# Patient Record
Sex: MALE | Race: WHITE | Employment: OTHER | URBAN - METROPOLITAN AREA
[De-identification: names, ages, dates, MRNs, and addresses within clinical notes are randomized per-mention and may not be internally consistent; named-entity substitution may affect disease eponyms.]

---

## 2017-09-29 PROBLEM — K86.2 CYSTIC MASS OF PANCREAS: Status: ACTIVE | Noted: 2017-09-29

## 2017-12-29 PROBLEM — R59.0 CERVICAL ADENOPATHY: Status: ACTIVE | Noted: 2017-12-29

## 2017-12-29 PROBLEM — M47.816 SPONDYLOSIS OF LUMBAR REGION WITHOUT MYELOPATHY OR RADICULOPATHY: Status: ACTIVE | Noted: 2017-12-29

## 2017-12-29 PROBLEM — K21.9 GASTROESOPHAGEAL REFLUX DISEASE WITHOUT ESOPHAGITIS: Status: ACTIVE | Noted: 2017-12-29

## 2018-02-26 PROBLEM — J45.909 ASTHMA: Status: ACTIVE | Noted: 2018-02-26

## 2018-04-05 ENCOUNTER — OFFICE VISIT (OUTPATIENT)
Dept: FAMILY MEDICINE CLINIC | Age: 53
End: 2018-04-05
Payer: COMMERCIAL

## 2018-04-05 VITALS
RESPIRATION RATE: 18 BRPM | BODY MASS INDEX: 27.99 KG/M2 | DIASTOLIC BLOOD PRESSURE: 78 MMHG | HEART RATE: 70 BPM | SYSTOLIC BLOOD PRESSURE: 126 MMHG | HEIGHT: 69 IN | WEIGHT: 189 LBS | OXYGEN SATURATION: 98 %

## 2018-04-05 DIAGNOSIS — K21.9 GASTROESOPHAGEAL REFLUX DISEASE WITHOUT ESOPHAGITIS: ICD-10-CM

## 2018-04-05 DIAGNOSIS — J43.9 PULMONARY EMPHYSEMA, UNSPECIFIED EMPHYSEMA TYPE (HCC): Primary | ICD-10-CM

## 2018-04-05 DIAGNOSIS — Z12.11 SCREENING FOR MALIGNANT NEOPLASM OF COLON: ICD-10-CM

## 2018-04-05 PROCEDURE — G8427 DOCREV CUR MEDS BY ELIG CLIN: HCPCS | Performed by: FAMILY MEDICINE

## 2018-04-05 PROCEDURE — 99214 OFFICE O/P EST MOD 30 MIN: CPT | Performed by: FAMILY MEDICINE

## 2018-04-05 PROCEDURE — 3017F COLORECTAL CA SCREEN DOC REV: CPT | Performed by: FAMILY MEDICINE

## 2018-04-05 PROCEDURE — 4004F PT TOBACCO SCREEN RCVD TLK: CPT | Performed by: FAMILY MEDICINE

## 2018-04-05 PROCEDURE — G8926 SPIRO NO PERF OR DOC: HCPCS | Performed by: FAMILY MEDICINE

## 2018-04-05 PROCEDURE — G8419 CALC BMI OUT NRM PARAM NOF/U: HCPCS | Performed by: FAMILY MEDICINE

## 2018-04-05 PROCEDURE — 3023F SPIROM DOC REV: CPT | Performed by: FAMILY MEDICINE

## 2018-04-05 RX ORDER — MONTELUKAST SODIUM 10 MG/1
10 TABLET ORAL NIGHTLY
Refills: 0 | Status: ON HOLD | COMMUNITY
Start: 2018-04-03 | End: 2018-12-07 | Stop reason: HOSPADM

## 2018-04-05 ASSESSMENT — ENCOUNTER SYMPTOMS
VOMITING: 0
COLOR CHANGE: 0
RHINORRHEA: 0
DIARRHEA: 0
DIFFICULTY BREATHING: 0
SHORTNESS OF BREATH: 0
APNEA: 0
HEARTBURN: 1
CONSTIPATION: 0
ABDOMINAL PAIN: 0
EYE REDNESS: 0
SORE THROAT: 0
CHOKING: 0
CHEST TIGHTNESS: 0
SINUS PRESSURE: 0
EYE PAIN: 0
WHEEZING: 0
SPUTUM PRODUCTION: 0
BACK PAIN: 0
BLOOD IN STOOL: 0
COUGH: 1
NAUSEA: 0
EYE ITCHING: 0

## 2018-04-05 ASSESSMENT — COPD QUESTIONNAIRES: COPD: 1

## 2018-04-09 ENCOUNTER — TELEPHONE (OUTPATIENT)
Dept: SURGERY | Age: 53
End: 2018-04-09

## 2018-04-12 ENCOUNTER — TELEPHONE (OUTPATIENT)
Dept: SURGERY | Age: 53
End: 2018-04-12

## 2018-05-08 ENCOUNTER — TELEPHONE (OUTPATIENT)
Dept: SURGERY | Age: 53
End: 2018-05-08

## 2018-05-10 RX ORDER — OMEPRAZOLE 20 MG/1
20 CAPSULE, DELAYED RELEASE ORAL DAILY
Qty: 90 CAPSULE | Refills: 1 | Status: SHIPPED | OUTPATIENT
Start: 2018-05-10 | End: 2018-05-30 | Stop reason: SDUPTHER

## 2018-05-14 DIAGNOSIS — J43.9 PULMONARY EMPHYSEMA, UNSPECIFIED EMPHYSEMA TYPE (HCC): Primary | ICD-10-CM

## 2018-05-14 DIAGNOSIS — J45.909 UNCOMPLICATED ASTHMA, UNSPECIFIED ASTHMA SEVERITY, UNSPECIFIED WHETHER PERSISTENT: ICD-10-CM

## 2018-05-30 DIAGNOSIS — J45.909 UNCOMPLICATED ASTHMA, UNSPECIFIED ASTHMA SEVERITY, UNSPECIFIED WHETHER PERSISTENT: ICD-10-CM

## 2018-05-30 RX ORDER — OMEPRAZOLE 20 MG/1
20 CAPSULE, DELAYED RELEASE ORAL DAILY
Qty: 90 CAPSULE | Refills: 1 | Status: SHIPPED | OUTPATIENT
Start: 2018-05-30 | End: 2018-12-26 | Stop reason: SDUPTHER

## 2018-06-19 ENCOUNTER — HOSPITAL ENCOUNTER (OUTPATIENT)
Dept: CT IMAGING | Age: 53
Discharge: HOME | End: 2018-06-21
Payer: COMMERCIAL

## 2018-06-19 DIAGNOSIS — J45.909 SEVERE ASTHMA WITHOUT COMPLICATION, UNSPECIFIED WHETHER PERSISTENT: ICD-10-CM

## 2018-06-19 PROCEDURE — 71250 CT THORAX DX C-: CPT

## 2018-07-31 ENCOUNTER — OFFICE VISIT (OUTPATIENT)
Dept: PULMONOLOGY | Age: 53
End: 2018-07-31
Payer: COMMERCIAL

## 2018-07-31 VITALS
HEIGHT: 71 IN | HEART RATE: 76 BPM | RESPIRATION RATE: 16 BRPM | BODY MASS INDEX: 25.2 KG/M2 | SYSTOLIC BLOOD PRESSURE: 137 MMHG | TEMPERATURE: 98.8 F | WEIGHT: 180 LBS | OXYGEN SATURATION: 97 % | DIASTOLIC BLOOD PRESSURE: 75 MMHG

## 2018-07-31 DIAGNOSIS — J45.909 SEVERE ASTHMA WITHOUT COMPLICATION, UNSPECIFIED WHETHER PERSISTENT: ICD-10-CM

## 2018-07-31 PROCEDURE — G8419 CALC BMI OUT NRM PARAM NOF/U: HCPCS | Performed by: INTERNAL MEDICINE

## 2018-07-31 PROCEDURE — 4004F PT TOBACCO SCREEN RCVD TLK: CPT | Performed by: INTERNAL MEDICINE

## 2018-07-31 PROCEDURE — 99213 OFFICE O/P EST LOW 20 MIN: CPT | Performed by: INTERNAL MEDICINE

## 2018-07-31 PROCEDURE — 3017F COLORECTAL CA SCREEN DOC REV: CPT | Performed by: INTERNAL MEDICINE

## 2018-07-31 PROCEDURE — G8427 DOCREV CUR MEDS BY ELIG CLIN: HCPCS | Performed by: INTERNAL MEDICINE

## 2018-10-09 ENCOUNTER — TELEPHONE (OUTPATIENT)
Dept: FAMILY MEDICINE CLINIC | Age: 53
End: 2018-10-09

## 2018-11-02 ENCOUNTER — HOSPITAL ENCOUNTER (EMERGENCY)
Age: 53
Discharge: HOME | End: 2018-11-02
Attending: EMERGENCY MEDICINE
Payer: COMMERCIAL

## 2018-11-02 ENCOUNTER — APPOINTMENT (OUTPATIENT)
Dept: CT IMAGING | Age: 53
End: 2018-11-02
Payer: COMMERCIAL

## 2018-11-02 VITALS
RESPIRATION RATE: 18 BRPM | OXYGEN SATURATION: 95 % | BODY MASS INDEX: 25.2 KG/M2 | HEART RATE: 98 BPM | SYSTOLIC BLOOD PRESSURE: 136 MMHG | HEIGHT: 71 IN | WEIGHT: 180 LBS | TEMPERATURE: 98.4 F | DIASTOLIC BLOOD PRESSURE: 80 MMHG

## 2018-11-02 DIAGNOSIS — R10.30 LOWER ABDOMINAL PAIN: Primary | ICD-10-CM

## 2018-11-02 DIAGNOSIS — R19.7 DIARRHEA, UNSPECIFIED TYPE: ICD-10-CM

## 2018-11-02 DIAGNOSIS — F10.920 ACUTE ALCOHOLIC INTOXICATION WITHOUT COMPLICATION (HCC): ICD-10-CM

## 2018-11-02 LAB
ACETAMINOPHEN LEVEL: <5 MCG/ML (ref 10–30)
ALBUMIN SERPL-MCNC: 3.5 G/DL (ref 3.5–5.2)
ALP BLD-CCNC: 100 U/L (ref 40–129)
ALT SERPL-CCNC: 123 U/L (ref 0–40)
AMPHETAMINE SCREEN, URINE: NOT DETECTED
AMYLASE: 53 U/L (ref 20–100)
ANION GAP SERPL CALCULATED.3IONS-SCNC: 14 MMOL/L (ref 7–16)
AST SERPL-CCNC: 162 U/L (ref 0–39)
BARBITURATE SCREEN URINE: NOT DETECTED
BASOPHILS ABSOLUTE: 0.02 E9/L (ref 0–0.2)
BASOPHILS RELATIVE PERCENT: 0.5 % (ref 0–2)
BENZODIAZEPINE SCREEN, URINE: NOT DETECTED
BILIRUB SERPL-MCNC: 0.5 MG/DL (ref 0–1.2)
BILIRUBIN DIRECT: 0.2 MG/DL (ref 0–0.3)
BILIRUBIN URINE: NEGATIVE
BILIRUBIN, INDIRECT: 0.3 MG/DL (ref 0–1)
BLOOD, URINE: NEGATIVE
BUN BLDV-MCNC: 3 MG/DL (ref 6–20)
CALCIUM SERPL-MCNC: 8.6 MG/DL (ref 8.6–10.2)
CANNABINOID SCREEN URINE: NOT DETECTED
CHLORIDE BLD-SCNC: 92 MMOL/L (ref 98–107)
CLARITY: CLEAR
CO2: 24 MMOL/L (ref 22–29)
COCAINE METABOLITE SCREEN URINE: NOT DETECTED
COLOR: YELLOW
CREAT SERPL-MCNC: 0.5 MG/DL (ref 0.7–1.2)
EOSINOPHILS ABSOLUTE: 0.07 E9/L (ref 0.05–0.5)
EOSINOPHILS RELATIVE PERCENT: 1.8 % (ref 0–6)
ETHANOL: 128 MG/DL (ref 0–0.08)
ETHANOL: 174 MG/DL (ref 0–0.08)
ETHANOL: 307 MG/DL (ref 0–0.08)
GFR AFRICAN AMERICAN: >60
GFR NON-AFRICAN AMERICAN: >60 ML/MIN/1.73
GLUCOSE BLD-MCNC: 94 MG/DL (ref 74–109)
GLUCOSE URINE: NEGATIVE MG/DL
HCT VFR BLD CALC: 39.9 % (ref 37–54)
HEMOGLOBIN: 14.4 G/DL (ref 12.5–16.5)
IMMATURE GRANULOCYTES #: 0.01 E9/L
IMMATURE GRANULOCYTES %: 0.3 % (ref 0–5)
KETONES, URINE: NEGATIVE MG/DL
LACTIC ACID: 2.7 MMOL/L (ref 0.5–2.2)
LACTIC ACID: 3 MMOL/L (ref 0.5–2.2)
LEUKOCYTE ESTERASE, URINE: NEGATIVE
LIPASE: 56 U/L (ref 13–60)
LYMPHOCYTES ABSOLUTE: 1.84 E9/L (ref 1.5–4)
LYMPHOCYTES RELATIVE PERCENT: 47.8 % (ref 20–42)
MCH RBC QN AUTO: 36.1 PG (ref 26–35)
MCHC RBC AUTO-ENTMCNC: 36.1 % (ref 32–34.5)
MCV RBC AUTO: 100 FL (ref 80–99.9)
METHADONE SCREEN, URINE: NOT DETECTED
MONOCYTES ABSOLUTE: 0.34 E9/L (ref 0.1–0.95)
MONOCYTES RELATIVE PERCENT: 8.8 % (ref 2–12)
NEUTROPHILS ABSOLUTE: 1.57 E9/L (ref 1.8–7.3)
NEUTROPHILS RELATIVE PERCENT: 40.8 % (ref 43–80)
NITRITE, URINE: NEGATIVE
OPIATE SCREEN URINE: NOT DETECTED
PDW BLD-RTO: 11.9 FL (ref 11.5–15)
PH UA: 6 (ref 5–9)
PHENCYCLIDINE SCREEN URINE: NOT DETECTED
PLATELET # BLD: 66 E9/L (ref 130–450)
PLATELET CONFIRMATION: NORMAL
PMV BLD AUTO: 11.6 FL (ref 7–12)
POTASSIUM SERPL-SCNC: 3.7 MMOL/L (ref 3.5–5)
PROPOXYPHENE SCREEN: NOT DETECTED
PROTEIN UA: NEGATIVE MG/DL
RBC # BLD: 3.99 E12/L (ref 3.8–5.8)
SALICYLATE, SERUM: <0.3 MG/DL (ref 0–30)
SODIUM BLD-SCNC: 130 MMOL/L (ref 132–146)
SPECIFIC GRAVITY UA: <=1.005 (ref 1–1.03)
TOTAL PROTEIN: 6.4 G/DL (ref 6.4–8.3)
TRICYCLIC ANTIDEPRESSANTS SCREEN SERUM: NEGATIVE NG/ML
UROBILINOGEN, URINE: 0.2 E.U./DL
WBC # BLD: 3.9 E9/L (ref 4.5–11.5)

## 2018-11-02 PROCEDURE — 74176 CT ABD & PELVIS W/O CONTRAST: CPT

## 2018-11-02 PROCEDURE — 6360000002 HC RX W HCPCS: Performed by: PHYSICIAN ASSISTANT

## 2018-11-02 PROCEDURE — 80048 BASIC METABOLIC PNL TOTAL CA: CPT

## 2018-11-02 PROCEDURE — 96366 THER/PROPH/DIAG IV INF ADDON: CPT

## 2018-11-02 PROCEDURE — 82150 ASSAY OF AMYLASE: CPT

## 2018-11-02 PROCEDURE — 83605 ASSAY OF LACTIC ACID: CPT

## 2018-11-02 PROCEDURE — 2500000003 HC RX 250 WO HCPCS: Performed by: PHYSICIAN ASSISTANT

## 2018-11-02 PROCEDURE — 2580000003 HC RX 258: Performed by: PHYSICIAN ASSISTANT

## 2018-11-02 PROCEDURE — 36415 COLL VENOUS BLD VENIPUNCTURE: CPT

## 2018-11-02 PROCEDURE — 96375 TX/PRO/DX INJ NEW DRUG ADDON: CPT

## 2018-11-02 PROCEDURE — 99284 EMERGENCY DEPT VISIT MOD MDM: CPT

## 2018-11-02 PROCEDURE — 81003 URINALYSIS AUTO W/O SCOPE: CPT

## 2018-11-02 PROCEDURE — G0480 DRUG TEST DEF 1-7 CLASSES: HCPCS

## 2018-11-02 PROCEDURE — 83690 ASSAY OF LIPASE: CPT

## 2018-11-02 PROCEDURE — 96365 THER/PROPH/DIAG IV INF INIT: CPT

## 2018-11-02 PROCEDURE — 80307 DRUG TEST PRSMV CHEM ANLYZR: CPT

## 2018-11-02 PROCEDURE — 85025 COMPLETE CBC W/AUTO DIFF WBC: CPT

## 2018-11-02 PROCEDURE — 80076 HEPATIC FUNCTION PANEL: CPT

## 2018-11-02 RX ORDER — DICYCLOMINE HYDROCHLORIDE 10 MG/1
10 CAPSULE ORAL
Qty: 20 CAPSULE | Refills: 0 | Status: SHIPPED | OUTPATIENT
Start: 2018-11-02 | End: 2018-11-30 | Stop reason: ALTCHOICE

## 2018-11-02 RX ORDER — ONDANSETRON 2 MG/ML
4 INJECTION INTRAMUSCULAR; INTRAVENOUS ONCE
Status: COMPLETED | OUTPATIENT
Start: 2018-11-02 | End: 2018-11-02

## 2018-11-02 RX ORDER — ONDANSETRON 4 MG/1
4 TABLET, FILM COATED ORAL EVERY 8 HOURS PRN
Qty: 12 TABLET | Refills: 0 | Status: SHIPPED | OUTPATIENT
Start: 2018-11-02 | End: 2018-11-07

## 2018-11-02 RX ORDER — SODIUM CHLORIDE 0.9 % (FLUSH) 0.9 %
SYRINGE (ML) INJECTION
Status: DISCONTINUED
Start: 2018-11-02 | End: 2018-11-02 | Stop reason: HOSPADM

## 2018-11-02 RX ORDER — 0.9 % SODIUM CHLORIDE 0.9 %
1000 INTRAVENOUS SOLUTION INTRAVENOUS ONCE
Status: COMPLETED | OUTPATIENT
Start: 2018-11-02 | End: 2018-11-02

## 2018-11-02 RX ADMIN — ONDANSETRON 4 MG: 2 INJECTION INTRAMUSCULAR; INTRAVENOUS at 02:57

## 2018-11-02 RX ADMIN — SODIUM CHLORIDE 1000 ML: 9 INJECTION, SOLUTION INTRAVENOUS at 02:57

## 2018-11-02 RX ADMIN — FOLIC ACID: 5 INJECTION, SOLUTION INTRAMUSCULAR; INTRAVENOUS; SUBCUTANEOUS at 04:33

## 2018-11-02 ASSESSMENT — PAIN DESCRIPTION - PAIN TYPE: TYPE: ACUTE PAIN

## 2018-11-02 ASSESSMENT — PAIN DESCRIPTION - LOCATION: LOCATION: ABDOMEN

## 2018-11-02 ASSESSMENT — PAIN SCALES - GENERAL: PAINLEVEL_OUTOF10: 10

## 2018-11-02 NOTE — ED PROVIDER NOTES
[facility]       Department of Emergency Medicine   ED  Provider Note  Admit Date/Time: 11/2/2018  2:03 AM  ED Bed: 22/22  Chief Complaint     Abdominal Pain    History of Present Illness   Source of history provided by:  patient. History/Exam Limitations: none. Gaviota Dennis is a 48 y.o. old male who has a past medical history of:   Past Medical History:   Diagnosis Date    Arthritis     Asthma 2/26/2018    Cancer [facility]) Diagnosed 2013    Pancreatic cancer    Chronic back pain     Plates inseted from C0-T4    COPD [facility] obstructive [facility] disease) [facility] [address]     Emphysema lung ([facility] [address]     Hepatitis C 2011    presents to the emergency department by private vehicle, for complaints of gradual onset, still present aching, colicky, cramping pain in the lower abdomen without radiation which began a few day(s) prior to arrival.  There has been no similar episodes in the past.   Since onset the symptoms have been persistent. The pain is associated with pt is drunk. The pain is aggravated by movement and relieved by nothing. There has been NO back pain, chills, constipation, diarrhea or vomiting. Pt is drunk and not reliable historian. .  ROS   Pertinent positives and negatives are stated within HPI, all other systems reviewed and are negative. Past Surgical History:   Procedure Laterality Date    BACK SURGERY      LEG SURGERY Left     Pins inserted   Social History:  reports that he has been smoking. He has a 30.00 pack-year smoking history. He has never used smokeless tobacco. He reports that he drinks alcohol. He reports that he does not use drugs. Family History: family history includes Breast Cancer in his mother; Heart Disease in his father. Allergies: Shellfish-derived products;  Strawberry extract; and Penicillins    Physical Exam           ED Triage Vitals [11/02/18 0209]   BP Temp Temp Source Pulse Resp SpO2 Height Weight   119/61 98.4 °F (36.9 °C) Oral 95 14 96 % 5' 11\" (1.803 m) ng/mL    Opiate Scrn, Ur NOT DETECTED Negative < 300ng/mL    PCP Screen, Urine NOT DETECTED Negative < 25 ng/mL    Methadone Screen, Urine NOT DETECTED Negative <300 ng/mL    Propoxyphene Scrn, Ur NOT DETECTED Negative <300 ng/mL   Platelet Confirmation   Result Value Ref Range    Platelet Confirmation CONFIRMED      Imaging: All Radiology results interpreted by Radiologist unless otherwise noted. CT ABDOMEN PELVIS WO CONTRAST   Final Result   1. Nondilated, gas and fluid-filled colon, compatible with diarrhea. 2. Normal appendix. This report has been electronically signed by Maximilian Felder MD.          ED Course / Medical Decision Making     Medications   sodium chloride 0.9 % 5,609 mL with folic acid 1 mg, adult multi-vitamin with vitamin k 10 mL, thiamine 100 mg (not administered)   sodium chloride flush 0.9 % injection (not administered)   ondansetron (ZOFRAN) injection 4 mg (4 mg Intravenous Given 11/2/18 0257)   0.9 % sodium chloride bolus (0 mLs Intravenous Stopped 11/2/18 0337)        Re-Evaluations:  11/2/18      Time: Pt is resting quietly    Patients symptoms show no change. Consultations:             None    Procedures:   none    MDM:  PT presents with abdominal pain, reportedly for a few months, pt is intoxicated and not a good historian. CT showing diarrheal illness and etoh 307. Pt states he does not have ride home. Held in ER until able to be discharged. PT asked to be driven to this hospital bypassing closer hospital on the way. Pt receiving IV fluids and nausea medication inED. Pt to obtain ride home or be dc when sober. CAre endorsed to Dr. Jeanna Cotto at 0400. Counseling:   I have spoken with the patient and discussed todays results, in addition to providing specific details for the plan of care and counseling regarding the diagnosis and prognosis and are agreeable with the plan. Assessment      1. Lower abdominal pain    2. Diarrhea, unspecified type    3.  Acute alcoholic intoxication without complication [facility])      This patient's ED course included: a personal history and physicial examination and re-evaluation prior to disposition  This patient has remained hemodynamically stable during their ED course. Plan   Discharge to home. Patient condition is stable. New Medications     New Prescriptions    No medications on file     Electronically signed by Scot Schultz PA-C   DD: 11/2/18  **This report was transcribed using voice recognition software. Every effort was made to ensure accuracy; however, inadvertent computerized transcription errors may be present.   END OF PROVIDER NOTE       Scot Schultz PA-C  11/02/18 [facility] RICKEY Jeong  11/02/18 6693

## 2018-11-21 DIAGNOSIS — D13.6 PANCREATIC CYSTADENOMA: Primary | ICD-10-CM

## 2018-11-26 ENCOUNTER — TELEPHONE (OUTPATIENT)
Dept: SURGERY | Age: 53
End: 2018-11-26

## 2018-11-26 NOTE — TELEPHONE ENCOUNTER
I called the patient's sister and shared with her that the patient is scheduled at [facility] on 12/14/18 at 11:00 am for his MRI. I also shared with her that the patient must NPO 4 hours prior to the test.   [facility] patient's sister stated she will try to tell the patient but he has not been getting his psych. Shots, which is making it hard for her to talk to him. I shared with her to let me know if the patient is unable or can not get the MRI.      Electronically signed by Luis Miguel Roldan on 11/26/18 at 3:12 PM

## 2018-11-29 DIAGNOSIS — K86.2 CYSTIC MASS OF PANCREAS: Primary | ICD-10-CM

## 2018-11-30 ENCOUNTER — HOSPITAL ENCOUNTER (INPATIENT)
Age: 53
LOS: 7 days | Discharge: HOME | DRG: 885 | End: 2018-12-07
Attending: EMERGENCY MEDICINE | Admitting: PSYCHIATRY & NEUROLOGY
Payer: COMMERCIAL

## 2018-11-30 DIAGNOSIS — R45.851 SUICIDAL IDEATION: Primary | ICD-10-CM

## 2018-11-30 PROBLEM — F32.A DEPRESSION: Status: ACTIVE | Noted: 2018-11-30

## 2018-11-30 PROBLEM — F32.2 SEVERE MAJOR DEPRESSION WITHOUT PSYCHOTIC FEATURES (HCC): Status: ACTIVE | Noted: 2018-11-30

## 2018-11-30 LAB
ACETAMINOPHEN LEVEL: <5 MCG/ML (ref 10–30)
ALBUMIN SERPL-MCNC: 3.9 G/DL (ref 3.5–5.2)
ALP BLD-CCNC: 67 U/L (ref 40–129)
ALT SERPL-CCNC: 34 U/L (ref 0–40)
AMPHETAMINE SCREEN, URINE: NOT DETECTED
ANION GAP SERPL CALCULATED.3IONS-SCNC: 12 MMOL/L (ref 7–16)
AST SERPL-CCNC: 32 U/L (ref 0–39)
BACTERIA: NORMAL /HPF
BARBITURATE SCREEN URINE: NOT DETECTED
BASOPHILS ABSOLUTE: 0.05 E9/L (ref 0–0.2)
BASOPHILS RELATIVE PERCENT: 0.9 % (ref 0–2)
BENZODIAZEPINE SCREEN, URINE: NOT DETECTED
BILIRUB SERPL-MCNC: 0.2 MG/DL (ref 0–1.2)
BILIRUBIN URINE: NEGATIVE
BLOOD, URINE: NEGATIVE
BUN BLDV-MCNC: 3 MG/DL (ref 6–20)
CALCIUM SERPL-MCNC: 8.5 MG/DL (ref 8.6–10.2)
CANNABINOID SCREEN URINE: NOT DETECTED
CHLORIDE BLD-SCNC: 96 MMOL/L (ref 98–107)
CLARITY: CLEAR
CO2: 26 MMOL/L (ref 22–29)
COCAINE METABOLITE SCREEN URINE: NOT DETECTED
COLOR: YELLOW
CREAT SERPL-MCNC: 0.6 MG/DL (ref 0.7–1.2)
EKG ATRIAL RATE: 76 BPM
EKG P AXIS: 60 DEGREES
EKG P-R INTERVAL: 138 MS
EKG Q-T INTERVAL: 386 MS
EKG QRS DURATION: 80 MS
EKG QTC CALCULATION (BAZETT): 434 MS
EKG R AXIS: 41 DEGREES
EKG T AXIS: 67 DEGREES
EKG VENTRICULAR RATE: 76 BPM
EOSINOPHILS ABSOLUTE: 0.04 E9/L (ref 0.05–0.5)
EOSINOPHILS RELATIVE PERCENT: 0.7 % (ref 0–6)
ETHANOL: 248 MG/DL (ref 0–0.08)
GFR AFRICAN AMERICAN: >60
GFR NON-AFRICAN AMERICAN: >60 ML/MIN/1.73
GLUCOSE BLD-MCNC: 95 MG/DL (ref 74–99)
GLUCOSE URINE: NEGATIVE MG/DL
HCT VFR BLD CALC: 42.9 % (ref 37–54)
HEMOGLOBIN: 14.5 G/DL (ref 12.5–16.5)
IMMATURE GRANULOCYTES #: 0.02 E9/L
IMMATURE GRANULOCYTES %: 0.4 % (ref 0–5)
KETONES, URINE: NEGATIVE MG/DL
LEUKOCYTE ESTERASE, URINE: ABNORMAL
LYMPHOCYTES ABSOLUTE: 1.94 E9/L (ref 1.5–4)
LYMPHOCYTES RELATIVE PERCENT: 35.8 % (ref 20–42)
MCH RBC QN AUTO: 35.3 PG (ref 26–35)
MCHC RBC AUTO-ENTMCNC: 33.8 % (ref 32–34.5)
MCV RBC AUTO: 104.4 FL (ref 80–99.9)
METHADONE SCREEN, URINE: NOT DETECTED
MONOCYTES ABSOLUTE: 0.57 E9/L (ref 0.1–0.95)
MONOCYTES RELATIVE PERCENT: 10.5 % (ref 2–12)
NEUTROPHILS ABSOLUTE: 2.8 E9/L (ref 1.8–7.3)
NEUTROPHILS RELATIVE PERCENT: 51.7 % (ref 43–80)
NITRITE, URINE: NEGATIVE
OPIATE SCREEN URINE: NOT DETECTED
PDW BLD-RTO: 12.4 FL (ref 11.5–15)
PH UA: 5 (ref 5–9)
PHENCYCLIDINE SCREEN URINE: NOT DETECTED
PLATELET # BLD: 157 E9/L (ref 130–450)
PMV BLD AUTO: 10.4 FL (ref 7–12)
POTASSIUM SERPL-SCNC: 4.1 MMOL/L (ref 3.5–5)
PROPOXYPHENE SCREEN: NOT DETECTED
PROTEIN UA: NEGATIVE MG/DL
RBC # BLD: 4.11 E12/L (ref 3.8–5.8)
RBC UA: NORMAL /HPF (ref 0–2)
SALICYLATE, SERUM: <0.3 MG/DL (ref 0–30)
SODIUM BLD-SCNC: 134 MMOL/L (ref 132–146)
SPECIFIC GRAVITY UA: <=1.005 (ref 1–1.03)
TOTAL PROTEIN: 7 G/DL (ref 6.4–8.3)
TRICYCLIC ANTIDEPRESSANTS SCREEN SERUM: NEGATIVE NG/ML
TROPONIN: <0.01 NG/ML (ref 0–0.03)
UROBILINOGEN, URINE: 0.2 E.U./DL
WBC # BLD: 5.4 E9/L (ref 4.5–11.5)
WBC UA: NORMAL /HPF (ref 0–5)

## 2018-11-30 PROCEDURE — 94640 AIRWAY INHALATION TREATMENT: CPT

## 2018-11-30 PROCEDURE — 36415 COLL VENOUS BLD VENIPUNCTURE: CPT

## 2018-11-30 PROCEDURE — 80053 COMPREHEN METABOLIC PANEL: CPT

## 2018-11-30 PROCEDURE — 85025 COMPLETE CBC W/AUTO DIFF WBC: CPT

## 2018-11-30 PROCEDURE — 1240000000 HC EMOTIONAL WELLNESS R&B

## 2018-11-30 PROCEDURE — 80307 DRUG TEST PRSMV CHEM ANLYZR: CPT

## 2018-11-30 PROCEDURE — 99285 EMERGENCY DEPT VISIT HI MDM: CPT

## 2018-11-30 PROCEDURE — G0480 DRUG TEST DEF 1-7 CLASSES: HCPCS

## 2018-11-30 PROCEDURE — 84484 ASSAY OF TROPONIN QUANT: CPT

## 2018-11-30 PROCEDURE — 81001 URINALYSIS AUTO W/SCOPE: CPT

## 2018-11-30 PROCEDURE — 6370000000 HC RX 637 (ALT 250 FOR IP): Performed by: NURSE PRACTITIONER

## 2018-11-30 PROCEDURE — 6360000002 HC RX W HCPCS: Performed by: NURSE PRACTITIONER

## 2018-11-30 RX ORDER — ACETAMINOPHEN 325 MG/1
650 TABLET ORAL EVERY 4 HOURS PRN
Status: DISCONTINUED | OUTPATIENT
Start: 2018-11-30 | End: 2018-12-07 | Stop reason: HOSPADM

## 2018-11-30 RX ORDER — ALBUTEROL SULFATE 90 UG/1
2 AEROSOL, METERED RESPIRATORY (INHALATION) EVERY 6 HOURS PRN
Status: DISCONTINUED | OUTPATIENT
Start: 2018-11-30 | End: 2018-12-07 | Stop reason: HOSPADM

## 2018-11-30 RX ORDER — HALOPERIDOL 5 MG/ML
10 INJECTION INTRAMUSCULAR EVERY 6 HOURS PRN
Status: DISCONTINUED | OUTPATIENT
Start: 2018-11-30 | End: 2018-12-07 | Stop reason: HOSPADM

## 2018-11-30 RX ORDER — LORAZEPAM 2 MG/ML
3 INJECTION INTRAMUSCULAR
Status: DISCONTINUED | OUTPATIENT
Start: 2018-11-30 | End: 2018-12-07 | Stop reason: HOSPADM

## 2018-11-30 RX ORDER — LORAZEPAM 1 MG/1
3 TABLET ORAL
Status: DISCONTINUED | OUTPATIENT
Start: 2018-11-30 | End: 2018-12-07 | Stop reason: HOSPADM

## 2018-11-30 RX ORDER — LORAZEPAM 1 MG/1
1 TABLET ORAL
Status: DISCONTINUED | OUTPATIENT
Start: 2018-11-30 | End: 2018-12-07 | Stop reason: HOSPADM

## 2018-11-30 RX ORDER — ONDANSETRON 2 MG/ML
4 INJECTION INTRAMUSCULAR; INTRAVENOUS EVERY 6 HOURS PRN
Status: DISCONTINUED | OUTPATIENT
Start: 2018-11-30 | End: 2018-12-07 | Stop reason: HOSPADM

## 2018-11-30 RX ORDER — LORAZEPAM 1 MG/1
4 TABLET ORAL
Status: DISCONTINUED | OUTPATIENT
Start: 2018-11-30 | End: 2018-12-07 | Stop reason: HOSPADM

## 2018-11-30 RX ORDER — MONTELUKAST SODIUM 10 MG/1
10 TABLET ORAL NIGHTLY
Status: DISCONTINUED | OUTPATIENT
Start: 2018-11-30 | End: 2018-12-07 | Stop reason: HOSPADM

## 2018-11-30 RX ORDER — TRAZODONE HYDROCHLORIDE 50 MG/1
50 TABLET ORAL NIGHTLY PRN
Status: DISCONTINUED | OUTPATIENT
Start: 2018-11-30 | End: 2018-12-07 | Stop reason: HOSPADM

## 2018-11-30 RX ORDER — LORAZEPAM 1 MG/1
2 TABLET ORAL
Status: DISCONTINUED | OUTPATIENT
Start: 2018-11-30 | End: 2018-12-07 | Stop reason: HOSPADM

## 2018-11-30 RX ORDER — LORAZEPAM 2 MG/ML
4 INJECTION INTRAMUSCULAR
Status: DISCONTINUED | OUTPATIENT
Start: 2018-11-30 | End: 2018-12-07 | Stop reason: HOSPADM

## 2018-11-30 RX ORDER — MULTIVITAMIN WITH FOLIC ACID 400 MCG
1 TABLET ORAL DAILY
Status: DISCONTINUED | OUTPATIENT
Start: 2018-11-30 | End: 2018-12-07 | Stop reason: HOSPADM

## 2018-11-30 RX ORDER — PANTOPRAZOLE SODIUM 40 MG/1
40 TABLET, DELAYED RELEASE ORAL
Status: DISCONTINUED | OUTPATIENT
Start: 2018-12-01 | End: 2018-12-07 | Stop reason: HOSPADM

## 2018-11-30 RX ORDER — THIAMINE MONONITRATE (VIT B1) 100 MG
100 TABLET ORAL DAILY
Status: DISCONTINUED | OUTPATIENT
Start: 2018-11-30 | End: 2018-12-07 | Stop reason: HOSPADM

## 2018-11-30 RX ORDER — LORAZEPAM 2 MG/ML
2 INJECTION INTRAMUSCULAR
Status: DISCONTINUED | OUTPATIENT
Start: 2018-11-30 | End: 2018-12-07 | Stop reason: HOSPADM

## 2018-11-30 RX ORDER — LORAZEPAM 2 MG/ML
1 INJECTION INTRAMUSCULAR
Status: DISCONTINUED | OUTPATIENT
Start: 2018-11-30 | End: 2018-12-07 | Stop reason: HOSPADM

## 2018-11-30 RX ORDER — MAGNESIUM HYDROXIDE/ALUMINUM HYDROXICE/SIMETHICONE 120; 1200; 1200 MG/30ML; MG/30ML; MG/30ML
30 SUSPENSION ORAL PRN
Status: DISCONTINUED | OUTPATIENT
Start: 2018-11-30 | End: 2018-12-07 | Stop reason: HOSPADM

## 2018-11-30 RX ORDER — HYDROXYZINE PAMOATE 50 MG/1
50 CAPSULE ORAL EVERY 6 HOURS PRN
Status: DISCONTINUED | OUTPATIENT
Start: 2018-11-30 | End: 2018-12-07 | Stop reason: HOSPADM

## 2018-11-30 RX ORDER — ALBUTEROL SULFATE 2.5 MG/3ML
2.5 SOLUTION RESPIRATORY (INHALATION) 3 TIMES DAILY
Status: DISCONTINUED | OUTPATIENT
Start: 2018-11-30 | End: 2018-12-07 | Stop reason: HOSPADM

## 2018-11-30 RX ORDER — FOLIC ACID 1 MG/1
1 TABLET ORAL DAILY
Status: DISCONTINUED | OUTPATIENT
Start: 2018-11-30 | End: 2018-12-07 | Stop reason: HOSPADM

## 2018-11-30 RX ORDER — BENZTROPINE MESYLATE 1 MG/ML
2 INJECTION INTRAMUSCULAR; INTRAVENOUS 2 TIMES DAILY PRN
Status: DISCONTINUED | OUTPATIENT
Start: 2018-11-30 | End: 2018-12-07 | Stop reason: HOSPADM

## 2018-11-30 RX ORDER — OLANZAPINE 10 MG/1
10 TABLET ORAL
Status: ACTIVE | OUTPATIENT
Start: 2018-11-30 | End: 2018-11-30

## 2018-11-30 RX ORDER — NICOTINE 21 MG/24HR
1 PATCH, TRANSDERMAL 24 HOURS TRANSDERMAL DAILY
Status: DISCONTINUED | OUTPATIENT
Start: 2018-11-30 | End: 2018-12-07 | Stop reason: HOSPADM

## 2018-11-30 RX ADMIN — MOMETASONE FUROATE AND FORMOTEROL FUMARATE DIHYDRATE 2 PUFF: 200; 5 AEROSOL RESPIRATORY (INHALATION) at 21:24

## 2018-11-30 RX ADMIN — MULTIVITAMIN TABLET 1 TABLET: TABLET at 18:39

## 2018-11-30 RX ADMIN — FOLIC ACID 1 MG: 1 TABLET ORAL at 18:39

## 2018-11-30 RX ADMIN — TRAZODONE HYDROCHLORIDE 50 MG: 50 TABLET ORAL at 20:39

## 2018-11-30 RX ADMIN — MONTELUKAST SODIUM 10 MG: 10 TABLET, FILM COATED ORAL at 20:31

## 2018-11-30 RX ADMIN — ACETAMINOPHEN 650 MG: 325 TABLET, FILM COATED ORAL at 20:31

## 2018-11-30 RX ADMIN — Medication 100 MG: at 18:39

## 2018-11-30 RX ADMIN — ALBUTEROL SULFATE 2.5 MG: 2.5 SOLUTION RESPIRATORY (INHALATION) at 20:00

## 2018-11-30 RX ADMIN — TIOTROPIUM BROMIDE 18 MCG: 18 CAPSULE ORAL; RESPIRATORY (INHALATION) at 20:39

## 2018-11-30 ASSESSMENT — PAIN DESCRIPTION - ORIENTATION: ORIENTATION: LOWER

## 2018-11-30 ASSESSMENT — SLEEP AND FATIGUE QUESTIONNAIRES
DIFFICULTY STAYING ASLEEP: YES
SLEEP PATTERN: DIFFICULTY FALLING ASLEEP;DISTURBED/INTERRUPTED SLEEP
DIFFICULTY ARISING: NO
DIFFICULTY FALLING ASLEEP: YES
DO YOU USE A SLEEP AID: NO
RESTFUL SLEEP: NO
DO YOU HAVE DIFFICULTY SLEEPING: YES
AVERAGE NUMBER OF SLEEP HOURS: 4

## 2018-11-30 ASSESSMENT — PAIN SCALES - GENERAL
PAINLEVEL_OUTOF10: 10
PAINLEVEL_OUTOF10: 10
PAINLEVEL_OUTOF10: 0

## 2018-11-30 ASSESSMENT — LIFESTYLE VARIABLES: HISTORY_ALCOHOL_USE: YES

## 2018-11-30 ASSESSMENT — PAIN DESCRIPTION - PAIN TYPE
TYPE: CHRONIC PAIN
TYPE: CHRONIC PAIN

## 2018-11-30 ASSESSMENT — PAIN DESCRIPTION - DESCRIPTORS
DESCRIPTORS: ACHING;DISCOMFORT
DESCRIPTORS: ACHING;CONSTANT

## 2018-11-30 ASSESSMENT — PAIN DESCRIPTION - ONSET: ONSET: ON-GOING

## 2018-11-30 ASSESSMENT — PAIN DESCRIPTION - FREQUENCY: FREQUENCY: CONTINUOUS

## 2018-11-30 ASSESSMENT — PAIN DESCRIPTION - LOCATION
LOCATION: BACK
LOCATION: BACK

## 2018-11-30 ASSESSMENT — PATIENT HEALTH QUESTIONNAIRE - PHQ9: SUM OF ALL RESPONSES TO PHQ QUESTIONS 1-9: 12

## 2018-11-30 NOTE — ED PROVIDER NOTES
and counseling regarding the diagnosis and prognosis. Questions are answered at this time and they are agreeable with the plan.         --------------------------------- IMPRESSION AND DISPOSITION ---------------------------------    IMPRESSION  1.  Suicidal ideation        DISPOSITION  Disposition: as per consultation   Patient condition is stable             Iveth Cee, ASHLIE - CNP  11/30/18 5386

## 2018-12-01 LAB
CHOLESTEROL, TOTAL: 179 MG/DL (ref 0–199)
HBA1C MFR BLD: 4.6 % (ref 4–5.6)
HDLC SERPL-MCNC: 56 MG/DL
LDL CHOLESTEROL CALCULATED: 108 MG/DL (ref 0–99)
TRIGL SERPL-MCNC: 76 MG/DL (ref 0–149)
VLDLC SERPL CALC-MCNC: 15 MG/DL

## 2018-12-01 PROCEDURE — 1240000000 HC EMOTIONAL WELLNESS R&B

## 2018-12-01 PROCEDURE — 6370000000 HC RX 637 (ALT 250 FOR IP): Performed by: PSYCHIATRY & NEUROLOGY

## 2018-12-01 PROCEDURE — 36415 COLL VENOUS BLD VENIPUNCTURE: CPT

## 2018-12-01 PROCEDURE — 99221 1ST HOSP IP/OBS SF/LOW 40: CPT | Performed by: PSYCHIATRY & NEUROLOGY

## 2018-12-01 PROCEDURE — 6370000000 HC RX 637 (ALT 250 FOR IP): Performed by: NURSE PRACTITIONER

## 2018-12-01 PROCEDURE — 83036 HEMOGLOBIN GLYCOSYLATED A1C: CPT

## 2018-12-01 PROCEDURE — 94640 AIRWAY INHALATION TREATMENT: CPT

## 2018-12-01 PROCEDURE — 80061 LIPID PANEL: CPT

## 2018-12-01 PROCEDURE — 6360000002 HC RX W HCPCS: Performed by: NURSE PRACTITIONER

## 2018-12-01 RX ORDER — TRAZODONE HYDROCHLORIDE 50 MG/1
50 TABLET ORAL NIGHTLY
Status: DISCONTINUED | OUTPATIENT
Start: 2018-12-01 | End: 2018-12-07 | Stop reason: HOSPADM

## 2018-12-01 RX ORDER — VENLAFAXINE HYDROCHLORIDE 37.5 MG/1
37.5 CAPSULE, EXTENDED RELEASE ORAL EVERY MORNING
Status: DISCONTINUED | OUTPATIENT
Start: 2018-12-01 | End: 2018-12-02

## 2018-12-01 RX ADMIN — ALBUTEROL SULFATE 2.5 MG: 2.5 SOLUTION RESPIRATORY (INHALATION) at 12:27

## 2018-12-01 RX ADMIN — FOLIC ACID 1 MG: 1 TABLET ORAL at 08:20

## 2018-12-01 RX ADMIN — MULTIVITAMIN TABLET 1 TABLET: TABLET at 08:21

## 2018-12-01 RX ADMIN — MOMETASONE FUROATE AND FORMOTEROL FUMARATE DIHYDRATE 2 PUFF: 200; 5 AEROSOL RESPIRATORY (INHALATION) at 21:06

## 2018-12-01 RX ADMIN — Medication 100 MG: at 08:20

## 2018-12-01 RX ADMIN — TRAZODONE HYDROCHLORIDE 50 MG: 50 TABLET ORAL at 21:07

## 2018-12-01 RX ADMIN — PANTOPRAZOLE SODIUM 40 MG: 40 TABLET, DELAYED RELEASE ORAL at 06:43

## 2018-12-01 RX ADMIN — VENLAFAXINE HYDROCHLORIDE 37.5 MG: 37.5 CAPSULE, EXTENDED RELEASE ORAL at 12:35

## 2018-12-01 RX ADMIN — MONTELUKAST SODIUM 10 MG: 10 TABLET, FILM COATED ORAL at 21:07

## 2018-12-01 RX ADMIN — ACETAMINOPHEN 650 MG: 325 TABLET, FILM COATED ORAL at 06:51

## 2018-12-01 ASSESSMENT — PAIN SCALES - GENERAL
PAINLEVEL_OUTOF10: 0
PAINLEVEL_OUTOF10: 10

## 2018-12-01 NOTE — H&P
Physical Exam:        Medical Review of Systems:     All other than marked systmes have been reviewed and are all negative. Constitutional Symptoms: []  fever []  Chills  Skin Symptoms: [] rash []  Pruritus   Eye Symptoms: [] Vision unchanged []  recent vision problems[] blurred vision   Respiratory Symptoms:[] shortness of breath [] cough  Cardiovascular Symptoms:  [] chest pain   [] palpitations   Gastrointestinal Symptoms: []  abdominal pain []  nausea []  vomiting []  diarrhea  Genitourinary Symptoms: []  dysuria  []  hematuria   Musculoskeletal Symptoms: []  back pain []  muscle pain []  joint pain  Neurologic Symptoms: []  headache []  dizziness  Hematolymphoid Symptoms: [] Adenopathy [] Bruises   [] Schimosis       VITALS: /84   Pulse 58   Temp 97.5 °F (36.4 °C) (Temporal)   Resp 16   Ht 5' 11\" (1.803 m)   Wt 160 lb (72.6 kg)   SpO2 99%   BMI 22.32 kg/m²     ALLERGIES: Shellfish-derived products;  Strawberry extract; and Penicillins       Physical Examination:    Head:  [x] Atraumatic:  [x] normocephalic  Skin and Mucosa       [] Moist [] Dry [] Pale [x] Normal   Neck: [x] Thyroid [] Palpable    [x] Not palpable []  venus distention [] adenopathy   Chest: [x] Clear [] Rhonchi  [] Wheezing   CV: [x] S1 [x] S2 [x] No murmer   Abdomen:  [x] Soft   [] Tender  [] Viceromegaly   Extremities:  [x] No Edema   [] Edema    Cranial Nerves Examination:    CN II: [x] Pupils are reactive to light [] Pupils are non reactive to light  CN III, IV, VI:[x] No eye deviation  [x] No diplopia or ptosis   CN V: [x] Facial Sensation is intact  [] Facial Sensation is not intact   CN IIIV:  [x] Hearing is normal to rubbing fingers   CN IX, X:  [x] Normal gag reflex and phonation   CN XI: [x] Shoulder shrug and neck rotation is normal  CNXII: [x] Tongue is midline no deviation or atrophy       For further PE refer to ED note

## 2018-12-02 PROCEDURE — 6370000000 HC RX 637 (ALT 250 FOR IP): Performed by: NURSE PRACTITIONER

## 2018-12-02 PROCEDURE — 1240000000 HC EMOTIONAL WELLNESS R&B

## 2018-12-02 PROCEDURE — 6360000002 HC RX W HCPCS: Performed by: NURSE PRACTITIONER

## 2018-12-02 PROCEDURE — 6370000000 HC RX 637 (ALT 250 FOR IP): Performed by: PSYCHIATRY & NEUROLOGY

## 2018-12-02 PROCEDURE — 99232 SBSQ HOSP IP/OBS MODERATE 35: CPT | Performed by: NURSE PRACTITIONER

## 2018-12-02 PROCEDURE — 94640 AIRWAY INHALATION TREATMENT: CPT

## 2018-12-02 RX ORDER — VENLAFAXINE HYDROCHLORIDE 75 MG/1
75 CAPSULE, EXTENDED RELEASE ORAL EVERY MORNING
Status: DISCONTINUED | OUTPATIENT
Start: 2018-12-03 | End: 2018-12-06

## 2018-12-02 RX ADMIN — TRAZODONE HYDROCHLORIDE 50 MG: 50 TABLET ORAL at 21:15

## 2018-12-02 RX ADMIN — ALBUTEROL SULFATE 2.5 MG: 2.5 SOLUTION RESPIRATORY (INHALATION) at 18:15

## 2018-12-02 RX ADMIN — VENLAFAXINE HYDROCHLORIDE 37.5 MG: 37.5 CAPSULE, EXTENDED RELEASE ORAL at 10:25

## 2018-12-02 RX ADMIN — PANTOPRAZOLE SODIUM 40 MG: 40 TABLET, DELAYED RELEASE ORAL at 07:18

## 2018-12-02 RX ADMIN — ALBUTEROL SULFATE 2.5 MG: 2.5 SOLUTION RESPIRATORY (INHALATION) at 13:40

## 2018-12-02 RX ADMIN — ACETAMINOPHEN 650 MG: 325 TABLET, FILM COATED ORAL at 21:15

## 2018-12-02 RX ADMIN — MONTELUKAST SODIUM 10 MG: 10 TABLET, FILM COATED ORAL at 21:15

## 2018-12-02 RX ADMIN — ALBUTEROL SULFATE 2.5 MG: 2.5 SOLUTION RESPIRATORY (INHALATION) at 10:06

## 2018-12-02 RX ADMIN — FOLIC ACID 1 MG: 1 TABLET ORAL at 10:25

## 2018-12-02 RX ADMIN — Medication 100 MG: at 10:25

## 2018-12-02 RX ADMIN — MULTIVITAMIN TABLET 1 TABLET: TABLET at 10:25

## 2018-12-02 ASSESSMENT — PAIN SCALES - GENERAL
PAINLEVEL_OUTOF10: 9
PAINLEVEL_OUTOF10: 10

## 2018-12-02 NOTE — PLAN OF CARE
Problem: Depressive Behavior With or Without Suicide Precautions:  Goal: Able to verbalize and/or display a decrease in depressive symptoms  Able to verbalize and/or display a decrease in depressive symptoms   Outcome: Ongoing  ISOLATING HIMSELF IN HIS ROOM. OUT FOR DINNER AND ATE WELL. CONTINUES WITH COMPLAINTS OF SUICIDAL THOUGHTS. DENIES INTENT OR PLAN. DENIES THOUGHTS OF HARMING OTHERS. REPORTS HALLUCINATIONS NON SPECIFIC TALKING AND LAUGHING. DID ATTEND GOAL GROUP.

## 2018-12-02 NOTE — PLAN OF CARE
Problem: Depressive Behavior With or Without Suicide Precautions:  Goal: Able to verbalize and/or display a decrease in depressive symptoms  Able to verbalize and/or display a decrease in depressive symptoms   Outcome: Ongoing  ISOLATING HIMSELF IN HIS BED. DID COME OUT FOR DINNER. REPOTS THOUGHTS OF SUICIDE, BUT DENIES INTENT OR PLAN. NO SIGN OR SYMPTOMS OF WITHDRAWAL.

## 2018-12-03 PROCEDURE — 6370000000 HC RX 637 (ALT 250 FOR IP): Performed by: PSYCHIATRY & NEUROLOGY

## 2018-12-03 PROCEDURE — 99232 SBSQ HOSP IP/OBS MODERATE 35: CPT | Performed by: NURSE PRACTITIONER

## 2018-12-03 PROCEDURE — 1240000000 HC EMOTIONAL WELLNESS R&B

## 2018-12-03 PROCEDURE — 6370000000 HC RX 637 (ALT 250 FOR IP): Performed by: NURSE PRACTITIONER

## 2018-12-03 RX ORDER — ARIPIPRAZOLE 2 MG/1
2 TABLET ORAL 2 TIMES DAILY
Status: DISCONTINUED | OUTPATIENT
Start: 2018-12-03 | End: 2018-12-04

## 2018-12-03 RX ADMIN — TRAZODONE HYDROCHLORIDE 50 MG: 50 TABLET ORAL at 21:37

## 2018-12-03 RX ADMIN — MOMETASONE FUROATE AND FORMOTEROL FUMARATE DIHYDRATE 2 PUFF: 200; 5 AEROSOL RESPIRATORY (INHALATION) at 21:37

## 2018-12-03 RX ADMIN — PANTOPRAZOLE SODIUM 40 MG: 40 TABLET, DELAYED RELEASE ORAL at 06:39

## 2018-12-03 RX ADMIN — VENLAFAXINE HYDROCHLORIDE 75 MG: 75 CAPSULE, EXTENDED RELEASE ORAL at 09:47

## 2018-12-03 RX ADMIN — TIOTROPIUM BROMIDE 18 MCG: 18 CAPSULE ORAL; RESPIRATORY (INHALATION) at 09:49

## 2018-12-03 RX ADMIN — ARIPIPRAZOLE 2 MG: 2 TABLET ORAL at 10:37

## 2018-12-03 RX ADMIN — MOMETASONE FUROATE AND FORMOTEROL FUMARATE DIHYDRATE 2 PUFF: 200; 5 AEROSOL RESPIRATORY (INHALATION) at 09:49

## 2018-12-03 RX ADMIN — Medication 100 MG: at 09:47

## 2018-12-03 RX ADMIN — MULTIVITAMIN TABLET 1 TABLET: TABLET at 09:47

## 2018-12-03 RX ADMIN — MONTELUKAST SODIUM 10 MG: 10 TABLET, FILM COATED ORAL at 21:36

## 2018-12-03 RX ADMIN — FOLIC ACID 1 MG: 1 TABLET ORAL at 09:47

## 2018-12-03 RX ADMIN — ARIPIPRAZOLE 2 MG: 2 TABLET ORAL at 21:36

## 2018-12-03 RX ADMIN — TRAZODONE HYDROCHLORIDE 50 MG: 50 TABLET ORAL at 21:36

## 2018-12-04 PROCEDURE — 6370000000 HC RX 637 (ALT 250 FOR IP): Performed by: NURSE PRACTITIONER

## 2018-12-04 PROCEDURE — 6370000000 HC RX 637 (ALT 250 FOR IP): Performed by: PSYCHIATRY & NEUROLOGY

## 2018-12-04 PROCEDURE — 99231 SBSQ HOSP IP/OBS SF/LOW 25: CPT | Performed by: NURSE PRACTITIONER

## 2018-12-04 PROCEDURE — 1240000000 HC EMOTIONAL WELLNESS R&B

## 2018-12-04 RX ORDER — ARIPIPRAZOLE 5 MG/1
5 TABLET ORAL 2 TIMES DAILY
Status: DISCONTINUED | OUTPATIENT
Start: 2018-12-04 | End: 2018-12-05

## 2018-12-04 RX ADMIN — PANTOPRAZOLE SODIUM 40 MG: 40 TABLET, DELAYED RELEASE ORAL at 06:30

## 2018-12-04 RX ADMIN — VENLAFAXINE HYDROCHLORIDE 75 MG: 75 CAPSULE, EXTENDED RELEASE ORAL at 10:14

## 2018-12-04 RX ADMIN — FOLIC ACID 1 MG: 1 TABLET ORAL at 10:14

## 2018-12-04 RX ADMIN — TRAZODONE HYDROCHLORIDE 50 MG: 50 TABLET ORAL at 21:12

## 2018-12-04 RX ADMIN — MONTELUKAST SODIUM 10 MG: 10 TABLET, FILM COATED ORAL at 21:12

## 2018-12-04 RX ADMIN — Medication 100 MG: at 10:14

## 2018-12-04 RX ADMIN — TRAZODONE HYDROCHLORIDE 50 MG: 50 TABLET ORAL at 21:20

## 2018-12-04 RX ADMIN — MOMETASONE FUROATE AND FORMOTEROL FUMARATE DIHYDRATE 2 PUFF: 200; 5 AEROSOL RESPIRATORY (INHALATION) at 10:19

## 2018-12-04 RX ADMIN — TIOTROPIUM BROMIDE 18 MCG: 18 CAPSULE ORAL; RESPIRATORY (INHALATION) at 10:19

## 2018-12-04 RX ADMIN — MULTIVITAMIN TABLET 1 TABLET: TABLET at 10:14

## 2018-12-04 RX ADMIN — ARIPIPRAZOLE 5 MG: 5 TABLET ORAL at 21:12

## 2018-12-04 RX ADMIN — MOMETASONE FUROATE AND FORMOTEROL FUMARATE DIHYDRATE 2 PUFF: 200; 5 AEROSOL RESPIRATORY (INHALATION) at 21:12

## 2018-12-04 ASSESSMENT — PAIN SCALES - GENERAL: PAINLEVEL_OUTOF10: 0

## 2018-12-04 NOTE — PLAN OF CARE
Problem: Depressive Behavior With or Without Suicide Precautions:  Goal: Able to verbalize acceptance of life and situations over which he or she has no control  Able to verbalize acceptance of life and situations over which he or she has no control   Outcome: Ongoing    Goal: Able to verbalize and/or display a decrease in depressive symptoms  Able to verbalize and/or display a decrease in depressive symptoms   Outcome: Ongoing  Patient reports SI with no plan. Contracts for safety. Says he is hearing voices but no commands. Denies HI. Affect quiet, flat, but cooperative and polite. Accepting of meds, meals, and some groups.

## 2018-12-05 PROCEDURE — 94640 AIRWAY INHALATION TREATMENT: CPT

## 2018-12-05 PROCEDURE — 6370000000 HC RX 637 (ALT 250 FOR IP): Performed by: PSYCHIATRY & NEUROLOGY

## 2018-12-05 PROCEDURE — 1240000000 HC EMOTIONAL WELLNESS R&B

## 2018-12-05 PROCEDURE — 6360000002 HC RX W HCPCS: Performed by: NURSE PRACTITIONER

## 2018-12-05 PROCEDURE — 99231 SBSQ HOSP IP/OBS SF/LOW 25: CPT | Performed by: NURSE PRACTITIONER

## 2018-12-05 PROCEDURE — 6370000000 HC RX 637 (ALT 250 FOR IP): Performed by: NURSE PRACTITIONER

## 2018-12-05 RX ORDER — ARIPIPRAZOLE 15 MG/1
15 TABLET ORAL EVERY EVENING
Status: DISCONTINUED | OUTPATIENT
Start: 2018-12-06 | End: 2018-12-07 | Stop reason: HOSPADM

## 2018-12-05 RX ADMIN — TRAZODONE HYDROCHLORIDE 50 MG: 50 TABLET ORAL at 20:34

## 2018-12-05 RX ADMIN — ARIPIPRAZOLE 5 MG: 5 TABLET ORAL at 10:16

## 2018-12-05 RX ADMIN — ALBUTEROL SULFATE 2.5 MG: 2.5 SOLUTION RESPIRATORY (INHALATION) at 14:17

## 2018-12-05 RX ADMIN — VENLAFAXINE HYDROCHLORIDE 75 MG: 75 CAPSULE, EXTENDED RELEASE ORAL at 10:16

## 2018-12-05 RX ADMIN — MOMETASONE FUROATE AND FORMOTEROL FUMARATE DIHYDRATE 2 PUFF: 200; 5 AEROSOL RESPIRATORY (INHALATION) at 10:18

## 2018-12-05 RX ADMIN — TIOTROPIUM BROMIDE 18 MCG: 18 CAPSULE ORAL; RESPIRATORY (INHALATION) at 10:18

## 2018-12-05 RX ADMIN — FOLIC ACID 1 MG: 1 TABLET ORAL at 10:16

## 2018-12-05 RX ADMIN — MONTELUKAST SODIUM 10 MG: 10 TABLET, FILM COATED ORAL at 20:30

## 2018-12-05 RX ADMIN — ALBUTEROL SULFATE 2.5 MG: 2.5 SOLUTION RESPIRATORY (INHALATION) at 09:35

## 2018-12-05 RX ADMIN — ALBUTEROL SULFATE 2 PUFF: 90 AEROSOL, METERED RESPIRATORY (INHALATION) at 20:30

## 2018-12-05 RX ADMIN — PANTOPRAZOLE SODIUM 40 MG: 40 TABLET, DELAYED RELEASE ORAL at 06:27

## 2018-12-05 RX ADMIN — MOMETASONE FUROATE AND FORMOTEROL FUMARATE DIHYDRATE 2 PUFF: 200; 5 AEROSOL RESPIRATORY (INHALATION) at 20:30

## 2018-12-05 RX ADMIN — MULTIVITAMIN TABLET 1 TABLET: TABLET at 10:16

## 2018-12-05 RX ADMIN — Medication 100 MG: at 10:16

## 2018-12-05 RX ADMIN — TRAZODONE HYDROCHLORIDE 50 MG: 50 TABLET ORAL at 20:30

## 2018-12-05 ASSESSMENT — PAIN SCALES - GENERAL: PAINLEVEL_OUTOF10: 0

## 2018-12-06 PROCEDURE — 94640 AIRWAY INHALATION TREATMENT: CPT

## 2018-12-06 PROCEDURE — 6370000000 HC RX 637 (ALT 250 FOR IP): Performed by: NURSE PRACTITIONER

## 2018-12-06 PROCEDURE — 1240000000 HC EMOTIONAL WELLNESS R&B

## 2018-12-06 PROCEDURE — 99231 SBSQ HOSP IP/OBS SF/LOW 25: CPT | Performed by: NURSE PRACTITIONER

## 2018-12-06 PROCEDURE — 6370000000 HC RX 637 (ALT 250 FOR IP): Performed by: PSYCHIATRY & NEUROLOGY

## 2018-12-06 PROCEDURE — 6360000002 HC RX W HCPCS: Performed by: NURSE PRACTITIONER

## 2018-12-06 RX ORDER — NALTREXONE HYDROCHLORIDE 50 MG/1
50 TABLET, FILM COATED ORAL DAILY
Status: DISCONTINUED | OUTPATIENT
Start: 2018-12-06 | End: 2018-12-07 | Stop reason: HOSPADM

## 2018-12-06 RX ORDER — VENLAFAXINE HYDROCHLORIDE 150 MG/1
150 CAPSULE, EXTENDED RELEASE ORAL EVERY MORNING
Status: DISCONTINUED | OUTPATIENT
Start: 2018-12-07 | End: 2018-12-07 | Stop reason: HOSPADM

## 2018-12-06 RX ADMIN — Medication 100 MG: at 08:50

## 2018-12-06 RX ADMIN — FOLIC ACID 1 MG: 1 TABLET ORAL at 08:50

## 2018-12-06 RX ADMIN — ALBUTEROL SULFATE 2.5 MG: 2.5 SOLUTION RESPIRATORY (INHALATION) at 12:15

## 2018-12-06 RX ADMIN — ARIPIPRAZOLE 15 MG: 15 TABLET ORAL at 17:10

## 2018-12-06 RX ADMIN — TRAZODONE HYDROCHLORIDE 50 MG: 50 TABLET ORAL at 20:20

## 2018-12-06 RX ADMIN — MULTIVITAMIN TABLET 1 TABLET: TABLET at 08:50

## 2018-12-06 RX ADMIN — PANTOPRAZOLE SODIUM 40 MG: 40 TABLET, DELAYED RELEASE ORAL at 06:17

## 2018-12-06 RX ADMIN — TRAZODONE HYDROCHLORIDE 50 MG: 50 TABLET ORAL at 20:21

## 2018-12-06 RX ADMIN — NALTREXONE HYDROCHLORIDE 50 MG: 50 TABLET, FILM COATED ORAL at 14:35

## 2018-12-06 RX ADMIN — ALBUTEROL SULFATE 2.5 MG: 2.5 SOLUTION RESPIRATORY (INHALATION) at 21:55

## 2018-12-06 RX ADMIN — MONTELUKAST SODIUM 10 MG: 10 TABLET, FILM COATED ORAL at 20:20

## 2018-12-06 RX ADMIN — MOMETASONE FUROATE AND FORMOTEROL FUMARATE DIHYDRATE 2 PUFF: 200; 5 AEROSOL RESPIRATORY (INHALATION) at 08:50

## 2018-12-06 RX ADMIN — MOMETASONE FUROATE AND FORMOTEROL FUMARATE DIHYDRATE 2 PUFF: 200; 5 AEROSOL RESPIRATORY (INHALATION) at 20:22

## 2018-12-06 RX ADMIN — ACETAMINOPHEN 650 MG: 325 TABLET, FILM COATED ORAL at 21:45

## 2018-12-06 RX ADMIN — ALBUTEROL SULFATE 2.5 MG: 2.5 SOLUTION RESPIRATORY (INHALATION) at 08:30

## 2018-12-06 RX ADMIN — TIOTROPIUM BROMIDE 18 MCG: 18 CAPSULE ORAL; RESPIRATORY (INHALATION) at 08:51

## 2018-12-06 RX ADMIN — VENLAFAXINE HYDROCHLORIDE 75 MG: 75 CAPSULE, EXTENDED RELEASE ORAL at 08:50

## 2018-12-06 ASSESSMENT — PAIN DESCRIPTION - LOCATION: LOCATION: BACK;SHOULDER

## 2018-12-06 ASSESSMENT — PAIN SCALES - GENERAL
PAINLEVEL_OUTOF10: 0
PAINLEVEL_OUTOF10: 8

## 2018-12-06 ASSESSMENT — PAIN DESCRIPTION - PAIN TYPE: TYPE: CHRONIC PAIN

## 2018-12-06 ASSESSMENT — PAIN DESCRIPTION - DESCRIPTORS: DESCRIPTORS: ACHING;DISCOMFORT;CONSTANT

## 2018-12-06 ASSESSMENT — PAIN DESCRIPTION - ORIENTATION: ORIENTATION: RIGHT;LEFT

## 2018-12-06 NOTE — PLAN OF CARE
Problem: Depressive Behavior With or Without Suicide Precautions:  Goal: Able to verbalize acceptance of life and situations over which he or she has no control  Able to verbalize acceptance of life and situations over which he or she has no control   Outcome: Not Met This Shift    Goal: Able to verbalize and/or display a decrease in depressive symptoms  Able to verbalize and/or display a decrease in depressive symptoms   Outcome: Not Met This Shift

## 2018-12-07 VITALS
HEART RATE: 73 BPM | OXYGEN SATURATION: 99 % | HEIGHT: 71 IN | RESPIRATION RATE: 18 BRPM | DIASTOLIC BLOOD PRESSURE: 76 MMHG | BODY MASS INDEX: 22.4 KG/M2 | SYSTOLIC BLOOD PRESSURE: 119 MMHG | TEMPERATURE: 97.5 F | WEIGHT: 160 LBS

## 2018-12-07 PROCEDURE — 6370000000 HC RX 637 (ALT 250 FOR IP): Performed by: NURSE PRACTITIONER

## 2018-12-07 PROCEDURE — 6360000002 HC RX W HCPCS: Performed by: NURSE PRACTITIONER

## 2018-12-07 PROCEDURE — 94640 AIRWAY INHALATION TREATMENT: CPT

## 2018-12-07 PROCEDURE — 99238 HOSP IP/OBS DSCHRG MGMT 30/<: CPT | Performed by: NURSE PRACTITIONER

## 2018-12-07 RX ORDER — MONTELUKAST SODIUM 10 MG/1
10 TABLET ORAL NIGHTLY
Qty: 30 TABLET | Refills: 0 | Status: SHIPPED | OUTPATIENT
Start: 2018-12-07 | End: 2019-08-22 | Stop reason: SDUPTHER

## 2018-12-07 RX ORDER — TRAZODONE HYDROCHLORIDE 50 MG/1
50 TABLET ORAL NIGHTLY
Qty: 30 TABLET | Refills: 0 | Status: SHIPPED | OUTPATIENT
Start: 2018-12-07

## 2018-12-07 RX ORDER — VENLAFAXINE HYDROCHLORIDE 150 MG/1
150 CAPSULE, EXTENDED RELEASE ORAL EVERY MORNING
Qty: 30 CAPSULE | Refills: 0 | Status: SHIPPED | OUTPATIENT
Start: 2018-12-07 | End: 2020-02-11

## 2018-12-07 RX ORDER — NICOTINE 21 MG/24HR
1 PATCH, TRANSDERMAL 24 HOURS TRANSDERMAL DAILY
Qty: 30 PATCH | Refills: 0 | Status: SHIPPED | OUTPATIENT
Start: 2018-12-07 | End: 2020-02-11

## 2018-12-07 RX ORDER — NALTREXONE HYDROCHLORIDE 50 MG/1
50 TABLET, FILM COATED ORAL DAILY
Qty: 30 TABLET | Refills: 0 | Status: SHIPPED | OUTPATIENT
Start: 2018-12-07 | End: 2020-02-11

## 2018-12-07 RX ORDER — ARIPIPRAZOLE 15 MG/1
15 TABLET ORAL EVERY EVENING
Qty: 30 TABLET | Refills: 0 | Status: SHIPPED | OUTPATIENT
Start: 2018-12-07 | End: 2020-02-11

## 2018-12-07 RX ORDER — ALBUTEROL SULFATE 90 UG/1
2 AEROSOL, METERED RESPIRATORY (INHALATION) EVERY 6 HOURS PRN
Qty: 1 INHALER | Refills: 0 | Status: SHIPPED | OUTPATIENT
Start: 2018-12-07 | End: 2019-08-22 | Stop reason: SDUPTHER

## 2018-12-07 RX ADMIN — ALBUTEROL SULFATE 2.5 MG: 2.5 SOLUTION RESPIRATORY (INHALATION) at 09:20

## 2018-12-07 RX ADMIN — Medication 100 MG: at 09:21

## 2018-12-07 RX ADMIN — TIOTROPIUM BROMIDE 18 MCG: 18 CAPSULE ORAL; RESPIRATORY (INHALATION) at 09:22

## 2018-12-07 RX ADMIN — ALBUTEROL SULFATE 2.5 MG: 2.5 SOLUTION RESPIRATORY (INHALATION) at 13:25

## 2018-12-07 RX ADMIN — MULTIVITAMIN TABLET 1 TABLET: TABLET at 09:21

## 2018-12-07 RX ADMIN — FOLIC ACID 1 MG: 1 TABLET ORAL at 09:21

## 2018-12-07 RX ADMIN — VENLAFAXINE HYDROCHLORIDE 150 MG: 150 CAPSULE, EXTENDED RELEASE ORAL at 09:21

## 2018-12-07 RX ADMIN — MOMETASONE FUROATE AND FORMOTEROL FUMARATE DIHYDRATE 2 PUFF: 200; 5 AEROSOL RESPIRATORY (INHALATION) at 09:22

## 2018-12-07 RX ADMIN — NALTREXONE HYDROCHLORIDE 50 MG: 50 TABLET, FILM COATED ORAL at 09:21

## 2018-12-07 RX ADMIN — PANTOPRAZOLE SODIUM 40 MG: 40 TABLET, DELAYED RELEASE ORAL at 05:56

## 2018-12-07 ASSESSMENT — PAIN SCALES - GENERAL: PAINLEVEL_OUTOF10: 0

## 2018-12-07 NOTE — DISCHARGE SUMMARY
Physician Discharge Summary      Physical Examination   VS/Measurements:     /76   Pulse 73   Temp 97.5 °F (36.4 °C) (Oral)   Resp 18   Ht 5' 11\" (1.803 m)   Wt 160 lb (72.6 kg)   SpO2 99%   BMI 22.32 kg/m²         Discharge Medications:              Prer[facility]   Home Medication Instructions [id number]    Printed on:12/07/18 3242   Medication Information                      albuterol sulfate HFA (PROAIR HFA) 108 (90 Base) MCG/ACT inhaler  Inhale 2 puffs into the lungs every 6 hours as needed for Wheezing             ARIPiprazole (ABILIFY) 15 MG tablet  Take 1 tablet by mouth every evening             mometasone-formoterol (DULERA) 200-5 MCG/ACT inhaler  Inhale 2 puffs into the lungs 2 times daily             montelukast (SINGULAIR) 10 MG tablet  Take 1 tablet by mouth nightly             naltrexone (DEPADE) 50 MG tablet  Take 1 tablet by mouth daily             nicotine (NICODERM CQ) 21 MG/24HR  Place 1 patch onto the skin daily             omeprazole (PRILOSEC) 20 MG delayed release capsule  Take 1 capsule by mouth daily             tiotropium (SPIRIVA) 18 MCG inhalation capsule  Inhale 1 capsule into the lungs daily             traZODone (DESYREL) 50 MG tablet  Take 1 tablet by mouth nightly             venlafaxine (EFFEXOR XR) 150 MG extended release capsule  Take 1 capsule by mouth every morning                     Psychiatric: Mental Status Examination:    Cognition:      [x] Alert  [x] Awake  [x] Oriented  [x] Person  [x] Place [x] Time    [] drowsy  [] tired  [] lethargic  [] distractable       Attention/Concentration:   [x] Attentive  [] Distracted        Memory Recent and Remote: [x] Intact   [] Impaired [] Partially Impaired     Language: [] Able to recognize and name objects          [] Unable to recognize and name Objects    Fund of Knowledge:  [] Poor []  Fair  [] Good    Speech: [x] Normal  [] Soft  [] Slow  [] Fast [] Pressured            [] Loud [] Dysarthria  []

## 2018-12-07 NOTE — CARE COORDINATION
Contacted [employer] to arrange taxi service to [place].   Requested taxi to call with an ETA  Electronically signed by Ade Pope on 12/7/2018 at 2:09 PM
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
## 2018-12-26 RX ORDER — OMEPRAZOLE 20 MG/1
CAPSULE, DELAYED RELEASE ORAL
Qty: 90 CAPSULE | Refills: 3 | Status: SHIPPED | OUTPATIENT
Start: 2018-12-26 | End: 2019-08-01 | Stop reason: SDUPTHER

## 2018-12-29 ENCOUNTER — HOSPITAL ENCOUNTER (EMERGENCY)
Age: 53
Discharge: HOME | End: 2018-12-29
Attending: EMERGENCY MEDICINE
Payer: COMMERCIAL

## 2018-12-29 VITALS
SYSTOLIC BLOOD PRESSURE: 119 MMHG | HEIGHT: 71 IN | BODY MASS INDEX: 23.8 KG/M2 | OXYGEN SATURATION: 98 % | TEMPERATURE: 97.3 F | RESPIRATION RATE: 17 BRPM | DIASTOLIC BLOOD PRESSURE: 77 MMHG | HEART RATE: 89 BPM | WEIGHT: 170 LBS

## 2018-12-29 DIAGNOSIS — J02.9 SORETHROAT: ICD-10-CM

## 2018-12-29 DIAGNOSIS — R05.9 COUGH: ICD-10-CM

## 2018-12-29 DIAGNOSIS — J06.9 ACUTE UPPER RESPIRATORY INFECTION: Primary | ICD-10-CM

## 2018-12-29 LAB
INFLUENZA A BY PCR: NOT DETECTED
INFLUENZA B BY PCR: NOT DETECTED
STREP GRP A PCR: NEGATIVE

## 2018-12-29 PROCEDURE — 99283 EMERGENCY DEPT VISIT LOW MDM: CPT

## 2018-12-29 PROCEDURE — 87502 INFLUENZA DNA AMP PROBE: CPT

## 2018-12-29 PROCEDURE — 6370000000 HC RX 637 (ALT 250 FOR IP): Performed by: EMERGENCY MEDICINE

## 2018-12-29 PROCEDURE — 87880 STREP A ASSAY W/OPTIC: CPT

## 2018-12-29 RX ORDER — IBUPROFEN 800 MG/1
800 TABLET ORAL ONCE
Status: COMPLETED | OUTPATIENT
Start: 2018-12-29 | End: 2018-12-29

## 2018-12-29 RX ORDER — BROMPHENIRAMINE MALEATE, PSEUDOEPHEDRINE HYDROCHLORIDE, AND DEXTROMETHORPHAN HYDROBROMIDE 2; 30; 10 MG/5ML; MG/5ML; MG/5ML
5 SYRUP ORAL 4 TIMES DAILY PRN
Qty: 120 ML | Refills: 0 | Status: SHIPPED | OUTPATIENT
Start: 2018-12-29 | End: 2019-01-03

## 2018-12-29 RX ORDER — BENZONATATE 100 MG/1
100 CAPSULE ORAL 3 TIMES DAILY PRN
Qty: 21 CAPSULE | Refills: 0 | Status: SHIPPED | OUTPATIENT
Start: 2018-12-29 | End: 2019-01-05

## 2018-12-29 RX ORDER — IBUPROFEN 800 MG/1
800 TABLET ORAL EVERY 8 HOURS PRN
Qty: 21 TABLET | Refills: 0 | Status: SHIPPED | OUTPATIENT
Start: 2018-12-29 | End: 2021-06-24

## 2018-12-29 RX ORDER — BENZONATATE 100 MG/1
100 CAPSULE ORAL ONCE
Status: COMPLETED | OUTPATIENT
Start: 2018-12-29 | End: 2018-12-29

## 2018-12-29 RX ADMIN — BENZONATATE 100 MG: 100 CAPSULE ORAL at 13:14

## 2018-12-29 RX ADMIN — IBUPROFEN 800 MG: 800 TABLET ORAL at 12:46

## 2018-12-29 ASSESSMENT — PAIN SCALES - GENERAL
PAINLEVEL_OUTOF10: 10
PAINLEVEL_OUTOF10: 5

## 2019-01-10 ENCOUNTER — HOSPITAL ENCOUNTER (OUTPATIENT)
Dept: MRI IMAGING | Age: 54
Discharge: HOME | End: 2019-01-12
Payer: COMMERCIAL

## 2019-01-10 ENCOUNTER — OFFICE VISIT (OUTPATIENT)
Dept: HEMATOLOGY | Age: 54
End: 2019-01-10
Payer: COMMERCIAL

## 2019-01-10 VITALS
HEIGHT: 71 IN | WEIGHT: 159 LBS | OXYGEN SATURATION: 100 % | HEART RATE: 98 BPM | BODY MASS INDEX: 22.26 KG/M2 | RESPIRATION RATE: 18 BRPM | DIASTOLIC BLOOD PRESSURE: 63 MMHG | SYSTOLIC BLOOD PRESSURE: 113 MMHG

## 2019-01-10 DIAGNOSIS — K86.2 CYSTIC MASS OF PANCREAS: Primary | ICD-10-CM

## 2019-01-10 DIAGNOSIS — K86.2 CYSTIC MASS OF PANCREAS: ICD-10-CM

## 2019-01-10 PROCEDURE — 3017F COLORECTAL CA SCREEN DOC REV: CPT | Performed by: TRANSPLANT SURGERY

## 2019-01-10 PROCEDURE — 99213 OFFICE O/P EST LOW 20 MIN: CPT | Performed by: TRANSPLANT SURGERY

## 2019-01-10 PROCEDURE — G8427 DOCREV CUR MEDS BY ELIG CLIN: HCPCS | Performed by: TRANSPLANT SURGERY

## 2019-01-10 PROCEDURE — 74183 MRI ABD W/O CNTR FLWD CNTR: CPT

## 2019-01-10 PROCEDURE — 99214 OFFICE O/P EST MOD 30 MIN: CPT | Performed by: TRANSPLANT SURGERY

## 2019-01-10 PROCEDURE — 4004F PT TOBACCO SCREEN RCVD TLK: CPT | Performed by: TRANSPLANT SURGERY

## 2019-01-10 PROCEDURE — 6360000004 HC RX CONTRAST MEDICATION: Performed by: RADIOLOGY

## 2019-01-10 PROCEDURE — G8484 FLU IMMUNIZE NO ADMIN: HCPCS | Performed by: TRANSPLANT SURGERY

## 2019-01-10 PROCEDURE — G8420 CALC BMI NORM PARAMETERS: HCPCS | Performed by: TRANSPLANT SURGERY

## 2019-01-10 PROCEDURE — A9579 GAD-BASE MR CONTRAST NOS,1ML: HCPCS | Performed by: RADIOLOGY

## 2019-01-10 RX ADMIN — GADOTERIDOL 15 ML: 279.3 INJECTION, SOLUTION INTRAVENOUS at 13:22

## 2019-02-04 ENCOUNTER — OFFICE VISIT (OUTPATIENT)
Dept: PULMONOLOGY | Age: 54
End: 2019-02-04
Payer: COMMERCIAL

## 2019-02-04 VITALS
TEMPERATURE: 98.1 F | OXYGEN SATURATION: 98 % | DIASTOLIC BLOOD PRESSURE: 76 MMHG | WEIGHT: 169 LBS | HEART RATE: 99 BPM | SYSTOLIC BLOOD PRESSURE: 134 MMHG | BODY MASS INDEX: 23.66 KG/M2 | HEIGHT: 71 IN | RESPIRATION RATE: 16 BRPM

## 2019-02-04 DIAGNOSIS — J45.909 SEVERE ASTHMA WITHOUT COMPLICATION, UNSPECIFIED WHETHER PERSISTENT: ICD-10-CM

## 2019-02-04 PROCEDURE — 4004F PT TOBACCO SCREEN RCVD TLK: CPT | Performed by: INTERNAL MEDICINE

## 2019-02-04 PROCEDURE — G8427 DOCREV CUR MEDS BY ELIG CLIN: HCPCS | Performed by: INTERNAL MEDICINE

## 2019-02-04 PROCEDURE — 99213 OFFICE O/P EST LOW 20 MIN: CPT | Performed by: INTERNAL MEDICINE

## 2019-02-04 PROCEDURE — 99214 OFFICE O/P EST MOD 30 MIN: CPT | Performed by: INTERNAL MEDICINE

## 2019-02-04 PROCEDURE — G8420 CALC BMI NORM PARAMETERS: HCPCS | Performed by: INTERNAL MEDICINE

## 2019-02-04 PROCEDURE — 99406 BEHAV CHNG SMOKING 3-10 MIN: CPT | Performed by: INTERNAL MEDICINE

## 2019-02-04 PROCEDURE — 3017F COLORECTAL CA SCREEN DOC REV: CPT | Performed by: INTERNAL MEDICINE

## 2019-02-04 PROCEDURE — G8484 FLU IMMUNIZE NO ADMIN: HCPCS | Performed by: INTERNAL MEDICINE

## 2019-02-04 RX ORDER — NICOTINE 21 MG/24HR
1 PATCH, TRANSDERMAL 24 HOURS TRANSDERMAL EVERY 24 HOURS
Qty: 30 PATCH | Refills: 0 | Status: SHIPPED | OUTPATIENT
Start: 2019-02-04 | End: 2020-02-11

## 2019-02-04 RX ORDER — BUDESONIDE AND FORMOTEROL FUMARATE DIHYDRATE 160; 4.5 UG/1; UG/1
AEROSOL RESPIRATORY (INHALATION)
Refills: 3 | COMMUNITY
Start: 2018-12-12 | End: 2019-02-04 | Stop reason: SDUPTHER

## 2019-02-04 RX ORDER — UMECLIDINIUM 62.5 UG/1
AEROSOL, POWDER ORAL
Refills: 3 | COMMUNITY
Start: 2018-12-12 | End: 2019-02-04 | Stop reason: SDUPTHER

## 2019-02-04 RX ORDER — BUDESONIDE AND FORMOTEROL FUMARATE DIHYDRATE 160; 4.5 UG/1; UG/1
2 AEROSOL RESPIRATORY (INHALATION) 2 TIMES DAILY
Qty: 1 INHALER | Refills: 3 | Status: SHIPPED | OUTPATIENT
Start: 2019-02-04 | End: 2019-08-22 | Stop reason: SDUPTHER

## 2019-02-04 RX ORDER — UMECLIDINIUM 62.5 UG/1
1 AEROSOL, POWDER ORAL DAILY
Qty: 1 EACH | Refills: 3 | Status: SHIPPED | OUTPATIENT
Start: 2019-02-04 | End: 2019-08-22 | Stop reason: SDUPTHER

## 2019-02-05 ENCOUNTER — HOSPITAL ENCOUNTER (OUTPATIENT)
Age: 54
Discharge: HOME | End: 2019-02-07
Payer: COMMERCIAL

## 2019-02-05 ENCOUNTER — OFFICE VISIT (OUTPATIENT)
Dept: FAMILY MEDICINE CLINIC | Age: 54
End: 2019-02-05
Payer: COMMERCIAL

## 2019-02-05 VITALS
WEIGHT: 169 LBS | DIASTOLIC BLOOD PRESSURE: 80 MMHG | RESPIRATION RATE: 16 BRPM | OXYGEN SATURATION: 98 % | HEIGHT: 71 IN | HEART RATE: 78 BPM | BODY MASS INDEX: 23.66 KG/M2 | SYSTOLIC BLOOD PRESSURE: 136 MMHG

## 2019-02-05 DIAGNOSIS — Z20.9 EXPOSURE TO COMMUNICABLE DISEASES: ICD-10-CM

## 2019-02-05 DIAGNOSIS — M47.816 SPONDYLOSIS OF LUMBAR REGION WITHOUT MYELOPATHY OR RADICULOPATHY: Primary | ICD-10-CM

## 2019-02-05 DIAGNOSIS — Z12.11 SCREENING FOR MALIGNANT NEOPLASM OF COLON: ICD-10-CM

## 2019-02-05 DIAGNOSIS — Z11.4 SCREENING FOR HIV (HUMAN IMMUNODEFICIENCY VIRUS): ICD-10-CM

## 2019-02-05 DIAGNOSIS — J43.9 PULMONARY EMPHYSEMA, UNSPECIFIED EMPHYSEMA TYPE (HCC): ICD-10-CM

## 2019-02-05 DIAGNOSIS — B99.8 OTHER INFECTIOUS DISEASE: ICD-10-CM

## 2019-02-05 PROCEDURE — 86703 HIV-1/HIV-2 1 RESULT ANTBDY: CPT

## 2019-02-05 PROCEDURE — 3017F COLORECTAL CA SCREEN DOC REV: CPT | Performed by: FAMILY MEDICINE

## 2019-02-05 PROCEDURE — 36415 COLL VENOUS BLD VENIPUNCTURE: CPT | Performed by: FAMILY MEDICINE

## 2019-02-05 PROCEDURE — 3023F SPIROM DOC REV: CPT | Performed by: FAMILY MEDICINE

## 2019-02-05 PROCEDURE — G8420 CALC BMI NORM PARAMETERS: HCPCS | Performed by: FAMILY MEDICINE

## 2019-02-05 PROCEDURE — G8484 FLU IMMUNIZE NO ADMIN: HCPCS | Performed by: FAMILY MEDICINE

## 2019-02-05 PROCEDURE — 86803 HEPATITIS C AB TEST: CPT

## 2019-02-05 PROCEDURE — G8926 SPIRO NO PERF OR DOC: HCPCS | Performed by: FAMILY MEDICINE

## 2019-02-05 PROCEDURE — 99214 OFFICE O/P EST MOD 30 MIN: CPT | Performed by: FAMILY MEDICINE

## 2019-02-05 PROCEDURE — G8427 DOCREV CUR MEDS BY ELIG CLIN: HCPCS | Performed by: FAMILY MEDICINE

## 2019-02-05 PROCEDURE — 4004F PT TOBACCO SCREEN RCVD TLK: CPT | Performed by: FAMILY MEDICINE

## 2019-02-05 ASSESSMENT — ENCOUNTER SYMPTOMS
SORE THROAT: 0
BACK PAIN: 1
SHORTNESS OF BREATH: 1
NAUSEA: 0
COUGH: 0
VOMITING: 0
RHINORRHEA: 0
CHEST TIGHTNESS: 0
SINUS PRESSURE: 0
DIFFICULTY BREATHING: 1
FREQUENT THROAT CLEARING: 0
HEMOPTYSIS: 0
TROUBLE SWALLOWING: 0
APNEA: 0
EYE REDNESS: 0
COLOR CHANGE: 0
BLOOD IN STOOL: 0
DIARRHEA: 0
EYE ITCHING: 0
ABDOMINAL PAIN: 0
CONSTIPATION: 0
WHEEZING: 0
EYE PAIN: 0

## 2019-02-05 ASSESSMENT — PATIENT HEALTH QUESTIONNAIRE - PHQ9
1. LITTLE INTEREST OR PLEASURE IN DOING THINGS: 0
2. FEELING DOWN, DEPRESSED OR HOPELESS: 0
SUM OF ALL RESPONSES TO PHQ9 QUESTIONS 1 & 2: 0
SUM OF ALL RESPONSES TO PHQ QUESTIONS 1-9: 0
SUM OF ALL RESPONSES TO PHQ QUESTIONS 1-9: 0

## 2019-02-05 ASSESSMENT — COPD QUESTIONNAIRES: COPD: 1

## 2019-02-06 LAB
HEPATITIS C ANTIBODY INTERPRETATION: NORMAL
HIV-1 AND HIV-2 ANTIBODIES: NORMAL

## 2019-02-18 ENCOUNTER — NURSE ONLY (OUTPATIENT)
Dept: FAMILY MEDICINE CLINIC | Age: 54
End: 2019-02-18
Payer: COMMERCIAL

## 2019-02-18 DIAGNOSIS — Z23 NEED FOR PNEUMOCOCCAL VACCINATION: Primary | ICD-10-CM

## 2019-02-18 DIAGNOSIS — Z12.11 SCREENING FOR MALIGNANT NEOPLASM OF COLON: ICD-10-CM

## 2019-02-18 LAB
CONTROL: NORMAL
HEMOCCULT STL QL: NEGATIVE

## 2019-02-18 PROCEDURE — G0009 ADMIN PNEUMOCOCCAL VACCINE: HCPCS | Performed by: FAMILY MEDICINE

## 2019-02-18 PROCEDURE — 82274 ASSAY TEST FOR BLOOD FECAL: CPT | Performed by: FAMILY MEDICINE

## 2019-02-18 PROCEDURE — 90732 PPSV23 VACC 2 YRS+ SUBQ/IM: CPT | Performed by: FAMILY MEDICINE

## 2019-04-11 ENCOUNTER — TELEPHONE (OUTPATIENT)
Dept: PHYSICAL MEDICINE AND REHAB | Age: 54
End: 2019-04-11

## 2019-07-02 ENCOUNTER — TELEPHONE (OUTPATIENT)
Dept: PULMONOLOGY | Age: 54
End: 2019-07-02

## 2019-08-01 RX ORDER — OMEPRAZOLE 20 MG/1
CAPSULE, DELAYED RELEASE ORAL
Qty: 90 CAPSULE | Refills: 3 | Status: SHIPPED | OUTPATIENT
Start: 2019-08-01 | End: 2021-06-24

## 2019-08-07 ENCOUNTER — HOSPITAL ENCOUNTER (OUTPATIENT)
Dept: CT IMAGING | Age: 54
Discharge: HOME | End: 2019-08-09
Payer: MEDICARE

## 2019-08-07 DIAGNOSIS — J45.909 SEVERE ASTHMA WITHOUT COMPLICATION, UNSPECIFIED WHETHER PERSISTENT: ICD-10-CM

## 2019-08-07 PROCEDURE — 71250 CT THORAX DX C-: CPT

## 2019-08-22 ENCOUNTER — OFFICE VISIT (OUTPATIENT)
Dept: PULMONOLOGY | Age: 54
End: 2019-08-22
Payer: MEDICARE

## 2019-08-22 VITALS
OXYGEN SATURATION: 95 % | BODY MASS INDEX: 25.62 KG/M2 | SYSTOLIC BLOOD PRESSURE: 105 MMHG | DIASTOLIC BLOOD PRESSURE: 59 MMHG | WEIGHT: 183 LBS | HEART RATE: 90 BPM | HEIGHT: 71 IN | RESPIRATION RATE: 18 BRPM

## 2019-08-22 DIAGNOSIS — J45.909 SEVERE ASTHMA WITHOUT COMPLICATION, UNSPECIFIED WHETHER PERSISTENT: Primary | ICD-10-CM

## 2019-08-22 DIAGNOSIS — J43.9 PULMONARY EMPHYSEMA, UNSPECIFIED EMPHYSEMA TYPE (HCC): ICD-10-CM

## 2019-08-22 LAB
EXPIRATORY TIME: NORMAL SEC
FEF 25-75% %PRED-PRE: NORMAL L/SEC
FEF 25-75% PRED: NORMAL L/SEC
FEF 25-75%-PRE: NORMAL L/SEC
FENO: 6 PPB
FEV1 %PRED-PRE: 71 %
FEV1 PRED: 3.92 L
FEV1/FVC %PRED-PRE: 95 %
FEV1/FVC PRED: 78 %
FEV1/FVC: 75 %
FEV1: 2.8 L
FVC %PRED-PRE: 74 %
FVC PRED: 5.03 L
FVC: 3.75 L
PEF %PRED-PRE: NORMAL L/SEC
PEF PRED: NORMAL L/SEC
PEF-PRE: NORMAL L/SEC

## 2019-08-22 PROCEDURE — G8925 SPIR FEV1/FVC>=60% & NO COPD: HCPCS | Performed by: INTERNAL MEDICINE

## 2019-08-22 PROCEDURE — 99214 OFFICE O/P EST MOD 30 MIN: CPT | Performed by: INTERNAL MEDICINE

## 2019-08-22 PROCEDURE — 3017F COLORECTAL CA SCREEN DOC REV: CPT | Performed by: INTERNAL MEDICINE

## 2019-08-22 PROCEDURE — G8427 DOCREV CUR MEDS BY ELIG CLIN: HCPCS | Performed by: INTERNAL MEDICINE

## 2019-08-22 PROCEDURE — 94010 BREATHING CAPACITY TEST: CPT | Performed by: INTERNAL MEDICINE

## 2019-08-22 PROCEDURE — G8419 CALC BMI OUT NRM PARAM NOF/U: HCPCS | Performed by: INTERNAL MEDICINE

## 2019-08-22 PROCEDURE — 95012 NITRIC OXIDE EXP GAS DETER: CPT | Performed by: INTERNAL MEDICINE

## 2019-08-22 PROCEDURE — 3023F SPIROM DOC REV: CPT | Performed by: INTERNAL MEDICINE

## 2019-08-22 PROCEDURE — 99213 OFFICE O/P EST LOW 20 MIN: CPT | Performed by: INTERNAL MEDICINE

## 2019-08-22 PROCEDURE — 4004F PT TOBACCO SCREEN RCVD TLK: CPT | Performed by: INTERNAL MEDICINE

## 2019-08-22 RX ORDER — OLANZAPINE 15 MG/1
15 TABLET ORAL NIGHTLY
COMMUNITY
Start: 2019-07-31 | End: 2020-02-11

## 2019-08-22 RX ORDER — MONTELUKAST SODIUM 10 MG/1
10 TABLET ORAL NIGHTLY
Qty: 30 TABLET | Refills: 0 | Status: SHIPPED | OUTPATIENT
Start: 2019-08-22 | End: 2019-09-21 | Stop reason: SDUPTHER

## 2019-08-22 RX ORDER — UMECLIDINIUM 62.5 UG/1
1 AEROSOL, POWDER ORAL DAILY
Qty: 1 EACH | Refills: 3 | Status: SHIPPED | OUTPATIENT
Start: 2019-08-22 | End: 2019-12-17 | Stop reason: SDUPTHER

## 2019-08-22 RX ORDER — BUDESONIDE AND FORMOTEROL FUMARATE DIHYDRATE 160; 4.5 UG/1; UG/1
2 AEROSOL RESPIRATORY (INHALATION) 2 TIMES DAILY
Qty: 1 INHALER | Refills: 3 | Status: SHIPPED | OUTPATIENT
Start: 2019-08-22 | End: 2021-10-14

## 2019-08-22 RX ORDER — ALBUTEROL SULFATE 90 UG/1
2 AEROSOL, METERED RESPIRATORY (INHALATION) EVERY 6 HOURS PRN
Qty: 1 INHALER | Refills: 0 | Status: SHIPPED | OUTPATIENT
Start: 2019-08-22 | End: 2020-05-01 | Stop reason: SDUPTHER

## 2019-08-22 ASSESSMENT — PULMONARY FUNCTION TESTS
FEV1_PREDICTED: 3.92
FVC_PREDICTED: 5.03
FENO: 6
FEV1_PERCENT_PREDICTED_PRE: 71
FEV1/FVC_PREDICTED: 78
FVC: 3.75
FEV1: 2.80
FEV1/FVC_PERCENT_PREDICTED_PRE: 95
FVC_PERCENT_PREDICTED_PRE: 74
FEV1/FVC: 75

## 2019-08-22 NOTE — PROGRESS NOTES
intact. External canals are patent and no discharge was appreciated. Septum was midline,   mucosa was without erythema, exudates or cobblestoning. No thrush was noted. Neck: Supple without thyromegaly. No elevated JVP. Trachea was midline. No carotid bruits were auscultated. Respiratory: occasional wheezing ,rhonchi     Cardiovascular: Regular without murmur, clicks, gallops or rubs. There is no left or right ventricular heave. Pulses:  Carotid, radial and femoral pulses were equally bilaterally. Abdomen: Slightly rounded and soft without organomegaly. No rebound, rigidity or guarding was appreciated. Lymphatic: No lymphadenopathy. Musculoskeletal: no joint deformity   Extremities:no edema   Skin:  Warm and dry. Good color, turgor and pigmentation. No lesions or scars. Neurological/Psychiatric: The patient's general behavior, level of consciousness, thought content and emotional status is normal.  Cranial nerves II-XII are intact. DATA:   FEV1  2,22   55 5  FVC: 3.41    66  FEV1/FVC   65 ,LLN is 69     PFT today shows possible mixed obstructive and restrictive lung disease with mild air-trapping     Repeat Spirometry shows non specific pattern and improved FEV1  FEV 1 2.8 which is 71  FVC 3.75 74 %  FEv1/FVC 75   ERV 0.57 33 %        IMPRESSION:      1-Moderately Severe COPD  2-Emphysema  3-Nicotine dependence   4- allergic Rhinitis            PLAN:        -Patient is a 42-year-old male with a 30- pack year history of smoking, [facility] for follow-up of COPD.   He proceed with the following    -Doing well with Incruse inhaler 1 puff daily   -On Symbicort   _ use albuterol 5-6 times a week  _ breathing is ok  -No hemoptysis     Singulair and on Zyrtec  - alpha-1 antitrypsin still pending, hospice staff to make sure it has been sent   IgE level came back 749 ,not covered with insurance ,previous one ,will check with new insurance ,if will consider [place]   I

## 2019-08-28 ENCOUNTER — TELEPHONE (OUTPATIENT)
Dept: PULMONOLOGY | Age: 54
End: 2019-08-28

## 2019-08-28 DIAGNOSIS — J43.9 PULMONARY EMPHYSEMA, UNSPECIFIED EMPHYSEMA TYPE (HCC): Primary | ICD-10-CM

## 2019-08-28 DIAGNOSIS — J45.909 SEVERE ASTHMA WITHOUT COMPLICATION, UNSPECIFIED WHETHER PERSISTENT: ICD-10-CM

## 2019-08-28 DIAGNOSIS — J45.909 SEVERE ASTHMA WITHOUT COMPLICATION, UNSPECIFIED WHETHER PERSISTENT: Primary | ICD-10-CM

## 2019-08-28 NOTE — TELEPHONE ENCOUNTER
VM left for pt advising that lab orders are placed for pt to get [facility] lab completed before follow up visit to office. Instructed to go to any [facility] Lab site and to call office if any questions.

## 2019-08-28 NOTE — TELEPHONE ENCOUNTER
Call from [facility] center re: Test kit they have received. Advised office that testing is no longer free and there will be a charge to the pt for this test. Advised they are not able to bill insurance however the pt can be billed. Requested lab discard sample and not proceed with testing.

## 2019-09-09 ENCOUNTER — HOSPITAL ENCOUNTER (OUTPATIENT)
Age: 54
Discharge: HOME | End: 2019-09-09
Payer: MEDICARE

## 2019-09-09 DIAGNOSIS — J45.909 SEVERE ASTHMA WITHOUT COMPLICATION, UNSPECIFIED WHETHER PERSISTENT: ICD-10-CM

## 2019-09-09 DIAGNOSIS — J43.9 PULMONARY EMPHYSEMA, UNSPECIFIED EMPHYSEMA TYPE (HCC): ICD-10-CM

## 2019-09-09 PROCEDURE — 82103 ALPHA-1-ANTITRYPSIN TOTAL: CPT

## 2019-09-12 LAB — ALPHA-1 ANTITRYPSIN: 154 MG/DL (ref 90–200)

## 2019-09-13 ENCOUNTER — TELEPHONE (OUTPATIENT)
Dept: PULMONOLOGY | Age: 54
End: 2019-09-13

## 2019-09-16 ENCOUNTER — HOSPITAL ENCOUNTER (OUTPATIENT)
Age: 54
Discharge: HOME | End: 2019-09-16
Payer: MEDICARE

## 2019-09-16 DIAGNOSIS — J45.909 SEVERE ASTHMA WITHOUT COMPLICATION, UNSPECIFIED WHETHER PERSISTENT: ICD-10-CM

## 2019-09-16 PROCEDURE — 86003 ALLG SPEC IGE CRUDE XTRC EA: CPT

## 2019-09-16 PROCEDURE — 82785 ASSAY OF IGE: CPT

## 2019-09-20 LAB
Lab: NORMAL
REPORT: NORMAL
THIS TEST SENT TO: NORMAL

## 2019-09-23 RX ORDER — MONTELUKAST SODIUM 10 MG/1
TABLET ORAL
Qty: 30 TABLET | Refills: 0 | Status: SHIPPED | OUTPATIENT
Start: 2019-09-23 | End: 2019-10-17 | Stop reason: SDUPTHER

## 2019-10-17 DIAGNOSIS — J45.909 SEVERE ASTHMA WITHOUT COMPLICATION, UNSPECIFIED WHETHER PERSISTENT: Primary | ICD-10-CM

## 2019-10-17 RX ORDER — MONTELUKAST SODIUM 10 MG/1
TABLET ORAL
Qty: 30 TABLET | Refills: 3 | Status: SHIPPED | OUTPATIENT
Start: 2019-10-17 | End: 2020-04-01 | Stop reason: SDUPTHER

## 2019-12-17 DIAGNOSIS — J43.9 PULMONARY EMPHYSEMA, UNSPECIFIED EMPHYSEMA TYPE (HCC): Primary | ICD-10-CM

## 2019-12-18 ENCOUNTER — TELEPHONE (OUTPATIENT)
Dept: HEMATOLOGY | Age: 54
End: 2019-12-18

## 2019-12-19 DIAGNOSIS — K86.2 CYSTIC MASS OF PANCREAS: Primary | ICD-10-CM

## 2019-12-20 RX ORDER — DIPHENHYDRAMINE HYDROCHLORIDE 50 MG/ML
50 INJECTION INTRAMUSCULAR; INTRAVENOUS ONCE
Status: CANCELLED | OUTPATIENT
Start: 2019-12-20

## 2019-12-20 RX ORDER — HEPARIN SODIUM (PORCINE) LOCK FLUSH IV SOLN 100 UNIT/ML 100 UNIT/ML
500 SOLUTION INTRAVENOUS PRN
Status: CANCELLED | OUTPATIENT
Start: 2019-12-20

## 2019-12-20 RX ORDER — SODIUM CHLORIDE 0.9 % (FLUSH) 0.9 %
10 SYRINGE (ML) INJECTION PRN
Status: CANCELLED | OUTPATIENT
Start: 2019-12-20

## 2019-12-20 RX ORDER — METHYLPREDNISOLONE SODIUM SUCCINATE 125 MG/2ML
125 INJECTION, POWDER, LYOPHILIZED, FOR SOLUTION INTRAMUSCULAR; INTRAVENOUS ONCE
Status: CANCELLED | OUTPATIENT
Start: 2019-12-20

## 2019-12-20 RX ORDER — ACETAMINOPHEN 325 MG/1
650 TABLET ORAL ONCE
Status: CANCELLED | OUTPATIENT
Start: 2019-12-20

## 2019-12-20 RX ORDER — SODIUM CHLORIDE 0.9 % (FLUSH) 0.9 %
5 SYRINGE (ML) INJECTION PRN
Status: CANCELLED | OUTPATIENT
Start: 2019-12-20

## 2019-12-20 RX ORDER — SODIUM CHLORIDE 9 MG/ML
INJECTION, SOLUTION INTRAVENOUS CONTINUOUS
Status: CANCELLED | OUTPATIENT
Start: 2019-12-20

## 2019-12-20 RX ORDER — EPINEPHRINE 1 MG/ML
0.3 INJECTION, SOLUTION, CONCENTRATE INTRAVENOUS PRN
Status: CANCELLED | OUTPATIENT
Start: 2019-12-20

## 2019-12-23 ENCOUNTER — TELEPHONE (OUTPATIENT)
Dept: HEMATOLOGY | Age: 54
End: 2019-12-23

## 2019-12-30 ENCOUNTER — TELEPHONE (OUTPATIENT)
Dept: HEMATOLOGY | Age: 54
End: 2019-12-30

## 2019-12-31 ENCOUNTER — TELEPHONE (OUTPATIENT)
Dept: HEMATOLOGY | Age: 54
End: 2019-12-31

## 2020-01-09 ENCOUNTER — HOSPITAL ENCOUNTER (OUTPATIENT)
Dept: MRI IMAGING | Age: 55
Discharge: HOME | End: 2020-01-11
Payer: MEDICARE

## 2020-01-09 PROCEDURE — A9579 GAD-BASE MR CONTRAST NOS,1ML: HCPCS | Performed by: RADIOLOGY

## 2020-01-09 PROCEDURE — 6360000004 HC RX CONTRAST MEDICATION: Performed by: RADIOLOGY

## 2020-01-09 PROCEDURE — 74183 MRI ABD W/O CNTR FLWD CNTR: CPT

## 2020-01-09 RX ADMIN — GADOTERIDOL 16 ML: 279.3 INJECTION, SOLUTION INTRAVENOUS at 15:13

## 2020-01-16 ENCOUNTER — TELEPHONE (OUTPATIENT)
Dept: HEMATOLOGY | Age: 55
End: 2020-01-16

## 2020-01-16 NOTE — TELEPHONE ENCOUNTER
I called and left a message for the patient to call the office back at [phone] to reschedule his 1/23/2020 appt in the [facility] office.        Electronically signed by Yolanda Tellez on 1/16/20 at 10:11 AM

## 2020-01-20 ENCOUNTER — TELEPHONE (OUTPATIENT)
Dept: HEMATOLOGY | Age: 55
End: 2020-01-20

## 2020-01-20 NOTE — TELEPHONE ENCOUNTER
Returned call to reschedule his appt from 1/23/20 to 1/30/20 at 3:15pm at [facility]. He is aware of office location and was instructed to bring his photo ID, list of meds and insurance card to this appt. He verbalized understanding and confirmed this appt.       Electronically signed by Trung Egan RN on 1/20/2020 at 11:34 AM

## 2020-01-29 ENCOUNTER — TELEPHONE (OUTPATIENT)
Dept: HEMATOLOGY | Age: 55
End: 2020-01-29

## 2020-01-29 NOTE — TELEPHONE ENCOUNTER
I called and left a message for the patient to call the office back at [phone] to confirm or reschedule his 1/30/2020 appt at 3:15pm in the [facility] office.        Electronically signed by Tsering Vazquez on 1/29/20 at 9:41 AM

## 2020-01-30 ENCOUNTER — OFFICE VISIT (OUTPATIENT)
Dept: HEMATOLOGY | Age: 55
End: 2020-01-30
Payer: MEDICARE

## 2020-01-30 VITALS
BODY MASS INDEX: 27.02 KG/M2 | TEMPERATURE: 97.5 F | DIASTOLIC BLOOD PRESSURE: 84 MMHG | WEIGHT: 193 LBS | OXYGEN SATURATION: 97 % | HEART RATE: 110 BPM | HEIGHT: 71 IN | SYSTOLIC BLOOD PRESSURE: 135 MMHG | RESPIRATION RATE: 16 BRPM

## 2020-01-30 PROCEDURE — G8419 CALC BMI OUT NRM PARAM NOF/U: HCPCS | Performed by: TRANSPLANT SURGERY

## 2020-01-30 PROCEDURE — G8427 DOCREV CUR MEDS BY ELIG CLIN: HCPCS | Performed by: TRANSPLANT SURGERY

## 2020-01-30 PROCEDURE — G8484 FLU IMMUNIZE NO ADMIN: HCPCS | Performed by: TRANSPLANT SURGERY

## 2020-01-30 PROCEDURE — 3017F COLORECTAL CA SCREEN DOC REV: CPT | Performed by: TRANSPLANT SURGERY

## 2020-01-30 PROCEDURE — 99213 OFFICE O/P EST LOW 20 MIN: CPT | Performed by: TRANSPLANT SURGERY

## 2020-01-30 PROCEDURE — 4004F PT TOBACCO SCREEN RCVD TLK: CPT | Performed by: TRANSPLANT SURGERY

## 2020-01-30 PROCEDURE — 99212 OFFICE O/P EST SF 10 MIN: CPT | Performed by: TRANSPLANT SURGERY

## 2020-01-30 SDOH — HEALTH STABILITY: MENTAL HEALTH: HOW MANY STANDARD DRINKS CONTAINING ALCOHOL DO YOU HAVE ON A TYPICAL DAY?: 3 OR 4

## 2020-01-30 SDOH — HEALTH STABILITY: MENTAL HEALTH: HOW OFTEN DO YOU HAVE A DRINK CONTAINING ALCOHOL?: 2-4 TIMES A MONTH

## 2020-02-03 ENCOUNTER — TELEPHONE (OUTPATIENT)
Dept: HEMATOLOGY | Age: 55
End: 2020-02-03

## 2020-02-13 ENCOUNTER — TELEPHONE (OUTPATIENT)
Dept: HEMATOLOGY | Age: 55
End: 2020-02-13

## 2020-02-13 NOTE — TELEPHONE ENCOUNTER
Patient called and stated he does not have a ride for his procedure tomorrow so he will call back to reschedule.        Electronically signed by Tahir Oliveros on 2/13/20 at 2:10 PM

## 2020-02-27 ENCOUNTER — OFFICE VISIT (OUTPATIENT)
Dept: PULMONOLOGY | Age: 55
End: 2020-02-27
Payer: MEDICARE

## 2020-02-27 VITALS
RESPIRATION RATE: 16 BRPM | DIASTOLIC BLOOD PRESSURE: 72 MMHG | HEART RATE: 92 BPM | TEMPERATURE: 98.1 F | BODY MASS INDEX: 26.46 KG/M2 | HEIGHT: 71 IN | OXYGEN SATURATION: 97 % | WEIGHT: 189 LBS | SYSTOLIC BLOOD PRESSURE: 123 MMHG

## 2020-02-27 LAB — FENO: 5 PPB

## 2020-02-27 PROCEDURE — G8427 DOCREV CUR MEDS BY ELIG CLIN: HCPCS | Performed by: INTERNAL MEDICINE

## 2020-02-27 PROCEDURE — G8419 CALC BMI OUT NRM PARAM NOF/U: HCPCS | Performed by: INTERNAL MEDICINE

## 2020-02-27 PROCEDURE — 99213 OFFICE O/P EST LOW 20 MIN: CPT | Performed by: INTERNAL MEDICINE

## 2020-02-27 PROCEDURE — 3017F COLORECTAL CA SCREEN DOC REV: CPT | Performed by: INTERNAL MEDICINE

## 2020-02-27 PROCEDURE — 4004F PT TOBACCO SCREEN RCVD TLK: CPT | Performed by: INTERNAL MEDICINE

## 2020-02-27 PROCEDURE — G8484 FLU IMMUNIZE NO ADMIN: HCPCS | Performed by: INTERNAL MEDICINE

## 2020-02-27 RX ORDER — OLANZAPINE 10 MG/1
10 TABLET ORAL NIGHTLY
COMMUNITY
Start: 2020-02-17

## 2020-02-27 ASSESSMENT — PULMONARY FUNCTION TESTS: FENO: 5

## 2020-02-27 NOTE — PROGRESS NOTES
Department of Internal Medicine  Division of Pulmonary, Critical Care & Sleep Medicine  Pulmonary [address] [facility]                                             Pulmonary Clinic Consult     I had the pleasure of seeing  Osmar Egan in the [facility] regarding their SOB ,cough with yellow sputum       Chief Complaint   Patient presents with    Follow-up     6 mos follow up; no complaints   And possible COPD    HISTORY OF PRESENT ILLNESS:    Osmar Egan is a 47y.o. year old male start age 16 usually smoke 1 pack and goes up to 1.5 pach  He has been having SOB . for the last 2 years ,it has been getting worse over the last 6 months as well to the point where he use albuterol in daily basis, he also has cough that's productive of yellow sputum and sometimes become brown, he notices that this shortness of breath get worse in the evening associated with wheezing and chest tightness, he is complaining of nasal stuffiness and postnasal drainage, he denies any fever or chills he denies any chest pain, he worked in [employer] for 6 years.   He stated he was exposed to dust and industrial material    The patient is known to have hepatitis C and pancreatic cyst for which she will follow with general surgery with    Use albuterol 2 puff but on daily basis   He was admitted/visited ER at last 2-3 times   Use Symbicort 160/4.5 2 puff bid   He can goes 2-3 flights of stairs   He can walk 1-2 blocks with no chest pain   No sleeping while driving or talking to people       Today visit      Still with some and SOB    Still smoke ,he states he cut to 5 cigarettes a day   He states that he can do 1 block but he will stop for seconds  He states he will stop so he can catch his breath at times  He was not using his inhalers   No hemoptysis   No weight loss    ALLERGIES:    Allergies   Allergen Reactions    Shellfish-Derived Products Hives    Strawberry Extract Hives    been no weight changes. No fevers, fatigue or weakness. Head: Patient denies any history of trauma, convulsive disorder or syncope. Skin:  Patient denies history of changes in pigmentation, eruptions or pruritus. No easy bruising or bleeding. EENT: no nasal congestion   Cardiovascular ,No chest pain ,No edema ,  Respiration:SOB:+++  ,GRACE :+++  Gastrointestinal:No GI bleed ,no melena  ,no hematemesis    Musculoskeletal: no joint pain ,no swelling  Neurological:no , syncope. Denies twitching, convulsions, loss of consciousness or memory. Endocrine:  . No history of goiter, exophthalmos or dryness of skin. The patient has no history of diabetes. Hematopoietic:  No history of bleeding disorders or easy bruising. Rheumatic:  No connective tissue disease history or polyarthritis/inflammatory joint disease. PHYSICAL EXAMINATION:  Vitals:    02/27/20 1434   BP: 123/72   Pulse: 92   Resp: 16   Temp: 98.1 °F (36.7 °C)   SpO2: 97%     Constitutional: This is a well developed, well nourished 47y.o. year old male who is alert, oriented, cooperative and in no apparent distress. Head was normocephalic and atraumatic. EENT: Mallampati class :  Extraocular muscles intact. External canals are patent and no discharge was appreciated. Septum was midline,   mucosa was without erythema, exudates or cobblestoning. No thrush was noted. Neck: Supple without thyromegaly. No elevated JVP. Trachea was midline. No carotid bruits were auscultated. Respiratory: occasional wheezing ,rhonchi     Cardiovascular: Regular without murmur, clicks, gallops or rubs. There is no left or right ventricular heave. Pulses:  Carotid, radial and femoral pulses were equally bilaterally. Abdomen: Slightly rounded and soft without organomegaly. No rebound, rigidity or guarding was appreciated. Lymphatic: No lymphadenopathy. Musculoskeletal: no joint deformity   Extremities:no edema   Skin:  Warm and dry.   Good

## 2020-02-28 RX ORDER — ACETAMINOPHEN 325 MG/1
650 TABLET ORAL ONCE
Status: CANCELLED | OUTPATIENT
Start: 2020-02-28

## 2020-03-11 ENCOUNTER — TELEPHONE (OUTPATIENT)
Dept: PULMONOLOGY | Age: 55
End: 2020-03-11

## 2020-03-11 RX ORDER — EPINEPHRINE 0.3 MG/.3ML
INJECTION SUBCUTANEOUS
Qty: 0.3 ML | Refills: 0 | Status: SHIPPED
Start: 2020-03-11 | End: 2021-06-24

## 2020-03-17 ENCOUNTER — HOSPITAL ENCOUNTER (OUTPATIENT)
Dept: INFUSION THERAPY | Age: 55
Setting detail: INFUSION SERIES
End: 2020-03-17
Payer: MEDICARE

## 2020-03-25 ENCOUNTER — TELEPHONE (OUTPATIENT)
Dept: HEMATOLOGY | Age: 55
End: 2020-03-25

## 2020-03-25 ENCOUNTER — TELEPHONE (OUTPATIENT)
Dept: SURGERY | Age: 55
End: 2020-03-25

## 2020-03-25 NOTE — TELEPHONE ENCOUNTER
Call placed to patient to discuss rescheduling EUS with Dr. Dorian Holloway due to COVID-19. Patient not available, message left for patient to please return my call to discuss rescheduling.   Electronically signed by Sonido Womack on 3/25/20 at 8:55 AM EDT

## 2020-03-25 NOTE — TELEPHONE ENCOUNTER
Called and rescheduled this patient for his EUS follow up appt. Dr. Adry Bustos is not able to do this procedure until 6/5/20 due to the Covid-19. I gave him an appt on 6/12/20 at 1:00pm at [facility] clinic at [facility].     Electronically signed by Danny Jones RN on 3/25/2020 at 9:48 AM

## 2020-03-25 NOTE — TELEPHONE ENCOUNTER
Return call received from patient and procedure moved from 3.27.2020 to 6. 5.2020 with Dr. Kaya Reis. Patient informed that should things change with COVID-19 we will move his procedure back up. Patient verbalized understanding and had no further questions at this time. Phone call placed to surgery scheduling at [facility] to move procedure, I spoke with Hernandez Quan. Chart forwarded to Dr. Kaya Reis to enter orders. Patient will follow up with Dr. Sanjuanita Mathis for results of EUS.   Electronically signed by Rahul Meredith on 3/25/20 at 9:24 AM EDT

## 2020-03-26 ENCOUNTER — HOSPITAL ENCOUNTER (OUTPATIENT)
Dept: INFUSION THERAPY | Age: 55
Setting detail: INFUSION SERIES
End: 2020-03-26
Payer: MEDICARE

## 2020-03-30 ENCOUNTER — HOSPITAL ENCOUNTER (OUTPATIENT)
Dept: INFUSION THERAPY | Age: 55
Setting detail: INFUSION SERIES
End: 2020-03-30
Payer: MEDICARE

## 2020-04-01 RX ORDER — MONTELUKAST SODIUM 10 MG/1
TABLET ORAL
Qty: 30 TABLET | Refills: 3 | Status: SHIPPED
Start: 2020-04-01 | End: 2021-06-24

## 2020-05-01 RX ORDER — UMECLIDINIUM 62.5 UG/1
1 AEROSOL, POWDER ORAL DAILY
Qty: 1 EACH | Refills: 5 | Status: SHIPPED
Start: 2020-05-01 | End: 2021-10-14 | Stop reason: SDUPTHER

## 2020-05-01 RX ORDER — ALBUTEROL SULFATE 90 UG/1
2 AEROSOL, METERED RESPIRATORY (INHALATION) EVERY 6 HOURS PRN
Qty: 1 INHALER | Refills: 0 | Status: SHIPPED
Start: 2020-05-01 | End: 2021-02-10

## 2020-06-10 ENCOUNTER — TELEPHONE (OUTPATIENT)
Dept: SURGERY | Age: 55
End: 2020-06-10

## 2020-06-10 NOTE — TELEPHONE ENCOUNTER
Phone call placed to patient to discuss rescheduling his missed procedure. Patient not available, and unable to leave message. Patients appointment with Dr. Gerber Forrester has also been cancelled due to procedure not being done. Will try again to reach patient to reschedule.   Electronically signed by Ari Naidu on 6/10/20 at 9:45 AM EDT

## 2020-07-14 ENCOUNTER — TELEPHONE (OUTPATIENT)
Dept: HEMATOLOGY | Age: 55
End: 2020-07-14

## 2020-07-14 NOTE — TELEPHONE ENCOUNTER
I called and left another message attempting to get this patient to get his EUS scheduled with Dr. Angeline Song and a follow up with Dr. Brianna Braun. This patient is not returning any calls to our office or Dr. Lance Boykin office.     Electronically signed by Oscar Olivo RN on 7/14/2020 at 2:36 PM

## 2020-08-19 ENCOUNTER — TELEPHONE (OUTPATIENT)
Dept: HEMATOLOGY | Age: 55
End: 2020-08-19

## 2020-09-01 ENCOUNTER — VIRTUAL VISIT (OUTPATIENT)
Dept: PULMONOLOGY | Age: 55
End: 2020-09-01
Payer: MEDICARE

## 2020-09-01 PROCEDURE — 99442 PR PHYS/QHP TELEPHONE EVALUATION 11-20 MIN: CPT | Performed by: INTERNAL MEDICINE

## 2020-09-01 NOTE — PROGRESS NOTES
Department of Internal Medicine  Division of Pulmonary, Critical Care & Sleep Medicine  Pulmonary [address] [facility]                                             Pulmonary Clinic Consult     I had the pleasure of seeing  Sherice Knutson in the [facility] regarding their SOB ,cough with yellow sputum       Chief Complaint   Patient presents with    Follow-up    Asthma   And possible COPD    HISTORY OF PRESENT ILLNESS:    Sherice Knutson is a 54y.o. year old male start age 16 usually smoke 1 pack and goes up to 1.5 pach  He has been having SOB . for the last 2 years ,it has been getting worse over the last 6 months as well to the point where he use albuterol in daily basis, he also has cough that's productive of yellow sputum and sometimes become brown, he notices that this shortness of breath get worse in the evening associated with wheezing and chest tightness, he is complaining of nasal stuffiness and postnasal drainage, he denies any fever or chills he denies any chest pain, he worked in [employer] for 6 years. He stated he was exposed to dust and industrial material    The patient is known to have hepatitis C and pancreatic cyst for which she will follow with general surgery with    Use albuterol 2 puff but on daily basis   He was admitted/visited ER at last 2-3 times   Use Symbicort 160/4.5 2 puff bid   He can goes 2-3 flights of stairs   He can walk 1-2 blocks with no chest pain   No sleeping while driving or talking to people       Today visit      States that his breathing still the same and get Short winded when exercise ,some cough   Still smoke ,he states he still 10 cigarettes a day .   He states that he can do 1 block but he will stop for seconds  He states he will stop so he can catch his breath at times  He is sining albuterol and incruse   No hemoptysis   No weight loss    ALLERGIES:    Allergies   Allergen Reactions    Shellfish-Derived Products Hives    Strawberry Extract Hives    Penicillins Rash     Trouble breathing       PAST MEDICAL HISTORY:       Diagnosis Date    Arthritis     Asthma 2/26/2018    Chronic back pain     Plates inseted from S7-S7    COPD (chronic obstructive pulmonary disease) (HCC)     Emphysema lung (HCC)     Hepatitis C 2011       MEDICATIONS:   Current Outpatient Medications   Medication Sig Dispense Refill    Umeclidinium Bromide (INCRUSE ELLIPTA) 62.5 MCG/INH AEPB Inhale 1 puff into the lungs daily 1 each 5    albuterol sulfate HFA (PROAIR HFA) 108 (90 Base) MCG/ACT inhaler Inhale 2 puffs into the lungs every 6 hours as needed for Wheezing 1 Inhaler 0    montelukast (SINGULAIR) 10 MG tablet TAKE 1 TABLET BY MOUTH EVERY DAY AT NIGHT 30 tablet 3    EPINEPHrine (EPIPEN 2-DANO) 0.3 MG/0.3ML SOAJ injection Use as directed for allergic reaction to Xolair 0.3 mL 0    OLANZapine (ZYPREXA) 10 MG tablet nightly       SYMBICORT 160-4.5 MCG/ACT AERO Inhale 2 puffs into the lungs 2 times daily 1 Inhaler 3    omeprazole (PRILOSEC) 20 MG delayed release capsule TAKE ONE (1) CAPSULE BY MOUTH DAILY 90 capsule 3    ibuprofen (IBU) 800 MG tablet Take 1 tablet by mouth every 8 hours as needed for Pain (Patient taking differently: Take 800 mg by mouth every 8 hours as needed for Pain STOP PREOP MED) 21 tablet 0    traZODone (DESYREL) 50 MG tablet Take 1 tablet by mouth nightly 30 tablet 0     No current facility-administered medications for this visit.         SOCIAL AND OCCUPATIONAL HEALTH:  Social History     Tobacco Use   Smoking Status Current Every Day Smoker    Packs/day: 0.75    Years: 30.00    Pack years: 22.50    Types: Cigarettes   Smokeless Tobacco Never Used     TB :No   Pets No   Industrial exposure:[employer] 6 years   Birds :No     SURGICAL HISTORY:   Past Surgical History:   Procedure Laterality Date    BACK SURGERY  1994    LEG SURGERY Left     Pins inserted       FAMILY HISTORY:   Lung cancer:No   DVT or PE No     REVIEW OF SYSTEMS:  Constitutional: General health is good . There has been no weight changes. No fevers, fatigue or weakness. Head: Patient denies any history of trauma, convulsive disorder or syncope. Skin:  Patient denies history of changes in pigmentation, eruptions or pruritus. No easy bruising or bleeding. EENT: no nasal congestion   Cardiovascular ,No chest pain ,No edema ,  Respiration:SOB:+++  ,GRACE :+++  Gastrointestinal:No GI bleed ,no melena  ,no hematemesis    Musculoskeletal: no joint pain ,no swelling  Neurological:no , syncope. Denies twitching, convulsions, loss of consciousness or memory. Endocrine:  . No history of goiter, exophthalmos or dryness of skin. The patient has no history of diabetes. Hematopoietic:  No history of bleeding disorders or easy bruising. Rheumatic:  No connective tissue disease history or polyarthritis/inflammatory joint disease. PHYSICAL EXAMINATION:  There were no vitals filed for this visit. This is phone visit     DATA:   FEV1  2,22   55 5  FVC: 3.41    66  FEV1/FVC   65 ,LLN is 69     PFT today shows possible mixed obstructive and restrictive lung disease with mild air-trapping     Repeat Spirometry shows non specific pattern and improved FEV1  FEV 1 2.8 which is 71  FVC 3.75 74 %  FEv1/FVC 75   ERV 0.57 33 %        IMPRESSION:      1-Moderately Severe COPD  2-Emphysema  3-Nicotine dependence   4- allergic Rhinitis            PLAN:        -Patient is a 26-year-old male with a 30- pack year history of smoking, came in for [facility] for follow-up of COPD.    -Doing well with Incruse inhaler 1 puff daily   -On Symbicort   _ Use albuterol 5-6 times a week  -No hemoptysis     Singulair and on Zyrtec  - alpha-1 antitrypsin still pending, hospice staff to make sure it has been sent     IgE level came back 749 ,he did not show up for his injection ,so will arrange for that,asked to check expiration date on his Epi pen and if needed to call   Will verify with them,hopefully     I spent 4-6 minutes counseling patient regarding smoking and the risk of Lung cancer and COPD and respiratory failure ,will try patches     PFT next visit   CT in 6 months     Pulmonary rehab not interested   Compression fracture . primary following as Patient tells me,advise again to contact his PCP dr Menard Sessions       Thank you Dr Khadijah Sarmiento for allowing me to participate in [facility] care.  I will keep following with you ,should you have any concerns ,please contact me at [phone]     Sincerely,      Yemi Pimentel MD  Pulmonary & Critical Care Medicine

## 2020-09-01 NOTE — PROGRESS NOTES
TeleMedicine Video Visit    This visit was performed as a virtual video visit using a telephone. Patient identification was verified at the start of the visit, including the patient's telephone number and physical location. I discussed with the patient the nature of our telehealth visits, that:     1. Due to the nature of an audio- video modality, the only components of a physical exam that could be done are the elements supported by direct observation. 2. I would evaluate the patient and recommend diagnostics and treatments based on my assessment. 3. If it was felt that the patient should be evaluated in clinic or an emergency room setting, then they would be directed there. 4. Our sessions are not being recorded and that personal health information is protected. 5. Our team would provide follow up care in person if/when the patient needs it. Patient does agree to proceed with telemedicine consultation. Patient's location: pts home  Physician  location [address]  other people involved in call N/A      Time spent: Greater than 20    This visit was completed virtually using telephone. 6 mos follow up via phone; pt reports breathing is ok. Smoking still (approx 1/2 pkg). Pt concerned with pandemic/COVID. Using incruse and albuterol daily with good results. Uses Albuterol 1-15 times/weekly. Denies hemoptysis. Pt would like to re-order Xolair and begin injections. Office to arrange with infusion center to begin Xolair injections. Follow up in office in 6 to 8 mos. Appt card to be mailed. Pt advised that infusion center will be calling to arrange appt for injections. Advised to check Epi Pen expiration date and contact office RN if needs for new script.

## 2021-02-09 DIAGNOSIS — J45.30 MILD PERSISTENT ASTHMA, UNSPECIFIED WHETHER COMPLICATED: ICD-10-CM

## 2021-02-09 DIAGNOSIS — J43.9 PULMONARY EMPHYSEMA, UNSPECIFIED EMPHYSEMA TYPE (HCC): ICD-10-CM

## 2021-02-10 RX ORDER — ALBUTEROL SULFATE 90 UG/1
AEROSOL, METERED RESPIRATORY (INHALATION)
Qty: 18 G | Refills: 5 | Status: SHIPPED
Start: 2021-02-10 | End: 2021-06-24

## 2021-04-01 ENCOUNTER — TELEPHONE (OUTPATIENT)
Dept: PULMONOLOGY | Age: 56
End: 2021-04-01

## 2021-04-01 NOTE — TELEPHONE ENCOUNTER
Call returned to pt after VM left requesting appt for today be changed. Advised pt in  that office will send out new appt card date and time for appt. Advised to call office if needed.

## 2021-06-24 ENCOUNTER — APPOINTMENT (OUTPATIENT)
Dept: ULTRASOUND IMAGING | Age: 56
DRG: 392 | End: 2021-06-24
Payer: MEDICARE

## 2021-06-24 ENCOUNTER — HOSPITAL ENCOUNTER (INPATIENT)
Age: 56
LOS: 4 days | Discharge: HOME | DRG: 392 | End: 2021-06-28
Attending: EMERGENCY MEDICINE | Admitting: INTERNAL MEDICINE
Payer: MEDICARE

## 2021-06-24 ENCOUNTER — APPOINTMENT (OUTPATIENT)
Dept: CT IMAGING | Age: 56
DRG: 392 | End: 2021-06-24
Payer: MEDICARE

## 2021-06-24 ENCOUNTER — ANESTHESIA EVENT (OUTPATIENT)
Dept: ENDOSCOPY | Age: 56
DRG: 392 | End: 2021-06-24
Payer: MEDICARE

## 2021-06-24 DIAGNOSIS — R10.9 ABDOMINAL PAIN, UNSPECIFIED ABDOMINAL LOCATION: Primary | ICD-10-CM

## 2021-06-24 DIAGNOSIS — R19.7 DIARRHEA, UNSPECIFIED TYPE: ICD-10-CM

## 2021-06-24 DIAGNOSIS — R11.2 NON-INTRACTABLE VOMITING WITH NAUSEA, UNSPECIFIED VOMITING TYPE: ICD-10-CM

## 2021-06-24 DIAGNOSIS — E87.1 HYPONATREMIA: ICD-10-CM

## 2021-06-24 PROBLEM — Z72.0 TOBACCO ABUSE: Chronic | Status: ACTIVE | Noted: 2021-06-24

## 2021-06-24 PROBLEM — F32.A DEPRESSION: Chronic | Status: ACTIVE | Noted: 2018-11-30

## 2021-06-24 LAB
ALBUMIN SERPL-MCNC: 3.5 G/DL (ref 3.5–5.2)
ALP BLD-CCNC: 75 U/L (ref 40–129)
ALT SERPL-CCNC: 103 U/L (ref 0–40)
ANION GAP SERPL CALCULATED.3IONS-SCNC: 15 MMOL/L (ref 7–16)
ANION GAP SERPL CALCULATED.3IONS-SCNC: 7 MMOL/L (ref 7–16)
AST SERPL-CCNC: 87 U/L (ref 0–39)
BASOPHILS ABSOLUTE: 0.03 E9/L (ref 0–0.2)
BASOPHILS RELATIVE PERCENT: 0.5 % (ref 0–2)
BILIRUB SERPL-MCNC: 0.6 MG/DL (ref 0–1.2)
BILIRUBIN URINE: NEGATIVE
BLOOD, URINE: NEGATIVE
BUN BLDV-MCNC: 3 MG/DL (ref 6–20)
BUN BLDV-MCNC: 5 MG/DL (ref 6–20)
CALCIUM SERPL-MCNC: 8.6 MG/DL (ref 8.6–10.2)
CALCIUM SERPL-MCNC: 9.2 MG/DL (ref 8.6–10.2)
CHLORIDE BLD-SCNC: 84 MMOL/L (ref 98–107)
CHLORIDE BLD-SCNC: 89 MMOL/L (ref 98–107)
CLARITY: CLEAR
CO2: 25 MMOL/L (ref 22–29)
CO2: 29 MMOL/L (ref 22–29)
COLOR: YELLOW
CREAT SERPL-MCNC: 0.6 MG/DL (ref 0.7–1.2)
CREAT SERPL-MCNC: 0.6 MG/DL (ref 0.7–1.2)
EKG ATRIAL RATE: 69 BPM
EKG P AXIS: 63 DEGREES
EKG P-R INTERVAL: 134 MS
EKG Q-T INTERVAL: 378 MS
EKG QRS DURATION: 80 MS
EKG QTC CALCULATION (BAZETT): 405 MS
EKG R AXIS: 46 DEGREES
EKG T AXIS: 57 DEGREES
EKG VENTRICULAR RATE: 69 BPM
EOSINOPHILS ABSOLUTE: 0.07 E9/L (ref 0.05–0.5)
EOSINOPHILS RELATIVE PERCENT: 1.2 % (ref 0–6)
ETHANOL: <10 MG/DL (ref 0–0.08)
GFR AFRICAN AMERICAN: >60
GFR AFRICAN AMERICAN: >60
GFR NON-AFRICAN AMERICAN: >60 ML/MIN/1.73
GFR NON-AFRICAN AMERICAN: >60 ML/MIN/1.73
GLUCOSE BLD-MCNC: 78 MG/DL (ref 74–99)
GLUCOSE BLD-MCNC: 84 MG/DL (ref 74–99)
GLUCOSE URINE: NEGATIVE MG/DL
HCT VFR BLD CALC: 42.2 % (ref 37–54)
HEMOGLOBIN: 15.6 G/DL (ref 12.5–16.5)
IMMATURE GRANULOCYTES #: 0.05 E9/L
IMMATURE GRANULOCYTES %: 0.9 % (ref 0–5)
KETONES, URINE: 40 MG/DL
LACTIC ACID, SEPSIS: 1.5 MMOL/L (ref 0.5–1.9)
LEUKOCYTE ESTERASE, URINE: NEGATIVE
LIPASE: 33 U/L (ref 13–60)
LYMPHOCYTES ABSOLUTE: 1.8 E9/L (ref 1.5–4)
LYMPHOCYTES RELATIVE PERCENT: 30.7 % (ref 20–42)
MCH RBC QN AUTO: 35.4 PG (ref 26–35)
MCHC RBC AUTO-ENTMCNC: 37 % (ref 32–34.5)
MCV RBC AUTO: 95.7 FL (ref 80–99.9)
MONOCYTES ABSOLUTE: 0.62 E9/L (ref 0.1–0.95)
MONOCYTES RELATIVE PERCENT: 10.6 % (ref 2–12)
NEUTROPHILS ABSOLUTE: 3.3 E9/L (ref 1.8–7.3)
NEUTROPHILS RELATIVE PERCENT: 56.1 % (ref 43–80)
NITRITE, URINE: NEGATIVE
PDW BLD-RTO: 10.9 FL (ref 11.5–15)
PH UA: 6 (ref 5–9)
PLATELET # BLD: 103 E9/L (ref 130–450)
PMV BLD AUTO: 11.5 FL (ref 7–12)
POTASSIUM REFLEX MAGNESIUM: 4 MMOL/L (ref 3.5–5)
POTASSIUM SERPL-SCNC: 4.3 MMOL/L (ref 3.5–5)
PROTEIN UA: NEGATIVE MG/DL
RBC # BLD: 4.41 E12/L (ref 3.8–5.8)
SODIUM BLD-SCNC: 124 MMOL/L (ref 132–146)
SODIUM BLD-SCNC: 125 MMOL/L (ref 132–146)
SPECIFIC GRAVITY UA: 1.01 (ref 1–1.03)
TOTAL PROTEIN: 6.9 G/DL (ref 6.4–8.3)
UROBILINOGEN, URINE: 0.2 E.U./DL
WBC # BLD: 5.9 E9/L (ref 4.5–11.5)

## 2021-06-24 PROCEDURE — C9113 INJ PANTOPRAZOLE SODIUM, VIA: HCPCS | Performed by: INTERNAL MEDICINE

## 2021-06-24 PROCEDURE — 83690 ASSAY OF LIPASE: CPT

## 2021-06-24 PROCEDURE — 76705 ECHO EXAM OF ABDOMEN: CPT

## 2021-06-24 PROCEDURE — 6370000000 HC RX 637 (ALT 250 FOR IP): Performed by: INTERNAL MEDICINE

## 2021-06-24 PROCEDURE — 6360000002 HC RX W HCPCS: Performed by: INTERNAL MEDICINE

## 2021-06-24 PROCEDURE — 36415 COLL VENOUS BLD VENIPUNCTURE: CPT

## 2021-06-24 PROCEDURE — 2580000003 HC RX 258: Performed by: INTERNAL MEDICINE

## 2021-06-24 PROCEDURE — 74176 CT ABD & PELVIS W/O CONTRAST: CPT

## 2021-06-24 PROCEDURE — 1200000000 HC SEMI PRIVATE

## 2021-06-24 PROCEDURE — 80053 COMPREHEN METABOLIC PANEL: CPT

## 2021-06-24 PROCEDURE — 94640 AIRWAY INHALATION TREATMENT: CPT

## 2021-06-24 PROCEDURE — 82077 ASSAY SPEC XCP UR&BREATH IA: CPT

## 2021-06-24 PROCEDURE — 93010 ELECTROCARDIOGRAM REPORT: CPT | Performed by: INTERNAL MEDICINE

## 2021-06-24 PROCEDURE — 80048 BASIC METABOLIC PNL TOTAL CA: CPT

## 2021-06-24 PROCEDURE — 93005 ELECTROCARDIOGRAM TRACING: CPT | Performed by: EMERGENCY MEDICINE

## 2021-06-24 PROCEDURE — 6370000000 HC RX 637 (ALT 250 FOR IP): Performed by: STUDENT IN AN ORGANIZED HEALTH CARE EDUCATION/TRAINING PROGRAM

## 2021-06-24 PROCEDURE — 99283 EMERGENCY DEPT VISIT LOW MDM: CPT

## 2021-06-24 PROCEDURE — 85025 COMPLETE CBC W/AUTO DIFF WBC: CPT

## 2021-06-24 PROCEDURE — 2580000003 HC RX 258: Performed by: EMERGENCY MEDICINE

## 2021-06-24 PROCEDURE — 83605 ASSAY OF LACTIC ACID: CPT

## 2021-06-24 PROCEDURE — 81003 URINALYSIS AUTO W/O SCOPE: CPT

## 2021-06-24 RX ORDER — PANTOPRAZOLE SODIUM 40 MG/10ML
40 INJECTION, POWDER, LYOPHILIZED, FOR SOLUTION INTRAVENOUS DAILY
Status: DISCONTINUED | OUTPATIENT
Start: 2021-06-24 | End: 2021-06-28 | Stop reason: HOSPADM

## 2021-06-24 RX ORDER — ALBUTEROL SULFATE 90 UG/1
2 AEROSOL, METERED RESPIRATORY (INHALATION) EVERY 6 HOURS PRN
Status: DISCONTINUED | OUTPATIENT
Start: 2021-06-24 | End: 2021-06-24 | Stop reason: CLARIF

## 2021-06-24 RX ORDER — ONDANSETRON 2 MG/ML
4 INJECTION INTRAMUSCULAR; INTRAVENOUS EVERY 6 HOURS PRN
Status: DISCONTINUED | OUTPATIENT
Start: 2021-06-24 | End: 2021-06-28 | Stop reason: HOSPADM

## 2021-06-24 RX ORDER — MONTELUKAST SODIUM 10 MG/1
10 TABLET ORAL NIGHTLY
Status: DISCONTINUED | OUTPATIENT
Start: 2021-06-24 | End: 2021-06-28 | Stop reason: HOSPADM

## 2021-06-24 RX ORDER — POLYETHYLENE GLYCOL 3350 17 G/17G
17 POWDER, FOR SOLUTION ORAL DAILY PRN
Status: DISCONTINUED | OUTPATIENT
Start: 2021-06-24 | End: 2021-06-28 | Stop reason: HOSPADM

## 2021-06-24 RX ORDER — SODIUM CHLORIDE 9 MG/ML
25 INJECTION, SOLUTION INTRAVENOUS PRN
Status: DISCONTINUED | OUTPATIENT
Start: 2021-06-24 | End: 2021-06-28 | Stop reason: HOSPADM

## 2021-06-24 RX ORDER — OMEPRAZOLE 20 MG/1
20 CAPSULE, DELAYED RELEASE ORAL DAILY
Status: ON HOLD | COMMUNITY
End: 2021-06-27 | Stop reason: HOSPADM

## 2021-06-24 RX ORDER — ALBUTEROL SULFATE 2.5 MG/3ML
2.5 SOLUTION RESPIRATORY (INHALATION) EVERY 6 HOURS PRN
Status: DISCONTINUED | OUTPATIENT
Start: 2021-06-24 | End: 2021-06-28 | Stop reason: HOSPADM

## 2021-06-24 RX ORDER — ACETAMINOPHEN 650 MG/1
650 SUPPOSITORY RECTAL EVERY 6 HOURS PRN
Status: DISCONTINUED | OUTPATIENT
Start: 2021-06-24 | End: 2021-06-28 | Stop reason: HOSPADM

## 2021-06-24 RX ORDER — MONTELUKAST SODIUM 10 MG/1
10 TABLET ORAL NIGHTLY
COMMUNITY

## 2021-06-24 RX ORDER — SODIUM CHLORIDE 9 MG/ML
10 INJECTION INTRAVENOUS DAILY
Status: DISCONTINUED | OUTPATIENT
Start: 2021-06-24 | End: 2021-06-28 | Stop reason: HOSPADM

## 2021-06-24 RX ORDER — ALBUTEROL SULFATE 90 UG/1
2 AEROSOL, METERED RESPIRATORY (INHALATION) EVERY 6 HOURS PRN
COMMUNITY
End: 2021-09-27 | Stop reason: SDUPTHER

## 2021-06-24 RX ORDER — OLANZAPINE 10 MG/1
10 TABLET ORAL NIGHTLY
Status: DISCONTINUED | OUTPATIENT
Start: 2021-06-24 | End: 2021-06-28 | Stop reason: HOSPADM

## 2021-06-24 RX ORDER — BUDESONIDE AND FORMOTEROL FUMARATE DIHYDRATE 160; 4.5 UG/1; UG/1
2 AEROSOL RESPIRATORY (INHALATION) 2 TIMES DAILY
Status: DISCONTINUED | OUTPATIENT
Start: 2021-06-24 | End: 2021-06-24 | Stop reason: CLARIF

## 2021-06-24 RX ORDER — ARFORMOTEROL TARTRATE 15 UG/2ML
15 SOLUTION RESPIRATORY (INHALATION) 2 TIMES DAILY
Status: DISCONTINUED | OUTPATIENT
Start: 2021-06-24 | End: 2021-06-28 | Stop reason: HOSPADM

## 2021-06-24 RX ORDER — ONDANSETRON 4 MG/1
4 TABLET, ORALLY DISINTEGRATING ORAL EVERY 8 HOURS PRN
Status: DISCONTINUED | OUTPATIENT
Start: 2021-06-24 | End: 2021-06-28 | Stop reason: HOSPADM

## 2021-06-24 RX ORDER — TRAZODONE HYDROCHLORIDE 50 MG/1
50 TABLET ORAL NIGHTLY
Status: DISCONTINUED | OUTPATIENT
Start: 2021-06-24 | End: 2021-06-28 | Stop reason: HOSPADM

## 2021-06-24 RX ORDER — ACETAMINOPHEN 325 MG/1
650 TABLET ORAL EVERY 6 HOURS PRN
Status: DISCONTINUED | OUTPATIENT
Start: 2021-06-24 | End: 2021-06-28 | Stop reason: HOSPADM

## 2021-06-24 RX ORDER — BUDESONIDE 0.5 MG/2ML
0.5 INHALANT ORAL 2 TIMES DAILY
Status: DISCONTINUED | OUTPATIENT
Start: 2021-06-24 | End: 2021-06-28 | Stop reason: HOSPADM

## 2021-06-24 RX ORDER — SODIUM CHLORIDE 9 MG/ML
1000 INJECTION, SOLUTION INTRAVENOUS CONTINUOUS
Status: DISCONTINUED | OUTPATIENT
Start: 2021-06-24 | End: 2021-06-24 | Stop reason: SDUPTHER

## 2021-06-24 RX ORDER — SODIUM CHLORIDE 0.9 % (FLUSH) 0.9 %
5-40 SYRINGE (ML) INJECTION PRN
Status: DISCONTINUED | OUTPATIENT
Start: 2021-06-24 | End: 2021-06-28 | Stop reason: HOSPADM

## 2021-06-24 RX ORDER — SODIUM CHLORIDE 0.9 % (FLUSH) 0.9 %
5-40 SYRINGE (ML) INJECTION EVERY 12 HOURS SCHEDULED
Status: DISCONTINUED | OUTPATIENT
Start: 2021-06-24 | End: 2021-06-28 | Stop reason: HOSPADM

## 2021-06-24 RX ORDER — SODIUM CHLORIDE 9 MG/ML
INJECTION, SOLUTION INTRAVENOUS CONTINUOUS
Status: DISCONTINUED | OUTPATIENT
Start: 2021-06-24 | End: 2021-06-26

## 2021-06-24 RX ADMIN — SODIUM CHLORIDE 1000 ML: 9 INJECTION, SOLUTION INTRAVENOUS at 06:11

## 2021-06-24 RX ADMIN — ACETAMINOPHEN 650 MG: 325 TABLET ORAL at 10:56

## 2021-06-24 RX ADMIN — SODIUM CHLORIDE: 9 INJECTION, SOLUTION INTRAVENOUS at 11:01

## 2021-06-24 RX ADMIN — PANTOPRAZOLE SODIUM 40 MG: 40 INJECTION, POWDER, FOR SOLUTION INTRAVENOUS at 10:55

## 2021-06-24 RX ADMIN — OLANZAPINE 10 MG: 10 TABLET, FILM COATED ORAL at 20:45

## 2021-06-24 RX ADMIN — IPRATROPIUM BROMIDE 0.5 MG: 0.5 SOLUTION RESPIRATORY (INHALATION) at 15:18

## 2021-06-24 RX ADMIN — MONTELUKAST SODIUM 10 MG: 10 TABLET ORAL at 20:45

## 2021-06-24 RX ADMIN — ONDANSETRON 4 MG: 2 INJECTION INTRAMUSCULAR; INTRAVENOUS at 10:56

## 2021-06-24 RX ADMIN — ENOXAPARIN SODIUM 40 MG: 40 INJECTION SUBCUTANEOUS at 10:57

## 2021-06-24 RX ADMIN — TRAZODONE HYDROCHLORIDE 50 MG: 50 TABLET ORAL at 20:45

## 2021-06-24 RX ADMIN — MAGNESIUM HYDROXIDE/ALUMINUM HYDROXICE/SIMETHICONE: 120; 1200; 1200 SUSPENSION ORAL at 10:57

## 2021-06-24 RX ADMIN — ACETAMINOPHEN 650 MG: 325 TABLET ORAL at 16:57

## 2021-06-24 RX ADMIN — ONDANSETRON 4 MG: 2 INJECTION INTRAMUSCULAR; INTRAVENOUS at 16:32

## 2021-06-24 RX ADMIN — SODIUM CHLORIDE: 9 INJECTION, SOLUTION INTRAVENOUS at 16:32

## 2021-06-24 ASSESSMENT — PAIN DESCRIPTION - DESCRIPTORS: DESCRIPTORS: DISCOMFORT

## 2021-06-24 ASSESSMENT — PAIN DESCRIPTION - FREQUENCY: FREQUENCY: CONTINUOUS

## 2021-06-24 ASSESSMENT — ENCOUNTER SYMPTOMS
RHINORRHEA: 0
TROUBLE SWALLOWING: 0
COUGH: 0
EYE PAIN: 0
BACK PAIN: 0
WHEEZING: 0
CHEST TIGHTNESS: 0
SHORTNESS OF BREATH: 0
VOMITING: 1
BLOOD IN STOOL: 0
DIARRHEA: 1
ABDOMINAL PAIN: 1
NAUSEA: 1

## 2021-06-24 ASSESSMENT — PAIN SCALES - GENERAL
PAINLEVEL_OUTOF10: 10
PAINLEVEL_OUTOF10: 0
PAINLEVEL_OUTOF10: 10

## 2021-06-24 ASSESSMENT — PAIN DESCRIPTION - LOCATION: LOCATION: ABDOMEN

## 2021-06-24 ASSESSMENT — PAIN DESCRIPTION - ORIENTATION: ORIENTATION: RIGHT

## 2021-06-24 ASSESSMENT — PAIN SCALES - WONG BAKER: WONGBAKER_NUMERICALRESPONSE: 2

## 2021-06-24 ASSESSMENT — LIFESTYLE VARIABLES: SMOKING_STATUS: 1

## 2021-06-24 ASSESSMENT — PAIN DESCRIPTION - PAIN TYPE: TYPE: ACUTE PAIN

## 2021-06-24 NOTE — H&P
[address]                  [address] [facility]  [facility]                              HISTORY AND PHYSICAL    PATIENT NAME: Kevin Steele                    :        1965  MED REC NO:   [id number]                            ROOM:       [id number]  ACCOUNT NO:   [de-identified]                           ADMIT DATE: 2021  PROVIDER:     Gabby Simmons DO    CHIEF COMPLAINT:  Nausea, vomiting, abdominal pain. HISTORY OF PRESENT ILLNESS:  The patient is a 59-year-old  male  who presented to the emergency room complaining of two-week history of  right lower quadrant abdominal pain with associated vomiting and  diarrhea. He was seen in the emergency room. Diagnostic evaluation  revealed a sodium of 124. CT abdomen and pelvis revealed fluid seen  throughout, nondilated small bowel, air fluid levels throughout,  nondilated colon, mild diffuse bladder wall thickening, hepatic  steatosis. The patient was admitted for further evaluation and  treatment. MEDICATIONS PRIOR TO ADMISSION:  Ventolin inhaler, Singulair, Prilosec,  Incruse Ellipta, Zyprexa, Symbicort, trazodone. SOCIAL HISTORY:  The patient smokes a pack of cigarettes a day. Admits  to alcohol, three six packs of beer a week. PAST SURGICAL HISTORY:  Low back surgery. REVIEW OF SYSTEMS:  Remarkable for above-stated chief complaint plus  allergy to PENICILLIN. PAST MEDICAL HISTORY:  COPD, hepatitis C, depression, schizoaffective  disorder per old records. PRIMARY CARE PROVIDER:  Anjelica Welch DO    PHYSICAL EXAMINATION:  GENERAL APPEARANCE:  Reveals a 59-year-old  male who is alert,  cooperative and a fair historian. VITAL SIGNS:  On admission, temperature 98.6, pulse 84, respirations 15,  blood pressure 143/88. HEENT:  Head:  Normocephalic, atraumatic. Eyes:  Pupils are equal and  reactive to light. Extraocular muscles intact. Fundi not well  visualized. Nose:  No obstruction, polyp or discharge noted. Mouth:   Mucosa without lesion. Teeth with dental caries. Pharynx:  Noninjected  without exudate. NECK:  Supple. No JVD. No thyromegaly. No carotid bruits. HEART:  Regular rate and rhythm without murmur. LUNGS:  Clear to auscultation bilaterally. ABDOMEN:  Positive bowel sounds. Soft. There is minimal tenderness to  palpation on the right lower quadrant. No rebound or guarding. No  hepatosplenomegaly. No masses. BACK:  Intact without lesion. EXTREMITIES:  Without edema. LYMPH NODES:  No adenopathy noted. SKIN:  Without rash or lesion. IMPRESSIO:  Abdominal pain, intractable nausea and vomiting,  hyponatremia, COPD, schizoaffective disorder, depression. PLAN:  Admit. IV fluids. Surgery to see. Serial lab. Discharge plan  home when stable.         Fabricio Duran DO    D: 06/24/2021 15:57:28       T: 06/24/2021 16:05:23     [id number]  Job#: [id number]     Doc#: [id number]    CC:

## 2021-06-24 NOTE — ED PROVIDER NOTES
[facility]  eMERGENCY dEPARTMENT eNCOUnter      Pt Name: Kendra Waggoner  MRN: [id number]  [place] 1965  Date of evaluation: 6/24/2021  Provider: Shelbie Ribera MD     CHIEF COMPLAINT       Chief Complaint   Patient presents with    Abdominal Pain     nausea/vomitin/diarrhea. HISTORY OF PRESENT ILLNESS   (Location/Symptom, Timing/Onset,Context/Setting, Quality, Duration, Modifying Factors, Severity) Note limiting factors. Presents here with abdominal pain. He arrived here via squad. He admits to drinking 2 beers this evening. He is slow to answer some questions. He states he has pain on his right side and the right abdomen which has been ongoing for 2 weeks. He also states that he has had diarrhea and vomiting for 2 weeks but it was worse today. He denies any other chest pain. He denies any neck or back pain. He has not vomited up any blood. No blood in his stool or black stool. Denies urinary symptoms          Kendra Waggoner is a 54 y.o. male who presents to the emergency department      Nursing Notes were reviewed. REVIEW OF SYSTEMS    (2+ for4; 10+ for level 5)     Review of Systems   Constitutional: Negative for appetite change, chills, fatigue, fever and unexpected weight change. HENT: Negative for congestion, drooling, nosebleeds, rhinorrhea and trouble swallowing. Eyes: Negative for pain and visual disturbance. Respiratory: Negative for cough, chest tightness, shortness of breath and wheezing. Cardiovascular: Negative for chest pain, palpitations and leg swelling. Gastrointestinal: Positive for abdominal pain, diarrhea, nausea and vomiting. Negative for blood in stool. Endocrine: Negative for polydipsia and polyuria. Genitourinary: Negative for difficulty urinating, dysuria, flank pain, frequency, hematuria and urgency. Musculoskeletal: Negative for arthralgias, back pain, myalgias and neck pain. None    Number of children: 1    Years of education: None    Highest education level: None   Occupational History     Employer: DISABLED   Tobacco Use    Smoking status: Current Every Day Smoker     Packs/day: 0.75     Years: 30.00     Pack years: 22.50     Types: Cigarettes    Smokeless tobacco: Never Used   Vaping Use    Vaping Use: Never used   Substance and Sexual Activity    Alcohol use: Yes     Alcohol/week: 6.0 standard drinks     Types: 6 Cans of beer per week     Comment: 3 times a week     Drug use: No    Sexual activity: Not Currently   Other Topics Concern    None   Social History Narrative    Has 1 daughter     Social Determinants of Health     Financial Resource Strain:     Difficulty of Paying Living Expenses:    Food Insecurity:     Worried About Running Out of Food in the Last Year:     Ran Out of Food in the Last Year:    Transportation Needs:     Lack of Transportation (Medical):  Lack of Transportation (Non-Medical):    Physical Activity:     Days of Exercise per Week:     Minutes of Exercise per Session:    Stress:     Feeling of Stress :    Social Connections:     Frequency of Communication with Friends and Family:     Frequency of Social Gatherings with Friends and Family:     Attends Latter-day Services:     Active Member of Clubs or Organizations:     Attends Club or Organization Meetings:     Marital Status:    Intimate Partner Violence:     Fear of Current or Ex-Partner:     Emotionally Abused:     Physically Abused:     Sexually Abused:        SCREENINGS      @FLOW([id number])@    PHYSICAL EXAM    (5+ for level 4, 8+ for level 5)     ED Triage Vitals [06/24/21 0400]   BP Temp Temp Source Pulse Resp SpO2 Height Weight   (!) 143/88 98.6 °F (37 °C) Oral 84 15 98 % 5' 11\" (1.803 m) 186 lb (84.4 kg)       Physical Exam  Vitals and nursing note reviewed. Constitutional:       General: He is not in acute distress. Appearance: Normal appearance.  He is well-developed and normal weight. He is not ill-appearing, toxic-appearing or diaphoretic. HENT:      Head: Normocephalic and atraumatic. Nose: Nose normal.      Mouth/Throat:      Pharynx: No oropharyngeal exudate. Eyes:      General: No scleral icterus. Right eye: No discharge. Left eye: No discharge. Conjunctiva/sclera: Conjunctivae normal.      Pupils: Pupils are equal, round, and reactive to light. Neck:      Thyroid: No thyromegaly. Vascular: No JVD. Trachea: No tracheal deviation. Cardiovascular:      Rate and Rhythm: Normal rate and regular rhythm. Heart sounds: Normal heart sounds. No murmur heard. No gallop. Pulmonary:      Effort: Pulmonary effort is normal. No respiratory distress. Breath sounds: Normal breath sounds. No stridor. No wheezing or rales. Chest:      Chest wall: No tenderness. Abdominal:      General: Abdomen is flat. There is no distension. Palpations: Abdomen is soft. There is no mass. Tenderness: There is abdominal tenderness. There is no guarding or rebound. Comments: Mild diffuse tenderness with no masses rebound guarding peritoneal signs or any focal findings   Musculoskeletal:         General: No tenderness or deformity. Normal range of motion. Cervical back: Normal range of motion and neck supple. Lymphadenopathy:      Cervical: No cervical adenopathy. Skin:     General: Skin is warm and dry. Coloration: Skin is not pale. Findings: No erythema or rash. Neurological:      General: No focal deficit present. Mental Status: He is alert and oriented to person, place, and time. Cranial Nerves: No cranial nerve deficit. Motor: No abnormal muscle tone. Coordination: Coordination normal.   Psychiatric:         Mood and Affect: Mood normal.         Behavior: Behavior normal.         Thought Content:  Thought content normal.         Judgment: Judgment normal.         DIAGNOSTIC RESULTS     EKG (Per Emergency Physician):   Normal sinus rhythm. Rate of 69. Normal NC. Normal QRS. Normal ST segments. No acute ischemia    [facility] Interpretation per the Radiologist below, if available at the time of this note:  CT ABDOMEN PELVIS WO CONTRAST Additional Contrast? None    Result Date: 6/24/2021  EXAMINATION: CT OF THE ABDOMEN AND PELVIS WITHOUT CONTRAST 6/24/2021 4:50 am TECHNIQUE: CT of the abdomen and pelvis was performed without the administration of intravenous contrast. Multiplanar reformatted images are provided for review. Dose modulation, iterative reconstruction, and/or weight based adjustment of the mA/kV was utilized to reduce the radiation dose to as low as reasonably achievable. COMPARISON: 11/02/2018 HISTORY: ORDERING SYSTEM PROVIDED HISTORY: abdominal pain TECHNOLOGIST PROVIDED HISTORY: Reason for exam:->abdominal pain Additional Contrast?->None Decision Support Exception - unselect if not a suspected or confirmed emergency medical condition->Emergency Medical Condition (MA) What reading provider will be dictating this exam?->CRC FINDINGS: Lower Chest: The visualized portions of the lung bases are clear. Organs: There is hepatic steatosis Within the limitations of a noncontrast exam, the visualized spleen, pancreas, adrenal glands and kidneys demonstrate no significant abnormality. GI/Bowel: There are no findings of intestinal obstruction. The appendix is normal.  There are fluid containing normal caliber bowel loops. There are air-fluid levels throughout normal caliber colon. Findings could represent enterocolitis. Pelvis: There is mild diffuse bladder wall thickening. Correlate for possible cystitis. There is no abnormal pelvic mass or fluid collection seen. Peritoneum/Retroperitoneum: There are aortoiliac atherosclerotic calcification. There is no abdominal aortic aneurysm. There is no free intraperitoneal air.   There is no abnormal fluid collection. Bones/Soft Tissues: No acute abnormality there are lumbar postsurgical changes from L2-L4. 1. Fluid seen throughout nondilated small bowel. There are air-fluid levels throughout nondilated colon. Findings are nonspecific but could represent enterocolitis or ileus. 2. Mild diffuse bladder wall thickening. Correlate for possible cystitis. 3. Unchanged hepatic steatosis       :  Labs Reviewed   CBC WITH AUTO DIFFERENTIAL - Abnormal; Notable for the following components:       Result Value    MCH 35.4 (*)     MCHC 37.0 (*)     RDW 10.9 (*)     Platelets 134 (*)     All other components within normal limits   COMPREHENSIVE METABOLIC PANEL W/ REFLEX TO MG FOR LOW K - Abnormal; Notable for the following components:    Sodium 124 (*)     Chloride 84 (*)     BUN 3 (*)     CREATININE 0.6 (*)      (*)     AST 87 (*)     All other components within normal limits   LIPASE   LACTATE, SEPSIS   ETHANOL   URINALYSIS       All other labs were within normal range or not returned as of this dictation. EMERGENCY DEPARTMENT COURSE and DIFFERENTIALDIAGNOSIS/MDM:   Vitals:    Vitals:    06/24/21 0400   BP: (!) 143/88   Pulse: 84   Resp: 15   Temp: 98.6 °F (37 °C)   TempSrc: Oral   SpO2: 98%   Weight: 186 lb (84.4 kg)   Height: 5' 11\" (1.803 m)       Medications   0.9 % sodium chloride infusion (has no administration in time range)       MDM  Number of Diagnoses or Management Options  Diagnosis management comments: Presents here with nausea vomiting some abdominal pain as well as diarrhea. It is uncertain the exact timeframe. It sounds as though this started about 2 weeks ago but then worsened over the last 24 hours. He is not a very good historian and is very vague. Patient has mild abdominal pain to palpation but really a nonfocal exam.  His labs returned with a white count of normal but his sodium is low at 124 which is new compared to the last one on the computer but that was 2 years ago.   His CT scan does reveal fluid-filled loops of bowel most likely an ileus not an obvious obstruction. Patient was reevaluated and at this time is resting comfortably. I spoke with Dr. Abhinav Adamson who agrees to admit the patient for correction of sodium and monitoring fluid status. Discussed with the patient he is in agreement as well. .    CONSULTS:  IP CONSULT TO INTERNAL MEDICINE    PROCEDURES:  Unless otherwise noted below, none     Procedures    FINAL IMPRESSION      1. Abdominal pain, unspecified abdominal location    2. Diarrhea, unspecified type    3. Non-intractable vomiting with nausea, unspecified vomiting type    4. Hyponatremia        DISPOSITION/PLAN   DISPOSITION Admitted 06/24/2021 05:45:05 AM      PATIENT REFERRED TO:  No follow-up provider specified. DISCHARGE MEDICATIONS:  New Prescriptions    No medications on file          (Please note:  Portions of this note were completed with a voice recognition program.  Efforts were made to edit thedictations but occasionally words and phrases are mis-transcribed.)    Form v2016. J.5-cn    Latrice Chun MD (electronically signed)  Emergency Medicine Provider         Latrice Chun MD  06/24/21 0781

## 2021-06-24 NOTE — CONSULTS
GENERAL SURGERY  CONSULT NOTE  6/24/2021    Physician Consulted: Dr. Patricia French  Reason for Consult: Intractable abdominal pain nausea vomiting and diarrhea    CC: Abdominal pain    HPI  Tala Aguilar is a 54 y.o. male who presents for evaluation of abdominal pain. Patient has a history of schizophrenia, GERD, hepatitis C. States he started noticing right upper quadrant abdominal pain approximately 1 month ago that is constant and dull. Currently the pain is 8 out of 10 but can spike to 10 out of 10.  2 weeks ago he developed nausea vomiting and very watery diarrhea. He has not noticed any hematemesis or bloody bowel movements or dark tarry stool. He has no history of EGD or colonoscopy. He does take Prilosec for frequent acid reflux. He does consume proximately 5 beers daily with an alcohol percentage of 6.9. CT abdomen and pelvis demonstrating hepatic steatosis, no gallbladder distention or stones visible or ductal dilation. No duodenal thickening or fat stranding    Patient is afebrile, not tachycardic, lactic acid WNL, WBC WNL. Lipase within normal limits      Past Medical History:   Diagnosis Date    Arthritis     Asthma 2/26/2018    Chronic back pain     Plates inseted from B1-H3    COPD (chronic obstructive pulmonary disease) ([facility]     Emphysema lung ([facility]     Hepatitis C 2011       Past Surgical History:   Procedure Laterality Date    BACK SURGERY  1994    LEG SURGERY Left     Pins inserted       Medications Prior to Admission:    Prior to Admission medications    Medication Sig Start Date End Date Taking?  Authorizing Provider   albuterol sulfate HFA (VENTOLIN HFA) 108 (90 Base) MCG/ACT inhaler Inhale 2 puffs into the lungs every 6 hours as needed for Wheezing   Yes Historical Provider, MD   montelukast (SINGULAIR) 10 MG tablet Take 10 mg by mouth nightly   Yes Historical Provider, MD   omeprazole (PRILOSEC) 20 MG delayed release capsule Take 20 mg by mouth daily   Yes Historical Provider, MD Umeclidinium Bromide (INCRUSE ELLIPTA) 62.5 MCG/INH AEPB Inhale 1 puff into the lungs daily 5/1/20  Yes Barbara Donato MD   OLANZapine (ZYPREXA) 10 MG tablet Take 10 mg by mouth nightly  2/17/20  Yes Historical Provider, MD   SYMBICORT 160-4.5 MCG/ACT AERO Inhale 2 puffs into the lungs 2 times daily 8/22/19  Yes Barbara Donato MD   traZODone (DESYREL) 50 MG tablet Take 1 tablet by mouth nightly 12/7/18  Yes Rodney Drew, APRN - CNP       Allergies   Allergen Reactions    Shellfish-Derived Products Hives    Strawberry Extract Hives    Penicillins Rash     Trouble breathing       Family History   Problem Relation Age of Onset    Breast Cancer Mother     Heart Disease Father        Social History     Tobacco Use    Smoking status: Current Every Day Smoker     Packs/day: 0.75     Years: 30.00     Pack years: 22.50     Types: Cigarettes    Smokeless tobacco: Never Used   Vaping Use    Vaping Use: Never used   Substance Use Topics    Alcohol use: Yes     Alcohol/week: 6.0 standard drinks     Types: 6 Cans of beer per week     Comment: 3 times a week     Drug use: No         Review of Systems   General ROS: positive for  - fever  negative for - chills  Hematological and Lymphatic ROS: Not on home anticoagulation  Respiratory ROS: Chronic cough, no shortness of breath  Cardiovascular ROS: Occasional left-sided chest pain associated with the abdominal pain, no dyspnea on exertion  Gastrointestinal ROS: SEE HPI  Genito-Urinary ROS: no dysuria, trouble voiding, or hematuria  Musculoskeletal ROS: Reporting pain in right thigh      PHYSICAL EXAM:    Vitals:    06/24/21 0400   BP: (!) 143/88   Pulse: 84   Resp: 15   Temp: 98.6 °F (37 °C)   SpO2: 98%       General Appearance:  awake, alert, oriented, in no acute distress  Skin:  Skin color, texture, turgor normal. No rashes or lesions. Head/face:  NCAT  Eyes:  No gross abnormalities.   Lungs:  Breathing Pattern: regular, no distress  Heart: Regular rate and hypertensive  Abdomen: Soft, nondistended, tender to palpation right upper quadrant with some involuntary guarding, negative Shaw sign. Not peritoneal  Extremities: Extremities warm to touch, pink, with no edema. LABS:    CBC  Recent Labs     06/24/21 0438   WBC 5.9   HGB 15.6   HCT 42.2   *     BMP  Recent Labs     06/24/21 0438   *   K 4.0   CL 84*   CO2 25   BUN 3*   CREATININE 0.6*   CALCIUM 9.2     Liver Function  Recent Labs     06/24/21 0438   LIPASE 33   BILITOT 0.6   AST 87*   *   ALKPHOS 75   PROT 6.9   LABALBU 3.5     No results for input(s): LACTATE in the last 72 hours. No results for input(s): INR, PTT in the last 72 hours. Invalid input(s): PT    RADIOLOGY    CT ABDOMEN PELVIS WO CONTRAST Additional Contrast? None    Result Date: 6/24/2021  EXAMINATION: CT OF THE ABDOMEN AND PELVIS WITHOUT CONTRAST 6/24/2021 4:50 am TECHNIQUE: CT of the abdomen and pelvis was performed without the administration of intravenous contrast. Multiplanar reformatted images are provided for review. Dose modulation, iterative reconstruction, and/or weight based adjustment of the mA/kV was utilized to reduce the radiation dose to as low as reasonably achievable. COMPARISON: 11/02/2018 HISTORY: ORDERING SYSTEM PROVIDED HISTORY: abdominal pain TECHNOLOGIST PROVIDED HISTORY: Reason for exam:->abdominal pain Additional Contrast?->None Decision Support Exception - unselect if not a suspected or confirmed emergency medical condition->Emergency Medical Condition (MA) What reading provider will be dictating this exam?->CRC FINDINGS: Lower Chest: The visualized portions of the lung bases are clear. Organs: There is hepatic steatosis Within the limitations of a noncontrast exam, the visualized spleen, pancreas, adrenal glands and kidneys demonstrate no significant abnormality. GI/Bowel: There are no findings of intestinal obstruction.   The appendix is normal.  There are fluid containing normal caliber bowel loops. There are air-fluid levels throughout normal caliber colon. Findings could represent enterocolitis. Pelvis: There is mild diffuse bladder wall thickening. Correlate for possible cystitis. There is no abnormal pelvic mass or fluid collection seen. Peritoneum/Retroperitoneum: There are aortoiliac atherosclerotic calcification. There is no abdominal aortic aneurysm. There is no free intraperitoneal air. There is no abnormal fluid collection. Bones/Soft Tissues: No acute abnormality there are lumbar postsurgical changes from L2-L4. 1. Fluid seen throughout nondilated small bowel. There are air-fluid levels throughout nondilated colon. Findings are nonspecific but could represent enterocolitis or ileus. 2. Mild diffuse bladder wall thickening. Correlate for possible cystitis. 3. Unchanged hepatic steatosis         ASSESSMENT:  54 y.o. male with abdominal pain, nausea, vomiting, diarrhea.   History of alcohol abuse, likely alcoholic peptic disease and pancreatic insufficiency    PLAN:    -We will obtain right upper quadrant ultrasound to rule out cholecystitis or cholelithiasis  -N.p.o. after midnight  -EGD tomorrow  -GI cocktail  -PPI  -Appreciate medicine input    Plan discussed with Dr. Bassam Breen    Electronically signed by Soheila Toth DO on 6/24/21 at 9:08 AM EDT    The chart was reviewed  Agree with the assessment and plan

## 2021-06-24 NOTE — Clinical Note
Patient Class: Observation [104]   REQUIRED: Diagnosis: Hyponatremia [433481]   Estimated Length of Stay: Estimated stay of less than 2 midnights   Admitting Provider: Jia Morales [[id number]]   Telemetry/Cardiac Monitoring Required?: No

## 2021-06-24 NOTE — ANESTHESIA PRE PROCEDURE
Department of Anesthesiology  Preprocedure Note       Name:  Mal Goltz   Age:  54 y.o.  :  1965                                          MRN:  [id number]         Date:  2021      Surgeon: Kye Veras):  Indu Holloway MD    Procedure: Procedure(s):  EGD ESOPHAGOGASTRODUODENOSCOPY    Medications prior to admission:   Prior to Admission medications    Medication Sig Start Date End Date Taking?  Authorizing Provider   albuterol sulfate HFA (VENTOLIN HFA) 108 (90 Base) MCG/ACT inhaler Inhale 2 puffs into the lungs every 6 hours as needed for Wheezing   Yes Historical Provider, MD   montelukast (SINGULAIR) 10 MG tablet Take 10 mg by mouth nightly   Yes Historical Provider, MD   omeprazole (PRILOSEC) 20 MG delayed release capsule Take 20 mg by mouth daily   Yes Historical Provider, MD   Umeclidinium Bromide (INCRUSE ELLIPTA) 62.5 MCG/INH AEPB Inhale 1 puff into the lungs daily 20  Yes Raphael Oden MD   OLANZapine (ZYPREXA) 10 MG tablet Take 10 mg by mouth nightly  20  Yes Historical Provider, MD   SYMBICORT 160-4.5 MCG/ACT AERO Inhale 2 puffs into the lungs 2 times daily 19  Yes Raphael Oden MD   traZODone (DESYREL) 50 MG tablet Take 1 tablet by mouth nightly 18  Yes Fan Manuel, APRN - CNP       Current medications:    Current Facility-Administered Medications   Medication Dose Route Frequency Provider Last Rate Last Admin    montelukast (SINGULAIR) tablet 10 mg  10 mg Oral Nightly Charlene Prado, DO   10 mg at 21    OLANZapine (ZYPREXA) tablet 10 mg  10 mg Oral Nightly Charlene Prado, DO   10 mg at 21    traZODone (DESYREL) tablet 50 mg  50 mg Oral Nightly Charlene Prado, DO   50 mg at 21    sodium chloride flush 0.9 % injection 5-40 mL  5-40 mL Intravenous 2 times per day Chava Santiago, DO   10 mL at 21 1010    sodium chloride flush 0.9 % injection 5-40 mL  5-40 mL Intravenous PRN Charleen Prado, DO        0.9 % sodium chloride infusion  25 mL Intravenous PRN Yaima Prado, DO        enoxaparin (LOVENOX) injection 40 mg  40 mg Subcutaneous Daily Yaima Prado, DO   40 mg at 06/25/21 1009    ondansetron (ZOFRAN-ODT) disintegrating tablet 4 mg  4 mg Oral Q8H PRN Yaima Prado, DO        Or    ondansetron [facility]) injection 4 mg  4 mg Intravenous Q6H PRN Yaima Prado, DO   4 mg at 06/24/21 1632    polyethylene glycol (GLYCOLAX) packet 17 g  17 g Oral Daily PRN Yaima Prado, DO        acetaminophen (TYLENOL) tablet 650 mg  650 mg Oral Q6H PRN Yaima Prado, DO   650 mg at 06/24/21 1657    Or    acetaminophen (TYLENOL) suppository 650 mg  650 mg Rectal Q6H PRN Yaima Prado, DO        0.9 % sodium chloride infusion   Intravenous Continuous Yaima Prado,  mL/hr at 06/24/21 1632 New Bag at 06/24/21 1632    pantoprazole (PROTONIX) injection 40 mg  40 mg Intravenous Daily Yaima Prado, DO   40 mg at 06/25/21 1008    And    sodium chloride (PF) 0.9 % injection 10 mL  10 mL Intravenous Daily Yaima Prado, DO   10 mL at 06/25/21 1009    albuterol (PROVENTIL) nebulizer solution 2.5 mg  2.5 mg Nebulization Q6H PRN Yaima Prado, DO        budesonide (PULMICORT) nebulizer suspension 500 mcg  0.5 mg Nebulization BID Yaima Prado, DO   500 mcg at 06/25/21 1001    Arformoterol Tartrate (BROVANA) nebulizer solution 15 mcg  15 mcg Nebulization BID Yaima Prado, DO   15 mcg at 06/25/21 1000    ipratropium (ATROVENT) 0.02 % nebulizer solution 0.5 mg  0.5 mg Nebulization 4x daily Yaima Prado, DO   0.5 mg at 06/25/21 1250       Allergies:     Allergies   Allergen Reactions    Shellfish-Derived Products Hives    Strawberry Extract Hives    Penicillins Rash     Trouble breathing       Problem List:    Patient Active Problem List   Diagnosis Code    Schizophrenia, schizo-affective ([facility] [address] F25.9    Dermatitis L30.9    Pulmonary emphysema ([facility] [facility] J43.9    Cystic mass of pancreas K86.2    Spondylosis of lumbar region without myelopathy or radiculopathy M47.816    Gastroesophageal reflux disease without esophagitis K21.9    Cervical adenopathy R59.0    Asthma J45.909    Depression F32.9    Severe major depression without psychotic features (HCC) F32.2    Exposure to communicable diseases Z20.9    Hyponatremia E87.1    Abdominal pain R10.9    Tobacco abuse Z72.0       Past Medical History:        Diagnosis Date    Arthritis     Asthma 2/26/2018    Chronic back pain     Plates inseted from A4-W4    [facility]     [facility]phy[facility] lung [facility] [facility]     Hepatitis C 2011       Past Surgical History:        Procedure Laterality Date    BACK SURGERY  1994    LEG SURGERY Left     Pins inserted       Social History:    Social History     Tobacco Use    Smoking status: Current Every Day Smoker     Packs/day: 0.75     Years: 30.00     Pack years: 22.50     Types: Cigarettes    Smokeless tobacco: Never Used   Substance Use Topics    Alcohol use:  Yes     Alcohol/week: 6.0 standard drinks     Types: 6 Cans of beer per week     Comment: 3 times a week                                 Ready to quit: Not Answered  Counseling given: Not Answered      Vital Signs (Current):   Vitals:    06/24/21 0400 06/24/21 1017 06/24/21 2358 06/25/21 0826   BP: (!) 143/88 127/65 105/61 106/61   Pulse: 84 65 61 55   Resp: 15  16 16   Temp: 98.6 °F (37 °C) 96.8 °F (36 °C) 97.3 °F (36.3 °C) 96.6 °F (35.9 °C)   TempSrc: Oral Temporal Temporal Temporal   SpO2: 98% 99% 97% 97%   Weight: 186 lb (84.4 kg)      Height: 5' 11\" (1.803 m)                                                 BP Readings from Last 3 Encounters:   06/25/21 106/61   02/27/20 123/72   01/30/20 135/84       NPO Status:  > 8hrs                                                                               BMI:   Wt Readings from Last 3 Encounters:   06/24/21 186 lb (84.4 kg)   02/27/20 189 lb (85.7 kg)   01/30/20 193 lb (87.5 kg) Body mass index is 25.94 kg/m². CBC:   Lab Results   Component Value Date    WBC 5.9 06/24/2021    RBC 4.41 06/24/2021    HGB 15.6 06/24/2021    HCT 42.2 06/24/2021    MCV 95.7 06/24/2021    RDW 10.9 06/24/2021     06/24/2021       CMP:   Lab Results   Component Value Date     06/25/2021    K 4.0 06/25/2021    K 4.0 06/24/2021    CL 95 06/25/2021    CO2 28 06/25/2021    BUN 4 06/25/2021    CREATININE 0.7 06/25/2021    GFRAA >60 06/25/2021    LABGLOM >60 06/25/2021    GLUCOSE 77 06/25/2021    GLUCOSE 65 03/26/2011    PROT 6.0 06/25/2021    CALCIUM 8.8 06/25/2021    BILITOT 0.5 06/25/2021    ALKPHOS 66 06/25/2021    AST 40 06/25/2021    ALT 70 06/25/2021       POC Tests: No results for input(s): POCGLU, POCNA, POCK, POCCL, POCBUN, POCHEMO, POCHCT in the last 72 hours. Coags:   Lab Results   Component Value Date    PROTIME 11.3 03/21/2017    PROTIME 10.9 02/27/2011    INR 1.0 03/21/2017    APTT 28.9 03/21/2017       HCG (If Applicable): No results found for: PREGTESTUR, PREGSERUM, HCG, HCGQUANT     ABGs: No results found for: PHART, PO2ART, UWA0WSS, XSE8VGP, BEART, M7HVZGVE     Type & Screen (If Applicable):  No results found for: LABABO, LABRH    Drug/Infectious Status (If Applicable):  No results found for: HIV, HEPCAB    COVID-19 Screening (If Applicable): No results found for: COVID19        Anesthesia Evaluation  Patient summary reviewed and Nursing notes reviewed no history of anesthetic complications:   Airway: Mallampati: III        Dental: normal exam         Pulmonary:   (+) COPD:  decreased breath sounds,  asthma: seasonal asthma, current smoker (1PPD)          Patient smoked on day of surgery.                  Cardiovascular:  Exercise tolerance: good (>4 METS),         ECG reviewed  Rhythm: regular  Rate: normal           Beta Blocker:  Not on Beta Blocker      ROS comment: EKG Narrative & Impression    Normal sinus rhythm  Normal ECG  When compared with ECG of 30-NOV-2018 05:19,  No significant change was found  Confirmed by Fifi Garcia ([phone]) on 6/24/2021 10:55:19 AM         Neuro/Psych:   (+) psychiatric history (Schizophrenia, schizo-affective ([facility])): stable with treatmentdepression/anxiety             GI/Hepatic/Renal:   (+) GERD: poorly controlled, hepatitis: C, liver disease (Hepatic Steatosis):,          ROS comment: N/v x2 weeks. Endo/Other:    (+) : arthritis (Cervical Adenopathy, Previous Lower back surgery): OA., electrolyte abnormalities, . ROS comment: Dermatitis Abdominal:           Vascular:                                        Anesthesia Plan      MAC     ASA 3       Induction: intravenous. MIPS: Prophylactic antiemetics administered. Anesthetic plan and risks discussed with patient. Plan discussed with CRNA and attending.                   Ace Ascencio DO   6/25/2021

## 2021-06-24 NOTE — ED NOTES
Bed: 15  Expected date:   Expected time:   Means of arrival:   Comments:  ems     Cara Liriano RN  06/24/21 9622

## 2021-06-25 ENCOUNTER — ANESTHESIA (OUTPATIENT)
Dept: ENDOSCOPY | Age: 56
DRG: 392 | End: 2021-06-25
Payer: MEDICARE

## 2021-06-25 VITALS — DIASTOLIC BLOOD PRESSURE: 82 MMHG | OXYGEN SATURATION: 95 % | SYSTOLIC BLOOD PRESSURE: 129 MMHG

## 2021-06-25 LAB
ALBUMIN SERPL-MCNC: 3.2 G/DL (ref 3.5–5.2)
ALP BLD-CCNC: 66 U/L (ref 40–129)
ALT SERPL-CCNC: 70 U/L (ref 0–40)
ANION GAP SERPL CALCULATED.3IONS-SCNC: 9 MMOL/L (ref 7–16)
AST SERPL-CCNC: 40 U/L (ref 0–39)
BILIRUB SERPL-MCNC: 0.5 MG/DL (ref 0–1.2)
BUN BLDV-MCNC: 4 MG/DL (ref 6–20)
CALCIUM SERPL-MCNC: 8.8 MG/DL (ref 8.6–10.2)
CHLORIDE BLD-SCNC: 95 MMOL/L (ref 98–107)
CO2: 28 MMOL/L (ref 22–29)
CREAT SERPL-MCNC: 0.7 MG/DL (ref 0.7–1.2)
GFR AFRICAN AMERICAN: >60
GFR NON-AFRICAN AMERICAN: >60 ML/MIN/1.73
GLUCOSE BLD-MCNC: 77 MG/DL (ref 74–99)
POTASSIUM SERPL-SCNC: 4 MMOL/L (ref 3.5–5)
SODIUM BLD-SCNC: 132 MMOL/L (ref 132–146)
TOTAL PROTEIN: 6 G/DL (ref 6.4–8.3)

## 2021-06-25 PROCEDURE — 0DB68ZX EXCISION OF STOMACH, VIA NATURAL OR ARTIFICIAL OPENING ENDOSCOPIC, DIAGNOSTIC: ICD-10-PCS | Performed by: SURGERY

## 2021-06-25 PROCEDURE — 3700000001 HC ADD 15 MINUTES (ANESTHESIA): Performed by: SURGERY

## 2021-06-25 PROCEDURE — 6360000002 HC RX W HCPCS: Performed by: ANESTHESIOLOGIST ASSISTANT

## 2021-06-25 PROCEDURE — 80053 COMPREHEN METABOLIC PANEL: CPT

## 2021-06-25 PROCEDURE — C9113 INJ PANTOPRAZOLE SODIUM, VIA: HCPCS | Performed by: INTERNAL MEDICINE

## 2021-06-25 PROCEDURE — 88342 IMHCHEM/IMCYTCHM 1ST ANTB: CPT

## 2021-06-25 PROCEDURE — 2709999900 HC NON-CHARGEABLE SUPPLY: Performed by: SURGERY

## 2021-06-25 PROCEDURE — 94640 AIRWAY INHALATION TREATMENT: CPT

## 2021-06-25 PROCEDURE — 3609012400 HC EGD TRANSORAL BIOPSY SINGLE/MULTIPLE: Performed by: SURGERY

## 2021-06-25 PROCEDURE — 2580000003 HC RX 258: Performed by: ANESTHESIOLOGIST ASSISTANT

## 2021-06-25 PROCEDURE — 2580000003 HC RX 258: Performed by: INTERNAL MEDICINE

## 2021-06-25 PROCEDURE — 3700000000 HC ANESTHESIA ATTENDED CARE: Performed by: SURGERY

## 2021-06-25 PROCEDURE — 6360000002 HC RX W HCPCS: Performed by: INTERNAL MEDICINE

## 2021-06-25 PROCEDURE — 36415 COLL VENOUS BLD VENIPUNCTURE: CPT

## 2021-06-25 PROCEDURE — 88305 TISSUE EXAM BY PATHOLOGIST: CPT

## 2021-06-25 PROCEDURE — 0DB98ZX EXCISION OF DUODENUM, VIA NATURAL OR ARTIFICIAL OPENING ENDOSCOPIC, DIAGNOSTIC: ICD-10-PCS | Performed by: SURGERY

## 2021-06-25 PROCEDURE — 6370000000 HC RX 637 (ALT 250 FOR IP): Performed by: INTERNAL MEDICINE

## 2021-06-25 PROCEDURE — 1200000000 HC SEMI PRIVATE

## 2021-06-25 PROCEDURE — 7100000000 HC PACU RECOVERY - FIRST 15 MIN: Performed by: SURGERY

## 2021-06-25 PROCEDURE — 7100000001 HC PACU RECOVERY - ADDTL 15 MIN: Performed by: SURGERY

## 2021-06-25 RX ORDER — MIDAZOLAM HYDROCHLORIDE 1 MG/ML
INJECTION INTRAMUSCULAR; INTRAVENOUS PRN
Status: DISCONTINUED | OUTPATIENT
Start: 2021-06-25 | End: 2021-06-25 | Stop reason: SDUPTHER

## 2021-06-25 RX ORDER — SODIUM CHLORIDE 9 MG/ML
INJECTION, SOLUTION INTRAVENOUS CONTINUOUS PRN
Status: DISCONTINUED | OUTPATIENT
Start: 2021-06-25 | End: 2021-06-25 | Stop reason: SDUPTHER

## 2021-06-25 RX ORDER — PROPOFOL 10 MG/ML
INJECTION, EMULSION INTRAVENOUS PRN
Status: DISCONTINUED | OUTPATIENT
Start: 2021-06-25 | End: 2021-06-25 | Stop reason: SDUPTHER

## 2021-06-25 RX ADMIN — PROPOFOL 150 MG: 10 INJECTION, EMULSION INTRAVENOUS at 14:38

## 2021-06-25 RX ADMIN — TRAZODONE HYDROCHLORIDE 50 MG: 50 TABLET ORAL at 21:31

## 2021-06-25 RX ADMIN — BUDESONIDE 500 MCG: 0.5 SUSPENSION RESPIRATORY (INHALATION) at 10:01

## 2021-06-25 RX ADMIN — IPRATROPIUM BROMIDE 0.5 MG: 0.5 SOLUTION RESPIRATORY (INHALATION) at 10:01

## 2021-06-25 RX ADMIN — SODIUM CHLORIDE, PRESERVATIVE FREE 10 ML: 5 INJECTION INTRAVENOUS at 10:09

## 2021-06-25 RX ADMIN — Medication 10 ML: at 10:10

## 2021-06-25 RX ADMIN — ONDANSETRON 4 MG: 2 INJECTION INTRAMUSCULAR; INTRAVENOUS at 21:31

## 2021-06-25 RX ADMIN — IPRATROPIUM BROMIDE 0.5 MG: 0.5 SOLUTION RESPIRATORY (INHALATION) at 12:50

## 2021-06-25 RX ADMIN — PANTOPRAZOLE SODIUM 40 MG: 40 INJECTION, POWDER, FOR SOLUTION INTRAVENOUS at 10:08

## 2021-06-25 RX ADMIN — SODIUM CHLORIDE: 9 INJECTION, SOLUTION INTRAVENOUS at 14:35

## 2021-06-25 RX ADMIN — SODIUM CHLORIDE: 9 INJECTION, SOLUTION INTRAVENOUS at 21:39

## 2021-06-25 RX ADMIN — ARFORMOTEROL TARTRATE 15 MCG: 15 SOLUTION RESPIRATORY (INHALATION) at 10:00

## 2021-06-25 RX ADMIN — ENOXAPARIN SODIUM 40 MG: 40 INJECTION SUBCUTANEOUS at 10:09

## 2021-06-25 RX ADMIN — IPRATROPIUM BROMIDE 0.5 MG: 0.5 SOLUTION RESPIRATORY (INHALATION) at 17:58

## 2021-06-25 RX ADMIN — Medication 10 ML: at 21:31

## 2021-06-25 RX ADMIN — IPRATROPIUM BROMIDE 0.5 MG: 0.5 SOLUTION RESPIRATORY (INHALATION) at 20:57

## 2021-06-25 RX ADMIN — MIDAZOLAM 2 MG: 1 INJECTION INTRAMUSCULAR; INTRAVENOUS at 14:38

## 2021-06-25 RX ADMIN — BUDESONIDE 500 MCG: 0.5 SUSPENSION RESPIRATORY (INHALATION) at 20:57

## 2021-06-25 RX ADMIN — ARFORMOTEROL TARTRATE 15 MCG: 15 SOLUTION RESPIRATORY (INHALATION) at 20:57

## 2021-06-25 RX ADMIN — OLANZAPINE 10 MG: 10 TABLET, FILM COATED ORAL at 21:31

## 2021-06-25 RX ADMIN — MONTELUKAST SODIUM 10 MG: 10 TABLET ORAL at 21:31

## 2021-06-25 ASSESSMENT — PAIN SCALES - GENERAL
PAINLEVEL_OUTOF10: 0

## 2021-06-25 ASSESSMENT — PAIN DESCRIPTION - PAIN TYPE: TYPE: SURGICAL PAIN

## 2021-06-25 ASSESSMENT — PAIN DESCRIPTION - LOCATION: LOCATION: ABDOMEN

## 2021-06-25 NOTE — PROGRESS NOTES
Floor Called, nurse to nurse given. Spoke with [employer]. Patients test results review, VS reported to receiving nurse. Any and all important information regarding patient disclosed.

## 2021-06-25 NOTE — CARE COORDINATION
6/25/2021 social work transition of care planning  Pt is from home alone and uses a cane. Pt pcp is Dr. Enrique Mckeon and pharmacy is [facility] on [address]. Explained sw role in transition of care planning. Pt plan is to return home. Pt stated he has money for a taxi. Pt has no preference if c needed-list declined. [address] to follow.   Electronically signed by Bonnetta Habermann, LSW on 6/25/2021 at 11:06 AM

## 2021-06-25 NOTE — HOME CARE
[facility] received referral. Patient does NOT have a pcp to follow [address] as he has not seen his listed pcp since 2019.  Mychal Gamble) made aware. Spoke with patient and verified demographics. Patient will need a Doctor to follow for [address] at discharge before [address] can see the patient.   Rula Clinton LPN, [facility]

## 2021-06-25 NOTE — PROGRESS NOTES
Hospital Medicine    Subjective:  Pt seen this am no emesis overnite      Current Facility-Administered Medications:     montelukast (SINGULAIR) tablet 10 mg, 10 mg, Oral, Nightly, [place] Al[place] Malmer, DO, 10 mg at 06/24/21 2045    OLANZapine (ZYPREXA) tablet 10 mg, 10 mg, Oral, Nightly, [place] Montgomery Malmer, DO, 10 mg at 06/24/21 2045    traZODone (DESYREL) tablet 50 mg, 50 mg, Oral, Nightly, [place] Malmer, DO, 50 mg at 06/24/21 2045    sodium chloride flush 0.9 % injection 5-40 mL, 5-40 mL, Intravenous, 2 times per day, [address], DO, 10 mL at 06/25/21 1010    sodium chloride flush 0.9 % injection 5-40 mL, 5-40 mL, Intravenous, PRN, [place] Montgomery Malmer, DO    0.9 % sodium chloride infusion, 25 mL, Intravenous, PRN, [place] Malmer, DO    enoxaparin (LOVENOX) injection 40 mg, 40 mg, Subcutaneous, Daily, [place] Almita Malmer, DO, 40 mg at 06/25/21 1009    ondansetron (ZOFRAN-ODT) disintegrating tablet 4 mg, 4 mg, Oral, Q8H PRN **OR** ondansetron (ZOFRAN) injection 4 mg, 4 mg, Intravenous, Q6H PRN, [place] Malmer, DO, 4 mg at 06/24/21 1632    polyethylene glycol (GLYCOLAX) packet 17 g, 17 g, Oral, Daily PRN, [place] Malmer, DO    acetaminophen (TYLENOL) tablet 650 mg, 650 mg, Oral, Q6H PRN, 650 mg at 06/24/21 1657 **OR** acetaminophen (TYLENOL) suppository 650 mg, 650 mg, Rectal, Q6H PRN, [place] Almita Malmer, DO    0.9 % sodium chloride infusion, , Intravenous, Continuous, [place] Malmer, DO, Last Rate: 100 mL/hr at 06/24/21 1632, New Bag at 06/24/21 1632    pantoprazole (PROTONIX) injection 40 mg, 40 mg, Intravenous, Daily, 40 mg at 06/25/21 1008 **AND** sodium chloride (PF) 0.9 % injection 10 mL, 10 mL, Intravenous, Daily, [place] Almita Prado DO, 10 mL at 06/25/21 1009    albuterol (PROVENTIL) nebulizer solution 2.5 mg, 2.5 mg, Nebulization, Q6H PRN, Maryam Prado DO    budesonide (PULMICORT) nebulizer suspension 500 mcg, 0.5 mg, Nebulization, BID, Maryam Prado DO, 500 mcg at 06/25/21 1001   Arformoterol Tartrate (BROVANA) nebulizer solution 15 mcg, 15 mcg, Nebulization, BID, Anni Griffin Malmer, DO, 15 mcg at 06/25/21 1000    ipratropium (ATROVENT) 0.02 % nebulizer solution 0.5 mg, 0.5 mg, Nebulization, 4x daily, Anni Griffin Malmer, DO, 0.5 mg at 06/25/21 1250    Objective:    /61   Pulse 55   Temp 96.6 °F (35.9 °C) (Temporal)   Resp 16   Ht 5' 11\" (1.803 m)   Wt 186 lb (84.4 kg)   SpO2 97%   BMI 25.94 kg/m²     Heart:  reg  Lungs:  ctab  Abd: + bs nontender nondistended  Extrem:  W/o edema    CBC with Differential:    Lab Results   Component Value Date    WBC 5.9 06/24/2021    RBC 4.41 06/24/2021    HGB 15.6 06/24/2021    HCT 42.2 06/24/2021     06/24/2021    MCV 95.7 06/24/2021    MCH 35.4 06/24/2021    MCHC 37.0 06/24/2021    RDW 10.9 06/24/2021    SEGSPCT 78 03/23/2011    LYMPHOPCT 30.7 06/24/2021    MONOPCT 10.6 06/24/2021    BASOPCT 0.5 06/24/2021    MONOSABS 0.62 06/24/2021    LYMPHSABS 1.80 06/24/2021    EOSABS 0.07 06/24/2021    BASOSABS 0.03 06/24/2021     CMP:    Lab Results   Component Value Date     06/25/2021    K 4.0 06/25/2021    K 4.0 06/24/2021    CL 95 06/25/2021    CO2 28 06/25/2021    BUN 4 06/25/2021    CREATININE 0.7 06/25/2021    GFRAA >60 06/25/2021    LABGLOM >60 06/25/2021    GLUCOSE 77 06/25/2021    GLUCOSE 65 03/26/2011    PROT 6.0 06/25/2021    LABALBU 3.2 06/25/2021    LABALBU 3.1 03/26/2011    CALCIUM 8.8 06/25/2021    BILITOT 0.5 06/25/2021    ALKPHOS 66 06/25/2021    AST 40 06/25/2021    ALT 70 06/25/2021     Warfarin PT/INR:    Lab Results   Component Value Date    INR 1.0 03/21/2017    INR 1.0 02/27/2011    PROTIME 11.3 03/21/2017    PROTIME 10.9 02/27/2011       Assessment:    Active Problems:    Schizophrenia, schizo-affective (HCC)    Pulmonary emphysema (HCC)    Depression    Hyponatremia    Abdominal pain    Tobacco abuse  Resolved Problems:    * No resolved hospital problems. *      Plan:   For egd today cont ivf advance diet per surgery cont ppi        Leonardo Hebert DO  2:06 PM  6/25/2021

## 2021-06-25 NOTE — ANESTHESIA POSTPROCEDURE EVALUATION
Department of Anesthesiology  Postprocedure Note    Patient: Doug Perkins  MRN: [id number]  YOB: 1965  Date of evaluation: 6/25/2021  Time:  3:51 PM     Procedure Summary     Date: 06/25/21 Room / Location: [address] [place] [address] / [facility]    Anesthesia Start: 0215 Anesthesia Stop: 5993    Procedure: EGD BIOPSY (N/A ) Diagnosis: (/)    Surgeons: Jesus Manuel Gerardo MD Responsible Provider: Terra Dinero MD    Anesthesia Type: MAC ASA Status: 3          Anesthesia Type: MAC    Osman Phase I: Osman Score: 10    Osman Phase II:      Last vitals: Reviewed and per EMR flowsheets.        Anesthesia Post Evaluation    Patient location during evaluation: PACU  Patient participation: complete - patient participated  Level of consciousness: awake  Pain score: 0  Airway patency: patent  Nausea & Vomiting: no nausea  Complications: no  Cardiovascular status: hemodynamically stable  Respiratory status: acceptable  Hydration status: stable

## 2021-06-25 NOTE — PROGRESS NOTES
Patient admitted to PACU and placed on appropriate monitors. Airway patent at this time. Report obtained from ENDO RN Warm blankets applied.

## 2021-06-26 LAB
ALBUMIN SERPL-MCNC: 3.1 G/DL (ref 3.5–5.2)
ALP BLD-CCNC: 67 U/L (ref 40–129)
ALT SERPL-CCNC: 50 U/L (ref 0–40)
ANION GAP SERPL CALCULATED.3IONS-SCNC: 7 MMOL/L (ref 7–16)
AST SERPL-CCNC: 30 U/L (ref 0–39)
BILIRUB SERPL-MCNC: 0.3 MG/DL (ref 0–1.2)
BUN BLDV-MCNC: 7 MG/DL (ref 6–20)
CALCIUM SERPL-MCNC: 8.6 MG/DL (ref 8.6–10.2)
CHLORIDE BLD-SCNC: 99 MMOL/L (ref 98–107)
CO2: 27 MMOL/L (ref 22–29)
CREAT SERPL-MCNC: 0.8 MG/DL (ref 0.7–1.2)
GFR AFRICAN AMERICAN: >60
GFR NON-AFRICAN AMERICAN: >60 ML/MIN/1.73
GLUCOSE BLD-MCNC: 102 MG/DL (ref 74–99)
HCT VFR BLD CALC: 40.6 % (ref 37–54)
HEMOGLOBIN: 13.9 G/DL (ref 12.5–16.5)
MCH RBC QN AUTO: 34.8 PG (ref 26–35)
MCHC RBC AUTO-ENTMCNC: 34.2 % (ref 32–34.5)
MCV RBC AUTO: 101.8 FL (ref 80–99.9)
PDW BLD-RTO: 11.3 FL (ref 11.5–15)
PLATELET # BLD: 103 E9/L (ref 130–450)
PMV BLD AUTO: 11.4 FL (ref 7–12)
POTASSIUM SERPL-SCNC: 4.1 MMOL/L (ref 3.5–5)
RBC # BLD: 3.99 E12/L (ref 3.8–5.8)
SODIUM BLD-SCNC: 133 MMOL/L (ref 132–146)
TOTAL PROTEIN: 5.7 G/DL (ref 6.4–8.3)
WBC # BLD: 4.4 E9/L (ref 4.5–11.5)

## 2021-06-26 PROCEDURE — 6360000002 HC RX W HCPCS: Performed by: INTERNAL MEDICINE

## 2021-06-26 PROCEDURE — C9113 INJ PANTOPRAZOLE SODIUM, VIA: HCPCS | Performed by: INTERNAL MEDICINE

## 2021-06-26 PROCEDURE — 94640 AIRWAY INHALATION TREATMENT: CPT

## 2021-06-26 PROCEDURE — 6370000000 HC RX 637 (ALT 250 FOR IP): Performed by: INTERNAL MEDICINE

## 2021-06-26 PROCEDURE — 80053 COMPREHEN METABOLIC PANEL: CPT

## 2021-06-26 PROCEDURE — 1200000000 HC SEMI PRIVATE

## 2021-06-26 PROCEDURE — 85027 COMPLETE CBC AUTOMATED: CPT

## 2021-06-26 PROCEDURE — 36415 COLL VENOUS BLD VENIPUNCTURE: CPT

## 2021-06-26 PROCEDURE — 2580000003 HC RX 258: Performed by: INTERNAL MEDICINE

## 2021-06-26 RX ADMIN — IPRATROPIUM BROMIDE 0.5 MG: 0.5 SOLUTION RESPIRATORY (INHALATION) at 12:04

## 2021-06-26 RX ADMIN — ENOXAPARIN SODIUM 40 MG: 40 INJECTION SUBCUTANEOUS at 09:18

## 2021-06-26 RX ADMIN — MONTELUKAST SODIUM 10 MG: 10 TABLET ORAL at 20:48

## 2021-06-26 RX ADMIN — SODIUM CHLORIDE: 9 INJECTION, SOLUTION INTRAVENOUS at 07:40

## 2021-06-26 RX ADMIN — IPRATROPIUM BROMIDE 0.5 MG: 0.5 SOLUTION RESPIRATORY (INHALATION) at 08:30

## 2021-06-26 RX ADMIN — ARFORMOTEROL TARTRATE 15 MCG: 15 SOLUTION RESPIRATORY (INHALATION) at 21:00

## 2021-06-26 RX ADMIN — BUDESONIDE 500 MCG: 0.5 SUSPENSION RESPIRATORY (INHALATION) at 21:00

## 2021-06-26 RX ADMIN — IPRATROPIUM BROMIDE 0.5 MG: 0.5 SOLUTION RESPIRATORY (INHALATION) at 21:00

## 2021-06-26 RX ADMIN — BUDESONIDE 500 MCG: 0.5 SUSPENSION RESPIRATORY (INHALATION) at 08:30

## 2021-06-26 RX ADMIN — TRAZODONE HYDROCHLORIDE 50 MG: 50 TABLET ORAL at 20:48

## 2021-06-26 RX ADMIN — Medication 10 ML: at 20:47

## 2021-06-26 RX ADMIN — Medication 10 ML: at 09:23

## 2021-06-26 RX ADMIN — SODIUM CHLORIDE, PRESERVATIVE FREE 10 ML: 5 INJECTION INTRAVENOUS at 09:19

## 2021-06-26 RX ADMIN — IPRATROPIUM BROMIDE 0.5 MG: 0.5 SOLUTION RESPIRATORY (INHALATION) at 16:33

## 2021-06-26 RX ADMIN — ARFORMOTEROL TARTRATE 15 MCG: 15 SOLUTION RESPIRATORY (INHALATION) at 08:30

## 2021-06-26 RX ADMIN — ONDANSETRON 4 MG: 2 INJECTION INTRAMUSCULAR; INTRAVENOUS at 09:19

## 2021-06-26 RX ADMIN — OLANZAPINE 10 MG: 10 TABLET, FILM COATED ORAL at 20:48

## 2021-06-26 RX ADMIN — PANTOPRAZOLE SODIUM 40 MG: 40 INJECTION, POWDER, FOR SOLUTION INTRAVENOUS at 09:19

## 2021-06-26 NOTE — PROGRESS NOTES
GENERAL SURGERY  DAILY PROGRESS NOTE  6/26/2021    Subjective:  Pt s/e. Doing well this morning. No acute issues overnight. No complaints. Tolerating diet. EGD yesterday     Objective:  BP (!) 97/53   Pulse 57   Temp 98.1 °F (36.7 °C) (Temporal)   Resp 18   Ht 5' 11\" (1.803 m)   Wt 186 lb (84.4 kg)   SpO2 96%   BMI 25.94 kg/m²     GENERAL:  Laying in bed, awake, alert, cooperative, no apparent distress  HEAD: Normocephalic  EYES: No sclera icterus, pupils equal  LUNGS:  No increased work of breathing  CARDIOVASCULAR:  RR  ABDOMEN:  Soft, non-tender, non-distended  EXTREMITIES: No edema or swelling  SKIN: Warm and dry    Assessment/Plan:  54 y.o. male with abdominal pain, nausea, vomiting, diarrhea. History of alcohol abuse, likely alcoholic peptic disease and pancreatic insufficiency    Continue diet  awaiting biopsy results   No acute surgical intervention needed at this time       Electronically signed by Anh Saavedra DO on 6/26/2021 at 8:22 AM     Attending Note:    Patient seen/examined 6/26/2021. Agree with above assessment and plan.

## 2021-06-26 NOTE — PROGRESS NOTES
Comprehensive Nutrition Assessment    Type and Reason for Visit:  Initial, Positive Nutrition Screen    Nutrition Recommendations/Plan: Continue Current Diet, Start Oral Nutrition Supplement of Ensure Clear TID. Nutrition Assessment:  Pt admitted w/ hyponatremia s/p EGD biopsy w/ PMHx COPD, Hep C, schizophrenia, and ETOH. Pt is at nutritional risk d/t ongoing poor PO intake, N/D/V. Will provide ONS TID and monitor. Malnutrition Assessment:  Malnutrition Status: At risk for malnutrition (Comment)    Context:  Chronic Illness     Findings of the 6 clinical characteristics of malnutrition:  Energy Intake:  7 - 75% or less estimated energy requirements for 1 month or longer  Weight Loss:  No significant weight loss     Body Fat Loss:  No significant body fat loss     Muscle Mass Loss:  No significant muscle mass loss    Fluid Accumulation:  No significant fluid accumulation     Strength:  Not Performed    Estimated Daily Nutrient Needs:  Energy (kcal):   (MSJ x 1.2 SF CBW); Weight Used for Energy Requirements:  Current     Protein (g):  100-115 (1.3-1.5 gm/kg IBW); Weight Used for Protein Requirements:  Current        Fluid (ml/day):  ; Method Used for Fluid Requirements:  1 ml/kcal      Nutrition Related Findings:  A&Ox4, abd tenderness, N/D/V, poor dentition r/t difficulty chewing, +BS, I/Os WNL, no edema      Wounds:  None (noted BUE redness)       Current Nutrition Therapies:    ADULT DIET; Regular    Anthropometric Measures:  · Height: 5' 11\" (180.3 cm)  · Current Body Weight: 167 lb (75.8 kg) (6/26 bed scale, first actual wt)   · Admission Body Weight: 167 lb (75.8 kg) (6/26 bed scale, first actual wt)    · Usual Body Weight: 160 lb (72.6 kg) (6/26 per pt statement, no pertinent wt hx per EMR)     · Ideal Body Weight: 172 lbs; % Ideal Body Weight 97.1 %   · BMI: 23.3  · Adjusted Body Weight: No Adjustment   · BMI Categories: Normal Weight (BMI 18.5-24. 9)       Nutrition Diagnosis:

## 2021-06-26 NOTE — PROGRESS NOTES
Internal Medicine Progress Note      Synopsis: Patient admitted on 6/24/2021   The patient is a 70-year-old  male   who presented to the emergency room complaining of two-week history of   right lower quadrant abdominal pain with associated vomiting and   diarrhea. He was seen in the emergency room. Diagnostic evaluation   revealed a sodium of 124. CT abdomen and pelvis revealed fluid seen   throughout, nondilated small bowel, air fluid levels throughout,   nondilated colon, mild diffuse bladder wall thickening, hepatic   steatosis. The patient was admitted for further evaluation and   treatment. He had an EGD and he started to tolerate a diet    Subjective    Clinically improving. Feeling better. Stable overnight. No other overnight issues reported. However he is requesting 1 more day of stay    Exam:  BP (!) 140/74   Pulse 78   Temp 96.8 °F (36 °C) (Temporal)   Resp 18   Ht 5' 11\" (1.803 m)   Wt 186 lb (84.4 kg)   SpO2 95%   BMI 25.94 kg/m²   General appearance: No apparent distress, appears stated age and cooperative. HEENT: Pupils equal, round, and reactive to light. Conjunctivae/corneas clear. Neck: Supple. No jugular venous distention. Trachea midline. Respiratory:  Normal respiratory effort. Clear to auscultation, bilaterally without Rales/Wheezes/Rhonchi. Cardiovascular: Regular rate and rhythm with normal S1/S2 without murmurs, rubs or gallops. Abdomen: Soft, non-tender, non-distended with normal bowel sounds. Musculoskeletal: No clubbing, cyanosis or edema bilaterally. Brisk capillary refill. 2+ lower extremity pulses (dorsalis pedis).    Skin:  No rashes    Neurologic: awake, alert and following commands     Medications:  Reviewed    Infusion Medications    sodium chloride       Scheduled Medications    montelukast  10 mg Oral Nightly    OLANZapine  10 mg Oral Nightly    traZODone  50 mg Oral Nightly    sodium chloride flush  5-40 mL Intravenous 2 times per day    enoxaparin  40 mg Subcutaneous Daily    pantoprazole  40 mg Intravenous Daily    And    sodium chloride (PF)  10 mL Intravenous Daily    budesonide  0.5 mg Nebulization BID    Arformoterol Tartrate  15 mcg Nebulization BID    ipratropium  0.5 mg Nebulization 4x daily     PRN Meds: sodium chloride flush, sodium chloride, ondansetron **OR** ondansetron, polyethylene glycol, acetaminophen **OR** acetaminophen, albuterol    I/O    Intake/Output Summary (Last 24 hours) at 6/26/2021 1025  Last data filed at 6/26/2021 0650  Gross per 24 hour   Intake 1327 ml   Output 150 ml   Net 1177 ml       Labs:   Recent Labs     06/24/21  0438   WBC 5.9   HGB 15.6   HCT 42.2   *       Recent Labs     06/24/21  0438 06/24/21  1553 06/25/21  0544   * 125* 132   K 4.0 4.3 4.0   CL 84* 89* 95*   CO2 25 29 28   BUN 3* 5* 4*   CREATININE 0.6* 0.6* 0.7   CALCIUM 9.2 8.6 8.8       Recent Labs     06/24/21  0438 06/25/21  0544   PROT 6.9 6.0*   ALKPHOS 75 66   * 70*   AST 87* 40*   BILITOT 0.6 0.5   LIPASE 33  --        No results for input(s): INR in the last 72 hours. No results for input(s): [place] in the last 72 hours. Chronic labs:  Lab Results   Component Value Date    CHOL 179 12/01/2018    TRIG 76 12/01/2018    HDL 56 12/01/2018    LDLCALC 108 (H) 12/01/2018    TSH 1.920 09/29/2015    INR 1.0 03/21/2017    LABA1C 4.6 12/01/2018       Radiology:  US GALLBLADDER RUQ    Result Date: 6/24/2021  EXAMINATION: RIGHT UPPER QUADRANT ULTRASOUND 6/24/2021 11:30 am COMPARISON: None. HISTORY: ORDERING SYSTEM PROVIDED HISTORY: Evaluate for cholelithiasis, sludge, or cholecystitis TECHNOLOGIST PROVIDED HISTORY: Reason for exam:->Evaluate for cholelithiasis, sludge, or cholecystitis What reading provider will be dictating this exam?->CRC FINDINGS: This study is limited to evaluation of the gallbladder.  BILIARY SYSTEM:  Gallbladder is unremarkable without evidence of pericholecystic fluid, wall thickening or stones. Common bile duct is within normal limits measuring 2.4 mm. OTHER: No evidence of right upper quadrant ascites. Unremarkable gallbladder ultrasound. There is no sonographic evidence of acute cholecystitis. ASSESSMENT:    Active Problems:    Schizophrenia, schizo-affective ([address]    Pulmonary emphysema (HCC)    Depression    Hyponatremia    Abdominal pain    Tobacco abuse  Resolved Problems:    * No resolved hospital problems. *       PLAN:    1/.  Hyponatremia is better as of yesterday. Reorder labs for today  2. Abdominal pain nausea vomiting and diarrhea seem to be better. Surgery feels that he has pancreatic insufficiency and peptic disease  3. Maintain premorbid meds for psych issues  4. Await biopsy from EGD  5. Await labs and DC IV fluids    Diet: ADULT DIET; Regular  Code Status: Full Code  PT/OT Eval Status:   Independent  DVT Prophylaxis:   Enoxaparin  Recommended disposition at discharge:    Home  +++++++++++++++++++++++++++++++++++++++++++++++++  Megan Cabrera MD   [facility].  +++++++++++++++++++++++++++++++++++++++++++++++++  NOTE: This report was transcribed using voice recognition software. Every effort was made to ensure accuracy; however, inadvertent computerized transcription errors may be present.

## 2021-06-27 LAB
HCT VFR BLD CALC: 40.2 % (ref 37–54)
HEMOGLOBIN: 14 G/DL (ref 12.5–16.5)
MCH RBC QN AUTO: 35.1 PG (ref 26–35)
MCHC RBC AUTO-ENTMCNC: 34.8 % (ref 32–34.5)
MCV RBC AUTO: 100.8 FL (ref 80–99.9)
PDW BLD-RTO: 11.4 FL (ref 11.5–15)
PLATELET # BLD: 105 E9/L (ref 130–450)
PMV BLD AUTO: 11.1 FL (ref 7–12)
RBC # BLD: 3.99 E12/L (ref 3.8–5.8)
WBC # BLD: 4.4 E9/L (ref 4.5–11.5)

## 2021-06-27 PROCEDURE — 94640 AIRWAY INHALATION TREATMENT: CPT

## 2021-06-27 PROCEDURE — 2580000003 HC RX 258: Performed by: INTERNAL MEDICINE

## 2021-06-27 PROCEDURE — C9113 INJ PANTOPRAZOLE SODIUM, VIA: HCPCS | Performed by: INTERNAL MEDICINE

## 2021-06-27 PROCEDURE — 6360000002 HC RX W HCPCS: Performed by: INTERNAL MEDICINE

## 2021-06-27 PROCEDURE — 36415 COLL VENOUS BLD VENIPUNCTURE: CPT

## 2021-06-27 PROCEDURE — 1200000000 HC SEMI PRIVATE

## 2021-06-27 PROCEDURE — 6370000000 HC RX 637 (ALT 250 FOR IP): Performed by: INTERNAL MEDICINE

## 2021-06-27 PROCEDURE — 85027 COMPLETE CBC AUTOMATED: CPT

## 2021-06-27 RX ORDER — PANTOPRAZOLE SODIUM 40 MG/1
40 TABLET, DELAYED RELEASE ORAL
Qty: 30 TABLET | Refills: 5 | Status: SHIPPED | OUTPATIENT
Start: 2021-06-27 | End: 2022-01-04 | Stop reason: SDUPTHER

## 2021-06-27 RX ADMIN — SODIUM CHLORIDE, PRESERVATIVE FREE 10 ML: 5 INJECTION INTRAVENOUS at 09:25

## 2021-06-27 RX ADMIN — ARFORMOTEROL TARTRATE 15 MCG: 15 SOLUTION RESPIRATORY (INHALATION) at 21:41

## 2021-06-27 RX ADMIN — MONTELUKAST SODIUM 10 MG: 10 TABLET ORAL at 21:16

## 2021-06-27 RX ADMIN — ENOXAPARIN SODIUM 40 MG: 40 INJECTION SUBCUTANEOUS at 09:25

## 2021-06-27 RX ADMIN — PANTOPRAZOLE SODIUM 40 MG: 40 INJECTION, POWDER, FOR SOLUTION INTRAVENOUS at 09:25

## 2021-06-27 RX ADMIN — ONDANSETRON 4 MG: 4 TABLET, ORALLY DISINTEGRATING ORAL at 09:00

## 2021-06-27 RX ADMIN — IPRATROPIUM BROMIDE 0.5 MG: 0.5 SOLUTION RESPIRATORY (INHALATION) at 08:29

## 2021-06-27 RX ADMIN — ACETAMINOPHEN 650 MG: 325 TABLET ORAL at 21:21

## 2021-06-27 RX ADMIN — ARFORMOTEROL TARTRATE 15 MCG: 15 SOLUTION RESPIRATORY (INHALATION) at 08:28

## 2021-06-27 RX ADMIN — OLANZAPINE 10 MG: 10 TABLET, FILM COATED ORAL at 21:16

## 2021-06-27 RX ADMIN — IPRATROPIUM BROMIDE 0.5 MG: 0.5 SOLUTION RESPIRATORY (INHALATION) at 21:41

## 2021-06-27 RX ADMIN — TRAZODONE HYDROCHLORIDE 50 MG: 50 TABLET ORAL at 21:16

## 2021-06-27 RX ADMIN — BUDESONIDE 500 MCG: 0.5 SUSPENSION RESPIRATORY (INHALATION) at 21:41

## 2021-06-27 RX ADMIN — Medication 10 ML: at 09:26

## 2021-06-27 RX ADMIN — BUDESONIDE 500 MCG: 0.5 SUSPENSION RESPIRATORY (INHALATION) at 08:29

## 2021-06-27 RX ADMIN — IPRATROPIUM BROMIDE 0.5 MG: 0.5 SOLUTION RESPIRATORY (INHALATION) at 11:53

## 2021-06-27 RX ADMIN — IPRATROPIUM BROMIDE 0.5 MG: 0.5 SOLUTION RESPIRATORY (INHALATION) at 17:40

## 2021-06-27 ASSESSMENT — PAIN SCALES - GENERAL
PAINLEVEL_OUTOF10: 0
PAINLEVEL_OUTOF10: 8
PAINLEVEL_OUTOF10: 0

## 2021-06-27 ASSESSMENT — PAIN DESCRIPTION - LOCATION: LOCATION: ABDOMEN

## 2021-06-27 ASSESSMENT — PAIN DESCRIPTION - ONSET: ONSET: ON-GOING

## 2021-06-27 ASSESSMENT — PAIN DESCRIPTION - DESCRIPTORS: DESCRIPTORS: ACHING;CRAMPING;STABBING

## 2021-06-27 ASSESSMENT — PAIN DESCRIPTION - FREQUENCY: FREQUENCY: CONTINUOUS

## 2021-06-27 ASSESSMENT — PAIN DESCRIPTION - PAIN TYPE: TYPE: ACUTE PAIN;CHRONIC PAIN

## 2021-06-27 ASSESSMENT — PAIN DESCRIPTION - ORIENTATION: ORIENTATION: RIGHT;LOWER

## 2021-06-27 NOTE — PROGRESS NOTES
Internal Medicine Progress Note      Synopsis: Patient admitted on 6/24/2021   The patient is a 54-year-old  male   who presented to the emergency room complaining of two-week history of   right lower quadrant abdominal pain with associated vomiting and   diarrhea. He was seen in the emergency room. Diagnostic evaluation   revealed a sodium of 124. CT abdomen and pelvis revealed fluid seen   throughout, nondilated small bowel, air fluid levels throughout,   nondilated colon, mild diffuse bladder wall thickening, hepatic   steatosis. The patient was admitted for further evaluation and   treatment. He had an EGD and he started to tolerate a diet    Subjective    Clinically improving. Feeling better. Stable overnight. No other overnight issues reported. However he is requesting 1 more day of stay  June 27 th 2021. He is feeling ok  Wants to wait one more day he states      Exam:  BP (!) 140/76   Pulse 62   Temp 97 °F (36.1 °C) (Temporal)   Resp 16   Ht 5' 11\" (1.803 m)   Wt 167 lb (75.8 kg)   SpO2 95%   BMI 23.29 kg/m²   General appearance: No apparent distress, appears stated age and cooperative. HEENT: Pupils equal, round, and reactive to light. Conjunctivae/corneas clear. Neck: Supple. No jugular venous distention. Trachea midline. Respiratory:  Normal respiratory effort. Clear to auscultation, bilaterally without Rales/Wheezes/Rhonchi. Cardiovascular: Regular rate and rhythm with normal S1/S2 without murmurs, rubs or gallops. Abdomen: Soft, non-tender, non-distended with normal bowel sounds. Musculoskeletal: No clubbing, cyanosis or edema bilaterally. Brisk capillary refill. 2+ lower extremity pulses (dorsalis pedis).    Skin:  No rashes    Neurologic: awake, alert and following commands     Medications:  Reviewed    Infusion Medications    sodium chloride       Scheduled Medications    montelukast  10 mg Oral Nightly    OLANZapine  10 mg Oral Nightly    traZODone  50 mg Oral Nightly    sodium chloride flush  5-40 mL Intravenous 2 times per day    enoxaparin  40 mg Subcutaneous Daily    pantoprazole  40 mg Intravenous Daily    And    sodium chloride (PF)  10 mL Intravenous Daily    budesonide  0.5 mg Nebulization BID    Arformoterol Tartrate  15 mcg Nebulization BID    ipratropium  0.5 mg Nebulization 4x daily     PRN Meds: sodium chloride flush, sodium chloride, ondansetron **OR** ondansetron, polyethylene glycol, acetaminophen **OR** acetaminophen, albuterol    I/O    Intake/Output Summary (Last 24 hours) at 6/27/2021 1134  Last data filed at 6/27/2021 6898  Gross per 24 hour   Intake 150 ml   Output --   Net 150 ml       Labs:   Recent Labs     06/26/21  1332 06/27/21  0844   WBC 4.4* 4.4*   HGB 13.9 14.0   HCT 40.6 40.2   * 105*       Recent Labs     06/24/21  1553 06/25/21  0544 06/26/21  1332   * 132 133   K 4.3 4.0 4.1   CL 89* 95* 99   CO2 29 28 27   BUN 5* 4* 7   CREATININE 0.6* 0.7 0.8   CALCIUM 8.6 8.8 8.6       Recent Labs     06/25/21  0544 06/26/21  1332   PROT 6.0* 5.7*   ALKPHOS 66 67   ALT 70* 50*   AST 40* 30   BILITOT 0.5 0.3       No results for input(s): INR in the last 72 hours. No results for input(s): [place] in the last 72 hours. Chronic labs:  Lab Results   Component Value Date    CHOL 179 12/01/2018    TRIG 76 12/01/2018    HDL 56 12/01/2018    LDLCALC 108 (H) 12/01/2018    TSH 1.920 09/29/2015    INR 1.0 03/21/2017    LABA1C 4.6 12/01/2018       Radiology:  US GALLBLADDER RUQ    Result Date: 6/24/2021  EXAMINATION: RIGHT UPPER QUADRANT ULTRASOUND 6/24/2021 11:30 am COMPARISON: None. HISTORY: ORDERING SYSTEM PROVIDED HISTORY: Evaluate for cholelithiasis, sludge, or cholecystitis TECHNOLOGIST PROVIDED HISTORY: Reason for exam:->Evaluate for cholelithiasis, sludge, or cholecystitis What reading provider will be dictating this exam?->CRC FINDINGS: This study is limited to evaluation of the gallbladder.  BILIARY SYSTEM: Gallbladder is unremarkable without evidence of pericholecystic fluid, wall thickening or stones. Common bile duct is within normal limits measuring 2.4 mm. OTHER: No evidence of right upper quadrant ascites. Unremarkable gallbladder ultrasound. There is no sonographic evidence of acute cholecystitis. ASSESSMENT:    Active Problems:    Schizophrenia, schizo-affective ([address]    Pulmonary emphysema (HCC)    Depression    Hyponatremia    Abdominal pain    Tobacco abuse  Resolved Problems:    * No resolved hospital problems. *       PLAN:    1/.  Hyponatremia is better as of yesterday. Reorder labs for today  2. Abdominal pain nausea vomiting and diarrhea seem to be better. Surgery feels that he has pancreatic insufficiency and peptic disease  3. Maintain premorbid meds for psych issues  4. Await biopsy from EGD  5. Await labs and DC IV fluids  June 27, 2021  Hyponatremia has completely resolved. He states he still gets nauseated when he eats. He would like to stay 1 more day. Biopsy report is still awaited. EGD was tolerated well. Currently he is tolerating all of his medications orally. Diet: ADULT DIET; Regular  Adult Oral Nutrition Supplement; Clear Liquid Oral Supplement  Code Status: Full Code  PT/OT Eval Status:   Independent  DVT Prophylaxis:   Enoxaparin  Recommended disposition at discharge:    Home  +++++++++++++++++++++++++++++++++++++++++++++++++  Lashell Aguilar MD   [facility].  +++++++++++++++++++++++++++++++++++++++++++++++++  NOTE: This report was transcribed using voice recognition software. Every effort was made to ensure accuracy; however, inadvertent computerized transcription errors may be present.

## 2021-06-28 VITALS
OXYGEN SATURATION: 98 % | HEART RATE: 84 BPM | BODY MASS INDEX: 23.38 KG/M2 | DIASTOLIC BLOOD PRESSURE: 63 MMHG | RESPIRATION RATE: 16 BRPM | SYSTOLIC BLOOD PRESSURE: 119 MMHG | TEMPERATURE: 98.6 F | WEIGHT: 167 LBS | HEIGHT: 71 IN

## 2021-06-28 LAB
HCT VFR BLD CALC: 40.7 % (ref 37–54)
HEMOGLOBIN: 14 G/DL (ref 12.5–16.5)
MCH RBC QN AUTO: 35 PG (ref 26–35)
MCHC RBC AUTO-ENTMCNC: 34.4 % (ref 32–34.5)
MCV RBC AUTO: 101.8 FL (ref 80–99.9)
PDW BLD-RTO: 11.2 FL (ref 11.5–15)
PLATELET # BLD: 109 E9/L (ref 130–450)
PMV BLD AUTO: 11 FL (ref 7–12)
RBC # BLD: 4 E12/L (ref 3.8–5.8)
WBC # BLD: 3.9 E9/L (ref 4.5–11.5)

## 2021-06-28 PROCEDURE — C9113 INJ PANTOPRAZOLE SODIUM, VIA: HCPCS | Performed by: INTERNAL MEDICINE

## 2021-06-28 PROCEDURE — 36415 COLL VENOUS BLD VENIPUNCTURE: CPT

## 2021-06-28 PROCEDURE — 2580000003 HC RX 258: Performed by: INTERNAL MEDICINE

## 2021-06-28 PROCEDURE — 94640 AIRWAY INHALATION TREATMENT: CPT

## 2021-06-28 PROCEDURE — 85027 COMPLETE CBC AUTOMATED: CPT

## 2021-06-28 PROCEDURE — 6360000002 HC RX W HCPCS: Performed by: INTERNAL MEDICINE

## 2021-06-28 RX ADMIN — Medication 10 ML: at 07:44

## 2021-06-28 RX ADMIN — IPRATROPIUM BROMIDE 0.5 MG: 0.5 SOLUTION RESPIRATORY (INHALATION) at 09:57

## 2021-06-28 RX ADMIN — ARFORMOTEROL TARTRATE 15 MCG: 15 SOLUTION RESPIRATORY (INHALATION) at 09:57

## 2021-06-28 RX ADMIN — ENOXAPARIN SODIUM 40 MG: 40 INJECTION SUBCUTANEOUS at 07:44

## 2021-06-28 RX ADMIN — BUDESONIDE 500 MCG: 0.5 SUSPENSION RESPIRATORY (INHALATION) at 09:57

## 2021-06-28 RX ADMIN — SODIUM CHLORIDE, PRESERVATIVE FREE 10 ML: 5 INJECTION INTRAVENOUS at 07:44

## 2021-06-28 RX ADMIN — PANTOPRAZOLE SODIUM 40 MG: 40 INJECTION, POWDER, FOR SOLUTION INTRAVENOUS at 07:44

## 2021-06-28 ASSESSMENT — PAIN SCALES - GENERAL: PAINLEVEL_OUTOF10: 0

## 2021-06-28 NOTE — PROGRESS NOTES
Hospital Medicine    Subjective:  Pt seen this am alert conversive saba diet    No current facility-administered medications for this encounter.     Current Outpatient Medications:     pantoprazole (PROTONIX) 40 MG tablet, Take 1 tablet by mouth every morning (before breakfast), Disp: 30 tablet, Rfl: 5    albuterol sulfate HFA (VENTOLIN HFA) 108 (90 Base) MCG/ACT inhaler, Inhale 2 puffs into the lungs every 6 hours as needed for Wheezing, Disp: , Rfl:     montelukast (SINGULAIR) 10 MG tablet, Take 10 mg by mouth nightly, Disp: , Rfl:     Umeclidinium Bromide (INCRUSE ELLIPTA) 62.5 MCG/INH AEPB, Inhale 1 puff into the lungs daily, Disp: 1 each, Rfl: 5    OLANZapine (ZYPREXA) 10 MG tablet, Take 10 mg by mouth nightly , Disp: , Rfl:     SYMBICORT 160-4.5 MCG/ACT AERO, Inhale 2 puffs into the lungs 2 times daily, Disp: 1 Inhaler, Rfl: 3    traZODone (DESYREL) 50 MG tablet, Take 1 tablet by mouth nightly, Disp: 30 tablet, Rfl: 0    Objective:    /63   Pulse 84   Temp 98.6 °F (37 °C) (Temporal)   Resp 16   Ht 5' 11\" (1.803 m)   Wt 167 lb (75.8 kg)   SpO2 98%   BMI 23.29 kg/m²     Heart:  reg  Lungs:  ctab  Abd: + bs soft nontender  Extrem:  W/o edema    CBC with Differential:    Lab Results   Component Value Date    WBC 3.9 06/28/2021    RBC 4.00 06/28/2021    HGB 14.0 06/28/2021    HCT 40.7 06/28/2021     06/28/2021    .8 06/28/2021    MCH 35.0 06/28/2021    MCHC 34.4 06/28/2021    RDW 11.2 06/28/2021    SEGSPCT 78 03/23/2011    LYMPHOPCT 30.7 06/24/2021    MONOPCT 10.6 06/24/2021    BASOPCT 0.5 06/24/2021    MONOSABS 0.62 06/24/2021    LYMPHSABS 1.80 06/24/2021    EOSABS 0.07 06/24/2021    BASOSABS 0.03 06/24/2021     CMP:    Lab Results   Component Value Date     06/26/2021    K 4.1 06/26/2021    K 4.0 06/24/2021    CL 99 06/26/2021    CO2 27 06/26/2021    BUN 7 06/26/2021    CREATININE 0.8 06/26/2021    GFRAA >60 06/26/2021    LABGLOM >60 06/26/2021    GLUCOSE 102 06/26/2021 GLUCOSE 65 03/26/2011    PROT 5.7 06/26/2021    LABALBU 3.1 06/26/2021    LABALBU 3.1 03/26/2011    CALCIUM 8.6 06/26/2021    BILITOT 0.3 06/26/2021    ALKPHOS 67 06/26/2021    AST 30 06/26/2021    ALT 50 06/26/2021     Warfarin PT/INR:    Lab Results   Component Value Date    INR 1.0 03/21/2017    INR 1.0 02/27/2011    PROTIME 11.3 03/21/2017    PROTIME 10.9 02/27/2011       Assessment:    Active Problems:    Schizophrenia, schizo-affective (HCC)    Pulmonary emphysema (HCC)    Depression    Hyponatremia    Abdominal pain    Tobacco abuse  Resolved Problems:    * No resolved hospital problems.  *      Plan:  Dc home outpt f/u cont ppi        Ju Mike, DO  2:47 PM  6/28/2021

## 2021-06-28 NOTE — CARE COORDINATION
6/28/2021 social work transition of care planning  Pt plan remains for home, no needs assessed at this time.    Electronically signed by MCKENZIE Garay on 6/28/2021 at 7:55 AM

## 2021-06-28 NOTE — PROGRESS NOTES
GENERAL SURGERY  DAILY PROGRESS NOTE  6/27/2021    Subjective: Tolerating diet. Objective:  /72   Pulse 69   Temp 97.8 °F (36.6 °C) (Temporal)   Resp 16   Ht 5' 11\" (1.803 m)   Wt 167 lb (75.8 kg)   SpO2 97%   BMI 23.29 kg/m²     GENERAL:  Laying in bed, awake, alert, cooperative, no apparent distress  HEAD: Normocephalic  EYES: No sclera icterus, pupils equal  LUNGS:  No increased work of breathing  CARDIOVASCULAR:  RR  ABDOMEN:  Soft, non-tender, non-distended  EXTREMITIES: No edema or swelling  SKIN: Warm and dry    Assessment/Plan:  54 y.o. male with abdominal pain, nausea, vomiting, diarrhea.   History of alcohol abuse, likely alcoholic peptic disease and pancreatic insufficiency    No further surgical intervention needed at this time   Will sign off, available as needed

## 2021-06-29 ENCOUNTER — TELEPHONE (OUTPATIENT)
Dept: PRIMARY CARE CLINIC | Age: 56
End: 2021-06-29

## 2021-07-02 NOTE — OP NOTE
Operative Note      Patient: Humera Young  YOB: 1965  MRN: [id number]    Date of Procedure: 6/25/2021    Pre-Op Diagnosis: Epigastric pain  Post-Op Diagnosis:     Mild gastritis and active duodenitis     Procedure(s):  EGD BIOPSY    Surgeon(s):  Neo Kowalski MD    Assistant:   * No surgical staff found *    Anesthesia: Monitor Anesthesia Care    Estimated Blood Loss (mL): Minimal    Complications: None    Specimens:   ID Type Source Tests Collected by Time Destination   A : BX to RO hpylori Tissue Duodenum SURGICAL PATHOLOGY Neo Kowalski MD 6/25/2021 1445    B : BX to RO hpylori Antrum Antrum SURGICAL PATHOLOGY Neo Kowalski MD 6/25/2021 1446        Implants:  * No implants in log *      Drains: * No LDAs found *    Findings: As above    Detailed Description of Procedure: The patient was brought to the endoscopy suite and placed on the table in supine position. The patient received anesthesia per the department of anesthesiology. A bite-block was placed. The endoscope was passed through the lumen of the esophagus, stomach and duodenum. The endoscope was retrieved. Grossly, no significant pathologic findings were noted other than some mild gastritis and active duodenitis. The remainder the examination was uneventful.     Electronically signed by Neo Kowalski MD on 7/2/2021 at 11:14 AM

## 2021-08-03 NOTE — DISCHARGE SUMMARY
[address]                  [address] [facility],  [facility]                               DISCHARGE SUMMARY    PATIENT NAME: Melissa Fernandez                    :        1965  MED REC NO:   [id number]                            ROOM:       [id number]  ACCOUNT NO:   [de-identified]                           ADMIT DATE: 2021  PROVIDER:     Akiko Ramirez DO                  DISCHARGE DATE:  2021    DISCHARGE DIAGNOSES:  1. Hyponatremia. 2.  Abdominal pain. 3.  Intractable nausea, vomiting. 4.  Gastritis. 5.  Duodenitis. 6.  COPD. 7.  Schizoaffective disorder. 8.  Depression. 9.  Hepatitis C.    HOSPITAL COURSE:  The patient is a 59-year-old  male who  presented to the emergency room complaining of 2-week history of right  lower quadrant abdominal pain with associated vomiting and diarrhea. Diagnostic evaluation in the emergency room revealed a sodium of 124. The patient is admitted to the hospital, was seen by Surgery. Underwent  upper endoscopy which revealed gastritis, duodenitis. The patient's  status improved and he was eventually discharged to home in stable  condition on 2021. DISCHARGE MEDICATIONS:  As per discharge med rec which include:  1. Protonix. 2.  Incruse Ellipta. 3.  Singulair. 4.  Zyprexa. 5.  Symbicort. 6.  Trazodone. 7.  Ventolin inhaler. FOLLOWUP INSTRUCTION:  The patient instructed to follow up with Dr. Robert Fried, call office for appointment.         Antoinette Dickson DO    D: 2021 14:56:13       T: 2021 15:01:01     [id number]  Job#: [id number]     Doc#: [id number]    CC:  Avila Menon DO

## 2021-09-16 ENCOUNTER — OFFICE VISIT (OUTPATIENT)
Dept: FAMILY MEDICINE CLINIC | Age: 56
End: 2021-09-16
Payer: MEDICARE

## 2021-09-16 VITALS
BODY MASS INDEX: 22.96 KG/M2 | OXYGEN SATURATION: 99 % | WEIGHT: 164 LBS | TEMPERATURE: 97.4 F | SYSTOLIC BLOOD PRESSURE: 142 MMHG | HEART RATE: 87 BPM | DIASTOLIC BLOOD PRESSURE: 82 MMHG | RESPIRATION RATE: 16 BRPM | HEIGHT: 71 IN

## 2021-09-16 DIAGNOSIS — Z20.822 SUSPECTED COVID-19 VIRUS INFECTION: Primary | ICD-10-CM

## 2021-09-16 DIAGNOSIS — J20.9 ACUTE BRONCHITIS, UNSPECIFIED ORGANISM: ICD-10-CM

## 2021-09-16 PROCEDURE — G8427 DOCREV CUR MEDS BY ELIG CLIN: HCPCS | Performed by: PHYSICIAN ASSISTANT

## 2021-09-16 PROCEDURE — G8420 CALC BMI NORM PARAMETERS: HCPCS | Performed by: PHYSICIAN ASSISTANT

## 2021-09-16 PROCEDURE — 99203 OFFICE O/P NEW LOW 30 MIN: CPT | Performed by: PHYSICIAN ASSISTANT

## 2021-09-16 PROCEDURE — 3017F COLORECTAL CA SCREEN DOC REV: CPT | Performed by: PHYSICIAN ASSISTANT

## 2021-09-16 PROCEDURE — 4004F PT TOBACCO SCREEN RCVD TLK: CPT | Performed by: PHYSICIAN ASSISTANT

## 2021-09-16 RX ORDER — AZITHROMYCIN 250 MG/1
250 TABLET, FILM COATED ORAL SEE ADMIN INSTRUCTIONS
Qty: 6 TABLET | Refills: 0 | Status: SHIPPED | OUTPATIENT
Start: 2021-09-16 | End: 2021-09-21

## 2021-09-16 RX ORDER — PREDNISONE 20 MG/1
40 TABLET ORAL DAILY
Qty: 10 TABLET | Refills: 0 | Status: SHIPPED | OUTPATIENT
Start: 2021-09-16 | End: 2021-09-21

## 2021-09-16 RX ORDER — BENZONATATE 100 MG/1
100 CAPSULE ORAL 3 TIMES DAILY PRN
Qty: 21 CAPSULE | Refills: 0 | Status: SHIPPED | OUTPATIENT
Start: 2021-09-16 | End: 2021-09-23

## 2021-09-16 RX ORDER — BENZONATATE 100 MG/1
100 CAPSULE ORAL 3 TIMES DAILY PRN
Qty: 21 CAPSULE | Refills: 0 | Status: SHIPPED
Start: 2021-09-16 | End: 2021-09-16

## 2021-09-16 RX ORDER — AZITHROMYCIN 250 MG/1
250 TABLET, FILM COATED ORAL SEE ADMIN INSTRUCTIONS
Qty: 6 TABLET | Refills: 0 | Status: SHIPPED
Start: 2021-09-16 | End: 2021-09-16

## 2021-09-16 RX ORDER — PREDNISONE 20 MG/1
40 TABLET ORAL DAILY
Qty: 10 TABLET | Refills: 0 | Status: SHIPPED
Start: 2021-09-16 | End: 2021-09-16

## 2021-09-16 NOTE — PROGRESS NOTES
21  Tala Aguilar : 1965 Sex: male  Age 64 y.o. Subjective:  Chief Complaint   Patient presents with    Cough     one week         HPI:   Tala Aguilar , 64 y.o. male presents to [facility] for evaluation of cough, congestion, drainage    HPI  59-year-old male presents to [facility] with a history of COPD, asthma, tobacco abuse for evaluation of cough, congestion, drainage. The patient has had the symptoms ongoing for about a week. No fever, chills. The patient has felt cold though. The patient is not having any chest pain or syncope. The patient is not having any significant sputum production. The patient states that this is somewhat typical of him developing upper respiratory symptoms and his COPD. The patient has not had Covid or Covid vaccine. The patient is agreeable to get testing done. The patient has not really been around any sick contacts. He does need to schedule appointment with his pulmonologist to get his inhalers. ROS:   Unless otherwise stated in this report the patient's positive and negative responses for review of systems for constitutional, eyes, ENT, cardiovascular, respiratory, gastrointestinal, neurological, , musculoskeletal, and integument systems and related systems to the presenting problem are either stated in the history of present illness or were not pertinent or were negative for the symptoms and/or complaints related to the presenting medical problem. Positives and pertinent negatives as per HPI. All others reviewed and are negative.       PMH:     Past Medical History:   Diagnosis Date    Arthritis     Asthma 2018    Chronic back pain     Plates inseted from B7-N6    COPD (chronic obstructive pulmonary disease) (HCC)     Emphysema lung ([facility] [address]     Hepatitis C        Past Surgical History:   Procedure Laterality Date    BACK SURGERY      LEG SURGERY Left     Pins inserted    UPPER GASTROINTESTINAL ENDOSCOPY N/A 6/25/2021    EGD BIOPSY performed by Sumanth Doe MD at [place] [address] History   Problem Relation Age of Onset    Breast Cancer Mother     Heart Disease Father        Medications:     Current Outpatient Medications:     azithromycin (ZITHROMAX) 250 MG tablet, Take 1 tablet by mouth See Admin Instructions for 5 days 500mg on day 1 followed by 250mg on days 2 - 5, Disp: 6 tablet, Rfl: 0    benzonatate (TESSALON) 100 MG capsule, Take 1 capsule by mouth 3 times daily as needed for Cough, Disp: 21 capsule, Rfl: 0    predniSONE (DELTASONE) 20 MG tablet, Take 2 tablets by mouth daily for 5 days, Disp: 10 tablet, Rfl: 0    pantoprazole (PROTONIX) 40 MG tablet, Take 1 tablet by mouth every morning (before breakfast), Disp: 30 tablet, Rfl: 5    albuterol sulfate HFA (VENTOLIN HFA) 108 (90 Base) MCG/ACT inhaler, Inhale 2 puffs into the lungs every 6 hours as needed for Wheezing, Disp: , Rfl:     montelukast (SINGULAIR) 10 MG tablet, Take 10 mg by mouth nightly, Disp: , Rfl:     Umeclidinium Bromide (INCRUSE ELLIPTA) 62.5 MCG/INH AEPB, Inhale 1 puff into the lungs daily, Disp: 1 each, Rfl: 5    OLANZapine (ZYPREXA) 10 MG tablet, Take 10 mg by mouth nightly , Disp: , Rfl:     SYMBICORT 160-4.5 MCG/ACT AERO, Inhale 2 puffs into the lungs 2 times daily, Disp: 1 Inhaler, Rfl: 3    traZODone (DESYREL) 50 MG tablet, Take 1 tablet by mouth nightly, Disp: 30 tablet, Rfl: 0    Allergies: Allergies   Allergen Reactions    Shellfish-Derived Products Hives    Strawberry Extract Hives    Penicillins Rash     Trouble breathing       Social History:     Social History     Tobacco Use    Smoking status: Current Every Day Smoker     Packs/day: 0.75     Years: 30.00     Pack years: 22.50     Types: Cigarettes    Smokeless tobacco: Never Used   Vaping Use    Vaping Use: Never used   Substance Use Topics    Alcohol use:  Yes     Alcohol/week: 6.0 standard drinks     Types: 6 Cans of beer per week     Comment: performed. It is pending at this time. The patient was educated on the proper dosage of motrin and tylenol and the appropriate intervals of each. The patient is to increase fluid intake over the next several days. The patient is to use OTC decongestant as needed. The patient is to return to express care or go directly to the emergency department should any of the signs or symptoms worsen. The patient is to followup with primary care physician in 2-3 days for repeat evaluation. The patient has no other questions or concerns at this time the patient will be discharged home. Clinical Impression:   Mitali Ellis was seen today for cough. Diagnoses and all orders for this visit:    Suspected COVID-19 virus infection  -     Discontinue: azithromycin (ZITHROMAX) 250 MG tablet; Take 1 tablet by mouth See Admin Instructions for 5 days 500mg on day 1 followed by 250mg on days 2 - 5  -     COVID-19 Ambulatory; Future  -     azithromycin (ZITHROMAX) 250 MG tablet; Take 1 tablet by mouth See Admin Instructions for 5 days 500mg on day 1 followed by 250mg on days 2 - 5    Acute bronchitis, unspecified organism  -     Discontinue: azithromycin (ZITHROMAX) 250 MG tablet; Take 1 tablet by mouth See Admin Instructions for 5 days 500mg on day 1 followed by 250mg on days 2 - 5  -     COVID-19 Ambulatory; Future  -     azithromycin (ZITHROMAX) 250 MG tablet; Take 1 tablet by mouth See Admin Instructions for 5 days 500mg on day 1 followed by 250mg on days 2 - 5    Other orders  -     Discontinue: predniSONE (DELTASONE) 20 MG tablet; Take 2 tablets by mouth daily for 5 days  -     Discontinue: benzonatate (TESSALON) 100 MG capsule; Take 1 capsule by mouth 3 times daily as needed for Cough  -     benzonatate (TESSALON) 100 MG capsule; Take 1 capsule by mouth 3 times daily as needed for Cough  -     predniSONE (DELTASONE) 20 MG tablet;  Take 2 tablets by mouth daily for 5 days        The patient is to call for any concerns or return if any of the signs or symptoms worsen. The patient is to follow-up with PCP in the next 2-3 days for repeat evaluation repeat assessment or go directly to the emergency department.      SIGNATURE: Maggie Gill III, PA-C

## 2021-09-27 ENCOUNTER — HOSPITAL ENCOUNTER (OUTPATIENT)
Age: 56
Discharge: HOME | End: 2021-09-27
Payer: MEDICARE

## 2021-09-27 ENCOUNTER — OFFICE VISIT (OUTPATIENT)
Dept: PRIMARY CARE CLINIC | Age: 56
End: 2021-09-27
Payer: MEDICARE

## 2021-09-27 VITALS
HEIGHT: 71 IN | HEART RATE: 78 BPM | OXYGEN SATURATION: 98 % | DIASTOLIC BLOOD PRESSURE: 76 MMHG | BODY MASS INDEX: 22.96 KG/M2 | WEIGHT: 164 LBS | SYSTOLIC BLOOD PRESSURE: 124 MMHG | RESPIRATION RATE: 16 BRPM

## 2021-09-27 DIAGNOSIS — N40.1 BENIGN PROSTATIC HYPERPLASIA WITH NOCTURIA: ICD-10-CM

## 2021-09-27 DIAGNOSIS — Z12.5 SCREENING FOR MALIGNANT NEOPLASM OF PROSTATE: Primary | ICD-10-CM

## 2021-09-27 DIAGNOSIS — J43.9 PULMONARY EMPHYSEMA, UNSPECIFIED EMPHYSEMA TYPE (HCC): Chronic | ICD-10-CM

## 2021-09-27 DIAGNOSIS — K86.2 CYSTIC MASS OF PANCREAS: ICD-10-CM

## 2021-09-27 DIAGNOSIS — Z12.5 SCREENING FOR MALIGNANT NEOPLASM OF PROSTATE: ICD-10-CM

## 2021-09-27 DIAGNOSIS — R35.1 BENIGN PROSTATIC HYPERPLASIA WITH NOCTURIA: ICD-10-CM

## 2021-09-27 DIAGNOSIS — Z12.11 SCREEN FOR COLON CANCER: ICD-10-CM

## 2021-09-27 DIAGNOSIS — K21.9 GASTROESOPHAGEAL REFLUX DISEASE WITHOUT ESOPHAGITIS: ICD-10-CM

## 2021-09-27 PROBLEM — J45.909 ASTHMA: Status: RESOLVED | Noted: 2018-02-26 | Resolved: 2021-09-27

## 2021-09-27 LAB
ALBUMIN SERPL-MCNC: 3.3 G/DL (ref 3.5–5.2)
ALP BLD-CCNC: 67 U/L (ref 40–129)
ALT SERPL-CCNC: 47 U/L (ref 0–40)
ANION GAP SERPL CALCULATED.3IONS-SCNC: 8 MMOL/L (ref 7–16)
AST SERPL-CCNC: 17 U/L (ref 0–39)
BILIRUB SERPL-MCNC: 0.4 MG/DL (ref 0–1.2)
BUN BLDV-MCNC: 12 MG/DL (ref 6–20)
CALCIUM SERPL-MCNC: 9.2 MG/DL (ref 8.6–10.2)
CHLORIDE BLD-SCNC: 103 MMOL/L (ref 98–107)
CO2: 27 MMOL/L (ref 22–29)
CREAT SERPL-MCNC: 0.7 MG/DL (ref 0.7–1.2)
GFR AFRICAN AMERICAN: >60
GFR NON-AFRICAN AMERICAN: >60 ML/MIN/1.73
GLUCOSE BLD-MCNC: 96 MG/DL (ref 74–99)
POTASSIUM SERPL-SCNC: 4.8 MMOL/L (ref 3.5–5)
PROSTATE SPECIFIC ANTIGEN: 0.7 NG/ML (ref 0–4)
SODIUM BLD-SCNC: 138 MMOL/L (ref 132–146)
TOTAL PROTEIN: 6.7 G/DL (ref 6.4–8.3)

## 2021-09-27 PROCEDURE — G8420 CALC BMI NORM PARAMETERS: HCPCS | Performed by: FAMILY MEDICINE

## 2021-09-27 PROCEDURE — 3017F COLORECTAL CA SCREEN DOC REV: CPT | Performed by: FAMILY MEDICINE

## 2021-09-27 PROCEDURE — 99214 OFFICE O/P EST MOD 30 MIN: CPT | Performed by: FAMILY MEDICINE

## 2021-09-27 PROCEDURE — 3023F SPIROM DOC REV: CPT | Performed by: FAMILY MEDICINE

## 2021-09-27 PROCEDURE — 80053 COMPREHEN METABOLIC PANEL: CPT

## 2021-09-27 PROCEDURE — G8427 DOCREV CUR MEDS BY ELIG CLIN: HCPCS | Performed by: FAMILY MEDICINE

## 2021-09-27 PROCEDURE — G0103 PSA SCREENING: HCPCS

## 2021-09-27 PROCEDURE — 4004F PT TOBACCO SCREEN RCVD TLK: CPT | Performed by: FAMILY MEDICINE

## 2021-09-27 PROCEDURE — 90674 CCIIV4 VAC NO PRSV 0.5 ML IM: CPT | Performed by: FAMILY MEDICINE

## 2021-09-27 PROCEDURE — G0008 ADMIN INFLUENZA VIRUS VAC: HCPCS | Performed by: FAMILY MEDICINE

## 2021-09-27 PROCEDURE — G8926 SPIRO NO PERF OR DOC: HCPCS | Performed by: FAMILY MEDICINE

## 2021-09-27 PROCEDURE — 36415 COLL VENOUS BLD VENIPUNCTURE: CPT

## 2021-09-27 RX ORDER — FLUOXETINE HYDROCHLORIDE 20 MG/1
20 CAPSULE ORAL DAILY
COMMUNITY

## 2021-09-27 RX ORDER — BENZONATATE 100 MG/1
100 CAPSULE ORAL 3 TIMES DAILY PRN
COMMUNITY

## 2021-09-27 RX ORDER — BENZTROPINE MESYLATE 0.5 MG/1
TABLET ORAL
COMMUNITY
Start: 2021-09-08

## 2021-09-27 RX ORDER — TAMSULOSIN HYDROCHLORIDE 0.4 MG/1
0.4 CAPSULE ORAL DAILY
Qty: 30 CAPSULE | Refills: 3 | Status: SHIPPED
Start: 2021-09-27 | End: 2022-02-01 | Stop reason: SDUPTHER

## 2021-09-27 RX ORDER — ALBUTEROL SULFATE 90 UG/1
2 AEROSOL, METERED RESPIRATORY (INHALATION) EVERY 6 HOURS PRN
Qty: 18 G | Refills: 2 | Status: SHIPPED
Start: 2021-09-27 | End: 2022-09-13 | Stop reason: SDUPTHER

## 2021-09-27 RX ORDER — MIRTAZAPINE 15 MG/1
TABLET, FILM COATED ORAL
COMMUNITY
Start: 2021-09-08

## 2021-09-27 ASSESSMENT — ENCOUNTER SYMPTOMS
SINUS PRESSURE: 0
BACK PAIN: 0
DIARRHEA: 0
CONSTIPATION: 0
APNEA: 0
EYE PAIN: 0
RHINORRHEA: 0
CHEST TIGHTNESS: 0
SORE THROAT: 0
COUGH: 1
CHOKING: 0
EYE ITCHING: 0
EYE REDNESS: 0
WHEEZING: 0
HEARTBURN: 1
DIFFICULTY BREATHING: 0
VOMITING: 0
ABDOMINAL PAIN: 0
SPUTUM PRODUCTION: 0
NAUSEA: 0
BLOOD IN STOOL: 0
COLOR CHANGE: 0
SHORTNESS OF BREATH: 0

## 2021-09-27 ASSESSMENT — COPD QUESTIONNAIRES: COPD: 1

## 2021-09-27 NOTE — PROGRESS NOTES
Chief Complaint:     Chief Complaint   Patient presents with    Urinary Frequency     has been getting up in the night the lat 3-4 weeks having to go. unsure if related to his teeth    Benign Prostatic Hypertrophy         Urinary Frequency   This is a new problem. The current episode started more than 1 month ago. The problem occurs intermittently. The problem has been waxing and waning. The patient is experiencing no pain. There has been no fever. Associated symptoms include frequency. Pertinent negatives include no hematuria, nausea, urgency or vomiting. He has tried nothing for the symptoms. The treatment provided no relief. Benign Prostatic Hypertrophy  This is a new problem. The current episode started more than 1 month ago. The problem is unchanged. Irritative symptoms include frequency and nocturia. Irritative symptoms do not include urgency. Obstructive symptoms include dribbling. Pertinent negatives include no dysuria, hematuria, nausea or vomiting. He is not sexually active. Nothing aggravates the symptoms. Past treatments include nothing. The treatment provided no relief. COPD  He complains of cough. There is no difficulty breathing, shortness of breath, sputum production or wheezing. This is a chronic problem. The current episode started more than 1 year ago. The problem has been gradually improving. The cough is non-productive. Associated symptoms include heartburn. Pertinent negatives include no appetite change, chest pain, ear pain, fever, headaches, myalgias, rhinorrhea or sore throat. His symptoms are aggravated by change in weather and URI. His symptoms are alleviated by beta-agonist and steroid inhaler. He reports moderate improvement on treatment. Risk factors for lung disease include smoking/tobacco exposure. His past medical history is significant for COPD. Gastroesophageal Reflux  He complains of coughing and heartburn.  He reports no abdominal pain, no chest pain, no choking, no nausea, no sore throat or no wheezing. This is a recurrent problem. The problem occurs occasionally. The problem has been waxing and waning. The heartburn does not wake him from sleep. The heartburn does not limit his activity. The heartburn doesn't change with position. The symptoms are aggravated by certain foods. Pertinent negatives include no fatigue. There are no known risk factors. He has tried a PPI for the symptoms. The treatment provided significant relief.        Patient Active Problem List   Diagnosis    Schizophrenia, schizo-affective ([address]    Dermatitis    Pulmonary emphysema ([address]    Cystic mass of pancreas    Spondylosis of lumbar region without myelopathy or radiculopathy    Gastroesophageal reflux disease without esophagitis    Cervical adenopathy    Depression    Severe major depression without psychotic features ([address]    Exposure to communicable diseases    Hyponatremia    Abdominal pain    Tobacco abuse    Benign prostatic hyperplasia with nocturia       Past Medical History:   Diagnosis Date    Arthritis     Asthma 2/26/2018    Chronic back pain     Plates inseted from H3-L3    COPD (chronic obstructive pulmonary disease) ([address]     Emphysema lung ([address]     Hepatitis C 2011       Past Surgical History:   Procedure Laterality Date    BACK SURGERY  1994    LEG SURGERY Left     Pins inserted    UPPER GASTROINTESTINAL ENDOSCOPY N/A 6/25/2021    EGD BIOPSY performed by Iris Weiner MD at [facility] END[facility]COPY       Current Outpatient Medications   Medication Sig Dispense Refill    FLUoxetine (PROZAC) 20 MG capsule Take 20 mg by mouth daily      benzonatate (TESSALON) 100 MG capsule Take 100 mg by mouth 3 times daily as needed for Cough      albuterol sulfate HFA (VENTOLIN HFA) 108 (90 Base) MCG/ACT inhaler Inhale 2 puffs into the lungs every 6 hours as needed for Wheezing 18 g 2    tamsulosin (FLOMAX) 0.4 MG capsule Take 1 capsule by mouth daily 30 capsule 3    pantoprazole (PROTONIX) 40 MG tablet Take 1 tablet by mouth every morning (before breakfast) 30 tablet 5    montelukast (SINGULAIR) 10 MG tablet Take 10 mg by mouth nightly      Umeclidinium Bromide (INCRUSE ELLIPTA) 62.5 MCG/INH AEPB Inhale 1 puff into the lungs daily 1 each 5    OLANZapine (ZYPREXA) 10 MG tablet Take 10 mg by mouth nightly       SYMBICORT 160-4.5 MCG/ACT AERO Inhale 2 puffs into the lungs 2 times daily 1 Inhaler 3    traZODone (DESYREL) 50 MG tablet Take 1 tablet by mouth nightly 30 tablet 0    benztropine (COGENTIN) 0.5 MG tablet       mirtazapine (REMERON) 15 MG tablet        No current facility-administered medications for this visit. Allergies   Allergen Reactions    Shellfish-Derived Products Hives    Strawberry Extract Hives    Penicillins Rash     Trouble breathing       Social History     Socioeconomic History    Marital status: Single     Spouse name: None    Number of children: 1    Years of education: None    Highest education level: None   Occupational History     Employer: DISABLED   Tobacco Use    Smoking status: Current Every Day Smoker     Packs/day: 0.75     Years: 30.00     Pack years: 22.50     Types: Cigarettes    Smokeless tobacco: Never Used   Vaping Use    Vaping Use: Never used   Substance and Sexual Activity    Alcohol use: Yes     Alcohol/week: 6.0 standard drinks     Types: 6 Cans of beer per week     Comment: 3 times a week     Drug use: No    Sexual activity: Not Currently   Other Topics Concern    None   Social History Narrative    Has 1 daughter     Social Determinants of Health     Financial Resource Strain:     Difficulty of Paying Living Expenses:    Food Insecurity:     Worried About Running Out of Food in the Last Year:     Ran Out of Food in the Last Year:    Transportation Needs:     Lack of Transportation (Medical):      Lack of Transportation (Non-Medical):    Physical Activity:     Days of Exercise per Week:     Minutes of Exercise per and are negative. /76   Pulse 78   Resp 16   Ht 5' 11\" (1.803 m)   Wt 164 lb (74.4 kg)   SpO2 98%   BMI 22.87 kg/m²     Physical Exam  Vitals and nursing note reviewed. Constitutional:       General: He is not in acute distress. Appearance: Normal appearance. He is well-developed. HENT:      Head: Normocephalic and atraumatic. Right Ear: Hearing, tympanic membrane and external ear normal. No tenderness. No middle ear effusion. Left Ear: Hearing, tympanic membrane and external ear normal. No tenderness. No middle ear effusion. Nose: Nose normal. No congestion or rhinorrhea. Right Turbinates: Not enlarged. Left Turbinates: Not enlarged. Mouth/Throat:      Mouth: Mucous membranes are moist.      Tongue: No lesions. Pharynx: Oropharynx is clear. No oropharyngeal exudate or posterior oropharyngeal erythema. Eyes:      General: No scleral icterus. Conjunctiva/sclera: Conjunctivae normal.      Pupils: Pupils are equal, round, and reactive to light. Neck:      Thyroid: No thyromegaly. Cardiovascular:      Rate and Rhythm: Normal rate and regular rhythm. Heart sounds: Normal heart sounds. No murmur heard. Pulmonary:      Effort: Pulmonary effort is normal. No respiratory distress. Breath sounds: Normal breath sounds. No wheezing or rales. Abdominal:      General: Bowel sounds are normal. There is no distension. Palpations: Abdomen is soft. Tenderness: There is no abdominal tenderness. Musculoskeletal:         General: No tenderness. Normal range of motion. Cervical back: Normal range of motion and neck supple. No rigidity. No muscular tenderness. Lymphadenopathy:      Cervical: No cervical adenopathy. Skin:     General: Skin is warm and dry. Findings: No erythema or rash. Neurological:      General: No focal deficit present. Mental Status: He is alert and oriented to person, place, and time.       Cranial Nerves: No cranial nerve deficit. Deep Tendon Reflexes: Reflexes are normal and symmetric. Reflexes normal.   Psychiatric:         Mood and Affect: Mood normal.                                 ASSESSMENT/PLAN:    Patient Active Problem List   Diagnosis    Schizophrenia, schizo-affective ([address]    Dermatitis    Pulmonary emphysema ([address]    Cystic mass of pancreas    Spondylosis of lumbar region without myelopathy or radiculopathy    Gastroesophageal reflux disease without esophagitis    Cervical adenopathy    Depression    Severe major depression without psychotic features ([address]    Exposure to communicable diseases    Hyponatremia    Abdominal pain    Tobacco abuse    Benign prostatic hyperplasia with nocturia       Venessa Stephens was seen today for urinary frequency and benign prostatic hypertrophy. Diagnoses and all orders for this visit:    Screening for malignant neoplasm of prostate  -     PSA screening; Future    Benign prostatic hyperplasia with nocturia  -     Comprehensive Metabolic Panel; Future    Pulmonary emphysema, unspecified emphysema type ([address]    Gastroesophageal reflux disease without esophagitis    Screen for colon cancer  -     POCT Fecal Immunochemical Test (FIT); Future    Other orders  -     albuterol sulfate HFA (VENTOLIN HFA) 108 (90 Base) MCG/ACT inhaler; Inhale 2 puffs into the lungs every 6 hours as needed for Wheezing  -     tamsulosin (FLOMAX) 0.4 MG capsule; Take 1 capsule by mouth daily  -     INFLUENZA, MDCK QUADV, 2 YRS AND OLDER, IM, PF, PREFILL SYR OR SDV, 0.5ML (FLUCELVAX QUADV, PF)          Return in about 6 months (around 3/27/2022) for Medicare AWV. I spent 30 minutes with this patient. I spent greater than 50% of the time counseling this patient.         Fuentes Gonzalez DO  9/27/2021  8:27 AM

## 2021-10-14 ENCOUNTER — TELEPHONE (OUTPATIENT)
Dept: PULMONOLOGY | Age: 56
End: 2021-10-14

## 2021-10-14 DIAGNOSIS — J45.30 MILD PERSISTENT ASTHMA, UNSPECIFIED WHETHER COMPLICATED: ICD-10-CM

## 2021-10-14 DIAGNOSIS — J43.9 PULMONARY EMPHYSEMA, UNSPECIFIED EMPHYSEMA TYPE (HCC): ICD-10-CM

## 2021-10-14 RX ORDER — UMECLIDINIUM 62.5 UG/1
1 AEROSOL, POWDER ORAL DAILY
Qty: 1 EACH | Refills: 5 | Status: SHIPPED | OUTPATIENT
Start: 2021-10-14

## 2021-10-18 ENCOUNTER — TELEPHONE (OUTPATIENT)
Dept: PRIMARY CARE CLINIC | Age: 56
End: 2021-10-18

## 2021-10-18 NOTE — TELEPHONE ENCOUNTER
Pt calling and states he has a bad case of head lice and asking if you will call something in to help.

## 2022-01-04 RX ORDER — PANTOPRAZOLE SODIUM 40 MG/1
40 TABLET, DELAYED RELEASE ORAL
Qty: 30 TABLET | Refills: 5 | Status: SHIPPED
Start: 2022-01-04 | End: 2022-06-30 | Stop reason: SDUPTHER

## 2022-02-01 RX ORDER — TAMSULOSIN HYDROCHLORIDE 0.4 MG/1
0.4 CAPSULE ORAL DAILY
Qty: 30 CAPSULE | Refills: 3 | Status: SHIPPED | OUTPATIENT
Start: 2022-02-01

## 2022-04-06 ENCOUNTER — HOSPITAL ENCOUNTER (OUTPATIENT)
Age: 57
Discharge: HOME | End: 2022-04-06
Payer: MEDICARE

## 2022-04-06 LAB
ALBUMIN SERPL-MCNC: 4.1 G/DL (ref 3.5–5.2)
ALP BLD-CCNC: 87 U/L (ref 40–129)
ALT SERPL-CCNC: 15 U/L (ref 0–40)
ANION GAP SERPL CALCULATED.3IONS-SCNC: 15 MMOL/L (ref 7–16)
AST SERPL-CCNC: 24 U/L (ref 0–39)
BASOPHILS ABSOLUTE: 0.04 E9/L (ref 0–0.2)
BASOPHILS RELATIVE PERCENT: 0.5 % (ref 0–2)
BILIRUB SERPL-MCNC: 0.6 MG/DL (ref 0–1.2)
BUN BLDV-MCNC: 8 MG/DL (ref 6–20)
CALCIUM SERPL-MCNC: 9.5 MG/DL (ref 8.6–10.2)
CHLORIDE BLD-SCNC: 98 MMOL/L (ref 98–107)
CHOLESTEROL, TOTAL: 218 MG/DL (ref 0–199)
CO2: 24 MMOL/L (ref 22–29)
CREAT SERPL-MCNC: 0.8 MG/DL (ref 0.7–1.2)
EOSINOPHILS ABSOLUTE: 0.13 E9/L (ref 0.05–0.5)
EOSINOPHILS RELATIVE PERCENT: 1.5 % (ref 0–6)
GFR AFRICAN AMERICAN: >60
GFR NON-AFRICAN AMERICAN: >60 ML/MIN/1.73
GLUCOSE BLD-MCNC: 105 MG/DL (ref 74–99)
HBA1C MFR BLD: 5 % (ref 4–5.6)
HCT VFR BLD CALC: 48.6 % (ref 37–54)
HDLC SERPL-MCNC: 52 MG/DL
HEMOGLOBIN: 16.5 G/DL (ref 12.5–16.5)
IMMATURE GRANULOCYTES #: 0.05 E9/L
IMMATURE GRANULOCYTES %: 0.6 % (ref 0–5)
LDL CHOLESTEROL CALCULATED: 146 MG/DL (ref 0–99)
LYMPHOCYTES ABSOLUTE: 2.88 E9/L (ref 1.5–4)
LYMPHOCYTES RELATIVE PERCENT: 34.3 % (ref 20–42)
MCH RBC QN AUTO: 34.5 PG (ref 26–35)
MCHC RBC AUTO-ENTMCNC: 34 % (ref 32–34.5)
MCV RBC AUTO: 101.7 FL (ref 80–99.9)
MONOCYTES ABSOLUTE: 0.64 E9/L (ref 0.1–0.95)
MONOCYTES RELATIVE PERCENT: 7.6 % (ref 2–12)
NEUTROPHILS ABSOLUTE: 4.66 E9/L (ref 1.8–7.3)
NEUTROPHILS RELATIVE PERCENT: 55.5 % (ref 43–80)
PDW BLD-RTO: 13.2 FL (ref 11.5–15)
PLATELET # BLD: 217 E9/L (ref 130–450)
PMV BLD AUTO: 9.8 FL (ref 7–12)
POTASSIUM SERPL-SCNC: 4 MMOL/L (ref 3.5–5)
RBC # BLD: 4.78 E12/L (ref 3.8–5.8)
SODIUM BLD-SCNC: 137 MMOL/L (ref 132–146)
TOTAL PROTEIN: 7.8 G/DL (ref 6.4–8.3)
TRIGL SERPL-MCNC: 100 MG/DL (ref 0–149)
TSH SERPL DL<=0.05 MIU/L-ACNC: 3.01 UIU/ML (ref 0.27–4.2)
VITAMIN D 25-HYDROXY: 18 NG/ML (ref 30–100)
VLDLC SERPL CALC-MCNC: 20 MG/DL
WBC # BLD: 8.4 E9/L (ref 4.5–11.5)

## 2022-04-06 PROCEDURE — 84443 ASSAY THYROID STIM HORMONE: CPT

## 2022-04-06 PROCEDURE — 83036 HEMOGLOBIN GLYCOSYLATED A1C: CPT

## 2022-04-06 PROCEDURE — 80061 LIPID PANEL: CPT

## 2022-04-06 PROCEDURE — 82306 VITAMIN D 25 HYDROXY: CPT

## 2022-04-06 PROCEDURE — 36415 COLL VENOUS BLD VENIPUNCTURE: CPT

## 2022-04-06 PROCEDURE — 85025 COMPLETE CBC W/AUTO DIFF WBC: CPT

## 2022-04-06 PROCEDURE — 80053 COMPREHEN METABOLIC PANEL: CPT

## 2022-05-17 NOTE — TELEPHONE ENCOUNTER
Patient is not returning calls to get his EUS rescheduled.     Electronically signed by Oscar Olivo RN on 8/19/2020 at 10:32 AM
none

## 2022-06-30 RX ORDER — PANTOPRAZOLE SODIUM 40 MG/1
40 TABLET, DELAYED RELEASE ORAL
Qty: 30 TABLET | Refills: 5 | Status: SHIPPED | OUTPATIENT
Start: 2022-06-30

## 2022-09-13 ENCOUNTER — TELEPHONE (OUTPATIENT)
Dept: PRIMARY CARE CLINIC | Age: 57
End: 2022-09-13

## 2022-09-13 RX ORDER — ALBUTEROL SULFATE 90 UG/1
2 AEROSOL, METERED RESPIRATORY (INHALATION) EVERY 6 HOURS PRN
Qty: 18 G | Refills: 2 | Status: SHIPPED | OUTPATIENT
Start: 2022-09-13

## 2022-09-13 NOTE — TELEPHONE ENCOUNTER
----- Message from Murray Mcdaniel sent at 9/13/2022  1:55 PM EDT -----  Subject: Referral Request    Reason for referral request? Pt needs a referral pancreatic doctor   Provider patient wants to be referred to(if known):     Provider Phone Number(if known):     Additional Information for Provider?   ---------------------------------------------------------------------------  --------------  Troy WHANG    [phone]; OK to leave message on voicemail  ---------------------------------------------------------------------------  --------------

## 2024-12-07 ENCOUNTER — HOSPITAL ENCOUNTER (EMERGENCY)
Age: 59
Discharge: HOME | End: 2024-12-07
Attending: EMERGENCY MEDICINE
Payer: MEDICARE

## 2024-12-07 ENCOUNTER — APPOINTMENT (OUTPATIENT)
Dept: CT IMAGING | Age: 59
End: 2024-12-07
Payer: MEDICARE

## 2024-12-07 VITALS
HEIGHT: 71 IN | RESPIRATION RATE: 14 BRPM | OXYGEN SATURATION: 95 % | HEART RATE: 87 BPM | BODY MASS INDEX: 30.8 KG/M2 | DIASTOLIC BLOOD PRESSURE: 82 MMHG | SYSTOLIC BLOOD PRESSURE: 140 MMHG | TEMPERATURE: 98.3 F | WEIGHT: 220 LBS

## 2024-12-07 DIAGNOSIS — K21.9 GASTROESOPHAGEAL REFLUX DISEASE WITHOUT ESOPHAGITIS: Primary | ICD-10-CM

## 2024-12-07 LAB
ALBUMIN SERPL-MCNC: 4.1 G/DL (ref 3.5–5.2)
ALP SERPL-CCNC: 88 U/L (ref 40–129)
ALT SERPL-CCNC: 11 U/L (ref 0–40)
ANION GAP SERPL CALCULATED.3IONS-SCNC: 12 MMOL/L (ref 7–16)
AST SERPL-CCNC: 17 U/L (ref 0–39)
B PARAP IS1001 DNA NPH QL NAA+NON-PROBE: NOT DETECTED
B PERT DNA SPEC QL NAA+PROBE: NOT DETECTED
BASOPHILS # BLD: 0.04 K/UL (ref 0–0.2)
BASOPHILS NFR BLD: 1 % (ref 0–2)
BILIRUB SERPL-MCNC: 0.4 MG/DL (ref 0–1.2)
BUN SERPL-MCNC: 8 MG/DL (ref 6–20)
C PNEUM DNA NPH QL NAA+NON-PROBE: NOT DETECTED
CALCIUM SERPL-MCNC: 9.7 MG/DL (ref 8.6–10.2)
CHLORIDE SERPL-SCNC: 100 MMOL/L (ref 98–107)
CO2 SERPL-SCNC: 28 MMOL/L (ref 22–29)
CREAT SERPL-MCNC: 0.7 MG/DL (ref 0.7–1.2)
EKG ATRIAL RATE: 84 BPM
EKG P AXIS: 36 DEGREES
EKG P-R INTERVAL: 128 MS
EKG Q-T INTERVAL: 352 MS
EKG QRS DURATION: 76 MS
EKG QTC CALCULATION (BAZETT): 415 MS
EKG R AXIS: 35 DEGREES
EKG T AXIS: 63 DEGREES
EKG VENTRICULAR RATE: 84 BPM
EOSINOPHIL # BLD: 0.11 K/UL (ref 0.05–0.5)
EOSINOPHILS RELATIVE PERCENT: 2 % (ref 0–6)
ERYTHROCYTE [DISTWIDTH] IN BLOOD BY AUTOMATED COUNT: 12.9 % (ref 11.5–15)
FLUAV RNA NPH QL NAA+NON-PROBE: NOT DETECTED
FLUBV RNA NPH QL NAA+NON-PROBE: NOT DETECTED
GFR, ESTIMATED: >90 ML/MIN/1.73M2
GLUCOSE SERPL-MCNC: 81 MG/DL (ref 74–99)
HADV DNA NPH QL NAA+NON-PROBE: NOT DETECTED
HCOV 229E RNA NPH QL NAA+NON-PROBE: NOT DETECTED
HCOV HKU1 RNA NPH QL NAA+NON-PROBE: NOT DETECTED
HCOV NL63 RNA NPH QL NAA+NON-PROBE: NOT DETECTED
HCOV OC43 RNA NPH QL NAA+NON-PROBE: NOT DETECTED
HCT VFR BLD AUTO: 47.7 % (ref 37–54)
HGB BLD-MCNC: 16.3 G/DL (ref 12.5–16.5)
HMPV RNA NPH QL NAA+NON-PROBE: NOT DETECTED
HPIV1 RNA NPH QL NAA+NON-PROBE: NOT DETECTED
HPIV2 RNA NPH QL NAA+NON-PROBE: NOT DETECTED
HPIV3 RNA NPH QL NAA+NON-PROBE: NOT DETECTED
HPIV4 RNA NPH QL NAA+NON-PROBE: NOT DETECTED
IMM GRANULOCYTES # BLD AUTO: <0.03 K/UL (ref 0–0.58)
IMM GRANULOCYTES NFR BLD: 0 % (ref 0–5)
LACTATE BLDV-SCNC: 2.2 MMOL/L (ref 0.5–2.2)
LACTATE BLDV-SCNC: 2.6 MMOL/L (ref 0.5–2.2)
LIPASE SERPL-CCNC: 32 U/L (ref 13–60)
LYMPHOCYTES NFR BLD: 2.89 K/UL (ref 1.5–4)
LYMPHOCYTES RELATIVE PERCENT: 39 % (ref 20–42)
M PNEUMO DNA NPH QL NAA+NON-PROBE: NOT DETECTED
MCH RBC QN AUTO: 35.4 PG (ref 26–35)
MCHC RBC AUTO-ENTMCNC: 34.2 G/DL (ref 32–34.5)
MCV RBC AUTO: 103.7 FL (ref 80–99.9)
MONOCYTES NFR BLD: 0.43 K/UL (ref 0.1–0.95)
MONOCYTES NFR BLD: 6 % (ref 2–12)
NEUTROPHILS NFR BLD: 53 % (ref 43–80)
NEUTS SEG NFR BLD: 3.95 K/UL (ref 1.8–7.3)
PLATELET # BLD AUTO: 223 K/UL (ref 130–450)
PMV BLD AUTO: 10.2 FL (ref 7–12)
POTASSIUM SERPL-SCNC: 4 MMOL/L (ref 3.5–5)
PROT SERPL-MCNC: 7.7 G/DL (ref 6.4–8.3)
RBC # BLD AUTO: 4.6 M/UL (ref 3.8–5.8)
RSV RNA NPH QL NAA+NON-PROBE: NOT DETECTED
RV+EV RNA NPH QL NAA+NON-PROBE: NOT DETECTED
SARS-COV-2 RNA NPH QL NAA+NON-PROBE: NOT DETECTED
SODIUM SERPL-SCNC: 140 MMOL/L (ref 132–146)
SPECIMEN DESCRIPTION: NORMAL
TROPONIN I SERPL HS-MCNC: <6 NG/L (ref 0–11)
WBC OTHER # BLD: 7.4 K/UL (ref 4.5–11.5)

## 2024-12-07 PROCEDURE — 99285 EMERGENCY DEPT VISIT HI MDM: CPT

## 2024-12-07 PROCEDURE — 96375 TX/PRO/DX INJ NEW DRUG ADDON: CPT

## 2024-12-07 PROCEDURE — 71260 CT THORAX DX C+: CPT

## 2024-12-07 PROCEDURE — 96374 THER/PROPH/DIAG INJ IV PUSH: CPT

## 2024-12-07 PROCEDURE — 80053 COMPREHEN METABOLIC PANEL: CPT

## 2024-12-07 PROCEDURE — 83605 ASSAY OF LACTIC ACID: CPT

## 2024-12-07 PROCEDURE — 74177 CT ABD & PELVIS W/CONTRAST: CPT

## 2024-12-07 PROCEDURE — 93005 ELECTROCARDIOGRAM TRACING: CPT

## 2024-12-07 PROCEDURE — 85025 COMPLETE CBC W/AUTO DIFF WBC: CPT

## 2024-12-07 PROCEDURE — 6370000000 HC RX 637 (ALT 250 FOR IP): Performed by: EMERGENCY MEDICINE

## 2024-12-07 PROCEDURE — 0202U NFCT DS 22 TRGT SARS-COV-2: CPT

## 2024-12-07 PROCEDURE — 2580000003 HC RX 258: Performed by: RADIOLOGY

## 2024-12-07 PROCEDURE — 83690 ASSAY OF LIPASE: CPT

## 2024-12-07 PROCEDURE — 84484 ASSAY OF TROPONIN QUANT: CPT

## 2024-12-07 PROCEDURE — 2580000003 HC RX 258

## 2024-12-07 PROCEDURE — 6360000002 HC RX W HCPCS: Performed by: EMERGENCY MEDICINE

## 2024-12-07 PROCEDURE — 6360000004 HC RX CONTRAST MEDICATION: Performed by: RADIOLOGY

## 2024-12-07 RX ORDER — PANTOPRAZOLE SODIUM 40 MG/1
40 TABLET, DELAYED RELEASE ORAL
Qty: 30 TABLET | Refills: 0 | Status: SHIPPED | OUTPATIENT
Start: 2024-12-07

## 2024-12-07 RX ORDER — SODIUM CHLORIDE 0.9 % (FLUSH) 0.9 %
10 SYRINGE (ML) INJECTION PRN
Status: DISCONTINUED | OUTPATIENT
Start: 2024-12-07 | End: 2024-12-07 | Stop reason: HOSPADM

## 2024-12-07 RX ORDER — PANTOPRAZOLE SODIUM 40 MG/10ML
40 INJECTION, POWDER, LYOPHILIZED, FOR SOLUTION INTRAVENOUS ONCE
Status: COMPLETED | OUTPATIENT
Start: 2024-12-07 | End: 2024-12-07

## 2024-12-07 RX ORDER — SUCRALFATE 1 G/1
1 TABLET ORAL ONCE
Status: COMPLETED | OUTPATIENT
Start: 2024-12-07 | End: 2024-12-07

## 2024-12-07 RX ORDER — IOPAMIDOL 755 MG/ML
75 INJECTION, SOLUTION INTRAVASCULAR
Status: COMPLETED | OUTPATIENT
Start: 2024-12-07 | End: 2024-12-07

## 2024-12-07 RX ORDER — 0.9 % SODIUM CHLORIDE 0.9 %
1000 INTRAVENOUS SOLUTION INTRAVENOUS ONCE
Status: COMPLETED | OUTPATIENT
Start: 2024-12-07 | End: 2024-12-07

## 2024-12-07 RX ADMIN — PANTOPRAZOLE SODIUM 40 MG: 40 INJECTION, POWDER, FOR SOLUTION INTRAVENOUS at 11:09

## 2024-12-07 RX ADMIN — SODIUM CHLORIDE 1000 ML: 9 INJECTION, SOLUTION INTRAVENOUS at 10:38

## 2024-12-07 RX ADMIN — Medication 10 ML: at 12:26

## 2024-12-07 RX ADMIN — SUCRALFATE 1 G: 1 TABLET ORAL at 10:41

## 2024-12-07 RX ADMIN — LIDOCAINE HYDROCHLORIDE: 20 SOLUTION ORAL; TOPICAL at 10:45

## 2024-12-07 RX ADMIN — IOPAMIDOL 75 ML: 755 INJECTION, SOLUTION INTRAVENOUS at 12:24

## 2024-12-07 ASSESSMENT — LIFESTYLE VARIABLES
HOW OFTEN DO YOU HAVE A DRINK CONTAINING ALCOHOL: NEVER
HOW MANY STANDARD DRINKS CONTAINING ALCOHOL DO YOU HAVE ON A TYPICAL DAY: PATIENT DOES NOT DRINK

## 2024-12-07 NOTE — ED PROVIDER NOTES
[facility] EMERGENCY DEPARTMENT  EMERGENCY DEPARTMENT ENCOUNTER        Pt Name: Colton Hurley  MRN: [id number]  Birthdate 1965  Date of evaluation: 12/7/2024  Provider: Pipo Llanos DO  PCP: Luis Antonio Barney DO  Note Started: 9:06 AM EST 12/7/24    CHIEF COMPLAINT       Chief Complaint   Patient presents with    Heartburn     Heartburn x6 months. Patient not getting relief from TUMS. Pt states he is concerned for worsening pancreatic issues        HISTORY OF PRESENT ILLNESS: 1 or more Elements   History received from: Patient    Colton Hurley is a 59 y.o. male who presents to the emergency department with chief complaint of abdominal pain.  Patient states that the pain is in the epigastric region and radiates to the midsternal region.  States has been constant over the past 6 months but is been gradually worsening.  States he has been taking a large amount of Tums without any improvement.  States that he has a little bit of a cough as well.  Does state he has history of for pulmonary nodules as well as pancreatic nodules otherwise patient denies any fever, chills, nausea and vomiting, ch abdominal pain, hematuria or dysuria, constipation or diarrhea.     Nursing Notes were all reviewed and agreed with or any disagreements were addressed in the HPI.    REVIEW OF SYSTEMS :    Positives and Pertinent negatives as per HPI.    PAST MEDICAL HISTORY/Chronic Conditions Affecting Care    has a past medical history of Arthritis, Asthma (2/26/2018), Chronic back pain, COPD (chronic obstructive pulmonary disease) (HCC), Emphysema lung (HCC), and Hepatitis C (2011).     SURGICAL HISTORY     Past Surgical History:   Procedure Laterality Date    BACK SURGERY  1994    LEG SURGERY Left     Pins inserted    UPPER GASTROINTESTINAL ENDOSCOPY N/A 6/25/2021    EGD BIOPSY performed by Leo Zurita MD at [facility] ENDOSCOPY       CURRENTMEDICATIONS       Previous Medications    ALBUTEROL SULFATE  and QTc of 415.  No significant ST changes or elevation as compared to prior on 6/24/2021.  EKG interpreted by myself [RW]      ED Course User Index  [RW] Pipo Llanos DO        Social Determinants affecting Dx or Tx: Stress    Records Reviewed: EKG from 6/24/2021 reviewed in comparison to today's.    I am the Primary Clinician of Record.    CONSULTS: (Who and What was discussed)  None    FINAL IMPRESSION      1. Gastroesophageal reflux disease without esophagitis          DISPOSITION/PLAN   DISPOSITION Decision To Discharge 12/07/2024 02:20:06 PM    PATIENT REFERRED TO:  Luis Antonio Barney DO  [address] [facility] [address] [place], [place] [id number]  [phone]    Schedule an appointment as soon as possible for a visit       [facility] Emergency Department  [address] [address]  [phone]  Go to   If symptoms worsen    Breonna Yi MD  [address] [address]  [phone]            DISCHARGE MEDICATIONS:  New Prescriptions    PANTOPRAZOLE (PROTONIX) 40 MG TABLET    Take 1 tablet by mouth every morning (before breakfast)            (Please note that portions of this note were completed with a voice recognition program.  Efforts were made to edit the dictations but occasionally words are mis-transcribed.)    Pipo Llanos DO (electronically signed)

## 2024-12-07 NOTE — DISCHARGE INSTRUCTIONS
Please call and follow-up with your family physician as soon as possible.  Return to emergency department if symptoms persist or worsen.  Take Protonix as discussed.

## 2024-12-09 LAB
EKG ATRIAL RATE: 84 BPM
EKG P AXIS: 36 DEGREES
EKG P-R INTERVAL: 128 MS
EKG Q-T INTERVAL: 352 MS
EKG QRS DURATION: 76 MS
EKG QTC CALCULATION (BAZETT): 415 MS
EKG R AXIS: 35 DEGREES
EKG T AXIS: 63 DEGREES
EKG VENTRICULAR RATE: 84 BPM

## 2024-12-09 PROCEDURE — 93010 ELECTROCARDIOGRAM REPORT: CPT | Performed by: INTERNAL MEDICINE

## 2025-02-26 ENCOUNTER — APPOINTMENT (OUTPATIENT)
Dept: INTERVENTIONAL RADIOLOGY/VASCULAR | Age: 60
DRG: 024 | End: 2025-02-26
Payer: MEDICARE

## 2025-02-26 ENCOUNTER — APPOINTMENT (OUTPATIENT)
Dept: CT IMAGING | Age: 60
DRG: 024 | End: 2025-02-26
Payer: MEDICARE

## 2025-02-26 ENCOUNTER — ANESTHESIA EVENT (OUTPATIENT)
Dept: INTERVENTIONAL RADIOLOGY/VASCULAR | Age: 60
End: 2025-02-26
Payer: MEDICARE

## 2025-02-26 ENCOUNTER — HOSPITAL ENCOUNTER (INPATIENT)
Age: 60
LOS: 3 days | Discharge: NURSING, REHAB OR LONG-TERM CARE | DRG: 024 | End: 2025-03-01
Attending: STUDENT IN AN ORGANIZED HEALTH CARE EDUCATION/TRAINING PROGRAM | Admitting: INTERNAL MEDICINE
Payer: MEDICARE

## 2025-02-26 ENCOUNTER — ANESTHESIA (OUTPATIENT)
Dept: INTERVENTIONAL RADIOLOGY/VASCULAR | Age: 60
End: 2025-02-26
Payer: MEDICARE

## 2025-02-26 DIAGNOSIS — I63.10 CEREBROVASCULAR ACCIDENT (CVA) DUE TO EMBOLISM OF PRECEREBRAL ARTERY (HCC): Primary | ICD-10-CM

## 2025-02-26 DIAGNOSIS — I63.9 ACUTE CVA (CEREBROVASCULAR ACCIDENT) (HCC): ICD-10-CM

## 2025-02-26 LAB
ALBUMIN SERPL-MCNC: 4.1 G/DL (ref 3.5–5.2)
ALP SERPL-CCNC: 85 U/L (ref 40–129)
ALT SERPL-CCNC: 19 U/L (ref 0–40)
ANION GAP SERPL CALCULATED.3IONS-SCNC: 20 MMOL/L (ref 7–16)
AST SERPL-CCNC: 19 U/L (ref 0–39)
BASOPHILS # BLD: 0.04 K/UL (ref 0–0.2)
BASOPHILS NFR BLD: 0 % (ref 0–2)
BILIRUB SERPL-MCNC: 0.5 MG/DL (ref 0–1.2)
BUN SERPL-MCNC: 7 MG/DL (ref 6–20)
CALCIUM SERPL-MCNC: 8.9 MG/DL (ref 8.6–10.2)
CHLORIDE SERPL-SCNC: 104 MMOL/L (ref 98–107)
CO2 SERPL-SCNC: 17 MMOL/L (ref 22–29)
CREAT SERPL-MCNC: 0.8 MG/DL (ref 0.7–1.2)
EKG ATRIAL RATE: 78 BPM
EKG P AXIS: 44 DEGREES
EKG P-R INTERVAL: 124 MS
EKG Q-T INTERVAL: 360 MS
EKG QRS DURATION: 72 MS
EKG QTC CALCULATION (BAZETT): 410 MS
EKG R AXIS: 42 DEGREES
EKG T AXIS: 63 DEGREES
EKG VENTRICULAR RATE: 78 BPM
EOSINOPHIL # BLD: 0.08 K/UL (ref 0.05–0.5)
EOSINOPHILS RELATIVE PERCENT: 1 % (ref 0–6)
ERYTHROCYTE [DISTWIDTH] IN BLOOD BY AUTOMATED COUNT: 12.4 % (ref 11.5–15)
GFR, ESTIMATED: >90 ML/MIN/1.73M2
GLUCOSE BLD-MCNC: 153 MG/DL (ref 74–99)
GLUCOSE SERPL-MCNC: 155 MG/DL (ref 74–99)
HCT VFR BLD AUTO: 45.8 % (ref 37–54)
HGB BLD-MCNC: 16 G/DL (ref 12.5–16.5)
IMM GRANULOCYTES # BLD AUTO: <0.03 K/UL (ref 0–0.58)
IMM GRANULOCYTES NFR BLD: 0 % (ref 0–5)
INR PPP: 1
LYMPHOCYTES NFR BLD: 2.69 K/UL (ref 1.5–4)
LYMPHOCYTES RELATIVE PERCENT: 30 % (ref 20–42)
MCH RBC QN AUTO: 36.7 PG (ref 26–35)
MCHC RBC AUTO-ENTMCNC: 34.9 G/DL (ref 32–34.5)
MCV RBC AUTO: 105 FL (ref 80–99.9)
MONOCYTES NFR BLD: 0.6 K/UL (ref 0.1–0.95)
MONOCYTES NFR BLD: 7 % (ref 2–12)
NEUTROPHILS NFR BLD: 62 % (ref 43–80)
NEUTS SEG NFR BLD: 5.48 K/UL (ref 1.8–7.3)
PARTIAL THROMBOPLASTIN TIME: 26.1 SEC (ref 24.5–35.1)
PLATELET # BLD AUTO: 202 K/UL (ref 130–450)
PMV BLD AUTO: 10.1 FL (ref 7–12)
POTASSIUM SERPL-SCNC: 3.9 MMOL/L (ref 3.5–5)
PROT SERPL-MCNC: 6.9 G/DL (ref 6.4–8.3)
PROTHROMBIN TIME: 10.9 SEC (ref 9.3–12.4)
RBC # BLD AUTO: 4.36 M/UL (ref 3.8–5.8)
SODIUM SERPL-SCNC: 141 MMOL/L (ref 132–146)
WBC OTHER # BLD: 8.9 K/UL (ref 4.5–11.5)

## 2025-02-26 PROCEDURE — 6370000000 HC RX 637 (ALT 250 FOR IP): Performed by: PSYCHIATRY & NEUROLOGY

## 2025-02-26 PROCEDURE — 03CG3Z7 EXTIRPATION OF MATTER FROM INTRACRANIAL ARTERY USING STENT RETRIEVER, PERCUTANEOUS APPROACH: ICD-10-PCS | Performed by: INTERNAL MEDICINE

## 2025-02-26 PROCEDURE — B3171ZZ FLUOROSCOPY OF LEFT INTERNAL CAROTID ARTERY USING LOW OSMOLAR CONTRAST: ICD-10-PCS | Performed by: INTERNAL MEDICINE

## 2025-02-26 PROCEDURE — 2500000003 HC RX 250 WO HCPCS: Performed by: STUDENT IN AN ORGANIZED HEALTH CARE EDUCATION/TRAINING PROGRAM

## 2025-02-26 PROCEDURE — 2500000003 HC RX 250 WO HCPCS

## 2025-02-26 PROCEDURE — 6360000002 HC RX W HCPCS: Performed by: PSYCHIATRY & NEUROLOGY

## 2025-02-26 PROCEDURE — 82962 GLUCOSE BLOOD TEST: CPT

## 2025-02-26 PROCEDURE — 70450 CT HEAD/BRAIN W/O DYE: CPT

## 2025-02-26 PROCEDURE — 99285 EMERGENCY DEPT VISIT HI MDM: CPT

## 2025-02-26 PROCEDURE — 6360000002 HC RX W HCPCS

## 2025-02-26 PROCEDURE — 7100000000 HC PACU RECOVERY - FIRST 15 MIN

## 2025-02-26 PROCEDURE — 70498 CT ANGIOGRAPHY NECK: CPT

## 2025-02-26 PROCEDURE — 6370000000 HC RX 637 (ALT 250 FOR IP): Performed by: INTERNAL MEDICINE

## 2025-02-26 PROCEDURE — 93005 ELECTROCARDIOGRAM TRACING: CPT | Performed by: STUDENT IN AN ORGANIZED HEALTH CARE EDUCATION/TRAINING PROGRAM

## 2025-02-26 PROCEDURE — 6360000002 HC RX W HCPCS: Performed by: STUDENT IN AN ORGANIZED HEALTH CARE EDUCATION/TRAINING PROGRAM

## 2025-02-26 PROCEDURE — 96374 THER/PROPH/DIAG INJ IV PUSH: CPT

## 2025-02-26 PROCEDURE — 2500000003 HC RX 250 WO HCPCS: Performed by: PSYCHIATRY & NEUROLOGY

## 2025-02-26 PROCEDURE — 96375 TX/PRO/DX INJ NEW DRUG ADDON: CPT

## 2025-02-26 PROCEDURE — 37215 TRANSCATH STENT CCA W/EPS: CPT

## 2025-02-26 PROCEDURE — C1876 STENT, NON-COA/NON-COV W/DEL: HCPCS | Performed by: PSYCHIATRY & NEUROLOGY

## 2025-02-26 PROCEDURE — C1760 CLOSURE DEV, VASC: HCPCS

## 2025-02-26 PROCEDURE — B3141ZZ FLUOROSCOPY OF LEFT COMMON CAROTID ARTERY USING LOW OSMOLAR CONTRAST: ICD-10-PCS | Performed by: INTERNAL MEDICINE

## 2025-02-26 PROCEDURE — 3700000000 HC ANESTHESIA ATTENDED CARE: Performed by: PSYCHIATRY & NEUROLOGY

## 2025-02-26 PROCEDURE — 85610 PROTHROMBIN TIME: CPT

## 2025-02-26 PROCEDURE — 0042T CT BRAIN PERFUSION: CPT

## 2025-02-26 PROCEDURE — 6360000004 HC RX CONTRAST MEDICATION: Performed by: PSYCHIATRY & NEUROLOGY

## 2025-02-26 PROCEDURE — 99223 1ST HOSP IP/OBS HIGH 75: CPT | Performed by: INTERNAL MEDICINE

## 2025-02-26 PROCEDURE — 3700000001 HC ADD 15 MINUTES (ANESTHESIA): Performed by: PSYCHIATRY & NEUROLOGY

## 2025-02-26 PROCEDURE — 80053 COMPREHEN METABOLIC PANEL: CPT

## 2025-02-26 PROCEDURE — 85730 THROMBOPLASTIN TIME PARTIAL: CPT

## 2025-02-26 PROCEDURE — 93010 ELECTROCARDIOGRAM REPORT: CPT | Performed by: INTERNAL MEDICINE

## 2025-02-26 PROCEDURE — 70496 CT ANGIOGRAPHY HEAD: CPT

## 2025-02-26 PROCEDURE — C1725 CATH, TRANSLUMIN NON-LASER: HCPCS

## 2025-02-26 PROCEDURE — 61645 PERQ ART M-THROMBECT &/NFS: CPT

## 2025-02-26 PROCEDURE — 7100000001 HC PACU RECOVERY - ADDTL 15 MIN

## 2025-02-26 PROCEDURE — 2580000003 HC RX 258: Performed by: STUDENT IN AN ORGANIZED HEALTH CARE EDUCATION/TRAINING PROGRAM

## 2025-02-26 PROCEDURE — 2060000000 HC ICU INTERMEDIATE R&B

## 2025-02-26 PROCEDURE — 76377 3D RENDER W/INTRP POSTPROCES: CPT

## 2025-02-26 PROCEDURE — 6360000004 HC RX CONTRAST MEDICATION

## 2025-02-26 PROCEDURE — 85025 COMPLETE CBC W/AUTO DIFF WBC: CPT

## 2025-02-26 PROCEDURE — 2500000003 HC RX 250 WO HCPCS: Performed by: INTERNAL MEDICINE

## 2025-02-26 PROCEDURE — 2000000000 HC ICU R&B

## 2025-02-26 PROCEDURE — 03CG3ZZ EXTIRPATION OF MATTER FROM INTRACRANIAL ARTERY, PERCUTANEOUS APPROACH: ICD-10-PCS | Performed by: INTERNAL MEDICINE

## 2025-02-26 PROCEDURE — 2580000003 HC RX 258

## 2025-02-26 RX ORDER — HEPARIN SODIUM 10000 [USP'U]/ML
INJECTION, SOLUTION INTRAVENOUS; SUBCUTANEOUS PRN
Status: COMPLETED | OUTPATIENT
Start: 2025-02-26 | End: 2025-02-26

## 2025-02-26 RX ORDER — ONDANSETRON 4 MG/1
4 TABLET, ORALLY DISINTEGRATING ORAL EVERY 8 HOURS PRN
Status: DISCONTINUED | OUTPATIENT
Start: 2025-02-26 | End: 2025-03-01 | Stop reason: HOSPADM

## 2025-02-26 RX ORDER — ONDANSETRON 2 MG/ML
INJECTION INTRAMUSCULAR; INTRAVENOUS
Status: DISCONTINUED | OUTPATIENT
Start: 2025-02-26 | End: 2025-02-26 | Stop reason: SDUPTHER

## 2025-02-26 RX ORDER — ONDANSETRON 2 MG/ML
4 INJECTION INTRAMUSCULAR; INTRAVENOUS EVERY 6 HOURS PRN
Status: DISCONTINUED | OUTPATIENT
Start: 2025-02-26 | End: 2025-02-26 | Stop reason: SDUPTHER

## 2025-02-26 RX ORDER — SODIUM CHLORIDE 9 MG/ML
INJECTION, SOLUTION INTRAVENOUS PRN
Status: DISCONTINUED | OUTPATIENT
Start: 2025-02-26 | End: 2025-03-01 | Stop reason: HOSPADM

## 2025-02-26 RX ORDER — SUCCINYLCHOLINE CHLORIDE 20 MG/ML
INJECTION INTRAMUSCULAR; INTRAVENOUS
Status: DISCONTINUED | OUTPATIENT
Start: 2025-02-26 | End: 2025-02-26 | Stop reason: SDUPTHER

## 2025-02-26 RX ORDER — ONDANSETRON 2 MG/ML
4 INJECTION INTRAMUSCULAR; INTRAVENOUS EVERY 6 HOURS PRN
Status: DISCONTINUED | OUTPATIENT
Start: 2025-02-26 | End: 2025-03-01 | Stop reason: HOSPADM

## 2025-02-26 RX ORDER — IOPAMIDOL 755 MG/ML
100 INJECTION, SOLUTION INTRAVASCULAR
Status: COMPLETED | OUTPATIENT
Start: 2025-02-26 | End: 2025-02-26

## 2025-02-26 RX ORDER — CALCIUM CARBONATE 500 MG/1
500 TABLET, CHEWABLE ORAL 3 TIMES DAILY PRN
Status: DISCONTINUED | OUTPATIENT
Start: 2025-02-26 | End: 2025-03-01 | Stop reason: HOSPADM

## 2025-02-26 RX ORDER — EPTIFIBATIDE 0.75 MG/ML
INJECTION, SOLUTION INTRAVENOUS
Status: DISCONTINUED | OUTPATIENT
Start: 2025-02-26 | End: 2025-02-26 | Stop reason: SDUPTHER

## 2025-02-26 RX ORDER — SODIUM CHLORIDE 9 MG/ML
INJECTION, SOLUTION INTRAVENOUS PRN
Status: DISCONTINUED | OUTPATIENT
Start: 2025-02-26 | End: 2025-02-27

## 2025-02-26 RX ORDER — ACETAMINOPHEN 650 MG/1
650 SUPPOSITORY RECTAL EVERY 6 HOURS PRN
Status: DISCONTINUED | OUTPATIENT
Start: 2025-02-26 | End: 2025-03-01 | Stop reason: HOSPADM

## 2025-02-26 RX ORDER — ATORVASTATIN CALCIUM 40 MG/1
40 TABLET, FILM COATED ORAL NIGHTLY
Status: DISCONTINUED | OUTPATIENT
Start: 2025-02-26 | End: 2025-03-01 | Stop reason: HOSPADM

## 2025-02-26 RX ORDER — SODIUM CHLORIDE 0.9 % (FLUSH) 0.9 %
5-40 SYRINGE (ML) INJECTION PRN
Status: DISCONTINUED | OUTPATIENT
Start: 2025-02-26 | End: 2025-03-01 | Stop reason: HOSPADM

## 2025-02-26 RX ORDER — BENZONATATE 100 MG/1
100 CAPSULE ORAL 3 TIMES DAILY PRN
Status: DISCONTINUED | OUTPATIENT
Start: 2025-02-26 | End: 2025-03-01 | Stop reason: HOSPADM

## 2025-02-26 RX ORDER — IPRATROPIUM BROMIDE AND ALBUTEROL SULFATE 2.5; .5 MG/3ML; MG/3ML
1 SOLUTION RESPIRATORY (INHALATION) EVERY 4 HOURS PRN
Status: DISCONTINUED | OUTPATIENT
Start: 2025-02-26 | End: 2025-03-01 | Stop reason: HOSPADM

## 2025-02-26 RX ORDER — PROPOFOL 10 MG/ML
INJECTION, EMULSION INTRAVENOUS
Status: DISCONTINUED | OUTPATIENT
Start: 2025-02-26 | End: 2025-02-26 | Stop reason: SDUPTHER

## 2025-02-26 RX ORDER — MONTELUKAST SODIUM 10 MG/1
10 TABLET ORAL NIGHTLY
Status: DISCONTINUED | OUTPATIENT
Start: 2025-02-26 | End: 2025-03-01 | Stop reason: HOSPADM

## 2025-02-26 RX ORDER — SODIUM CHLORIDE 0.9 % (FLUSH) 0.9 %
5-40 SYRINGE (ML) INJECTION PRN
Status: DISCONTINUED | OUTPATIENT
Start: 2025-02-26 | End: 2025-02-27

## 2025-02-26 RX ORDER — HYDRALAZINE HYDROCHLORIDE 20 MG/ML
5 INJECTION INTRAMUSCULAR; INTRAVENOUS EVERY 10 MIN PRN
Status: DISCONTINUED | OUTPATIENT
Start: 2025-02-26 | End: 2025-02-27

## 2025-02-26 RX ORDER — POTASSIUM CHLORIDE 7.45 MG/ML
10 INJECTION INTRAVENOUS PRN
Status: DISCONTINUED | OUTPATIENT
Start: 2025-02-26 | End: 2025-03-01 | Stop reason: HOSPADM

## 2025-02-26 RX ORDER — POLYETHYLENE GLYCOL 3350 17 G/17G
17 POWDER, FOR SOLUTION ORAL DAILY PRN
Status: DISCONTINUED | OUTPATIENT
Start: 2025-02-26 | End: 2025-03-01 | Stop reason: HOSPADM

## 2025-02-26 RX ORDER — SODIUM CHLORIDE 9 MG/ML
INJECTION, SOLUTION INTRAVENOUS
Status: DISCONTINUED | OUTPATIENT
Start: 2025-02-26 | End: 2025-02-26 | Stop reason: SDUPTHER

## 2025-02-26 RX ORDER — DEXAMETHASONE SODIUM PHOSPHATE 10 MG/ML
INJECTION, SOLUTION INTRAMUSCULAR; INTRAVENOUS
Status: DISCONTINUED | OUTPATIENT
Start: 2025-02-26 | End: 2025-02-26 | Stop reason: SDUPTHER

## 2025-02-26 RX ORDER — ASPIRIN 300 MG/1
300 SUPPOSITORY RECTAL DAILY
Status: DISCONTINUED | OUTPATIENT
Start: 2025-02-26 | End: 2025-02-27

## 2025-02-26 RX ORDER — SODIUM CHLORIDE 0.9 % (FLUSH) 0.9 %
5-40 SYRINGE (ML) INJECTION EVERY 12 HOURS SCHEDULED
Status: DISCONTINUED | OUTPATIENT
Start: 2025-02-26 | End: 2025-03-01 | Stop reason: HOSPADM

## 2025-02-26 RX ORDER — FENTANYL CITRATE 50 UG/ML
INJECTION, SOLUTION INTRAMUSCULAR; INTRAVENOUS
Status: DISCONTINUED | OUTPATIENT
Start: 2025-02-26 | End: 2025-02-26 | Stop reason: SDUPTHER

## 2025-02-26 RX ORDER — LABETALOL HYDROCHLORIDE 5 MG/ML
INJECTION, SOLUTION INTRAVENOUS
Status: DISCONTINUED | OUTPATIENT
Start: 2025-02-26 | End: 2025-02-26 | Stop reason: SDUPTHER

## 2025-02-26 RX ORDER — MAGNESIUM SULFATE IN WATER 40 MG/ML
2000 INJECTION, SOLUTION INTRAVENOUS PRN
Status: DISCONTINUED | OUTPATIENT
Start: 2025-02-26 | End: 2025-03-01 | Stop reason: HOSPADM

## 2025-02-26 RX ORDER — ROCURONIUM BROMIDE 10 MG/ML
INJECTION, SOLUTION INTRAVENOUS
Status: DISCONTINUED | OUTPATIENT
Start: 2025-02-26 | End: 2025-02-26 | Stop reason: SDUPTHER

## 2025-02-26 RX ORDER — DIPHENHYDRAMINE HYDROCHLORIDE 50 MG/ML
25 INJECTION INTRAMUSCULAR; INTRAVENOUS ONCE
Status: COMPLETED | OUTPATIENT
Start: 2025-02-26 | End: 2025-02-26

## 2025-02-26 RX ORDER — EPTIFIBATIDE 0.75 MG/ML
0.5 INJECTION, SOLUTION INTRAVENOUS CONTINUOUS
Status: ACTIVE | OUTPATIENT
Start: 2025-02-26 | End: 2025-02-26

## 2025-02-26 RX ORDER — ONDANSETRON 4 MG/1
4 TABLET, ORALLY DISINTEGRATING ORAL EVERY 8 HOURS PRN
Status: DISCONTINUED | OUTPATIENT
Start: 2025-02-26 | End: 2025-02-26 | Stop reason: SDUPTHER

## 2025-02-26 RX ORDER — POTASSIUM CHLORIDE 1500 MG/1
40 TABLET, EXTENDED RELEASE ORAL PRN
Status: DISCONTINUED | OUTPATIENT
Start: 2025-02-26 | End: 2025-03-01 | Stop reason: HOSPADM

## 2025-02-26 RX ORDER — ACETAMINOPHEN 325 MG/1
650 TABLET ORAL EVERY 6 HOURS PRN
Status: DISCONTINUED | OUTPATIENT
Start: 2025-02-26 | End: 2025-03-01 | Stop reason: HOSPADM

## 2025-02-26 RX ORDER — SODIUM CHLORIDE 0.9 % (FLUSH) 0.9 %
5-40 SYRINGE (ML) INJECTION EVERY 12 HOURS SCHEDULED
Status: DISCONTINUED | OUTPATIENT
Start: 2025-02-26 | End: 2025-02-27

## 2025-02-26 RX ORDER — LABETALOL HYDROCHLORIDE 5 MG/ML
5 INJECTION, SOLUTION INTRAVENOUS EVERY 10 MIN PRN
Status: DISCONTINUED | OUTPATIENT
Start: 2025-02-26 | End: 2025-02-27

## 2025-02-26 RX ORDER — ASPIRIN 81 MG/1
81 TABLET, CHEWABLE ORAL DAILY
Status: DISCONTINUED | OUTPATIENT
Start: 2025-02-27 | End: 2025-02-26

## 2025-02-26 RX ORDER — IOPAMIDOL 612 MG/ML
INJECTION, SOLUTION INTRAVASCULAR PRN
Status: COMPLETED | OUTPATIENT
Start: 2025-02-26 | End: 2025-02-26

## 2025-02-26 RX ADMIN — IOPAMIDOL 100 ML: 755 INJECTION, SOLUTION INTRAVENOUS at 14:25

## 2025-02-26 RX ADMIN — Medication 5000 UNITS: at 15:18

## 2025-02-26 RX ADMIN — SODIUM CHLORIDE: 9 INJECTION, SOLUTION INTRAVENOUS at 15:08

## 2025-02-26 RX ADMIN — SODIUM CHLORIDE, PRESERVATIVE FREE 10 ML: 5 INJECTION INTRAVENOUS at 20:30

## 2025-02-26 RX ADMIN — FENTANYL CITRATE 50 MCG: 50 INJECTION, SOLUTION INTRAMUSCULAR; INTRAVENOUS at 15:12

## 2025-02-26 RX ADMIN — Medication 1000 ML: at 15:18

## 2025-02-26 RX ADMIN — Medication 3 MG: at 21:05

## 2025-02-26 RX ADMIN — ROCURONIUM BROMIDE 20 MG: 10 INJECTION, SOLUTION INTRAVENOUS at 15:34

## 2025-02-26 RX ADMIN — ONDANSETRON HYDROCHLORIDE 4 MG: 2 SOLUTION INTRAMUSCULAR; INTRAVENOUS at 16:22

## 2025-02-26 RX ADMIN — EPTIFIBATIDE 0.5 MCG/KG/MIN: 0.75 INJECTION INTRAVENOUS at 17:29

## 2025-02-26 RX ADMIN — EPTIFIBATIDE 11475 MCG: 0.75 INJECTION INTRAVENOUS at 15:45

## 2025-02-26 RX ADMIN — ROCURONIUM BROMIDE 20 MG: 10 INJECTION, SOLUTION INTRAVENOUS at 16:04

## 2025-02-26 RX ADMIN — EPTIFIBATIDE 0.5 MCG/KG/MIN: 0.75 INJECTION INTRAVENOUS at 15:47

## 2025-02-26 RX ADMIN — IOPAMIDOL 100 ML: 755 INJECTION, SOLUTION INTRAVENOUS at 14:42

## 2025-02-26 RX ADMIN — PHENYLEPHRINE HYDROCHLORIDE 200 MCG: 10 INJECTION INTRAVENOUS at 15:22

## 2025-02-26 RX ADMIN — LABETALOL HYDROCHLORIDE 2.5 MG: 5 INJECTION INTRAVENOUS at 16:34

## 2025-02-26 RX ADMIN — ASPIRIN 300 MG: 300 SUPPOSITORY RECTAL at 21:05

## 2025-02-26 RX ADMIN — IOPAMIDOL 80 ML: 612 INJECTION, SOLUTION INTRAVENOUS at 16:34

## 2025-02-26 RX ADMIN — PHENYLEPHRINE HYDROCHLORIDE 200 MCG: 10 INJECTION INTRAVENOUS at 15:17

## 2025-02-26 RX ADMIN — PROPOFOL 150 MG: 10 INJECTION, EMULSION INTRAVENOUS at 15:12

## 2025-02-26 RX ADMIN — DEXAMETHASONE SODIUM PHOSPHATE 10 MG: 10 INJECTION INTRAMUSCULAR; INTRAVENOUS at 15:20

## 2025-02-26 RX ADMIN — FAMOTIDINE 20 MG: 10 INJECTION, SOLUTION INTRAVENOUS at 14:18

## 2025-02-26 RX ADMIN — MONTELUKAST 10 MG: 10 TABLET, FILM COATED ORAL at 20:29

## 2025-02-26 RX ADMIN — PHENYLEPHRINE HYDROCHLORIDE 100 MCG: 10 INJECTION INTRAVENOUS at 15:25

## 2025-02-26 RX ADMIN — PHENYLEPHRINE HYDROCHLORIDE 100 MCG: 10 INJECTION INTRAVENOUS at 15:30

## 2025-02-26 RX ADMIN — ACETAMINOPHEN 650 MG: 325 TABLET ORAL at 18:45

## 2025-02-26 RX ADMIN — ROCURONIUM BROMIDE 30 MG: 10 INJECTION, SOLUTION INTRAVENOUS at 15:20

## 2025-02-26 RX ADMIN — DIPHENHYDRAMINE HYDROCHLORIDE 25 MG: 50 INJECTION INTRAMUSCULAR; INTRAVENOUS at 14:15

## 2025-02-26 RX ADMIN — PHENYLEPHRINE HYDROCHLORIDE 100 MCG: 10 INJECTION INTRAVENOUS at 15:27

## 2025-02-26 RX ADMIN — SUCCINYLCHOLINE CHLORIDE 170 MG: 20 INJECTION, SOLUTION INTRAMUSCULAR; INTRAVENOUS at 15:14

## 2025-02-26 RX ADMIN — ATORVASTATIN CALCIUM 40 MG: 40 TABLET, FILM COATED ORAL at 20:29

## 2025-02-26 RX ADMIN — FENTANYL CITRATE 50 MCG: 50 INJECTION, SOLUTION INTRAMUSCULAR; INTRAVENOUS at 16:04

## 2025-02-26 RX ADMIN — SUGAMMADEX 200 MG: 100 INJECTION, SOLUTION INTRAVENOUS at 16:37

## 2025-02-26 RX ADMIN — WATER 125 MG: 1 INJECTION INTRAMUSCULAR; INTRAVENOUS; SUBCUTANEOUS at 14:16

## 2025-02-26 ASSESSMENT — PAIN DESCRIPTION - DESCRIPTORS: DESCRIPTORS: ACHING;DISCOMFORT;SORE

## 2025-02-26 ASSESSMENT — PAIN DESCRIPTION - LOCATION
LOCATION: BACK

## 2025-02-26 ASSESSMENT — PAIN SCALES - GENERAL
PAINLEVEL_OUTOF10: 8
PAINLEVEL_OUTOF10: 8
PAINLEVEL_OUTOF10: 5
PAINLEVEL_OUTOF10: 3

## 2025-02-26 ASSESSMENT — LIFESTYLE VARIABLES: SMOKING_STATUS: 1

## 2025-02-26 ASSESSMENT — PAIN - FUNCTIONAL ASSESSMENT: PAIN_FUNCTIONAL_ASSESSMENT: ACTIVITIES ARE NOT PREVENTED

## 2025-02-26 ASSESSMENT — PAIN DESCRIPTION - PAIN TYPE: TYPE: CHRONIC PAIN

## 2025-02-26 ASSESSMENT — PAIN DESCRIPTION - ORIENTATION
ORIENTATION: MID
ORIENTATION: MID

## 2025-02-26 NOTE — PROGRESS NOTES
4 Eyes Skin Assessment     NAME:  Colton Hurley  YOB: 1965  MEDICAL RECORD NUMBER:  [id number]    The patient is being assessed for  Post-Op Surgical    I agree that at least one RN has performed a thorough Head to Toe Skin Assessment on the patient. ALL assessment sites listed below have been assessed.      Areas assessed by both nurses:    Head, Face, Ears, Shoulders, Back, Chest, Arms, Elbows, Hands, Sacrum. Buttock, Coccyx, Ischium, Legs. Feet and Heels, and Under Medical Devices         Does the Patient have a Wound? Yes wound(s) were present on assessment. LDA wound assessment was Initiated and completed by RN  R fem puncture wound, r hip abrasion, rash on buttocks       Miki Prevention initiated by RN: Yes  Wound Care Orders initiated by RN: No    Pressure Injury (Stage 3,4, Unstageable, DTI, NWPT, and Complex wounds) if present, place Wound referral order by RN under : No    New Ostomies, if present place, Ostomy referral order under : No     Nurse 1 eSignature: Electronically signed by Alison Sampson RN on 2/26/25 at 5:44 PM EST    **SHARE this note so that the co-signing nurse can place an eSignature**    Nurse 2 eSignature: Electronically signed by LOPEZ WILSON RN on 2/26/25 at 5:54 PM EST

## 2025-02-26 NOTE — PROGRESS NOTES
MRI screening needs to be completed.    Please make sure your patient can lay flat onto their back. (not kyphotic/SOB/contracted, etc) -if not patient cannot come down to MRI.    If it is stated on screening that they need medicated for claustrophobia/pain/holding still -have the doctor put med orders in.    If patient is a prisoner- check with patients security they have the correct MRI accommodations done as in correct flex/plastic cuffs. Also let MRI know on screening patient is a prisoner.    If patient is on a IV drip that they cannot come off of for MRI- please inform MRI so we can coordinate with the RN that will come down to switch to MRI safe pump.    Thank you from your MRI team.

## 2025-02-26 NOTE — PROCEDURES
PROCEDURE NOTE  Date: 2025   Name: Colton Hurley  YOB: 1965    Procedures: L MCA Thrombectomy  NEUROINTERVENTION PROCEDURE NOTE    PATIENT NAME: Colton Hurley  MRN: [id number]  : 1965  DATE OF PROCEDURE: 25    Stroke Metrics  NIHSS prior to procedure: 12  IV TNK Administered: [] Yes  [x]  No  Consent obtained: [x] Yes  []  No  by Dr. Mijares   Pedal Pulses checked: +doppler bilaterally pre-procedure    Vitals completed per anesthesia    Neurointerventionalist: Kehinde Coffey MD  1st assistant Tal Leon and [facility]      Time Event Device Notes   1522 Access site puncture   Location: right femoral       1533 1st pass suction TICI Reperfusion grade: 2A    1537 2nd pass suction TICI Reperfusion grade:2A    1544 3rd pass suction TICI Reperfusion grade:2A   1551 4th pass stent retriever TICI Reperfusion grade: 2B   1601   5th pass strent retriever TICI Reperfusion grade: 2C   1610   L Carotid Stent  Carotid wall stent    1613 Angioplasty 6.6 SARA for 11 seconds    1625 Access site closure  Sheath pulled and  Angioseal used to close arterial puncture. Puncture site cleansed and dry dressing applied. No bleeding, swelling or complications noted, no change in pulses.      IA tPA adminstered: [] Yes  [x]  No         1630 Post-procedure NIHSS unable to assess due to anesthesia/sedation and initial site assessment: site R fem WNL, no bleeding or hematoma noted, dressing dry and intact. doppler bilateral pedal pulses.  1645 Post-procedure NIHSS unable to assess due to anesthesia/sedation and initial site assessment: site R fem WNL, no bleeding or hematoma noted, dressing dry and intact. doppler bilateral pedal pulses.  1700 Post-procedure NIHSS unable to assess due to anesthesia/sedation and initial site assessment: site R fem WNL, no bleeding or hematoma noted, dressing dry and intact. doppler bilateral pedal pulses.  1715 Post-procedure NIHSS unable to assess due to  anesthesia/sedation and initial site assessment: site R fem WNL, no bleeding or hematoma noted, dressing dry and intact. doppler bilateral pedal pulses.    1620  CT head completed.    1715  Patient transported to NS  and handoff report given to bedside RN    Family updated: [x] Yes  []  No    Blood pressure parameters: SBP <140    Antiplatelet Recommendations:  Integralin for 3 hr    Any additional follow up scans:  CT head in 24hours

## 2025-02-26 NOTE — ANESTHESIA PROCEDURE NOTES
Arterial Line:    An arterial line was placed using surface landmarks, in the OR for the following indication(s): continuous blood pressure monitoring and blood sampling needed.    A 20 gauge (size), 1 and 3/4 inch (length), Arrow (type) catheter was placed, Seldinger technique not used, into the left radial artery, secured by tape and Tegaderm.  Anesthesia type: General    Events:  patient tolerated procedure well with no complications and EBL < 5mL.2/26/2025 3:14 PM2/26/2025 3:17 PM  Anesthesiologist: Melissa Lazo MD  Other anesthesia staff: Humphrey Rodriguez RN  Performed: Other anesthesia staff   Preanesthetic Checklist  Completed: patient identified, IV checked, site marked, risks and benefits discussed, surgical/procedural consents, equipment checked, pre-op evaluation, timeout performed, anesthesia consent given, oxygen available, monitors applied/VS acknowledged and blood product R/B/A discussed and consented

## 2025-02-26 NOTE — PLAN OF CARE
Problem: Neurosensory - Adult  Goal: Achieves stable or improved neurological status  Outcome: Progressing  Flowsheets (Taken 2/26/2025 1732)  Achieves stable or improved neurological status:   Assess for and report changes in neurological status   Initiate measures to prevent increased intracranial pressure   Maintain blood pressure and fluid volume within ordered parameters to optimize cerebral perfusion and minimize risk of hemorrhage  Goal: Absence of seizures  Outcome: Progressing  Goal: Remains free of injury related to seizures activity  Outcome: Progressing  Goal: Achieves maximal functionality and self care  Outcome: Progressing  Flowsheets (Taken 2/26/2025 1732)  Achieves maximal functionality and self care:   Monitor swallowing and airway patency with patient fatigue and changes in neurological status   Encourage and assist patient to increase activity and self care with guidance from physical therapy/occupational therapy     Problem: Respiratory - Adult  Goal: Achieves optimal ventilation and oxygenation  Outcome: Progressing     Problem: Cardiovascular - Adult  Goal: Maintains optimal cardiac output and hemodynamic stability  Outcome: Progressing     Problem: Infection - Adult  Goal: Absence of infection at discharge  Outcome: Progressing

## 2025-02-26 NOTE — PROGRESS NOTES
Patient bilateral groins shaved, pedal pulses marked, placed in hospital gown, two IV's placed.   Patients wallet, shoes, clothes and hat in bags placed with patient to CLARISSA Contreras RN, BSN, Stroke Navigator

## 2025-02-26 NOTE — H&P
14   SpO2 97%   General Appearance:  awake, alert, and oriented to person, place, time, and purpose; appears stated age and cooperative; no apparent distress no labored breathing  HEENT:  NCAT; PERRL; conjunctiva pink, sclera clear  Neck:  no adenopathy, bruit, JVD, tenderness, masses, or nodules; supple, symmetrical, trachea midline, thyroid not enlarged  Lung:  clear to auscultation bilaterally; no use of accessory muscles; no rhonchi, rales, or crackles  Heart:  regular rate and regular rhythm without murmur, rub, or gallop  Abdomen:  soft, nontender, nondistended; normoactive bowel sounds; no organomegaly  Extremities:  extremities normal, atraumatic, no cyanosis or edema  Musculokeletal:  no joint swelling, no muscle tenderness. ROM normal in all joints of extremities.   Neurologic:  mental status A&Ox3,  right sided weakness, right sided facial droop    Laboratory Data  Recent Results (from the past 24 hour(s))   POCT Glucose    Collection Time: 02/26/25  2:11 PM   Result Value Ref Range    POC Glucose 153 (H) 74 - 99 mg/dL   CBC with Auto Differential    Collection Time: 02/26/25  2:14 PM   Result Value Ref Range    WBC 8.9 4.5 - 11.5 k/uL    RBC 4.36 3.80 - 5.80 m/uL    Hemoglobin 16.0 12.5 - 16.5 g/dL    Hematocrit 45.8 37.0 - 54.0 %    .0 (H) 80.0 - 99.9 fL    MCH 36.7 (H) 26.0 - 35.0 pg    MCHC 34.9 (H) 32.0 - 34.5 g/dL    RDW 12.4 11.5 - 15.0 %    Platelets 202 130 - 450 k/uL    MPV 10.1 7.0 - 12.0 fL    Neutrophils % 62 43.0 - 80.0 %    Lymphocytes % 30 20.0 - 42.0 %    Monocytes % 7 2.0 - 12.0 %    Eosinophils % 1 0 - 6 %    Basophils % 0 0.0 - 2.0 %    Immature Granulocytes % 0 0.0 - 5.0 %    Neutrophils Absolute 5.48 1.80 - 7.30 k/uL    Lymphocytes Absolute 2.69 1.50 - 4.00 k/uL    Monocytes Absolute 0.60 0.10 - 0.95 k/uL    Eosinophils Absolute 0.08 0.05 - 0.50 k/uL    Basophils Absolute 0.04 0.00 - 0.20 k/uL    Immature Granulocytes Absolute <0.03 0.00 - 0.58 k/uL   Comprehensive Metabolic  achievable.; CTA of the neck was performed with the administration of intravenous contrast. Multiplanar reformatted images are provided for review. MIP images are provided for review. Stenosis of the internal carotid arteries measured using NASCET criteria. Automated exposure control, iterative reconstruction, and/or weight based adjustment of the mA/kV was utilized to reduce the radiation dose to as low as reasonably achievable. COMPARISON: None. HISTORY: ORDERING SYSTEM PROVIDED HISTORY: cva TECHNOLOGIST PROVIDED HISTORY: Reason for exam:->cva Has a \"code stroke\" or \"stroke alert\" been called?->Yes Decision Support Exception - unselect if not a suspected or confirmed emergency medical condition->Emergency Medical Condition (MA) What reading provider will be dictating this exam?->CRC FINDINGS: CT PERFUSION: EXAM QUALITY: The examination is diagnostic with appropriate arterial inflow and venous outflow curves, and diagnostic perfusion maps. CORE INFARCT: The total area of ischemic core is 75 mL (CBF<30% volume). TOTAL HYPOPERFUSION: The total area of hypoperfusion is 175 mL (Tmax>6s volume). PENUMBRA: The penumbra (mismatch) volume is 100 mL and is located in the left frontal, parietal, and temporal lobes as well as left insular cortex..  The mismatch ratio is 2.3. CTA neck: Occluded left ICA at its origin.  No stenosis involving the right internal carotid artery.  The common carotid arteries are patent.  There is severe stenosis involving proximal left subclavian artery due to noncalcified atherosclerotic plaque.  There is moderate stenosis at origin of the left vertebral artery. CTA head: There is minimal reconstitution of intracranial left ICA.  There is occlusion of the M1 segment of left middle cerebral artery.  Right middle cerebral artery is patent.  Anterior cerebral arteries are patent.  Posterior cerebral arteries are patent.  No stenosis involving the basilar artery. CT head: Areas of low attenuation are

## 2025-02-26 NOTE — BRIEF OP NOTE
Brief Postoperative Note      Patient: Colton Hurley  YOB: 1965  MRN: [id number]    Date of Procedure: 2025  Neurointerventional POST Procedure Note    This is a brief post operative note that serves as immediate documentation to communicate with other clinicians and update them about the procedure  For Billing and complete documentation purposes the COMPLETE operative report/diagnostic imaging report is placed in PACS. To review this document please go under the images tab and click on the relevant IR procedure.      Date of Service: 25      Patient Name: Colton Hurley   : 1965  Medical record number:  [id number]        Procedure: Left ICA and Left MCA stroke   Physician: ADRIÁN CHUA MD  Assistant: Nii ALDRIDGE  Access:Right femoral   Vessels injected:   Left CCA  Left ICA  Left MCA  Hemostasis: achieved 8F angioseal  Anesthesia: Please see flowchart  Specimens: None  Blood loss: 100 ml   Contrast Material:  please see dictation in PACS  Fluoro time: Please see dictation in PACS        Diagnosis/Findings:   1. Left % s/p thrombectomy and stenting with distal embolic protection . S/p balloon angioplasty s/p stent    2. Left MCA M1 and Left MCA M2 inferior AND SUPERIOR M2 division thrombus s/p thrombectomy with TICI 2C  recanalization     3. DynaCT negative for ICH    Plan:  1. Q15 min neuro checks x2 hours, Q30 for 6 hours and q1h for 24 hours and then q4h/q6h till discharge   2. Maintain SBP <140 per SVIN Guidelines Class 2 Recommendation MAP always greater than 65   3. Neurovascular checks   4. 24 hour CT head or MRI, if MRI is feasible ( If 24 hour MRI is obtained do not need 24 hour CT)  5. STAT CT head for any neurological decline and contact me immediately  6. Antiplatelet Recs ASA rectal mg + ticagrelor 180 mg loaded in IR    As load given continue Integrellin drip for 3 hours only         Implants:  Implant Name Type Inv. Item Serial No.  Lot

## 2025-02-26 NOTE — ANESTHESIA PRE PROCEDURE
(TESSALON) capsule 100 mg  100 mg Oral TID PRN Laura Horan, DO        calcium carbonate (TUMS) chewable tablet 500 mg  500 mg Oral TID PRN Laura Horan, DO        melatonin tablet 3 mg  3 mg Oral Nightly PRN Laura Horan, DO        Polyvinyl Alcohol-Povidone PF (REFRESH) 1.4-0.6 % ophthalmic solution 1 drop  1 drop Both Eyes PRN Laura Horan, DO        sodium chloride (OCEAN, BABY AYR) 0.65 % nasal spray 1 spray  1 spray Each Nostril PRN Laura oHran, DO        sodium chloride flush 0.9 % injection 5-40 mL  5-40 mL IntraVENous 2 times per day Laura Horan, DO        sodium chloride flush 0.9 % injection 5-40 mL  5-40 mL IntraVENous PRN Laura Horan, DO        0.9 % sodium chloride infusion   IntraVENous PRN Laura Horan, DO        potassium chloride (KLOR-CON M) extended release tablet 40 mEq  40 mEq Oral PRN Laura Horan, DO        Or    potassium bicarb-citric acid (EFFER-K) effervescent tablet 40 mEq  40 mEq Oral PRN Laura Horan, DO        Or    potassium chloride 10 mEq/100 mL IVPB (Peripheral Line)  10 mEq IntraVENous PRN Laura Horan, DO        magnesium sulfate 2000 mg in 50 mL IVPB premix  2,000 mg IntraVENous PRN Laura Horan, DO        ondansetron (ZOFRAN-ODT) disintegrating tablet 4 mg  4 mg Oral Q8H PRN Laura Horan, DO        Or    ondansetron (ZOFRAN) injection 4 mg  4 mg IntraVENous Q6H PRN Laura Horan, DO        polyethylene glycol (GLYCOLAX) packet 17 g  17 g Oral Daily PRN Laura Horan, DO        acetaminophen (TYLENOL) tablet 650 mg  650 mg Oral Q6H PRN Laura Horan, DO        Or    acetaminophen (TYLENOL) suppository 650 mg  650 mg Rectal Q6H PRN Laura Horan, DO        heparin (porcine) 5000 Units in sodium chloride 0.9% 1000 mL irrigation    PRN Kehinde Coffey MD   5,000 Units at 02/26/25 1518    heparin (porcine) 5,000 Units with nitroGLYCERIN 100 mcg in sodium chloride 0.9% 1000

## 2025-02-26 NOTE — ED NOTES
Radiology Procedure Waiver   Name: Colton Hurley  : 1965  MRN: [id number]    Date:  25    Time: 2:12 PM EST    Benefits of immediately proceeding with Radiology exam(s) without pre-testing outweigh the risks or are not indicated as specified below and therefore the following is/are being waived:    [] Pregnancy test   [] Patients LMP on-time and regular.   [] Patient had Tubal Ligation or has other Contraception Device.   [] Patient  is Menopausal or Premenarcheal.    [] Patient had Full or Partial Hysterectomy.    [x] Protocol for Iodine allergy    [] MRI Questionnaire     [] BUN/Creatinine   [] Patient age w/no hx of renal dysfunction.   [] Patient on Dialysis.   [] Recent Normal Labs.  Electronically signed by Jaison Trejo DO on 25 at 2:12 PM Jaison Ramirez DO  [phone]

## 2025-02-26 NOTE — PROGRESS NOTES
Patient admitted to NSICU with the following belongings:  Wallet, Pants, Shirt, Shoes, Jacket, Hat, Cane , and Other gloves and $67.00 cash . The following belongings admitted with the patient, None, were sent home with the patient's family.

## 2025-02-27 ENCOUNTER — APPOINTMENT (OUTPATIENT)
Dept: MRI IMAGING | Age: 60
DRG: 024 | End: 2025-02-27
Payer: MEDICARE

## 2025-02-27 LAB
ALBUMIN SERPL-MCNC: 3.5 G/DL (ref 3.5–5.2)
ALP SERPL-CCNC: 73 U/L (ref 40–129)
ALT SERPL-CCNC: 14 U/L (ref 0–40)
ANION GAP SERPL CALCULATED.3IONS-SCNC: 13 MMOL/L (ref 7–16)
AST SERPL-CCNC: 19 U/L (ref 0–39)
BILIRUB SERPL-MCNC: 0.4 MG/DL (ref 0–1.2)
BUN SERPL-MCNC: 7 MG/DL (ref 6–20)
CALCIUM SERPL-MCNC: 8.7 MG/DL (ref 8.6–10.2)
CHLORIDE SERPL-SCNC: 108 MMOL/L (ref 98–107)
CHOLEST SERPL-MCNC: 135 MG/DL
CO2 SERPL-SCNC: 21 MMOL/L (ref 22–29)
CREAT SERPL-MCNC: 0.7 MG/DL (ref 0.7–1.2)
ERYTHROCYTE [DISTWIDTH] IN BLOOD BY AUTOMATED COUNT: 12.2 % (ref 11.5–15)
GFR, ESTIMATED: >90 ML/MIN/1.73M2
GLUCOSE SERPL-MCNC: 162 MG/DL (ref 74–99)
HBA1C MFR BLD: 5 % (ref 4–5.6)
HCT VFR BLD AUTO: 38.9 % (ref 37–54)
HDLC SERPL-MCNC: 51 MG/DL
HGB BLD-MCNC: 13.8 G/DL (ref 12.5–16.5)
LDLC SERPL CALC-MCNC: 72 MG/DL
MAGNESIUM SERPL-MCNC: 2.1 MG/DL (ref 1.6–2.6)
MCH RBC QN AUTO: 36.5 PG (ref 26–35)
MCHC RBC AUTO-ENTMCNC: 35.5 G/DL (ref 32–34.5)
MCV RBC AUTO: 102.9 FL (ref 80–99.9)
PHOSPHATE SERPL-MCNC: 2.4 MG/DL (ref 2.5–4.5)
PLATELET # BLD AUTO: 174 K/UL (ref 130–450)
PMV BLD AUTO: 10.2 FL (ref 7–12)
POTASSIUM SERPL-SCNC: 4 MMOL/L (ref 3.5–5)
PROT SERPL-MCNC: 6.3 G/DL (ref 6.4–8.3)
RBC # BLD AUTO: 3.78 M/UL (ref 3.8–5.8)
SODIUM SERPL-SCNC: 142 MMOL/L (ref 132–146)
TRIGL SERPL-MCNC: 60 MG/DL
TSH SERPL DL<=0.05 MIU/L-ACNC: 0.89 UIU/ML (ref 0.27–4.2)
VLDLC SERPL CALC-MCNC: 12 MG/DL
WBC OTHER # BLD: 12.4 K/UL (ref 4.5–11.5)

## 2025-02-27 PROCEDURE — 2500000003 HC RX 250 WO HCPCS

## 2025-02-27 PROCEDURE — 92523 SPEECH SOUND LANG COMPREHEN: CPT

## 2025-02-27 PROCEDURE — 2500000003 HC RX 250 WO HCPCS: Performed by: INTERNAL MEDICINE

## 2025-02-27 PROCEDURE — 2000000000 HC ICU R&B

## 2025-02-27 PROCEDURE — 2060000000 HC ICU INTERMEDIATE R&B

## 2025-02-27 PROCEDURE — 97530 THERAPEUTIC ACTIVITIES: CPT

## 2025-02-27 PROCEDURE — 70551 MRI BRAIN STEM W/O DYE: CPT

## 2025-02-27 PROCEDURE — 6370000000 HC RX 637 (ALT 250 FOR IP): Performed by: PSYCHIATRY & NEUROLOGY

## 2025-02-27 PROCEDURE — 37799 UNLISTED PX VASCULAR SURGERY: CPT

## 2025-02-27 PROCEDURE — 97162 PT EVAL MOD COMPLEX 30 MIN: CPT

## 2025-02-27 PROCEDURE — 97166 OT EVAL MOD COMPLEX 45 MIN: CPT

## 2025-02-27 PROCEDURE — 51798 US URINE CAPACITY MEASURE: CPT

## 2025-02-27 PROCEDURE — 85027 COMPLETE CBC AUTOMATED: CPT

## 2025-02-27 PROCEDURE — 99233 SBSQ HOSP IP/OBS HIGH 50: CPT | Performed by: INTERNAL MEDICINE

## 2025-02-27 PROCEDURE — 2580000003 HC RX 258

## 2025-02-27 PROCEDURE — 80053 COMPREHEN METABOLIC PANEL: CPT

## 2025-02-27 PROCEDURE — 6370000000 HC RX 637 (ALT 250 FOR IP): Performed by: CLINICAL NURSE SPECIALIST

## 2025-02-27 PROCEDURE — 92610 EVALUATE SWALLOWING FUNCTION: CPT

## 2025-02-27 PROCEDURE — 84443 ASSAY THYROID STIM HORMONE: CPT

## 2025-02-27 PROCEDURE — 83735 ASSAY OF MAGNESIUM: CPT

## 2025-02-27 PROCEDURE — 80061 LIPID PANEL: CPT

## 2025-02-27 PROCEDURE — 83036 HEMOGLOBIN GLYCOSYLATED A1C: CPT

## 2025-02-27 PROCEDURE — 87081 CULTURE SCREEN ONLY: CPT

## 2025-02-27 PROCEDURE — 99232 SBSQ HOSP IP/OBS MODERATE 35: CPT | Performed by: CLINICAL NURSE SPECIALIST

## 2025-02-27 PROCEDURE — 6370000000 HC RX 637 (ALT 250 FOR IP): Performed by: INTERNAL MEDICINE

## 2025-02-27 PROCEDURE — 84100 ASSAY OF PHOSPHORUS: CPT

## 2025-02-27 RX ORDER — LABETALOL HYDROCHLORIDE 5 MG/ML
10 INJECTION, SOLUTION INTRAVENOUS EVERY 10 MIN PRN
Status: DISCONTINUED | OUTPATIENT
Start: 2025-02-27 | End: 2025-03-01

## 2025-02-27 RX ORDER — ALBUTEROL SULFATE 0.83 MG/ML
2.5 SOLUTION RESPIRATORY (INHALATION) EVERY 6 HOURS PRN
Status: DISCONTINUED | OUTPATIENT
Start: 2025-02-27 | End: 2025-03-01 | Stop reason: HOSPADM

## 2025-02-27 RX ORDER — HYDRALAZINE HYDROCHLORIDE 20 MG/ML
10 INJECTION INTRAMUSCULAR; INTRAVENOUS EVERY 10 MIN PRN
Status: DISCONTINUED | OUTPATIENT
Start: 2025-02-27 | End: 2025-03-01

## 2025-02-27 RX ORDER — ASPIRIN 300 MG/1
300 SUPPOSITORY RECTAL DAILY
Status: DISCONTINUED | OUTPATIENT
Start: 2025-02-27 | End: 2025-03-01 | Stop reason: HOSPADM

## 2025-02-27 RX ORDER — ASPIRIN 81 MG/1
81 TABLET, CHEWABLE ORAL DAILY
Status: DISCONTINUED | OUTPATIENT
Start: 2025-02-27 | End: 2025-03-01 | Stop reason: HOSPADM

## 2025-02-27 RX ADMIN — ACETAMINOPHEN 650 MG: 325 TABLET ORAL at 00:21

## 2025-02-27 RX ADMIN — MONTELUKAST 10 MG: 10 TABLET, FILM COATED ORAL at 20:43

## 2025-02-27 RX ADMIN — ASPIRIN 81 MG CHEWABLE TABLET 81 MG: 81 TABLET CHEWABLE at 08:54

## 2025-02-27 RX ADMIN — ACETAMINOPHEN 650 MG: 325 TABLET ORAL at 20:56

## 2025-02-27 RX ADMIN — SODIUM CHLORIDE, PRESERVATIVE FREE 10 ML: 5 INJECTION INTRAVENOUS at 08:54

## 2025-02-27 RX ADMIN — BENZONATATE 100 MG: 100 CAPSULE ORAL at 05:58

## 2025-02-27 RX ADMIN — ATORVASTATIN CALCIUM 40 MG: 40 TABLET, FILM COATED ORAL at 20:43

## 2025-02-27 RX ADMIN — TICAGRELOR 90 MG: 90 TABLET ORAL at 08:54

## 2025-02-27 RX ADMIN — TICAGRELOR 90 MG: 90 TABLET ORAL at 20:44

## 2025-02-27 RX ADMIN — POTASSIUM PHOSPHATE, MONOBASIC AND POTASSIUM PHOSPHATE, DIBASIC 15 MMOL: 224; 236 INJECTION, SOLUTION, CONCENTRATE INTRAVENOUS at 08:54

## 2025-02-27 RX ADMIN — SODIUM CHLORIDE, PRESERVATIVE FREE 10 ML: 5 INJECTION INTRAVENOUS at 20:44

## 2025-02-27 ASSESSMENT — PAIN DESCRIPTION - LOCATION
LOCATION: BACK

## 2025-02-27 ASSESSMENT — PAIN DESCRIPTION - DESCRIPTORS
DESCRIPTORS: ACHING;DISCOMFORT;DULL
DESCRIPTORS: ACHING;DISCOMFORT;NAGGING
DESCRIPTORS: DISCOMFORT;ACHING;SORE
DESCRIPTORS: ACHING;DISCOMFORT;SORE

## 2025-02-27 ASSESSMENT — PAIN SCALES - GENERAL
PAINLEVEL_OUTOF10: 8
PAINLEVEL_OUTOF10: 4
PAINLEVEL_OUTOF10: 7
PAINLEVEL_OUTOF10: 8
PAINLEVEL_OUTOF10: 8
PAINLEVEL_OUTOF10: 5
PAINLEVEL_OUTOF10: 3
PAINLEVEL_OUTOF10: 8

## 2025-02-27 ASSESSMENT — PAIN DESCRIPTION - PAIN TYPE
TYPE: CHRONIC PAIN

## 2025-02-27 ASSESSMENT — PAIN - FUNCTIONAL ASSESSMENT
PAIN_FUNCTIONAL_ASSESSMENT: ACTIVITIES ARE NOT PREVENTED

## 2025-02-27 ASSESSMENT — PAIN DESCRIPTION - ONSET
ONSET: ON-GOING

## 2025-02-27 ASSESSMENT — PAIN DESCRIPTION - ORIENTATION
ORIENTATION: MID;LOWER
ORIENTATION: POSTERIOR
ORIENTATION: MID;LOWER
ORIENTATION: MID;LOWER

## 2025-02-27 ASSESSMENT — PAIN DESCRIPTION - FREQUENCY
FREQUENCY: CONTINUOUS

## 2025-02-27 NOTE — PROGRESS NOTES
Subjective:      Colton Hurley post op follow up     Patient is clinically stable. Patient sitting comfortably in chair, currently being evaluated by speech therapy. Patient alert, oriented, calm, and cooperative. Moving all extremities.     Review of Systems  Review of systems not obtained due to patient factors.      Objective:     NIH Stroke Scale/Score at time of initial evaluation:    1A: Level of Consciousness 0 - alert; keenly responsive   1B: Ask Month and Age 0 - answers both questions correctly   1C: Tell Patient To Open and Close Eyes, then Hand  Squeeze 0 - performs both tasks correctly   2: Test Horizontal Extraocular Movements 0 - normal   3: Test Visual Fields 0 - no visual loss   4: Test Facial Palsy 1 - minor paralysis (flattened nasolabial fold, asymmetric on smiling)   5A: Test Left Arm Motor Drift 0 - no drift, limb holds 90 (or 45) degrees for full 10 seconds   5B: Test Right Arm Motor Drift 0 - no drift, limb holds 90 (or 45) degrees for full 10 seconds   6A: Test Left Leg Motor Drift 0 - no drift; leg holds 30 degree position for full 5 seconds   6B: Test Right Leg Motor Drift 0 - no drift; leg holds 30 degree position for full 5 seconds   7: Test Limb Ataxia (FNF/Heel-Shin) 0 - absent   8: Test Sensation 0 - normal; no sensory loss   9: Test Language/Aphasia 0 - no aphasia, normal   10: Test Dysarthria 1 - mild to moderate, patient slurs at least some words and at worst, can be understood with some difficulty   11: Test Extinction/Inattention 0 - no abnormality   Total 2              Vitals:    02/27/25 1300 02/27/25 1315 02/27/25 1400 02/27/25 1415   BP:       Pulse: 84 67 77 66   Resp: 23 19 25 21   Temp:       TempSrc:       SpO2:  96%  96%   Weight:       Height:          Lab Review  Lab Results   Component Value Date/Time    CHOL 135 02/27/2025 04:51 AM    CHOL 218 04/06/2022 11:55 AM    CHOL 179 12/01/2018 06:55 AM    TRIG 60 02/27/2025 04:51 AM    TRIG 100 04/06/2022 11:55 AM

## 2025-02-27 NOTE — ACP (ADVANCE CARE PLANNING)
Advance Care Planning   Healthcare Decision Maker:    Primary Decision Maker: Nenita Jessica - Brother/Sister - [phone]    Click here to complete Healthcare Decision Makers including selection of the Healthcare Decision Maker Relationship (ie \"Primary\").

## 2025-02-27 NOTE — PROGRESS NOTES
SPEECH/LANGUAGE PATHOLOGY  SPEECH/LANGUAGE/COGNITIVE EVALUATION   and PLAN OF CARE      PATIENT NAME:  Colton Hurley  (male)     MRN:  [id number]    :  1965  (59 y.o.)  STATUS:  Inpatient: Room 4522/4522-A    TODAY'S DATE:  2025  ORDER DATE, DESCRIPTION AND REFERRING PROVIDER :    SLP eval and treat  Start:  25,   End:  25,   ONE TIME,   Standing Count:  1 Occurrences,   R       Laura Horan DO  REASON FOR REFERRAL:  CVA/Aphasia   EVALUATING THERAPIST: JOANNE Mitchell    ADMITTING DIAGNOSIS: Acute CVA (cerebrovascular accident) (HCC) [I63.9]    VISIT DIAGNOSIS:        SPEECH THERAPY  PLAN OF CARE   The speech therapy  POC is established based on physician order, speech pathology diagnosis and results of clinical assessment     SPEECH PATHOLOGY DIAGNOSIS:    Patient presents with mild anomia and dysarthria as well as mild-moderate cognitive deficits in the areas of memory, attention, executive functions, and reasoning.     Speech Pathology intervention is recommended 1-3 times per week for LOS or when goals are met with emphasis on the following:      Conditions Requiring Skilled Therapeutic Intervention for speech, language and/or cognition    Expressive Aphasia   Anomia  Dysarthria   Cognitive linguistic impairment  Decreased safety awareness  Decreased short term memory  Decreased problem solving skills   Decreased thought organization    Specific Speech Therapy Interventions to Include:   Therapeutic tasks for paraphasic errors  Expressive language training   Therapeutic tasks for Cognition  Therapeutic exercises for dysarthria    Specific instructions for next treatment:    To initiate language tasks  To initiate memory tasks  To initiate thought organization tasks  To initiate speech production tasks  To initiate verbal fluency tasks    SHORT/LONG TERM GOALS  Pt will improve orientation to spatial and temporal surroundings with use of external memory aides.  Pt will        EXPRESSIVE LANGUAGE     Serials: Functional    Imitation:  Words   Functional   Sentences Functional    Naming:  (Modality used:  Verbal)  Confrontation Naming  Functional  Functional Description  Impaired  Response Naming: Impaired    Conversation:      Anomia was present and Decreased thought organization during structured conversation    COGNITION     Attention/Orientation  Attention: Easily Distracted  Orientation:  Oriented to Person, Place, Reason for hospitalization    Memory   Immediate Recall: Repeated 3/3    Delayed Recall:   Recalled 2/3 with min cues    Long Term Recall:   Recalled Birthdate and Age    Organization/Problem Solving/Reasoning   Verbal Sequencing:   Impaired        Verbal Problem solving:   Impaired          CLINICAL OBSERVATIONS NOTED DURING THE EVALUATION  Latent responses, Anomic errors, Cueing was required, and Flat affect                  EDUCATION:   The Speech Language Pathologist (SLP) completed education regarding results of evaluation and that intervention is warranted at this time.  Learner: Patient  Education: Reviewed results and recommendations of this evaluation  Evaluation of Education:  Verbalizes understanding    Evaluation Time includes thorough review of current medical information, gathering information on past medical history/social history and prior level of function, completion of standardized testing/informal observation of tasks, assessment of data and education on plan of care and goals.      CPT code:    45875  eval speech sound lang comprehension      The admitting diagnosis and active problem list, as listed below have been reviewed prior to initiation of this evaluation.        ACTIVE PROBLEM LIST:   Patient Active Problem List   Diagnosis    Schizophrenia, schizo-affective (HCC)    Dermatitis    Pulmonary emphysema (HCC)    Cystic mass of pancreas    Spondylosis of lumbar region without myelopathy or radiculopathy    Gastroesophageal reflux disease

## 2025-02-27 NOTE — PROGRESS NOTES
[facility] Hospitalist Progress Note    Admitting Date and Time: 2/26/2025  2:10 PM  Admit Dx: Acute CVA (cerebrovascular accident) (HCC) [I63.9]    Subjective:  Patient is being followed for Acute CVA (cerebrovascular accident) (HCC) [I63.9]   Pt feels ok, better than before.      ROS: denies fever, chills, cp, sob, n/v, HA unless stated above.      aspirin  300 mg Rectal Daily    Or    aspirin  81 mg Oral Daily    sodium chloride flush  5-40 mL IntraVENous 2 times per day    atorvastatin  40 mg Oral Nightly    ticagrelor  90 mg Oral BID    montelukast  10 mg Oral Nightly     hydrALAZINE, 10 mg, Q10 Min PRN  labetalol, 10 mg, Q10 Min PRN  albuterol, 2.5 mg, Q6H PRN  benzocaine-menthol, 1 lozenge, Q2H PRN  benzonatate, 100 mg, TID PRN  calcium carbonate, 500 mg, TID PRN  melatonin, 3 mg, Nightly PRN  Polyvinyl Alcohol-Povidone PF, 1 drop, PRN  sodium chloride, 1 spray, PRN  sodium chloride flush, 5-40 mL, PRN  sodium chloride, , PRN  potassium chloride, 40 mEq, PRN   Or  potassium alternative oral replacement, 40 mEq, PRN   Or  potassium chloride, 10 mEq, PRN  magnesium sulfate, 2,000 mg, PRN  polyethylene glycol, 17 g, Daily PRN  acetaminophen, 650 mg, Q6H PRN   Or  acetaminophen, 650 mg, Q6H PRN  ondansetron, 4 mg, Q8H PRN   Or  ondansetron, 4 mg, Q6H PRN  ipratropium 0.5 mg-albuterol 2.5 mg, 1 Dose, Q4H PRN         Objective:    BP (!) 140/75   Pulse 67   Temp 98 °F (36.7 °C) (Temporal)   Resp 19   Ht 1.803 m (5' 11\")   Wt 83.4 kg (183 lb 13.8 oz)   SpO2 96%   BMI 25.64 kg/m²     General Appearance: alert and oriented to person, place and time and in no acute distress  Skin: warm and dry  Head: normocephalic and atraumatic  Eyes: pupils equal, round, and reactive to light, extraocular eye movements intact, conjunctivae normal  Neck: neck supple and non tender without mass   Pulmonary/Chest: clear to auscultation bilaterally- no wheezes, rales or rhonchi, normal air movement, no respiratory

## 2025-02-27 NOTE — PROGRESS NOTES
Subjective:      Colton Hurley post op follow up     Patient is clinically stable. Patient sitting comfortably in chair, currently being evaluated by speech therapy. Patient alert, oriented, calm, and cooperative. Moving all extremities.     Review of Systems  Review of systems not obtained due to patient factors.      Objective:     NIH Stroke Scale/Score at time of initial evaluation:    1A: Level of Consciousness 0 - alert; keenly responsive   1B: Ask Month and Age 0 - answers both questions correctly   1C: Tell Patient To Open and Close Eyes, then Hand  Squeeze 0 - performs both tasks correctly   2: Test Horizontal Extraocular Movements 0 - normal   3: Test Visual Fields 0 - no visual loss   4: Test Facial Palsy 1 - minor paralysis (flattened nasolabial fold, asymmetric on smiling)   5A: Test Left Arm Motor Drift 0 - no drift, limb holds 90 (or 45) degrees for full 10 seconds   5B: Test Right Arm Motor Drift 0 - no drift, limb holds 90 (or 45) degrees for full 10 seconds   6A: Test Left Leg Motor Drift 0 - no drift; leg holds 30 degree position for full 5 seconds   6B: Test Right Leg Motor Drift 0 - no drift; leg holds 30 degree position for full 5 seconds   7: Test Limb Ataxia (FNF/Heel-Shin) 0 - absent   8: Test Sensation 0 - normal; no sensory loss   9: Test Language/Aphasia 0 - no aphasia, normal   10: Test Dysarthria 1 - mild to moderate, patient slurs at least some words and at worst, can be understood with some difficulty   11: Test Extinction/Inattention 0 - no abnormality   Total 2              Vitals:    02/27/25 0915 02/27/25 1000 02/27/25 1015 02/27/25 1100   BP:  (!) 140/75 (!) 140/75    Pulse: 83 78 69 80   Resp: 15 18 20 22   Temp:       TempSrc:       SpO2: 98% 96% 96% 97%   Weight:       Height:          Lab Review  Lab Results   Component Value Date/Time    CHOL 135 02/27/2025 04:51 AM    CHOL 218 04/06/2022 11:55 AM    CHOL 179 12/01/2018 06:55 AM    TRIG 60 02/27/2025 04:51 AM    TRIG

## 2025-02-27 NOTE — PROGRESS NOTES
OCCUPATIONAL THERAPY INITIAL EVALUATION    [facility]  [address] [place], [place]       Date:2025                                                               Patient Name: Colton Hurley  MRN: [id number]  : 1965  Room: [address] [place] [address]    Evaluating OT: Acacia Wilder, OTR/L [id number]    Referring Provider:   Laura Dahl DO      Specific Provider Orders/Date: OT eval and treat (25)        Diagnosis: Acute CVA (cerebrovascular accident) (HCC) [I63.9]         Reason for admission:   Presented with right side weakness and slurred   speech. Found to have L MCA (M1/M2) occlusion. Taken to IR for L MCA thrombectomy, L ICA thrombectomy, stenting. Admitted to [facility].        Surgery/Procedures:  L ICA thrombectomy, stenting.            Pertinent Medical History:    Past Medical History:   Diagnosis Date    Arthritis     Asthma 2018    BPH (benign prostatic hyperplasia)     Chronic back pain     Plates inseted from L2-L5    COPD (chronic obstructive pulmonary disease) (HCC)     Depression     Emphysema lung (HCC)     Hepatitis C     Schizo affective schizophrenia ([facility])           *Precautions:  Fall Risk, alarms, R hemiparesis, dysarthria, SBP<140    Assessment of current deficits   [x] Functional mobility  [x]ADLs  [x] Strength               []Cognition   [x] Functional transfers   [x] IADLs         [x] Safety Awareness   [x]Endurance   [x] Fine Coordination        [x] ROM     [] Vision/perception   []Sensation    [x]Gross Motor Coordination [x] Balance   [] Delirium                  []Motor Control     [x] Communication    OT PLAN OF CARE   OT POC based on physician orders, patient diagnosis and results of clinical assessment.       Frequency/Duration: 1-3 days/wk for 1-2 weeks PRN    Specific OT Treatment to include:   ADL retraining/adapted techniques and AE recommendations to increase functional independence  review of current medical information, gathering information on past medical history/social history and prior level of function, completion of standardized testing/informal observation of tasks, assessment of data and development of POC/Goals.     Acacia Wilder, OTR/L [id number]

## 2025-02-27 NOTE — PLAN OF CARE
Problem: Chronic Conditions and Co-morbidities  Goal: Patient's chronic conditions and co-morbidity symptoms are monitored and maintained or improved  2/27/2025 1120 by Marietta Reynoso RN  Outcome: Progressing  Flowsheets (Taken 2/27/2025 1120)  Care Plan - Patient's Chronic Conditions and Co-Morbidity Symptoms are Monitored and Maintained or Improved:   Monitor and assess patient's chronic conditions and comorbid symptoms for stability, deterioration, or improvement   Update acute care plan with appropriate goals if chronic or comorbid symptoms are exacerbated and prevent overall improvement and discharge  2/27/2025 0418 by Shahnaz Alicia, RN  Outcome: Progressing     Problem: Discharge Planning  Goal: Discharge to home or other facility with appropriate resources  Outcome: Progressing  Flowsheets (Taken 2/27/2025 1120)  Discharge to home or other facility with appropriate resources: Identify barriers to discharge with patient and caregiver     Problem: Pain  Goal: Verbalizes/displays adequate comfort level or baseline comfort level  2/27/2025 1120 by Marietta Reynoso RN  Outcome: Progressing  Flowsheets (Taken 2/27/2025 1120)  Verbalizes/displays adequate comfort level or baseline comfort level:   Encourage patient to monitor pain and request assistance   Assess pain using appropriate pain scale   Implement non-pharmacological measures as appropriate and evaluate response  2/27/2025 0418 by Shahnaz Alicia RN  Outcome: Progressing     Problem: Neurosensory - Adult  Goal: Achieves stable or improved neurological status  Outcome: Progressing  Flowsheets (Taken 2/26/2025 1732 by Elvira Toney RN)  Achieves stable or improved neurological status:   Assess for and report changes in neurological status   Initiate measures to prevent increased intracranial pressure   Maintain blood pressure and fluid volume within ordered parameters to optimize cerebral perfusion and minimize risk of hemorrhage  Goal: Absence

## 2025-02-27 NOTE — PROGRESS NOTES
adequate nutrition/hydration and decrease signs/symptoms of aspiration to less than 1 x/day.      Patient/family Goal:    To get stronger    Plan of care discussed with Patient   The Patient understand(s) the diagnosis, prognosis and plan of care     Rehabilitation Potential/Prognosis: good                    ADMITTING DIAGNOSIS: Acute CVA (cerebrovascular accident) (HCC) [I63.9]    VISIT DIAGNOSIS:      PATIENT REPORT/COMPLAINT: occasional cough  RN cleared patient for participation in assessment     yes     PRIOR LEVEL OF SWALLOW FUNCTION:    PAST HISTORY OF OROPHARYNGEAL DYSPHAGIA?: none reported    Home diet: Regular consistency solids (IDDSI level 7) with  thin liquids (IDDSI level 0)  Current Diet Order:  ADULT DIET; Regular; 3 carb choices (45 gm/meal); Low Fat/Low Chol/High Fiber/MARCO ANTONIO    PROCEDURE:  Consistencies Administered During the Evaluation   Liquids: thin liquid   Solids:  Pureed  and Hard solid      Method of Intake:   cup, straw, spoon  Self fed      Position:   Sitting upright in a chair    CLINICAL ASSESSMENT:  Oral Stage:       Dentition:  missing teeth  prolonged mastication resulting in poor oral manipulation, poor bolus formation and increased oral residue post swallow  Oral residuals were noted :  right buccal cavity         Pharyngeal Stage:    Congested cough throughout evaluation; however, no immediate coughing and/or throat clears were present while consuming puree, hard solid, and thin liquid     Cognition:   Alert & Oriented x3 (situation, self and place) and Follows 2 - step directions appropriate for this assessment    Oral Peripheral Examination   Right labiobuccal weakness    Current Respiratory Status    room air     Parameters of Speech Production  Respiration:  Adequate for speech production  Quality:   Within functional limits  Intensity: Within functional limits    Volitional Swallow: Present     Volitional Cough:  Present     Pain: No pain reported.    EDUCATION:   The Speech  Language Pathologist (SLP) completed education regarding results of evaluation and that intervention is warranted at this time.  Learner: Patient  Education: Reviewed results and recommendations of this evaluation and Reviewed diet and strategies  Evaluation of Education:  Verbalizes understanding    This plan may be re-evaluated and revised as warranted.      Evaluation Time includes thorough review of current medical information, gathering information on past medical history/social history and prior level of function, completion of standardized testing/informal observation of tasks, assessment of data and education on plan of care and goals.    [x]The admitting diagnosis and active problem list, have been reviewed prior to initiation of this evaluation.        ACTIVE PROBLEM LIST:   Patient Active Problem List   Diagnosis    Schizophrenia, schizo-affective (HCC)    Dermatitis    Pulmonary emphysema (HCC)    Cystic mass of pancreas    Spondylosis of lumbar region without myelopathy or radiculopathy    Gastroesophageal reflux disease without esophagitis    Cervical adenopathy    Depression    Severe major depression without psychotic features (HCC)    Exposure to communicable diseases    Hyponatremia    Abdominal pain    Tobacco abuse    Benign prostatic hyperplasia with nocturia    Acute CVA (cerebrovascular accident) (HCC)         CPT code:  70636  bedside swallow ninaal    Elmer Dwyer M.S., CCC-SLP/L   Speech-Language Pathologist  [id number]

## 2025-02-27 NOTE — CONSULTS
Intensive Care Unit  Critical Care Daily Progress Note   2/27/2025  Date of Admission: 2/26  Date of ICU Admission:  2/26    CC: critical care management of acute CVA      HPI:   Presented with right side weakness and slurred speech. Found to have L MCA (M1/M2) occlusion. Taken to IR for L MCA thrombectomy, L ICA thrombectomy, stenting. Admitted to [facility].    2/27 no events overnight.       Problem List:   Patient Active Problem List   Diagnosis    Schizophrenia, schizo-affective (HCC)    Dermatitis    Pulmonary emphysema ([facility])    Cystic mass of pancreas    Spondylosis of lumbar region without myelopathy or radiculopathy    Gastroesophageal reflux disease without esophagitis    Cervical adenopathy    Depression    Severe major depression without psychotic features ([facility])    Exposure to communicable diseases    Hyponatremia    Abdominal pain    Tobacco abuse    Benign prostatic hyperplasia with nocturia    Acute CVA (cerebrovascular accident) ([facility])         Allergies:   Allergies   Allergen Reactions    Latex Rash    Shellfish-Derived Products Hives    Strawberry Extract Hives    Adhesive Tape Rash    Penicillins Rash     Trouble breathing          PHYSICAL EXAM:  Vitals: Blood pressure (!) 101/51, pulse 56, temperature 97.3 °F (36.3 °C), temperature source Temporal, resp. rate 16, height 1.803 m (5' 11\"), weight 83.4 kg (183 lb 13.8 oz), SpO2 96%.  Body mass index is 25.64 kg/m².     Intake/Output Summary (Last 24 hours) at 2/27/2025 0839  Last data filed at 2/27/2025 0600  Gross per 24 hour   Intake 959.58 ml   Output 200 ml   Net 759.58 ml       Pain Description: none    General appearance:  comfortable, in hospital bed  Neurologic:   PERRL, oriented x 4  GCS:    4 - Opens eyes on own   6 - Follows simple motor commands  5 - Alert and oriented    Pupil size: Left 3 mm   Right 3 mm  Pupil reaction: Yes  Wiggles fingers: Left Yes Right Yes  Hand grasp:   Left normal    Right decreased  Wiggles toes: Left Yes    Right  procedures    Please feel free to call with questions or concerns.   ASHLIE Israel NP    Electronically signed by Britany Thakkar CNP on 2/27/2025 at 8:39 AM        NOTE: This report was transcribed using voice recognition software. Every effort was made to ensure accuracy; however, inadvertent computerized transcription errors may be present.

## 2025-02-27 NOTE — PROGRESS NOTES
Physical Therapy  Physical Therapy Initial Assessment     Name: Colton Hurley  : 1965  MRN: [id number]      Date of Service: 2025    Evaluating PT:  Triston Leger, PT, DPT  [id number]     Room #:  4522/4522-A  Diagnosis:  Acute CVA (cerebrovascular accident) (HCC) [I63.9]  PMHx/PSHx:   has a past medical history of Arthritis, Asthma, BPH (benign prostatic hyperplasia), Chronic back pain, COPD (chronic obstructive pulmonary disease) (HCC), Depression, Emphysema lung (HCC), Hepatitis C, and Schizo affective schizophrenia ([facility]).   Procedure/Surgery:  L ICA and L MCA thrombectomy    Precautions:  Falls, R hemiparesis, Dysarthria, SBP<140  Equipment Needs:  TBD    SUBJECTIVE:    Pt lives alone in a basement level apartment with no steps to enter but 7 steps down with rail to apartment.  Pt ambulates with SPC PTA.     OBJECTIVE:   Initial Evaluation  Date: 25 Treatment Short Term/ Long Term   Goals   AM-PAC 6 Clicks 15/24     Was pt agreeable to Eval/treatment? Yes      Does pt have pain? Chronic back pain      Bed Mobility  Rolling: NT  Supine to sit: Mod A  Sit to supine: NT  Scooting: Min A to EOB   Rolling: SBA   Supine to sit: SBA  Sit to supine: SBA  Scooting: SBA    Transfers Sit to stand: Min A  Stand to sit: Min A  Stand pivot: Mod A with no AD;  Min A with FWW   Sit to stand: SBA   Stand to sit: SBA  Stand pivot: SBA with AAD   Ambulation    50 feet with FWW Min A  >150 feet with AAD SBA   Stair negotiation: ascended and descended  NT  >4 steps with 1 rail SBA    ROM BUE:  Per OT eval  BLE:  WFL     Strength BUE:  Per OT eval   BLE:  grossly 4+/5     Balance Sitting EOB:  SBA  Dynamic Standing:  Min A with FWW  Sitting EOB:  Supervision   Dynamic Standing:  SBA with AAD      Pt is A & O x 4  Sensation:  Pt mildly diminished lt touch to RUE and RLE   Edema:  Unremarkable    Vitals:   HR at rest: 73 bpm HR at end of session: 70 bpm   Spo2 at rest:-% Spo2 at end of session -%   BP at  exercises 69272 - minutes  [] Neuromuscular reeducation [id number] -- minutes     Triston Leger, PT, DPT  [id number]

## 2025-02-27 NOTE — CARE COORDINATION
Spoke with patient. He is from home, lives alone. He uses a cane for ambulation. 7 steps down to enter 1 floor apartment. No other assistive devices. He goes to [place] for case management. PCP is Dr. Barney. No JOSE history. No current homecare. His initial plan is to return home, will confirm after therapy. Patient concerned his sister does not know he is here, attempted to reach her, no answer, no voicemail. Updated patient. He typically uses city bus for transportation.     Case Management Assessment  Initial Evaluation    Date/Time of Evaluation: 2/27/2025 11:05 AM  Assessment Completed by: MCKENZIE Nichole    If patient is discharged prior to next notation, then this note serves as note for discharge by case management.    Patient Name: Colton Hurley                   YOB: 1965  Diagnosis: Acute CVA (cerebrovascular accident) (HCC) [I63.9]                   Date / Time: 2/26/2025  2:10 PM    Patient Admission Status: Inpatient   Readmission Risk (Low < 19, Mod (19-27), High > 27): Readmission Risk Score: 12.9    Current PCP: Luis Antonio Barney, DO  PCP verified by CM? Yes    Chart Reviewed: Yes      History Provided by: Patient  Patient Orientation: Alert and Oriented    Patient Cognition: Alert    Hospitalization in the last 30 days (Readmission):  No    If yes, Readmission Assessment in  Navigator will be completed.    Advance Directives:      Code Status: Full Code   Patient's Primary Decision Maker is: Legal Next of Kin    Primary Decision Maker: Nenita Jessica - Brother/Sister - [phone]    Discharge Planning:    Patient lives with: Alone Type of Home: Apartment  Primary Care Giver:    Patient Support Systems include: Family Members   Current Financial resources:    Current community resources:    Current services prior to admission: None            Current DME:              Type of Home Care services:  OT, PT, Nursing Services    ADLS  Prior functional level: Independent in  Patient and/or Patient Representative Agree with the Discharge Plan?      MCKENZIE Nichole  Case Management Department  Ph: [phone] Fax:

## 2025-02-28 ENCOUNTER — APPOINTMENT (OUTPATIENT)
Age: 60
DRG: 024 | End: 2025-02-28
Payer: MEDICARE

## 2025-02-28 LAB
ANION GAP SERPL CALCULATED.3IONS-SCNC: 12 MMOL/L (ref 7–16)
BUN SERPL-MCNC: 11 MG/DL (ref 6–20)
CA-I BLD-SCNC: 1.23 MMOL/L (ref 1.15–1.33)
CALCIUM SERPL-MCNC: 8.8 MG/DL (ref 8.6–10.2)
CHLORIDE SERPL-SCNC: 106 MMOL/L (ref 98–107)
CO2 SERPL-SCNC: 25 MMOL/L (ref 22–29)
CREAT SERPL-MCNC: 0.8 MG/DL (ref 0.7–1.2)
ERYTHROCYTE [DISTWIDTH] IN BLOOD BY AUTOMATED COUNT: 12.5 % (ref 11.5–15)
GFR, ESTIMATED: >90 ML/MIN/1.73M2
GLUCOSE SERPL-MCNC: 103 MG/DL (ref 74–99)
HCT VFR BLD AUTO: 37.2 % (ref 37–54)
HGB BLD-MCNC: 12.9 G/DL (ref 12.5–16.5)
MAGNESIUM SERPL-MCNC: 2.2 MG/DL (ref 1.6–2.6)
MCH RBC QN AUTO: 36.4 PG (ref 26–35)
MCHC RBC AUTO-ENTMCNC: 34.7 G/DL (ref 32–34.5)
MCV RBC AUTO: 105.1 FL (ref 80–99.9)
MICROORGANISM SPEC CULT: NORMAL
PHOSPHATE SERPL-MCNC: 3.3 MG/DL (ref 2.5–4.5)
PLATELET # BLD AUTO: 176 K/UL (ref 130–450)
PMV BLD AUTO: 10.4 FL (ref 7–12)
POTASSIUM SERPL-SCNC: 3.9 MMOL/L (ref 3.5–5)
RBC # BLD AUTO: 3.54 M/UL (ref 3.8–5.8)
SODIUM SERPL-SCNC: 143 MMOL/L (ref 132–146)
SPECIMEN DESCRIPTION: NORMAL
WBC OTHER # BLD: 14.7 K/UL (ref 4.5–11.5)

## 2025-02-28 PROCEDURE — 93306 TTE W/DOPPLER COMPLETE: CPT

## 2025-02-28 PROCEDURE — 2500000003 HC RX 250 WO HCPCS: Performed by: INTERNAL MEDICINE

## 2025-02-28 PROCEDURE — 99233 SBSQ HOSP IP/OBS HIGH 50: CPT | Performed by: INTERNAL MEDICINE

## 2025-02-28 PROCEDURE — 6370000000 HC RX 637 (ALT 250 FOR IP): Performed by: INTERNAL MEDICINE

## 2025-02-28 PROCEDURE — 80048 BASIC METABOLIC PNL TOTAL CA: CPT

## 2025-02-28 PROCEDURE — 6370000000 HC RX 637 (ALT 250 FOR IP): Performed by: PSYCHIATRY & NEUROLOGY

## 2025-02-28 PROCEDURE — 85027 COMPLETE CBC AUTOMATED: CPT

## 2025-02-28 PROCEDURE — 6370000000 HC RX 637 (ALT 250 FOR IP): Performed by: NURSE PRACTITIONER

## 2025-02-28 PROCEDURE — 84100 ASSAY OF PHOSPHORUS: CPT

## 2025-02-28 PROCEDURE — 99231 SBSQ HOSP IP/OBS SF/LOW 25: CPT | Performed by: NURSE PRACTITIONER

## 2025-02-28 PROCEDURE — 82330 ASSAY OF CALCIUM: CPT

## 2025-02-28 PROCEDURE — 2060000000 HC ICU INTERMEDIATE R&B

## 2025-02-28 PROCEDURE — 83735 ASSAY OF MAGNESIUM: CPT

## 2025-02-28 PROCEDURE — 6370000000 HC RX 637 (ALT 250 FOR IP): Performed by: CLINICAL NURSE SPECIALIST

## 2025-02-28 RX ORDER — BENZTROPINE MESYLATE 0.5 MG/1
0.5 TABLET ORAL 2 TIMES DAILY
Status: DISCONTINUED | OUTPATIENT
Start: 2025-02-28 | End: 2025-03-01 | Stop reason: HOSPADM

## 2025-02-28 RX ORDER — MIRTAZAPINE 15 MG/1
15 TABLET, FILM COATED ORAL NIGHTLY
Status: DISCONTINUED | OUTPATIENT
Start: 2025-02-28 | End: 2025-03-01 | Stop reason: HOSPADM

## 2025-02-28 RX ORDER — OLANZAPINE 10 MG/1
10 TABLET ORAL NIGHTLY
Status: DISCONTINUED | OUTPATIENT
Start: 2025-02-28 | End: 2025-03-01 | Stop reason: HOSPADM

## 2025-02-28 RX ADMIN — TICAGRELOR 90 MG: 90 TABLET ORAL at 09:04

## 2025-02-28 RX ADMIN — MIRTAZAPINE 15 MG: 15 TABLET, FILM COATED ORAL at 21:20

## 2025-02-28 RX ADMIN — SODIUM CHLORIDE, PRESERVATIVE FREE 10 ML: 5 INJECTION INTRAVENOUS at 21:19

## 2025-02-28 RX ADMIN — TICAGRELOR 90 MG: 90 TABLET ORAL at 21:20

## 2025-02-28 RX ADMIN — MONTELUKAST 10 MG: 10 TABLET, FILM COATED ORAL at 21:19

## 2025-02-28 RX ADMIN — SODIUM CHLORIDE, PRESERVATIVE FREE 10 ML: 5 INJECTION INTRAVENOUS at 09:04

## 2025-02-28 RX ADMIN — BENZTROPINE MESYLATE 0.5 MG: 0.5 TABLET ORAL at 09:05

## 2025-02-28 RX ADMIN — OLANZAPINE 10 MG: 10 TABLET, FILM COATED ORAL at 21:20

## 2025-02-28 RX ADMIN — ACETAMINOPHEN 650 MG: 325 TABLET ORAL at 15:13

## 2025-02-28 RX ADMIN — ASPIRIN 81 MG CHEWABLE TABLET 81 MG: 81 TABLET CHEWABLE at 09:04

## 2025-02-28 RX ADMIN — ATORVASTATIN CALCIUM 40 MG: 40 TABLET, FILM COATED ORAL at 21:19

## 2025-02-28 RX ADMIN — BENZOCAINE AND MENTHOL 1 LOZENGE: 15; 3.6 LOZENGE ORAL at 09:12

## 2025-02-28 RX ADMIN — BENZTROPINE MESYLATE 0.5 MG: 0.5 TABLET ORAL at 21:20

## 2025-02-28 ASSESSMENT — PAIN SCALES - GENERAL
PAINLEVEL_OUTOF10: 6
PAINLEVEL_OUTOF10: 4
PAINLEVEL_OUTOF10: 0

## 2025-02-28 ASSESSMENT — PAIN DESCRIPTION - DESCRIPTORS: DESCRIPTORS: ACHING;DISCOMFORT;NAGGING

## 2025-02-28 ASSESSMENT — PAIN DESCRIPTION - LOCATION: LOCATION: HEAD

## 2025-02-28 ASSESSMENT — PAIN - FUNCTIONAL ASSESSMENT: PAIN_FUNCTIONAL_ASSESSMENT: ACTIVITIES ARE NOT PREVENTED

## 2025-02-28 ASSESSMENT — PAIN DESCRIPTION - ORIENTATION: ORIENTATION: RIGHT;LEFT

## 2025-02-28 NOTE — PROGRESS NOTES
[facility] Hospitalist Progress Note    Admitting Date and Time: 2/26/2025  2:10 PM  Admit Dx: Acute CVA (cerebrovascular accident) (HCC) [I63.9]    Subjective:  Patient is being followed for Acute CVA (cerebrovascular accident) (HCC) [I63.9]   Pt feels ok, better than before.      ROS: denies fever, chills, cp, sob, n/v, HA unless stated above.      mirtazapine  15 mg Oral Nightly    OLANZapine  10 mg Oral Nightly    benztropine  0.5 mg Oral BID    aspirin  300 mg Rectal Daily    Or    aspirin  81 mg Oral Daily    sodium chloride flush  5-40 mL IntraVENous 2 times per day    atorvastatin  40 mg Oral Nightly    ticagrelor  90 mg Oral BID    montelukast  10 mg Oral Nightly     sulfur hexafluoride microspheres, 2 mL, ONCE PRN  hydrALAZINE, 10 mg, Q10 Min PRN  labetalol, 10 mg, Q10 Min PRN  albuterol, 2.5 mg, Q6H PRN  benzocaine-menthol, 1 lozenge, Q2H PRN  benzonatate, 100 mg, TID PRN  calcium carbonate, 500 mg, TID PRN  melatonin, 3 mg, Nightly PRN  Polyvinyl Alcohol-Povidone PF, 1 drop, PRN  sodium chloride, 1 spray, PRN  sodium chloride flush, 5-40 mL, PRN  sodium chloride, , PRN  potassium chloride, 40 mEq, PRN   Or  potassium alternative oral replacement, 40 mEq, PRN   Or  potassium chloride, 10 mEq, PRN  magnesium sulfate, 2,000 mg, PRN  polyethylene glycol, 17 g, Daily PRN  acetaminophen, 650 mg, Q6H PRN   Or  acetaminophen, 650 mg, Q6H PRN  ondansetron, 4 mg, Q8H PRN   Or  ondansetron, 4 mg, Q6H PRN  ipratropium 0.5 mg-albuterol 2.5 mg, 1 Dose, Q4H PRN         Objective:    /64   Pulse 55   Temp 97.5 °F (36.4 °C) (Temporal)   Resp 16   Ht 1.803 m (5' 11\")   Wt 80.9 kg (178 lb 5.6 oz)   SpO2 100%   BMI 24.88 kg/m²     General Appearance: alert and oriented to person, place and time and in no acute distress  Skin: warm and dry  Head: normocephalic and atraumatic  Eyes: pupils equal, round, and reactive to light, extraocular eye movements intact, conjunctivae normal  Neck: neck supple and

## 2025-02-28 NOTE — PROGRESS NOTES
Subjective:      Colton Hurley post op follow up      Patient alert, oriented, calm, and cooperative. Moving all extremities.     Review of Systems  Review of systems not obtained due to patient factors.      Objective:     NIH Stroke Scale/Score at time of initial evaluation:    1A: Level of Consciousness 0 - alert; keenly responsive   1B: Ask Month and Age 0 - answers both questions correctly   1C: Tell Patient To Open and Close Eyes, then Hand  Squeeze 0 - performs both tasks correctly   2: Test Horizontal Extraocular Movements 0 - normal   3: Test Visual Fields 0 - no visual loss   4: Test Facial Palsy 1 - minor paralysis (flattened nasolabial fold, asymmetric on smiling)   5A: Test Left Arm Motor Drift 0 - no drift, limb holds 90 (or 45) degrees for full 10 seconds   5B: Test Right Arm Motor Drift 0 - no drift, limb holds 90 (or 45) degrees for full 10 seconds   6A: Test Left Leg Motor Drift 0 - no drift; leg holds 30 degree position for full 5 seconds   6B: Test Right Leg Motor Drift 0 - no drift; leg holds 30 degree position for full 5 seconds   7: Test Limb Ataxia (FNF/Heel-Shin) 0 - absent   8: Test Sensation 0 - normal; no sensory loss   9: Test Language/Aphasia 0 - no aphasia, normal   10: Test Dysarthria 0 - normal   11: Test Extinction/Inattention 0 - no abnormality   Total 1              Vitals:    02/28/25 0500 02/28/25 0600 02/28/25 0700 02/28/25 0800   BP: 122/63 (!) 136/58 121/60 123/71   Pulse: 53 65 52 55   Resp: 13 14 15 16   Temp:       TempSrc:    Temporal   SpO2: 96% 97% 96% 100%   Weight:  80.9 kg (178 lb 5.6 oz)     Height:  1.803 m (5' 11\")        Lab Review  Lab Results   Component Value Date/Time    CHOL 135 02/27/2025 04:51 AM    CHOL 218 04/06/2022 11:55 AM    CHOL 179 12/01/2018 06:55 AM    TRIG 60 02/27/2025 04:51 AM    TRIG 100 04/06/2022 11:55 AM    TRIG 76 12/01/2018 06:55 AM    HDL 51 02/27/2025 04:51 AM    HDL 52 04/06/2022 11:55 AM    HDL 56 12/01/2018 06:55 AM

## 2025-02-28 NOTE — ED PROVIDER NOTES
alcohol     Types: 6 Cans of beer per week     Comment: 3 times a week     Drug use: Yes     Types: Marijuana (Weed)     Comment: rare       SCREENINGS   NIH Stroke Scale  NIH Stroke Scale Assessed: Yes  Interval: Reassessment  Level of Consciousness (1a): Alert  LOC Questions (1b): Answers one correctly  LOC Commands (1c): Performs both tasks correctly  Best Gaze (2): Normal  Visual (3): No visual loss  Facial Palsy (4): (!) Minor paralysis  Motor Arm, Left (5a): No drift  Motor Arm, Right (5b): No drift  Motor Leg, Left (6a): No drift  Motor Leg, Right (6b): No drift  Limb Ataxia (7): Absent  Sensory (8): Normal  Best Language (9): No aphasia  Dysarthria (10): Mild to moderate, slurs some words  Extinction and Inattention (11): No abnormality  Total: 3    Dean Coma Scale  Eye Opening: Spontaneous  Best Verbal Response: Oriented  Best Motor Response: Obeys commands  Dean Coma Scale Score: 15                CIWA Assessment  BP: 114/64  Pulse: 55           PHYSICAL EXAM  1 or more Elements     ED Triage Vitals   BP Systolic BP Percentile Diastolic BP Percentile Temp Temp Source Pulse Respirations SpO2   02/26/25 1445 -- -- 02/26/25 1703 02/26/25 1703 02/26/25 1445 02/26/25 1445 02/26/25 1445   94/65   97.3 °F (36.3 °C) Temporal 79 14 97 %      Height Weight - Scale         02/26/25 1820 02/26/25 1820         1.803 m (5' 11\") 88.1 kg (194 lb 3.6 oz)               Constitutional/General: Alert and oriented x3  Head: Normocephalic and atraumatic  Eyes: PERRL, EOMI, conjunctiva normal, sclera non icteric  ENT:  Oropharynx clear, handling secretions, no trismus, no asymmetry of the posterior oropharynx or uvular edema  Neck: Supple, full ROM, no stridor, no meningeal signs  Respiratory: Lungs clear to auscultation bilaterally, no wheezes, rales, or rhonchi. Not in respiratory distress  Cardiovascular:  Regular rate. Regular rhythm. No murmurs, no gallops, no rubs. 2+ distal pulses. Equal extremity pulses.   Chest:    benztropine (COGENTIN) tablet 0.5 mg (0.5 mg Oral Given 2/28/25 0905)   diphenhydrAMINE (BENADRYL) injection 25 mg (25 mg IntraVENous Given 2/26/25 1415)   methylPREDNISolone sodium succ (SOLU-MEDROL) 125 mg in sterile water 2 mL injection (125 mg IntraVENous Given 2/26/25 1416)   famotidine (PEPCID) 20 MG/2ML 20 mg in sodium chloride (PF) 0.9 % 10 mL injection (20 mg IntraVENous Given 2/26/25 1418)   iopamidol (ISOVUE-370) 76 % injection 100 mL (100 mLs IntraVENous Given 2/26/25 1442)   heparin (porcine) 5000 Units in sodium chloride 0.9% 1000 mL irrigation (5,000 Units Irrigation Given 2/26/25 1518)   heparin (porcine) 5,000 Units with nitroGLYCERIN 100 mcg in sodium chloride 0.9% 1000 mL irrigation (1,000 mLs Irrigation Given 2/26/25 1518)   iopamidol (ISOVUE-300) 61 % injection (80 mLs IntraVENous Given 2/26/25 1634)   potassium phosphate 15 mmol in sodium chloride 0.9 % 250 mL IVPB (0 mmol IntraVENous Stopped 2/27/25 1254)         Is this patient to be included in the SEP-1 core measure due to severe sepsis or septic shock? No Exclusion criteria - the patient is NOT to be included for SEP-1 Core Measure due to: Infection is not suspected        Medical Decision Making/Differential Diagnosis:    CC/HPI Summary, Social Determinants of health, Records Reviewed, DDx, testing done/not done, ED Course, Reassessment, disposition considerations/shared decision making with patient, consults, disposition:            Chronic Conditions:   Past Medical History:   Diagnosis Date    Arthritis     Asthma 02/26/2018    BPH (benign prostatic hyperplasia)     Chronic back pain     Plates inseted from L2-L5    COPD (chronic obstructive pulmonary disease) (HCC)     Depression     Emphysema lung (HCC)     Hepatitis C 2011    Schizo affective schizophrenia (HCC)        CONSULTS: (Who and What was discussed)  IP CONSULT TO CRITICAL CARE    Discussion with Other Profesionals : Admitting Team hospitalist agreed to admit patient and

## 2025-02-28 NOTE — PROGRESS NOTES
Intensive Care Unit  Critical Care Consult  Daily Progress Note 2/28/2025    Date of Admission: 2/26    Chief Complaint   Patient presents with    Cerebrovascular Accident     Walking fell, stroke like symptoms, R facial weakness, slurred,         EVENTS:   Presented with right side weakness and slurred speech. Found to have L MCA (M1/M2) occlusion. Taken to IR for L MCA thrombectomy, L ICA thrombectomy, stenting. Admitted to [facility].     2/27 no events overnight.   2/28 No acute events, afebrile, VSS, pt denies     PHYSICAL EXAM:    /60   Pulse 52   Temp 97.9 °F (36.6 °C) (Temporal)   Resp 15   Ht 1.803 m (5' 11\")   Wt 80.9 kg (178 lb 5.6 oz)   SpO2 96%   BMI 24.88 kg/m²     General appearance:  Comfortable.     Pain Description: none    GCS:    4 - Opens eyes on own   6 - Follows simple motor commands  5 - Alert and oriented    Pupil size:  Left 3 mm  Right 3 mm  Pupil reaction: Yes  Wiggles fingers: Left Yes Right Yes  Hand grasp:   Left normal     Right decreased  Wiggles toes: Left Yes    Right Yes  Plantar flexion: Left normal    Right decreased    CONSTITUTIONAL: no acute distress, lying in hospital bed, alert and cooperative   NEUROLOGIC: PERRL, oriented x 4, PORTILLO  CARDIOVASCULAR: S1 S2, regular rate, regular rhythm, no murmur/gallop/rub. Monitor: sinus  PULMONARY: scattered wheezes, no use of accessory muscles  RENAL: hart to gravity, clear yellow urine  ABDOMEN: soft, nontender, nondistended, nontympanic, normal bowel sounds   MUSCULOSKELETAL: moves all extremities purposefully, right sided weakness   SKIN/EXTREMITIES: no rashes/ecchymosis, no edema/clubbing, warm/dry, good capillary refill       Past Medical History:   Diagnosis Date    Arthritis     Asthma 02/26/2018    BPH (benign prostatic hyperplasia)     Chronic back pain     Plates inseted from L2-L5    COPD (chronic obstructive pulmonary disease) (HCC)     Depression     Emphysema lung (HCC)     Hepatitis C 2011    Schizo affective

## 2025-02-28 NOTE — CARE COORDINATION
Spoke with patient regarding therapy evals from yesterday. He is agreeable to [facility] short term for rehab. Choiced for [facility], referral pending. Requested facility start auth if they can accept. PASRR and ambulette on soft chart.     1101 Patient accepted at [facility], they are submitting auth.     1427 Auth obtained for [facility], good through the weekend if stable for discharge.     For questions I can be reached at [phone]. MCKENZIE Nichole    The Plan for Transition of Care is related to the following treatment goals: discharge planning when stable     The Patient and/or patient representative Colton Mei was provided with a choice of provider and agrees   with the discharge plan. [x] Yes [] No    Freedom of choice list was provided with basic dialogue that supports the patient's individualized plan of care/goals, treatment preferences and shares the quality data associated with the providers. [x] Yes [] No

## 2025-02-28 NOTE — PLAN OF CARE
Problem: Chronic Conditions and Co-morbidities  Goal: Patient's chronic conditions and co-morbidity symptoms are monitored and maintained or improved  2/27/2025 2153 by Malika Sahu RN  Outcome: Progressing  Flowsheets (Taken 2/27/2025 1120 by Marietta Reynoso, RN)  Care Plan - Patient's Chronic Conditions and Co-Morbidity Symptoms are Monitored and Maintained or Improved:   Monitor and assess patient's chronic conditions and comorbid symptoms for stability, deterioration, or improvement   Update acute care plan with appropriate goals if chronic or comorbid symptoms are exacerbated and prevent overall improvement and discharge  2/27/2025 1120 by Marietta Reynoso RN  Outcome: Progressing  Flowsheets (Taken 2/27/2025 1120)  Care Plan - Patient's Chronic Conditions and Co-Morbidity Symptoms are Monitored and Maintained or Improved:   Monitor and assess patient's chronic conditions and comorbid symptoms for stability, deterioration, or improvement   Update acute care plan with appropriate goals if chronic or comorbid symptoms are exacerbated and prevent overall improvement and discharge     Problem: Discharge Planning  Goal: Discharge to home or other facility with appropriate resources  2/27/2025 2153 by Malika Sahu RN  Outcome: Progressing  Flowsheets (Taken 2/27/2025 1120 by Marietta Reynoso RN)  Discharge to home or other facility with appropriate resources: Identify barriers to discharge with patient and caregiver  2/27/2025 1120 by Marietta Reynoso RN  Outcome: Progressing  Flowsheets (Taken 2/27/2025 1120)  Discharge to home or other facility with appropriate resources: Identify barriers to discharge with patient and caregiver     Problem: Pain  Goal: Verbalizes/displays adequate comfort level or baseline comfort level  2/27/2025 2153 by Malika Sahu RN  Outcome: Progressing  Flowsheets (Taken 2/27/2025 1120 by Marietta Reynoso RN)  Verbalizes/displays adequate comfort level or baseline comfort  limits:   Monitor labs and assess patient for signs and symptoms of electrolyte imbalances   Administer electrolyte replacement as ordered     Problem: Skin/Tissue Integrity  Goal: Skin integrity remains intact  Description: 1.  Monitor for areas of redness and/or skin breakdown  2.  Assess vascular access sites hourly  3.  Every 4-6 hours minimum:  Change oxygen saturation probe site  4.  Every 4-6 hours:  If on nasal continuous positive airway pressure, respiratory therapy assess nares and determine need for appliance change or resting period  2/27/2025 2153 by Malika Sahu, RN  Outcome: Progressing  Flowsheets (Taken 2/27/2025 1120 by Marietta Reynoso, RN)  Skin Integrity Remains Intact: Monitor for areas of redness and/or skin breakdown  2/27/2025 1120 by Marietta Reynoso, RN  Outcome: Progressing  Flowsheets (Taken 2/27/2025 1120)  Skin Integrity Remains Intact: Monitor for areas of redness and/or skin breakdown     Problem: Safety - Adult  Goal: Free from fall injury  2/27/2025 2153 by Malika Sahu RN  Outcome: Progressing  Flowsheets (Taken 2/27/2025 1120 by Marietta Reynoso, RN)  Free From Fall Injury: Instruct family/caregiver on patient safety  2/27/2025 1120 by Marietta Reynoso RN  Outcome: Progressing  Flowsheets (Taken 2/27/2025 1120)  Free From Fall Injury: Instruct family/caregiver on patient safety     Problem: ABCDS Injury Assessment  Goal: Absence of physical injury  Outcome: Progressing  Flowsheets (Taken 2/27/2025 2153)  Absence of Physical Injury: Implement safety measures based on patient assessment

## 2025-03-01 VITALS
RESPIRATION RATE: 20 BRPM | HEIGHT: 71 IN | DIASTOLIC BLOOD PRESSURE: 63 MMHG | HEART RATE: 59 BPM | BODY MASS INDEX: 24.97 KG/M2 | SYSTOLIC BLOOD PRESSURE: 101 MMHG | OXYGEN SATURATION: 95 % | WEIGHT: 178.35 LBS | TEMPERATURE: 98.4 F

## 2025-03-01 LAB
ECHO AO ASC DIAM: 2.7 CM
ECHO AO ASCENDING AORTA INDEX: 1.34 CM/M2
ECHO AV AREA PEAK VELOCITY: 1.8 CM2
ECHO AV AREA VTI: 1.7 CM2
ECHO AV AREA/BSA PEAK VELOCITY: 0.9 CM2/M2
ECHO AV AREA/BSA VTI: 0.8 CM2/M2
ECHO AV CUSP MM: 1.5 CM
ECHO AV MEAN GRADIENT: 9 MMHG
ECHO AV MEAN VELOCITY: 1.5 M/S
ECHO AV PEAK GRADIENT: 14 MMHG
ECHO AV PEAK VELOCITY: 1.9 M/S
ECHO AV VELOCITY RATIO: 0.53
ECHO AV VTI: 44.7 CM
ECHO BSA: 2.01 M2
ECHO EST RA PRESSURE: 3 MMHG
ECHO LA DIAMETER INDEX: 1.89 CM/M2
ECHO LA DIAMETER: 3.8 CM
ECHO LA VOL A-L A2C: 49 ML (ref 18–58)
ECHO LA VOL A-L A4C: 63 ML (ref 18–58)
ECHO LA VOL BP: 55 ML (ref 18–58)
ECHO LA VOL MOD A2C: 46 ML (ref 18–58)
ECHO LA VOL MOD A4C: 59 ML (ref 18–58)
ECHO LA VOL/BSA BIPLANE: 27 ML/M2 (ref 16–34)
ECHO LA VOLUME AREA LENGTH: 59 ML
ECHO LA VOLUME INDEX A-L A2C: 24 ML/M2 (ref 16–34)
ECHO LA VOLUME INDEX A-L A4C: 31 ML/M2 (ref 16–34)
ECHO LA VOLUME INDEX AREA LENGTH: 29 ML/M2 (ref 16–34)
ECHO LA VOLUME INDEX MOD A2C: 23 ML/M2 (ref 16–34)
ECHO LA VOLUME INDEX MOD A4C: 29 ML/M2 (ref 16–34)
ECHO LV EF PHYSICIAN: 62 %
ECHO LV FRACTIONAL SHORTENING: 31 % (ref 28–44)
ECHO LV INTERNAL DIMENSION DIASTOLE INDEX: 2.59 CM/M2
ECHO LV INTERNAL DIMENSION DIASTOLIC: 5.2 CM (ref 4.2–5.9)
ECHO LV INTERNAL DIMENSION SYSTOLIC INDEX: 1.79 CM/M2
ECHO LV INTERNAL DIMENSION SYSTOLIC: 3.6 CM
ECHO LV IVSD: 0.8 CM (ref 0.6–1)
ECHO LV IVSS: 1.2 CM
ECHO LV MASS 2D: 181.4 G (ref 88–224)
ECHO LV MASS INDEX 2D: 90.2 G/M2 (ref 49–115)
ECHO LV POSTERIOR WALL DIASTOLIC: 1.1 CM (ref 0.6–1)
ECHO LV POSTERIOR WALL SYSTOLIC: 1.2 CM
ECHO LV RELATIVE WALL THICKNESS RATIO: 0.42
ECHO LVOT AREA: 3.5 CM2
ECHO LVOT AV VTI INDEX: 0.51
ECHO LVOT DIAM: 2.1 CM
ECHO LVOT MEAN GRADIENT: 2 MMHG
ECHO LVOT PEAK GRADIENT: 4 MMHG
ECHO LVOT PEAK VELOCITY: 1 M/S
ECHO LVOT STROKE VOLUME INDEX: 39.1 ML/M2
ECHO LVOT SV: 78.6 ML
ECHO LVOT VTI: 22.7 CM
ECHO MV "A" WAVE DURATION: 99 MSEC
ECHO MV A VELOCITY: 0.7 M/S
ECHO MV AREA PHT: 3.8 CM2
ECHO MV AREA VTI: 2.8 CM2
ECHO MV E DECELERATION TIME (DT): 172.5 MS
ECHO MV E VELOCITY: 0.91 M/S
ECHO MV E/A RATIO: 1.3
ECHO MV LVOT VTI INDEX: 1.22
ECHO MV MAX VELOCITY: 0.9 M/S
ECHO MV MEAN GRADIENT: 1 MMHG
ECHO MV MEAN VELOCITY: 0.4 M/S
ECHO MV PEAK GRADIENT: 3 MMHG
ECHO MV PRESSURE HALF TIME (PHT): 58.5 MS
ECHO MV VTI: 27.7 CM
ECHO PV MAX VELOCITY: 0.8 M/S
ECHO PV MEAN GRADIENT: 2 MMHG
ECHO PV MEAN VELOCITY: 0.6 M/S
ECHO PV PEAK GRADIENT: 3 MMHG
ECHO PV VTI: 18.3 CM
ECHO PVEIN A DURATION: 125.6 MS
ECHO PVEIN A VELOCITY: 0.2 M/S
ECHO PVEIN PEAK D VELOCITY: 0.3 M/S
ECHO PVEIN PEAK S VELOCITY: 0.4 M/S
ECHO PVEIN S/D RATIO: 1.3
ECHO RIGHT VENTRICULAR SYSTOLIC PRESSURE (RVSP): 21 MMHG
ECHO RV INTERNAL DIMENSION: 2.9 CM
ECHO RV TAPSE: 1.9 CM (ref 1.7–?)
ECHO TV REGURGITANT MAX VELOCITY: 2.1 M/S
ECHO TV REGURGITANT PEAK GRADIENT: 18 MMHG

## 2025-03-01 PROCEDURE — 6370000000 HC RX 637 (ALT 250 FOR IP): Performed by: NURSE PRACTITIONER

## 2025-03-01 PROCEDURE — 2500000003 HC RX 250 WO HCPCS: Performed by: NURSE PRACTITIONER

## 2025-03-01 PROCEDURE — 93306 TTE W/DOPPLER COMPLETE: CPT | Performed by: INTERNAL MEDICINE

## 2025-03-01 PROCEDURE — 99239 HOSP IP/OBS DSCHRG MGMT >30: CPT | Performed by: INTERNAL MEDICINE

## 2025-03-01 RX ORDER — ASPIRIN 81 MG/1
81 TABLET, CHEWABLE ORAL DAILY
Qty: 30 TABLET | Refills: 0 | Status: SHIPPED | OUTPATIENT
Start: 2025-03-02

## 2025-03-01 RX ORDER — ATORVASTATIN CALCIUM 40 MG/1
40 TABLET, FILM COATED ORAL NIGHTLY
Qty: 30 TABLET | Refills: 0 | Status: SHIPPED | OUTPATIENT
Start: 2025-03-01

## 2025-03-01 RX ADMIN — BENZOCAINE AND MENTHOL 1 LOZENGE: 15; 3.6 LOZENGE ORAL at 09:47

## 2025-03-01 RX ADMIN — ASPIRIN 81 MG CHEWABLE TABLET 81 MG: 81 TABLET CHEWABLE at 09:41

## 2025-03-01 RX ADMIN — BENZONATATE 100 MG: 100 CAPSULE ORAL at 09:47

## 2025-03-01 RX ADMIN — ACETAMINOPHEN 650 MG: 325 TABLET ORAL at 09:47

## 2025-03-01 RX ADMIN — BENZTROPINE MESYLATE 0.5 MG: 0.5 TABLET ORAL at 09:41

## 2025-03-01 RX ADMIN — TICAGRELOR 90 MG: 90 TABLET ORAL at 09:41

## 2025-03-01 RX ADMIN — SODIUM CHLORIDE, PRESERVATIVE FREE 10 ML: 5 INJECTION INTRAVENOUS at 09:41

## 2025-03-01 ASSESSMENT — PAIN DESCRIPTION - DESCRIPTORS: DESCRIPTORS: ACHING;DISCOMFORT;SORE

## 2025-03-01 ASSESSMENT — PAIN SCALES - GENERAL
PAINLEVEL_OUTOF10: 8
PAINLEVEL_OUTOF10: 0

## 2025-03-01 ASSESSMENT — PAIN DESCRIPTION - ORIENTATION: ORIENTATION: LOWER

## 2025-03-01 ASSESSMENT — PAIN - FUNCTIONAL ASSESSMENT: PAIN_FUNCTIONAL_ASSESSMENT: ACTIVITIES ARE NOT PREVENTED

## 2025-03-01 NOTE — DISCHARGE SUMMARY
[facility] Hospitalist Physician Discharge Summary       [facility]  [address] [facility]  [phone]          Activity level: As tolerated    Dispo: Home      Condition on discharge:  Stable    Patient ID:  Colton Hurley  [id number]  59 y.o.  1965    Admit date: 2/26/2025    Discharge date and time:  3/1/2025  3:48 PM    Admission Diagnoses: Principal Problem:    Acute CVA (cerebrovascular accident) (HCC)  Resolved Problems:    * No resolved hospital problems. *      Discharge Diagnoses: Principal Problem:    Acute CVA (cerebrovascular accident) (HCC)  Resolved Problems:    * No resolved hospital problems. *      Consults:  IP CONSULT TO CRITICAL CARE    Procedures:  none    Hospital Course:   Patient Colton Hurley is a 59 y.o. presented with Acute CVA (cerebrovascular accident) (HCC) [I63.9]  Colton Hurley has a past medical history that includes COPD, schizoaffective disorder, hepatitis C, depression, BPH     Colton presents ER with stroke symptoms.  He was having right-sided facial droop, slurred speech, right-sided weakness, and fell.  He was found on his way walking to [employer]. Stroke alert was called when he got to ER and was found to have left-sided MCA M1 occlusion and L ICA occlusion.  He he had left thrombectomy done.  He he is admitted to neuro ICU.  He was placed on aspirin, Brilinta and Lipitor.  Following day MRI done shows infection with tiny hemorrhage.  He improved significantly with his neurological deficit.  He is being discharged to SNF and and neurologist.    Discharge Exam:    General Appearance: alert and oriented to person, place and time and in no acute distress  Skin: warm and dry  Head: normocephalic and atraumatic  Eyes: pupils equal, round, and reactive to light, extraocular eye movements intact, conjunctivae normal  Neck: neck supple and non tender without mass   Pulmonary/Chest: clear to auscultation bilaterally- no wheezes, rales  known as: LIPITOR  Take 1 tablet by mouth nightly     ticagrelor 90 MG Tabs tablet  Commonly known as: BRILINTA  Take 1 tablet by mouth 2 times daily            CONTINUE taking these medications      mirtazapine 15 MG tablet  Commonly known as: REMERON     OLANZapine 10 MG tablet  Commonly known as: ZYPREXA            STOP taking these medications      benzonatate 100 MG capsule  Commonly known as: TESSALON     benztropine 0.5 MG tablet  Commonly known as: COGENTIN     FLUoxetine 20 MG capsule  Commonly known as: PROZAC     Incruse Ellipta 62.5 MCG/INH inhaler  Generic drug: umeclidinium bromide     montelukast 10 MG tablet  Commonly known as: SINGULAIR     pantoprazole 40 MG tablet  Commonly known as: PROTONIX     permethrin 1 % liquid  Commonly known as: NIX     tamsulosin 0.4 MG capsule  Commonly known as: FLOMAX     traZODone 50 MG tablet  Commonly known as: DESYREL               Where to Get Your Medications        These medications were sent to [address] [place] [address] [phone] - F [phone]  [address] [facility] [address]      Phone: [phone]   aspirin 81 MG chewable tablet  atorvastatin 40 MG tablet  ticagrelor 90 MG Tabs tablet           Note that more than 30 minutes was spent in preparing discharge papers, discussing discharge with patient, medication review, etc.    Signed:  Electronically signed by JANA ALAN MD on 3/1/2025 at 3:48 PM

## 2025-03-01 NOTE — CARE COORDINATION
Discharge order noted, PAS for 5:30pm ambullete. Notified Shahnaz at [facility]. Notified charge RN of discharge/time. Called sister Nenita, unable to leave message. Received call back from sister, updated her on discharge/time/destination.Farideh Jones, MSW, LSW

## 2025-03-01 NOTE — PROGRESS NOTES
Message sent to Dr. Almendarez regarding echo not yet resulted. Per Dr. Almendarez, patient can follow up outpatient with PCP and neurologist.

## 2025-03-01 NOTE — PLAN OF CARE
Problem: Chronic Conditions and Co-morbidities  Goal: Patient's chronic conditions and co-morbidity symptoms are monitored and maintained or improved  Outcome: Progressing     Problem: Discharge Planning  Goal: Discharge to home or other facility with appropriate resources  Outcome: Progressing     Problem: Pain  Goal: Verbalizes/displays adequate comfort level or baseline comfort level  Outcome: Progressing     Problem: Neurosensory - Adult  Goal: Achieves stable or improved neurological status  Outcome: Progressing  Goal: Absence of seizures  Outcome: Progressing  Goal: Remains free of injury related to seizures activity  Outcome: Progressing  Goal: Achieves maximal functionality and self care  Outcome: Progressing     Problem: Respiratory - Adult  Goal: Achieves optimal ventilation and oxygenation  Outcome: Progressing     Problem: Cardiovascular - Adult  Goal: Maintains optimal cardiac output and hemodynamic stability  Outcome: Progressing     Problem: Infection - Adult  Goal: Absence of infection at discharge  Outcome: Progressing     Problem: Metabolic/Fluid and Electrolytes - Adult  Goal: Electrolytes maintained within normal limits  Outcome: Progressing     Problem: Skin/Tissue Integrity  Goal: Skin integrity remains intact  Description: 1.  Monitor for areas of redness and/or skin breakdown  2.  Assess vascular access sites hourly  3.  Every 4-6 hours minimum:  Change oxygen saturation probe site  4.  Every 4-6 hours:  If on nasal continuous positive airway pressure, respiratory therapy assess nares and determine need for appliance change or resting period  Outcome: Progressing     Problem: Safety - Adult  Goal: Free from fall injury  Outcome: Progressing     Problem: ABCDS Injury Assessment  Goal: Absence of physical injury  Outcome: Progressing

## 2025-03-01 NOTE — PROGRESS NOTES
Spiritual Health History and Assessment/Progress Note  [facility] [place]    (P) Initial Encounter, Spiritual/Emotional Needs,  ,  ,      Name: Colton Hurley MRN: [id number]    Age: 59 y.o.     Sex: male   Language: English   Evangelical: Judaism   Acute CVA (cerebrovascular accident) (HCC)     Date: 2/28/2025                           Spiritual Assessment began in [facility] IMCU/NEURO        Referral/Consult From: (P) Rounding   Encounter Overview/Reason: (P) Initial Encounter, Spiritual/Emotional Needs  Service Provided For: (P) Patient    Zohreh, Belief, Meaning:   Patient has beliefs or practices that help with coping during difficult times  Family/Friends No family/friends present      Importance and Influence:  Patient has spiritual/personal beliefs that influence decisions regarding their health  Family/Friends No family/friends present    Community:  Patient feels well-supported. Support system includes: Extended family  Family/Friends No family/friends present    Assessment and Plan of Care:     Patient Interventions include: Facilitated expression of thoughts and feelings, Explored spiritual coping/struggle/distress, and Affirmed coping skills/support systems  Family/Friends Interventions include: No family/friends present    Patient Plan of Care: Spiritual Care available upon further referral  Family/Friends Plan of Care: No family/friends present    Electronically signed by Chaplain Anju on 2/28/2025 at 7:31 PM

## 2025-03-01 NOTE — PROGRESS NOTES
Faxed stroke clinic appointment request form to stroke clinic office with request to schedule post hospital stroke clinic follow up appointment.

## 2025-03-01 NOTE — DISCHARGE INSTR - COC
Continuity of Care Form    Patient Name: Colton Hurely   :  1965  MRN:  [id number]    Admit date:  2025  Discharge date:  3/1/25    Code Status Order: Full Code   Advance Directives:   Advance Care Flowsheet Documentation             Admitting Physician:  Laura Horan DO  PCP: Luis Antonio Barney DO    Discharging Nurse: DANIELLE Antoine  Discharging Hospital Unit/Room#: 8516/8516-B  Discharging Unit Phone Number: [phone]    Emergency Contact:   Extended Emergency Contact Information  Primary Emergency Contact: Nenita Jessica  Mobile Phone: [phone]  Relation: Brother/Sister    Past Surgical History:  Past Surgical History:   Procedure Laterality Date    BACK SURGERY      LEG SURGERY Left     Pins inserted tib/ fib    UPPER GASTROINTESTINAL ENDOSCOPY N/A 2021    EGD BIOPSY performed by Leo Zurita MD at [facility] ENDOSCOPY       Immunization History:   Immunization History   Administered Date(s) Administered    COVID-19, MODERNA BLUE border, Primary or Immunocompromised, (age 12y+), IM, 100 mcg/0.5mL 2021    Influenza, AFLURIA (age 3 y+), FLUZONE, (age 6 mo+), Quadv MDV, 0.5mL 2017, 2017    Influenza, FLUCELVAX, (age 6 mo+), MDCK, Quadv PF, 0.5mL 2021    Pneumococcal, PPSV23, PNEUMOVAX 23, (age 2y+), SC/IM, 0.5mL 2019       Active Problems:  Patient Active Problem List   Diagnosis Code    Schizophrenia, schizo-affective (HCC) F25.9    Dermatitis L30.9    Pulmonary emphysema (HCC) J43.9    Cystic mass of pancreas K86.2    Spondylosis of lumbar region without myelopathy or radiculopathy M47.816    Gastroesophageal reflux disease without esophagitis K21.9    Cervical adenopathy R59.0    Depression F32.A    Severe major depression without psychotic features (HCC) F32.2    Exposure to communicable diseases Z20.9    Hyponatremia E87.1    Abdominal pain R10.9    Tobacco abuse Z72.0    Benign prostatic hyperplasia with nocturia N40.1, R35.1    Acute CVA (cerebrovascular  done    Treatments at the Time of Hospital Discharge:   Respiratory Treatments: albuterol q6 PRN for wheezing, duoneb q PRN for SOB  Oxygen Therapy:  is not on home oxygen therapy.  Ventilator:    - No ventilator support    Rehab Therapies: Physical Therapy and Occupational Therapy  Weight Bearing Status/Restrictions: No weight bearing restrictions  Other Medical Equipment (for information only, NOT a DME order):  walker  Other Treatments: N/A    Patient's personal belongings (please select all that are sent with patient):  clothing    RN SIGNATURE:  Electronically signed by Bettie Antoine RN on 3/1/25 at 4:21 PM EST    CASE MANAGEMENT/SOCIAL WORK SECTION    Inpatient Status Date: ***    Readmission Risk Assessment Score:  [facility] RISK OF UNPLANNED READMISSION 2.0             12.7 Total Score        Discharging to Facility/ Agency   Name:   Address:  Phone:  Fax:    Dialysis Facility (if applicable)   Name:  Address:  Dialysis Schedule:  Phone:  Fax:    / signature: {Esignature:[id number]}    PHYSICIAN SECTION    Prognosis: {Prognosis:8367563750}    Condition at Discharge: { Patient Condition:450528351}    Rehab Potential (if transferring to Rehab): {Prognosis:7286569814}    Recommended Labs or Other Treatments After Discharge: ***    Physician Certification: I certify the above information and transfer of Colton Hurley  is necessary for the continuing treatment of the diagnosis listed and that he requires {Admit to Appropriate Level of Care:25148} for {GREATER/LESS:297481594} 30 days.     Update Admission H&P: {CHP DME Changes in HandP:275334064}    PHYSICIAN SIGNATURE:  {Esignature:[id number]}

## 2025-05-11 ENCOUNTER — APPOINTMENT (OUTPATIENT)
Dept: ULTRASOUND IMAGING | Age: 60
DRG: 176 | End: 2025-05-11
Payer: MEDICARE

## 2025-05-11 ENCOUNTER — APPOINTMENT (OUTPATIENT)
Dept: CT IMAGING | Age: 60
DRG: 176 | End: 2025-05-11
Payer: MEDICARE

## 2025-05-11 ENCOUNTER — HOSPITAL ENCOUNTER (INPATIENT)
Age: 60
LOS: 2 days | Discharge: HOME | DRG: 176 | End: 2025-05-13
Attending: STUDENT IN AN ORGANIZED HEALTH CARE EDUCATION/TRAINING PROGRAM | Admitting: FAMILY MEDICINE
Payer: MEDICARE

## 2025-05-11 DIAGNOSIS — I26.99 ACUTE PULMONARY EMBOLISM WITHOUT ACUTE COR PULMONALE, UNSPECIFIED PULMONARY EMBOLISM TYPE (HCC): ICD-10-CM

## 2025-05-11 DIAGNOSIS — I26.99 BILATERAL PULMONARY EMBOLISM (HCC): Primary | ICD-10-CM

## 2025-05-11 DIAGNOSIS — R10.84 GENERALIZED ABDOMINAL PAIN: ICD-10-CM

## 2025-05-11 DIAGNOSIS — Z91.148 NONADHERENCE TO MEDICATION: ICD-10-CM

## 2025-05-11 LAB
ALBUMIN SERPL-MCNC: 4.1 G/DL (ref 3.5–5.2)
ALP SERPL-CCNC: 135 U/L (ref 40–129)
ALT SERPL-CCNC: 6 U/L (ref 0–50)
AMPHET UR QL SCN: NEGATIVE
ANION GAP SERPL CALCULATED.3IONS-SCNC: 17 MMOL/L (ref 7–16)
APAP SERPL-MCNC: <5 UG/ML (ref 10–30)
AST SERPL-CCNC: 20 U/L (ref 0–50)
BARBITURATES UR QL SCN: NEGATIVE
BASOPHILS # BLD: 0.02 K/UL (ref 0–0.2)
BASOPHILS NFR BLD: 0 % (ref 0–2)
BENZODIAZ UR QL: NEGATIVE
BILIRUB DIRECT SERPL-MCNC: 0.4 MG/DL (ref 0–0.2)
BILIRUB INDIRECT SERPL-MCNC: 0.5 MG/DL (ref 0–1)
BILIRUB SERPL-MCNC: 0.9 MG/DL (ref 0–1.2)
BILIRUB UR QL STRIP: ABNORMAL
BNP SERPL-MCNC: 318 PG/ML (ref 0–125)
BUN SERPL-MCNC: 19 MG/DL (ref 6–20)
BUPRENORPHINE UR QL: NEGATIVE
CALCIUM SERPL-MCNC: 9.7 MG/DL (ref 8.6–10)
CANNABINOIDS UR QL SCN: NEGATIVE
CHLORIDE SERPL-SCNC: 101 MMOL/L (ref 98–107)
CHP ED QC CHECK: NORMAL
CLARITY UR: CLEAR
CO2 SERPL-SCNC: 21 MMOL/L (ref 22–29)
COCAINE UR QL SCN: NEGATIVE
COLOR UR: YELLOW
CREAT SERPL-MCNC: 0.7 MG/DL (ref 0.7–1.2)
CRYSTALS URNS MICRO: ABNORMAL /HPF
EOSINOPHIL # BLD: 0.02 K/UL (ref 0.05–0.5)
EOSINOPHILS RELATIVE PERCENT: 0 % (ref 0–6)
ERYTHROCYTE [DISTWIDTH] IN BLOOD BY AUTOMATED COUNT: 12.9 % (ref 11.5–15)
ETHANOLAMINE SERPL-MCNC: <10 MG/DL (ref 0–0.08)
FENTANYL UR QL: NEGATIVE
GFR, ESTIMATED: >90 ML/MIN/1.73M2
GLUCOSE SERPL-MCNC: 119 MG/DL (ref 74–99)
GLUCOSE UR STRIP-MCNC: NEGATIVE MG/DL
HCT VFR BLD AUTO: 47.9 % (ref 37–54)
HEMOCCULT STL QL: NEGATIVE
HGB BLD-MCNC: 16.5 G/DL (ref 12.5–16.5)
HGB UR QL STRIP.AUTO: NEGATIVE
IMM GRANULOCYTES # BLD AUTO: 0.04 K/UL (ref 0–0.58)
IMM GRANULOCYTES NFR BLD: 0 % (ref 0–5)
KETONES UR STRIP-MCNC: >80 MG/DL
LACTATE BLDV-SCNC: 2 MMOL/L (ref 0.5–2.2)
LACTATE BLDV-SCNC: 2.3 MMOL/L (ref 0.5–2.2)
LEUKOCYTE ESTERASE UR QL STRIP: ABNORMAL
LIPASE SERPL-CCNC: 17 U/L (ref 13–60)
LYMPHOCYTES NFR BLD: 1.73 K/UL (ref 1.5–4)
LYMPHOCYTES RELATIVE PERCENT: 17 % (ref 20–42)
MAGNESIUM SERPL-MCNC: 2 MG/DL (ref 1.6–2.6)
MCH RBC QN AUTO: 35.8 PG (ref 26–35)
MCHC RBC AUTO-ENTMCNC: 34.4 G/DL (ref 32–34.5)
MCV RBC AUTO: 103.9 FL (ref 80–99.9)
METHADONE UR QL: NEGATIVE
MONOCYTES NFR BLD: 0.58 K/UL (ref 0.1–0.95)
MONOCYTES NFR BLD: 6 % (ref 2–12)
NEUTROPHILS NFR BLD: 76 % (ref 43–80)
NEUTS SEG NFR BLD: 7.59 K/UL (ref 1.8–7.3)
NITRITE UR QL STRIP: NEGATIVE
OPIATES UR QL SCN: NEGATIVE
OXYCODONE UR QL SCN: NEGATIVE
PARTIAL THROMBOPLASTIN TIME: 32.1 SEC (ref 24.5–35.1)
PARTIAL THROMBOPLASTIN TIME: 32.6 SEC (ref 24.5–35.1)
PCP UR QL SCN: NEGATIVE
PH UR STRIP: 6 [PH] (ref 5–8)
PLATELET # BLD AUTO: 233 K/UL (ref 130–450)
PMV BLD AUTO: 10.4 FL (ref 7–12)
POTASSIUM SERPL-SCNC: 4.3 MMOL/L (ref 3.5–5.1)
PROT SERPL-MCNC: 7.5 G/DL (ref 6.4–8.3)
PROT UR STRIP-MCNC: 30 MG/DL
RBC # BLD AUTO: 4.61 M/UL (ref 3.8–5.8)
RBC #/AREA URNS HPF: ABNORMAL /HPF
SALICYLATES SERPL-MCNC: <0.5 MG/DL (ref 0–30)
SODIUM SERPL-SCNC: 139 MMOL/L (ref 136–145)
SP GR UR STRIP: >1.03 (ref 1–1.03)
TEST INFORMATION: NORMAL
TOXIC TRICYCLIC SC,BLOOD: NEGATIVE
TROPONIN I SERPL HS-MCNC: 43 NG/L (ref 0–22)
TROPONIN I SERPL HS-MCNC: 43 NG/L (ref 0–22)
UROBILINOGEN UR STRIP-ACNC: 2 EU/DL (ref 0–1)
WBC #/AREA URNS HPF: ABNORMAL /HPF
WBC OTHER # BLD: 10 K/UL (ref 4.5–11.5)

## 2025-05-11 PROCEDURE — 70450 CT HEAD/BRAIN W/O DYE: CPT

## 2025-05-11 PROCEDURE — 71275 CT ANGIOGRAPHY CHEST: CPT

## 2025-05-11 PROCEDURE — 87086 URINE CULTURE/COLONY COUNT: CPT

## 2025-05-11 PROCEDURE — G0480 DRUG TEST DEF 1-7 CLASSES: HCPCS

## 2025-05-11 PROCEDURE — 80307 DRUG TEST PRSMV CHEM ANLYZR: CPT

## 2025-05-11 PROCEDURE — 83690 ASSAY OF LIPASE: CPT

## 2025-05-11 PROCEDURE — 80143 DRUG ASSAY ACETAMINOPHEN: CPT

## 2025-05-11 PROCEDURE — 83735 ASSAY OF MAGNESIUM: CPT

## 2025-05-11 PROCEDURE — 6360000004 HC RX CONTRAST MEDICATION: Performed by: RADIOLOGY

## 2025-05-11 PROCEDURE — 83605 ASSAY OF LACTIC ACID: CPT

## 2025-05-11 PROCEDURE — 2140000000 HC CCU INTERMEDIATE R&B

## 2025-05-11 PROCEDURE — 84484 ASSAY OF TROPONIN QUANT: CPT

## 2025-05-11 PROCEDURE — 93005 ELECTROCARDIOGRAM TRACING: CPT | Performed by: NURSE PRACTITIONER

## 2025-05-11 PROCEDURE — 82248 BILIRUBIN DIRECT: CPT

## 2025-05-11 PROCEDURE — 2580000003 HC RX 258: Performed by: NURSE PRACTITIONER

## 2025-05-11 PROCEDURE — 99285 EMERGENCY DEPT VISIT HI MDM: CPT

## 2025-05-11 PROCEDURE — 83880 ASSAY OF NATRIURETIC PEPTIDE: CPT

## 2025-05-11 PROCEDURE — 6370000000 HC RX 637 (ALT 250 FOR IP): Performed by: FAMILY MEDICINE

## 2025-05-11 PROCEDURE — 74177 CT ABD & PELVIS W/CONTRAST: CPT

## 2025-05-11 PROCEDURE — 80179 DRUG ASSAY SALICYLATE: CPT

## 2025-05-11 PROCEDURE — 85025 COMPLETE CBC W/AUTO DIFF WBC: CPT

## 2025-05-11 PROCEDURE — 85730 THROMBOPLASTIN TIME PARTIAL: CPT

## 2025-05-11 PROCEDURE — 6360000002 HC RX W HCPCS: Performed by: NURSE PRACTITIONER

## 2025-05-11 PROCEDURE — 99222 1ST HOSP IP/OBS MODERATE 55: CPT | Performed by: FAMILY MEDICINE

## 2025-05-11 PROCEDURE — 81001 URINALYSIS AUTO W/SCOPE: CPT

## 2025-05-11 PROCEDURE — 93970 EXTREMITY STUDY: CPT

## 2025-05-11 PROCEDURE — 80053 COMPREHEN METABOLIC PANEL: CPT

## 2025-05-11 RX ORDER — PHENOBARBITAL 32.4 MG/1
32.4 TABLET ORAL 2 TIMES DAILY
Status: COMPLETED | OUTPATIENT
Start: 2025-05-12 | End: 2025-05-13

## 2025-05-11 RX ORDER — PHENOBARBITAL 32.4 MG/1
16.2 TABLET ORAL 2 TIMES DAILY
Status: DISCONTINUED | OUTPATIENT
Start: 2025-05-13 | End: 2025-05-13 | Stop reason: HOSPADM

## 2025-05-11 RX ORDER — HEPARIN SODIUM 1000 [USP'U]/ML
80 INJECTION, SOLUTION INTRAVENOUS; SUBCUTANEOUS ONCE
Status: COMPLETED | OUTPATIENT
Start: 2025-05-11 | End: 2025-05-11

## 2025-05-11 RX ORDER — PHENOBARBITAL 32.4 MG/1
16.2 TABLET ORAL EVERY 6 HOURS PRN
Status: DISCONTINUED | OUTPATIENT
Start: 2025-05-13 | End: 2025-05-13 | Stop reason: HOSPADM

## 2025-05-11 RX ORDER — ONDANSETRON 2 MG/ML
4 INJECTION INTRAMUSCULAR; INTRAVENOUS EVERY 6 HOURS PRN
Status: DISCONTINUED | OUTPATIENT
Start: 2025-05-11 | End: 2025-05-13 | Stop reason: HOSPADM

## 2025-05-11 RX ORDER — HEPARIN SODIUM 1000 [USP'U]/ML
80 INJECTION, SOLUTION INTRAVENOUS; SUBCUTANEOUS PRN
Status: DISCONTINUED | OUTPATIENT
Start: 2025-05-11 | End: 2025-05-13

## 2025-05-11 RX ORDER — IOPAMIDOL 755 MG/ML
75 INJECTION, SOLUTION INTRAVASCULAR
Status: CANCELLED | OUTPATIENT
Start: 2025-05-11

## 2025-05-11 RX ORDER — POTASSIUM CHLORIDE 7.45 MG/ML
10 INJECTION INTRAVENOUS PRN
Status: DISCONTINUED | OUTPATIENT
Start: 2025-05-11 | End: 2025-05-13 | Stop reason: HOSPADM

## 2025-05-11 RX ORDER — ACETAMINOPHEN 650 MG/1
650 SUPPOSITORY RECTAL EVERY 6 HOURS PRN
Status: DISCONTINUED | OUTPATIENT
Start: 2025-05-11 | End: 2025-05-13 | Stop reason: HOSPADM

## 2025-05-11 RX ORDER — SODIUM CHLORIDE 9 MG/ML
INJECTION, SOLUTION INTRAVENOUS PRN
Status: DISCONTINUED | OUTPATIENT
Start: 2025-05-11 | End: 2025-05-13 | Stop reason: HOSPADM

## 2025-05-11 RX ORDER — IOPAMIDOL 755 MG/ML
75 INJECTION, SOLUTION INTRAVASCULAR
Status: COMPLETED | OUTPATIENT
Start: 2025-05-11 | End: 2025-05-11

## 2025-05-11 RX ORDER — SODIUM CHLORIDE 0.9 % (FLUSH) 0.9 %
5-40 SYRINGE (ML) INJECTION EVERY 12 HOURS SCHEDULED
Status: DISCONTINUED | OUTPATIENT
Start: 2025-05-11 | End: 2025-05-13 | Stop reason: HOSPADM

## 2025-05-11 RX ORDER — SODIUM CHLORIDE 0.9 % (FLUSH) 0.9 %
5-40 SYRINGE (ML) INJECTION PRN
Status: DISCONTINUED | OUTPATIENT
Start: 2025-05-11 | End: 2025-05-13 | Stop reason: HOSPADM

## 2025-05-11 RX ORDER — POLYETHYLENE GLYCOL 3350 17 G/17G
17 POWDER, FOR SOLUTION ORAL DAILY PRN
Status: DISCONTINUED | OUTPATIENT
Start: 2025-05-11 | End: 2025-05-13 | Stop reason: HOSPADM

## 2025-05-11 RX ORDER — HEPARIN SODIUM 1000 [USP'U]/ML
40 INJECTION, SOLUTION INTRAVENOUS; SUBCUTANEOUS PRN
Status: DISCONTINUED | OUTPATIENT
Start: 2025-05-11 | End: 2025-05-13

## 2025-05-11 RX ORDER — ASPIRIN 81 MG/1
81 TABLET, CHEWABLE ORAL DAILY
Status: DISCONTINUED | OUTPATIENT
Start: 2025-05-12 | End: 2025-05-13 | Stop reason: HOSPADM

## 2025-05-11 RX ORDER — ACETAMINOPHEN 325 MG/1
650 TABLET ORAL EVERY 6 HOURS PRN
Status: DISCONTINUED | OUTPATIENT
Start: 2025-05-11 | End: 2025-05-13 | Stop reason: HOSPADM

## 2025-05-11 RX ORDER — ALBUTEROL SULFATE 0.83 MG/ML
2.5 SOLUTION RESPIRATORY (INHALATION) EVERY 6 HOURS PRN
Status: DISCONTINUED | OUTPATIENT
Start: 2025-05-11 | End: 2025-05-13 | Stop reason: HOSPADM

## 2025-05-11 RX ORDER — IOPAMIDOL 755 MG/ML
70 INJECTION, SOLUTION INTRAVASCULAR
Status: COMPLETED | OUTPATIENT
Start: 2025-05-11 | End: 2025-05-11

## 2025-05-11 RX ORDER — ATORVASTATIN CALCIUM 40 MG/1
40 TABLET, FILM COATED ORAL NIGHTLY
Status: DISCONTINUED | OUTPATIENT
Start: 2025-05-11 | End: 2025-05-13 | Stop reason: HOSPADM

## 2025-05-11 RX ORDER — HEPARIN SODIUM 10000 [USP'U]/100ML
5-30 INJECTION, SOLUTION INTRAVENOUS CONTINUOUS
Status: DISCONTINUED | OUTPATIENT
Start: 2025-05-11 | End: 2025-05-13

## 2025-05-11 RX ORDER — POTASSIUM CHLORIDE 1500 MG/1
40 TABLET, EXTENDED RELEASE ORAL PRN
Status: DISCONTINUED | OUTPATIENT
Start: 2025-05-11 | End: 2025-05-13 | Stop reason: HOSPADM

## 2025-05-11 RX ORDER — 0.9 % SODIUM CHLORIDE 0.9 %
1000 INTRAVENOUS SOLUTION INTRAVENOUS ONCE
Status: COMPLETED | OUTPATIENT
Start: 2025-05-11 | End: 2025-05-11

## 2025-05-11 RX ORDER — PHENOBARBITAL 32.4 MG/1
32.4 TABLET ORAL EVERY 6 HOURS PRN
Status: DISCONTINUED | OUTPATIENT
Start: 2025-05-11 | End: 2025-05-13 | Stop reason: HOSPADM

## 2025-05-11 RX ORDER — MAGNESIUM SULFATE IN WATER 40 MG/ML
2000 INJECTION, SOLUTION INTRAVENOUS PRN
Status: DISCONTINUED | OUTPATIENT
Start: 2025-05-11 | End: 2025-05-13 | Stop reason: HOSPADM

## 2025-05-11 RX ORDER — ONDANSETRON 4 MG/1
4 TABLET, ORALLY DISINTEGRATING ORAL EVERY 8 HOURS PRN
Status: DISCONTINUED | OUTPATIENT
Start: 2025-05-11 | End: 2025-05-13 | Stop reason: HOSPADM

## 2025-05-11 RX ORDER — MIRTAZAPINE 15 MG/1
7.5 TABLET, FILM COATED ORAL NIGHTLY
Status: DISCONTINUED | OUTPATIENT
Start: 2025-05-11 | End: 2025-05-13 | Stop reason: HOSPADM

## 2025-05-11 RX ORDER — LANOLIN ALCOHOL/MO/W.PET/CERES
100 CREAM (GRAM) TOPICAL DAILY
Status: DISCONTINUED | OUTPATIENT
Start: 2025-05-12 | End: 2025-05-13 | Stop reason: HOSPADM

## 2025-05-11 RX ORDER — ALBUTEROL SULFATE 90 UG/1
2 INHALANT RESPIRATORY (INHALATION) EVERY 6 HOURS PRN
Status: DISCONTINUED | OUTPATIENT
Start: 2025-05-11 | End: 2025-05-11 | Stop reason: SDUPTHER

## 2025-05-11 RX ORDER — PHENOBARBITAL 32.4 MG/1
32.4 TABLET ORAL 4 TIMES DAILY
Status: DISPENSED | OUTPATIENT
Start: 2025-05-11 | End: 2025-05-12

## 2025-05-11 RX ADMIN — HEPARIN SODIUM 18 UNITS/KG/HR: 10000 INJECTION, SOLUTION INTRAVENOUS at 21:40

## 2025-05-11 RX ADMIN — IOPAMIDOL 75 ML: 755 INJECTION, SOLUTION INTRAVENOUS at 15:07

## 2025-05-11 RX ADMIN — SODIUM CHLORIDE 1000 ML: 0.9 INJECTION, SOLUTION INTRAVENOUS at 12:26

## 2025-05-11 RX ADMIN — ATORVASTATIN CALCIUM 40 MG: 40 TABLET, FILM COATED ORAL at 22:51

## 2025-05-11 RX ADMIN — OLANZAPINE 15 MG: 5 TABLET, FILM COATED ORAL at 22:53

## 2025-05-11 RX ADMIN — MIRTAZAPINE 7.5 MG: 15 TABLET, FILM COATED ORAL at 22:51

## 2025-05-11 RX ADMIN — IOPAMIDOL 70 ML: 755 INJECTION, SOLUTION INTRAVENOUS at 19:36

## 2025-05-11 RX ADMIN — HEPARIN SODIUM 6500 UNITS: 1000 INJECTION INTRAVENOUS; SUBCUTANEOUS at 21:39

## 2025-05-11 ASSESSMENT — LIFESTYLE VARIABLES
HOW MANY STANDARD DRINKS CONTAINING ALCOHOL DO YOU HAVE ON A TYPICAL DAY: 5 OR 6
HOW OFTEN DO YOU HAVE A DRINK CONTAINING ALCOHOL: 4 OR MORE TIMES A WEEK

## 2025-05-11 ASSESSMENT — PAIN - FUNCTIONAL ASSESSMENT: PAIN_FUNCTIONAL_ASSESSMENT: NONE - DENIES PAIN

## 2025-05-11 NOTE — ED NOTES
The following labs were labeled with appropriate pt sticker and tubed to lab:     [] Blue     [x] Lavender   [] on ice  [x] Green/yellow  [] Green/black [] on ice  [x] Grey  [x] on ice  [] Yellow  [x] Red  [] Type/ Screen  [] ABG  [] VBG    [] COVID-19 swab    [] Rapid  [] PCR  [] Flu swab  [] Peds Viral Panel     [] Urine Sample  [] Fecal Sample  [] Pelvic Cultures  [] Blood Cultures  [] X 2  [] STREP Cultures

## 2025-05-11 NOTE — ED PROVIDER NOTES
Diagnosis Date    Arthritis     Asthma 02/26/2018    BPH (benign prostatic hyperplasia)     Chronic back pain     Plates inseted from L2-L5    COPD (chronic obstructive pulmonary disease) (HCC)     Depression     Emphysema lung (HCC)     Hepatitis C 2011    Schizo affective schizophrenia (HCC)        CONSULTS: (Who and What was discussed)  IP CONSULT TO HOSPITALIST  IP CONSULT TO NEUROLOGY    Discussion with Other Profesionals : Admitting Team Dr. Bravo and Consultant      Social Determinants : Patient has significant healthcare illiteracy. Additional time provided in explanations. and patient has a significant psychiatric overlay, history of tobacco and alcohol abuse.    Records Reviewed : Inpatient Notes discharge summary from 3/1/2025, patient presented with an acute CVA, had a left thrombectomy completed from his left-sided MCA M1 occlusion and left ICA occlusion.  Admitted to neuro ICU, placed on aspirin, Brilinta and Lipitor, follow-up MRI revealed infarction with tiny hemorrhage, he was discharged to SNF for follow-up with neurology.  Chart review does not appointment.  On further chart review, patient had not followed up with neurology.    CC/HPI Summary, DDx, ED Course, and Reassessment: Briefly is a 59-year-old male patient who is presenting with a 3-week history of abdominal pain, left flank pain, blood in urine, dark diarrheal stool.  Patient is an overall poor historian.  States he has not eaten anything in 2 weeks.  Has been drinking water.     I phoned patient's sister for collateral information, please refer to ED course as above.     On exam patient is awake, alert, he is oriented to himself, disoriented to month, oriented to year.  His abdomen is soft, diffuse tenderness throughout, no CVA tenderness.  Rectal exam completed, green diarrheal stool noted heme-negative.  No palpable masses.     Differentials considered include bowel obstruction, enteritis, gastroenteritis, colitis, acute alcohol

## 2025-05-12 ENCOUNTER — APPOINTMENT (OUTPATIENT)
Age: 60
DRG: 176 | End: 2025-05-12
Attending: INTERNAL MEDICINE
Payer: MEDICARE

## 2025-05-12 LAB
ANION GAP SERPL CALCULATED.3IONS-SCNC: 14 MMOL/L (ref 7–16)
BASOPHILS # BLD: 0.04 K/UL (ref 0–0.2)
BASOPHILS NFR BLD: 1 % (ref 0–2)
BUN SERPL-MCNC: 14 MG/DL (ref 6–20)
CALCIUM SERPL-MCNC: 9 MG/DL (ref 8.6–10)
CHLORIDE SERPL-SCNC: 105 MMOL/L (ref 98–107)
CO2 SERPL-SCNC: 19 MMOL/L (ref 22–29)
CREAT SERPL-MCNC: 0.5 MG/DL (ref 0.7–1.2)
EKG ATRIAL RATE: 70 BPM
EKG P AXIS: 60 DEGREES
EKG P-R INTERVAL: 128 MS
EKG Q-T INTERVAL: 390 MS
EKG QRS DURATION: 76 MS
EKG QTC CALCULATION (BAZETT): 421 MS
EKG R AXIS: 44 DEGREES
EKG T AXIS: 72 DEGREES
EKG VENTRICULAR RATE: 70 BPM
EOSINOPHIL # BLD: 0.15 K/UL (ref 0.05–0.5)
EOSINOPHILS RELATIVE PERCENT: 2 % (ref 0–6)
ERYTHROCYTE [DISTWIDTH] IN BLOOD BY AUTOMATED COUNT: 12.9 % (ref 11.5–15)
FOLATE SERPL-MCNC: 7 NG/ML (ref 4.6–34.8)
GFR, ESTIMATED: >90 ML/MIN/1.73M2
GLUCOSE SERPL-MCNC: 80 MG/DL (ref 74–99)
HCT VFR BLD AUTO: 43 % (ref 37–54)
HGB BLD-MCNC: 14.7 G/DL (ref 12.5–16.5)
IMM GRANULOCYTES # BLD AUTO: 0.03 K/UL (ref 0–0.58)
IMM GRANULOCYTES NFR BLD: 0 % (ref 0–5)
LYMPHOCYTES NFR BLD: 2.35 K/UL (ref 1.5–4)
LYMPHOCYTES RELATIVE PERCENT: 29 % (ref 20–42)
MCH RBC QN AUTO: 35.5 PG (ref 26–35)
MCHC RBC AUTO-ENTMCNC: 34.2 G/DL (ref 32–34.5)
MCV RBC AUTO: 103.9 FL (ref 80–99.9)
MICROORGANISM SPEC CULT: ABNORMAL
MICROORGANISM SPEC CULT: ABNORMAL
MONOCYTES NFR BLD: 0.63 K/UL (ref 0.1–0.95)
MONOCYTES NFR BLD: 8 % (ref 2–12)
NEUTROPHILS NFR BLD: 60 % (ref 43–80)
NEUTS SEG NFR BLD: 4.8 K/UL (ref 1.8–7.3)
PARTIAL THROMBOPLASTIN TIME: 190.6 SEC (ref 24.5–35.1)
PARTIAL THROMBOPLASTIN TIME: 62.3 SEC (ref 24.5–35.1)
PARTIAL THROMBOPLASTIN TIME: 90.9 SEC (ref 24.5–35.1)
PLATELET # BLD AUTO: 164 K/UL (ref 130–450)
PMV BLD AUTO: 10.3 FL (ref 7–12)
POTASSIUM SERPL-SCNC: 3.6 MMOL/L (ref 3.5–5.1)
RBC # BLD AUTO: 4.14 M/UL (ref 3.8–5.8)
SERVICE CMNT-IMP: ABNORMAL
SODIUM SERPL-SCNC: 138 MMOL/L (ref 136–145)
SPECIMEN DESCRIPTION: ABNORMAL
T4 FREE SERPL-MCNC: 0.9 NG/DL (ref 0.9–1.7)
TSH SERPL DL<=0.05 MIU/L-ACNC: 1.56 UIU/ML (ref 0.27–4.2)
VIT B12 SERPL-MCNC: 226 PG/ML (ref 232–1245)
WBC OTHER # BLD: 8 K/UL (ref 4.5–11.5)

## 2025-05-12 PROCEDURE — 99232 SBSQ HOSP IP/OBS MODERATE 35: CPT | Performed by: INTERNAL MEDICINE

## 2025-05-12 PROCEDURE — 6370000000 HC RX 637 (ALT 250 FOR IP): Performed by: INTERNAL MEDICINE

## 2025-05-12 PROCEDURE — 81291 MTHFR GENE: CPT

## 2025-05-12 PROCEDURE — 6360000002 HC RX W HCPCS: Performed by: FAMILY MEDICINE

## 2025-05-12 PROCEDURE — 85730 THROMBOPLASTIN TIME PARTIAL: CPT

## 2025-05-12 PROCEDURE — 81400 MOPATH PROCEDURE LEVEL 1: CPT

## 2025-05-12 PROCEDURE — 6370000000 HC RX 637 (ALT 250 FOR IP): Performed by: FAMILY MEDICINE

## 2025-05-12 PROCEDURE — 81240 F2 GENE: CPT

## 2025-05-12 PROCEDURE — 82746 ASSAY OF FOLIC ACID SERUM: CPT

## 2025-05-12 PROCEDURE — 82607 VITAMIN B-12: CPT

## 2025-05-12 PROCEDURE — 93010 ELECTROCARDIOGRAM REPORT: CPT | Performed by: INTERNAL MEDICINE

## 2025-05-12 PROCEDURE — 36415 COLL VENOUS BLD VENIPUNCTURE: CPT

## 2025-05-12 PROCEDURE — 2500000003 HC RX 250 WO HCPCS: Performed by: FAMILY MEDICINE

## 2025-05-12 PROCEDURE — 80048 BASIC METABOLIC PNL TOTAL CA: CPT

## 2025-05-12 PROCEDURE — 81241 F5 GENE: CPT

## 2025-05-12 PROCEDURE — 85025 COMPLETE CBC W/AUTO DIFF WBC: CPT

## 2025-05-12 PROCEDURE — 93306 TTE W/DOPPLER COMPLETE: CPT

## 2025-05-12 PROCEDURE — 6360000004 HC RX CONTRAST MEDICATION: Performed by: INTERNAL MEDICINE

## 2025-05-12 PROCEDURE — 84443 ASSAY THYROID STIM HORMONE: CPT

## 2025-05-12 PROCEDURE — 2140000000 HC CCU INTERMEDIATE R&B

## 2025-05-12 PROCEDURE — 84439 ASSAY OF FREE THYROXINE: CPT

## 2025-05-12 RX ORDER — ERGOCALCIFEROL 1.25 MG/1
50000 CAPSULE, LIQUID FILLED ORAL WEEKLY
COMMUNITY

## 2025-05-12 RX ORDER — BENZTROPINE MESYLATE 0.5 MG/1
0.5 TABLET ORAL 2 TIMES DAILY
COMMUNITY

## 2025-05-12 RX ORDER — ERGOCALCIFEROL 1.25 MG/1
50000 CAPSULE, LIQUID FILLED ORAL WEEKLY
Status: DISCONTINUED | OUTPATIENT
Start: 2025-05-12 | End: 2025-05-13 | Stop reason: HOSPADM

## 2025-05-12 RX ORDER — BENZTROPINE MESYLATE 0.5 MG/1
0.5 TABLET ORAL 2 TIMES DAILY
Status: DISCONTINUED | OUTPATIENT
Start: 2025-05-12 | End: 2025-05-13 | Stop reason: HOSPADM

## 2025-05-12 RX ORDER — PAROXETINE 30 MG/1
30 TABLET, FILM COATED ORAL EVERY MORNING
COMMUNITY

## 2025-05-12 RX ORDER — PAROXETINE 10 MG/1
30 TABLET, FILM COATED ORAL EVERY MORNING
Status: DISCONTINUED | OUTPATIENT
Start: 2025-05-12 | End: 2025-05-13 | Stop reason: HOSPADM

## 2025-05-12 RX ADMIN — SULFUR HEXAFLUORIDE 2 ML: 60.7; .19; .19 INJECTION, POWDER, LYOPHILIZED, FOR SUSPENSION INTRAVENOUS; INTRAVESICAL at 13:56

## 2025-05-12 RX ADMIN — Medication 3 MG: at 20:08

## 2025-05-12 RX ADMIN — SODIUM CHLORIDE, PRESERVATIVE FREE 10 ML: 5 INJECTION INTRAVENOUS at 08:34

## 2025-05-12 RX ADMIN — HEPARIN SODIUM 15 UNITS/KG/HR: 10000 INJECTION, SOLUTION INTRAVENOUS at 18:44

## 2025-05-12 RX ADMIN — BENZTROPINE MESYLATE 0.5 MG: 0.5 TABLET ORAL at 20:08

## 2025-05-12 RX ADMIN — PHENOBARBITAL 32.4 MG: 32.4 TABLET ORAL at 15:03

## 2025-05-12 RX ADMIN — Medication 100 MG: at 08:34

## 2025-05-12 RX ADMIN — SODIUM CHLORIDE, PRESERVATIVE FREE 10 ML: 5 INJECTION INTRAVENOUS at 20:08

## 2025-05-12 RX ADMIN — OLANZAPINE 15 MG: 5 TABLET, FILM COATED ORAL at 20:09

## 2025-05-12 RX ADMIN — PHENOBARBITAL 32.4 MG: 32.4 TABLET ORAL at 13:04

## 2025-05-12 RX ADMIN — PAROXETINE HYDROCHLORIDE 30 MG: 10 TABLET, FILM COATED ORAL at 15:03

## 2025-05-12 RX ADMIN — HEPARIN SODIUM 15 UNITS/KG/HR: 10000 INJECTION, SOLUTION INTRAVENOUS at 05:19

## 2025-05-12 RX ADMIN — ERGOCALCIFEROL 50000 UNITS: 1.25 CAPSULE ORAL at 15:01

## 2025-05-12 RX ADMIN — BENZTROPINE MESYLATE 0.5 MG: 0.5 TABLET ORAL at 15:01

## 2025-05-12 RX ADMIN — ASPIRIN 81 MG CHEWABLE TABLET 81 MG: 81 TABLET CHEWABLE at 08:34

## 2025-05-12 RX ADMIN — PHENOBARBITAL 32.4 MG: 32.4 TABLET ORAL at 20:12

## 2025-05-12 RX ADMIN — MIRTAZAPINE 7.5 MG: 15 TABLET, FILM COATED ORAL at 20:10

## 2025-05-12 RX ADMIN — ATORVASTATIN CALCIUM 40 MG: 40 TABLET, FILM COATED ORAL at 20:10

## 2025-05-12 RX ADMIN — PHENOBARBITAL 32.4 MG: 32.4 TABLET ORAL at 08:34

## 2025-05-12 ASSESSMENT — PAIN SCALES - GENERAL
PAINLEVEL_OUTOF10: 0
PAINLEVEL_OUTOF10: 0

## 2025-05-12 NOTE — ED NOTES
The following labs were labeled with appropriate pt sticker and tubed to lab:     [x] Blue     [] Lavender   [] on ice  [] Green/yellow  [] Green/black [] on ice  [] Grey  [] on ice  [] Yellow  [] Red  [] Type/ Screen  [] ABG  [] VBG    [] COVID-19 swab    [] Rapid  [] PCR  [] Flu swab  [] Peds Viral Panel     [] Urine Sample  [] Fecal Sample  [] Pelvic Cultures  [] Blood Cultures  [] X 2  [] STREP Cultures

## 2025-05-12 NOTE — ED NOTES
Per pt, pt has not drank in over a month and does not have withdraw sx.  Internal med team contacted regarding if they want pheno and ciwa orders followed through.

## 2025-05-12 NOTE — CONSULTS
Pulmonary Consultation    Admit Date: 5/11/2025    Requesting Physician: Luis Antonio Barney DO    CC:  Consult for Bilateral PE    HPI:  Colton Hurley 59 y.o. male with PMH of COPD, arthritis, BPH, hep C, schizoaffective disorder, EtOH abuse, CVA February 2025 status post left MCA thrombectomy with left ICA angioplasty and stent placement    Presented to ED 5/11: With complaints of diarrhea, dark stools, blood in his urine for the past 3 weeks as well as left flank pain.  Had reported he was not eating for 2 weeks.  Was discharged to rehab after his hospitalization in February.  Since leaving rehab 1 month ago had not been taking any of his medications including his Brilinta and aspirin.  His sister reported to ER provider that patient had been drinking alcohol very heavily since being discharged from rehab.  In ED, lactic 2.3 then 2.0, troponin 43, 43, urine tox negative, ethanol negative, normal WBC and hemoglobin, occult stool negative  CTA pulmonary ordered after CT abdomen and pelvis noted filling duct defect in left lower lobe, positive for acute PE bilaterally predominant towards left lower lobe, no evidence of heart strain, moderate to severe left subclavian artery stenosis, CTA neck recommended on routine basis  Ultrasound bilateral lower extremities negative    Patient seen resting in bed on RA, pox 97%.  He reports feeling \"a little\" short of breath.  He denies chest or back pain currently.  He denies cough or wheeze.  He was discharged from rehab approximately 1 month ago, and he reports he has been fairly active at home.  He does walk with a cane.  He has not spent long periods in the car or on an airplane recently.  He does admit to regular alcohol use, drinking approximately a six pack of beer daily, sometimes more.  He denies a personal or family history of blood clot in the past.  He has never had a colonoscopy.  He does report issues with diarrhea over the past 3 weeks.  He reports stools are

## 2025-05-12 NOTE — H&P
Breast Cancer Mother     Heart Disease Father        REVIEW OF SYSTEMS:   Pertinent positives as noted in the HPI. All other systems reviewed and negative.    PHYSICAL EXAM:    /75   Pulse 55   Temp 97 °F (36.1 °C)   Resp 18   Wt 80.9 kg (178 lb 5.6 oz)   SpO2 97%   BMI 24.88 kg/m²     General appearance:  No apparent distress, appears stated age and cooperative.  HEENT:  Normal cephalic, atraumatic without obvious deformity. Pupils equal, round, and reactive to light.  Extra ocular muscles intact. Conjunctivae/corneas clear.  Neck: Supple, with full range of motion. No jugular venous distention. Trachea midline.  Respiratory:  Normal respiratory effort. Clear to auscultation, bilaterally without Rales/Wheezes/Rhonchi.  Cardiovascular:  Regular rate and rhythm with normal S1/S2 without murmurs, rubs or gallops.  Abdomen: Soft, non-tender, non-distended with normal bowel sounds.  Musculoskeletal:  No clubbing, cyanosis or edema bilaterally.  Full range of motion without deformity.  Skin: Skin color, texture, turgor normal.  No rashes or lesions.  Neurologic:  Neurovascularly intact without any focal sensory/motor deficits. Cranial nerves: II-XII intact, grossly non-focal.  Psychiatric:  Alert and oriented, thought content appropriate, normal insight    CT pulmonary:  I have reviewed the CT pulmonary with the following interpretation: acute bilateral pulmonary emboli predominantly towards the left lower lung with no signs of RV strain  EKG:  I have reviewed the EKG with the following interpretation: Normal sinus rhythm    Labs:     Recent Labs     05/11/25  1223   WBC 10.0   HGB 16.5   HCT 47.9        Recent Labs     05/11/25  1223      K 4.3      CO2 21*   BUN 19   CREATININE 0.7   CALCIUM 9.7     Recent Labs     05/11/25  1223   AST 20   ALT 6   BILIDIR 0.4*   BILITOT 0.9   ALKPHOS 135*     No results for input(s): \"INR\" in the last 72 hours.  No results for input(s): \"CKTOTAL\",

## 2025-05-12 NOTE — PLAN OF CARE
Neuroimaging from February 2025 reviewed.    Patient underwent left MCA thrombectomy along with left ICA angioplasty and stenting by Dr. Mijares.  Patchy moderate infarct in left MCA territory back in February and patient was supposed to be on dual antiplatelet therapy with aspirin and Brilinta for fresh left carotid stent.    I have reviewed the head CT from 5/11.  There is no evidence of acute intracranial abnormality.  There are areas of encephalomalacia as expected in the areas of stroke back from February.  Exclusively from a neurovascular standpoint, this patient can be started on full dose therapeutic anticoagulation for PE.  Also, his carotid stent is more than 12 weeks old now, and hence Brilinta can be safely stopped but continue aspirin 81 mg in addition to anticoagulation.  Please make sure that his systolic blood pressure is less than 160 mmHg while he is on anticoagulation.    Since his carotid stent in February, he has not had any follow-up vascular imaging.  Also if his medication compliance was questionable, I suggest obtaining an ultrasound carotid duplex to assess for left carotid stent patency.    Please call me with any additional questions/concerns.    Fareed Lind M.D.  Vascular Neurology & Neurointerventional Surgery

## 2025-05-13 ENCOUNTER — APPOINTMENT (OUTPATIENT)
Dept: GENERAL RADIOLOGY | Age: 60
DRG: 176 | End: 2025-05-13
Payer: MEDICARE

## 2025-05-13 VITALS
SYSTOLIC BLOOD PRESSURE: 98 MMHG | HEART RATE: 72 BPM | RESPIRATION RATE: 18 BRPM | DIASTOLIC BLOOD PRESSURE: 65 MMHG | OXYGEN SATURATION: 94 % | TEMPERATURE: 97.4 F | BODY MASS INDEX: 24.92 KG/M2 | HEIGHT: 71 IN | WEIGHT: 178 LBS

## 2025-05-13 LAB
ANION GAP SERPL CALCULATED.3IONS-SCNC: 11 MMOL/L (ref 7–16)
BUN SERPL-MCNC: 15 MG/DL (ref 6–20)
CALCIUM SERPL-MCNC: 8.8 MG/DL (ref 8.6–10)
CHLORIDE SERPL-SCNC: 105 MMOL/L (ref 98–107)
CO2 SERPL-SCNC: 23 MMOL/L (ref 22–29)
CREAT SERPL-MCNC: 0.7 MG/DL (ref 0.7–1.2)
ECHO AO ASC DIAM: 3.3 CM
ECHO AO ASCENDING AORTA INDEX: 1.64 CM/M2
ECHO AV AREA PEAK VELOCITY: 1.8 CM2
ECHO AV AREA VTI: 1.8 CM2
ECHO AV AREA/BSA PEAK VELOCITY: 0.9 CM2/M2
ECHO AV AREA/BSA VTI: 0.9 CM2/M2
ECHO AV CUSP MM: 1.2 CM
ECHO AV MEAN GRADIENT: 10 MMHG
ECHO AV MEAN VELOCITY: 1.5 M/S
ECHO AV PEAK GRADIENT: 17 MMHG
ECHO AV PEAK VELOCITY: 2.1 M/S
ECHO AV VELOCITY RATIO: 0.52
ECHO AV VTI: 38.3 CM
ECHO BSA: 2.01 M2
ECHO EST RA PRESSURE: 3 MMHG
ECHO LA VOL A-L A2C: 33 ML (ref 18–58)
ECHO LA VOL A-L A4C: 25 ML (ref 18–58)
ECHO LA VOL MOD A2C: 31 ML (ref 18–58)
ECHO LA VOL MOD A4C: 23 ML (ref 18–58)
ECHO LA VOLUME AREA LENGTH: 32 ML
ECHO LA VOLUME INDEX A-L A2C: 16 ML/M2 (ref 16–34)
ECHO LA VOLUME INDEX A-L A4C: 12 ML/M2 (ref 16–34)
ECHO LA VOLUME INDEX AREA LENGTH: 16 ML/M2 (ref 16–34)
ECHO LA VOLUME INDEX MOD A2C: 15 ML/M2 (ref 16–34)
ECHO LA VOLUME INDEX MOD A4C: 11 ML/M2 (ref 16–34)
ECHO LV EDV A2C: 68 ML
ECHO LV EDV A4C: 57 ML
ECHO LV EDV BP: 61 ML (ref 67–155)
ECHO LV EDV INDEX A4C: 28 ML/M2
ECHO LV EDV INDEX BP: 30 ML/M2
ECHO LV EDV NDEX A2C: 34 ML/M2
ECHO LV EJECTION FRACTION 3D: 60 %
ECHO LV EJECTION FRACTION A2C: 68 %
ECHO LV EJECTION FRACTION A4C: 67 %
ECHO LV EJECTION FRACTION BIPLANE: 67 % (ref 55–100)
ECHO LV ESV A2C: 22 ML
ECHO LV ESV A4C: 19 ML
ECHO LV ESV BP: 20 ML (ref 22–58)
ECHO LV ESV INDEX A2C: 11 ML/M2
ECHO LV ESV INDEX A4C: 9 ML/M2
ECHO LV ESV INDEX BP: 10 ML/M2
ECHO LV FRACTIONAL SHORTENING: 27 % (ref 28–44)
ECHO LV INTERNAL DIMENSION DIASTOLE INDEX: 2.04 CM/M2
ECHO LV INTERNAL DIMENSION DIASTOLIC: 4.1 CM (ref 4.2–5.9)
ECHO LV INTERNAL DIMENSION SYSTOLIC INDEX: 1.49 CM/M2
ECHO LV INTERNAL DIMENSION SYSTOLIC: 3 CM
ECHO LV ISOVOLUMETRIC RELAXATION TIME (IVRT): 96.9 MS
ECHO LV IVSD: 0.9 CM (ref 0.6–1)
ECHO LV IVSS: 1.6 CM
ECHO LV MASS 2D: 123 G (ref 88–224)
ECHO LV MASS INDEX 2D: 61.2 G/M2 (ref 49–115)
ECHO LV POSTERIOR WALL DIASTOLIC: 1 CM (ref 0.6–1)
ECHO LV POSTERIOR WALL SYSTOLIC: 1.4 CM
ECHO LV RELATIVE WALL THICKNESS RATIO: 0.49
ECHO LVOT AREA: 3.1 CM2
ECHO LVOT AV VTI INDEX: 0.55
ECHO LVOT DIAM: 2 CM
ECHO LVOT MEAN GRADIENT: 3 MMHG
ECHO LVOT PEAK GRADIENT: 5 MMHG
ECHO LVOT PEAK VELOCITY: 1.1 M/S
ECHO LVOT STROKE VOLUME INDEX: 33.1 ML/M2
ECHO LVOT SV: 66.6 ML
ECHO LVOT VTI: 21.2 CM
ECHO MV "A" WAVE DURATION: 115.3 MSEC
ECHO MV A VELOCITY: 0.79 M/S
ECHO MV AREA PHT: 3.1 CM2
ECHO MV AREA VTI: 3 CM2
ECHO MV E DECELERATION TIME (DT): 230.4 MS
ECHO MV E VELOCITY: 0.78 M/S
ECHO MV E/A RATIO: 0.99
ECHO MV LVOT VTI INDEX: 1.04
ECHO MV MAX VELOCITY: 0.8 M/S
ECHO MV MEAN GRADIENT: 1 MMHG
ECHO MV MEAN VELOCITY: 0.5 M/S
ECHO MV PEAK GRADIENT: 3 MMHG
ECHO MV PRESSURE HALF TIME (PHT): 71.1 MS
ECHO MV VTI: 22.1 CM
ECHO PV MAX VELOCITY: 1.2 M/S
ECHO PV MEAN GRADIENT: 4 MMHG
ECHO PV MEAN VELOCITY: 0.9 M/S
ECHO PV PEAK GRADIENT: 6 MMHG
ECHO PV VTI: 26.3 CM
ECHO PVEIN A DURATION: 115.3 MS
ECHO PVEIN A VELOCITY: 0.4 M/S
ECHO PVEIN PEAK D VELOCITY: 0.3 M/S
ECHO PVEIN PEAK S VELOCITY: 0.7 M/S
ECHO PVEIN S/D RATIO: 2.3
ECHO RIGHT VENTRICULAR SYSTOLIC PRESSURE (RVSP): 23 MMHG
ECHO RV INTERNAL DIMENSION: 3.3 CM
ECHO TV REGURGITANT MAX VELOCITY: 2.23 M/S
ECHO TV REGURGITANT PEAK GRADIENT: 20 MMHG
ERYTHROCYTE [DISTWIDTH] IN BLOOD BY AUTOMATED COUNT: 13.1 % (ref 11.5–15)
GFR, ESTIMATED: >90 ML/MIN/1.73M2
GLUCOSE SERPL-MCNC: 100 MG/DL (ref 74–99)
HCT VFR BLD AUTO: 39 % (ref 37–54)
HGB BLD-MCNC: 13.4 G/DL (ref 12.5–16.5)
MCH RBC QN AUTO: 35.8 PG (ref 26–35)
MCHC RBC AUTO-ENTMCNC: 34.4 G/DL (ref 32–34.5)
MCV RBC AUTO: 104.3 FL (ref 80–99.9)
PARTIAL THROMBOPLASTIN TIME: 56.8 SEC (ref 24.5–35.1)
PARTIAL THROMBOPLASTIN TIME: 79.4 SEC (ref 24.5–35.1)
PLATELET # BLD AUTO: 155 K/UL (ref 130–450)
PMV BLD AUTO: 10.6 FL (ref 7–12)
POTASSIUM SERPL-SCNC: 3.6 MMOL/L (ref 3.5–5.1)
RBC # BLD AUTO: 3.74 M/UL (ref 3.8–5.8)
SODIUM SERPL-SCNC: 139 MMOL/L (ref 136–145)
WBC OTHER # BLD: 6.3 K/UL (ref 4.5–11.5)

## 2025-05-13 PROCEDURE — 2500000003 HC RX 250 WO HCPCS: Performed by: FAMILY MEDICINE

## 2025-05-13 PROCEDURE — 36415 COLL VENOUS BLD VENIPUNCTURE: CPT

## 2025-05-13 PROCEDURE — 94664 DEMO&/EVAL PT USE INHALER: CPT

## 2025-05-13 PROCEDURE — 6370000000 HC RX 637 (ALT 250 FOR IP): Performed by: INTERNAL MEDICINE

## 2025-05-13 PROCEDURE — 6360000002 HC RX W HCPCS: Performed by: NURSE PRACTITIONER

## 2025-05-13 PROCEDURE — 85730 THROMBOPLASTIN TIME PARTIAL: CPT

## 2025-05-13 PROCEDURE — 71045 X-RAY EXAM CHEST 1 VIEW: CPT

## 2025-05-13 PROCEDURE — 80048 BASIC METABOLIC PNL TOTAL CA: CPT

## 2025-05-13 PROCEDURE — 6370000000 HC RX 637 (ALT 250 FOR IP): Performed by: FAMILY MEDICINE

## 2025-05-13 PROCEDURE — 99232 SBSQ HOSP IP/OBS MODERATE 35: CPT | Performed by: INTERNAL MEDICINE

## 2025-05-13 PROCEDURE — 93306 TTE W/DOPPLER COMPLETE: CPT | Performed by: INTERNAL MEDICINE

## 2025-05-13 PROCEDURE — 85027 COMPLETE CBC AUTOMATED: CPT

## 2025-05-13 RX ORDER — LANOLIN ALCOHOL/MO/W.PET/CERES
100 CREAM (GRAM) TOPICAL DAILY
Qty: 30 TABLET | Refills: 3 | Status: SHIPPED | OUTPATIENT
Start: 2025-05-14

## 2025-05-13 RX ORDER — UBIDECARENONE 75 MG
100 CAPSULE ORAL DAILY
Qty: 30 TABLET | Refills: 3 | Status: SHIPPED | OUTPATIENT
Start: 2025-05-13 | End: 2025-09-10

## 2025-05-13 RX ORDER — ARFORMOTEROL TARTRATE 15 UG/2ML
15 SOLUTION RESPIRATORY (INHALATION)
Status: DISCONTINUED | OUTPATIENT
Start: 2025-05-13 | End: 2025-05-13 | Stop reason: HOSPADM

## 2025-05-13 RX ADMIN — ASPIRIN 81 MG CHEWABLE TABLET 81 MG: 81 TABLET CHEWABLE at 07:49

## 2025-05-13 RX ADMIN — Medication 100 MG: at 07:49

## 2025-05-13 RX ADMIN — APIXABAN 10 MG: 5 TABLET, FILM COATED ORAL at 14:17

## 2025-05-13 RX ADMIN — PAROXETINE HYDROCHLORIDE 30 MG: 10 TABLET, FILM COATED ORAL at 07:49

## 2025-05-13 RX ADMIN — ARFORMOTEROL TARTRATE 15 MCG: 15 SOLUTION RESPIRATORY (INHALATION) at 09:43

## 2025-05-13 RX ADMIN — PHENOBARBITAL 32.4 MG: 32.4 TABLET ORAL at 07:49

## 2025-05-13 RX ADMIN — SODIUM CHLORIDE, PRESERVATIVE FREE 10 ML: 5 INJECTION INTRAVENOUS at 07:49

## 2025-05-13 RX ADMIN — BENZTROPINE MESYLATE 0.5 MG: 0.5 TABLET ORAL at 07:53

## 2025-05-13 ASSESSMENT — LIFESTYLE VARIABLES
HOW MANY STANDARD DRINKS CONTAINING ALCOHOL DO YOU HAVE ON A TYPICAL DAY: 1 OR 2
HOW OFTEN DO YOU HAVE A DRINK CONTAINING ALCOHOL: 2-3 TIMES A WEEK

## 2025-05-13 ASSESSMENT — PAIN SCALES - GENERAL: PAINLEVEL_OUTOF10: 0

## 2025-05-13 NOTE — PLAN OF CARE
Problem: Chronic Conditions and Co-morbidities  Goal: Patient's chronic conditions and co-morbidity symptoms are monitored and maintained or improved  5/13/2025 0721 by Felicia Resendiz RN  Outcome: Progressing     Problem: Discharge Planning  Goal: Discharge to home or other facility with appropriate resources  5/13/2025 0721 by Felicia Resendiz RN  Outcome: Progressing     Problem: Safety - Adult  Goal: Free from fall injury  5/13/2025 0721 by Felicia Resendiz RN  Outcome: Progressing     Problem: Pain  Goal: Verbalizes/displays adequate comfort level or baseline comfort level  5/13/2025 0721 by Felicia Resendiz RN  Outcome: Progressing

## 2025-05-13 NOTE — CONSULTS
NEUROLOGY CONSULT NOTE      Impression:  Pulmonary embolism  CVA    Principal Problem:    Acute pulmonary embolism without acute cor pulmonale, unspecified pulmonary embolism type (HCC)  Resolved Problems:    * No resolved hospital problems. *      Recommendations:                                            Continue with anticoagulation as it has been 3 months since the CVA and hemorrhagic conversion  ***  Plan of care and all questions and concerns of patient /family were discussed at the bedside.         Requesting Physician: {Blank single:19197::\"***\",\"Delvis Kwong MD\"}    Reason for {Blank single:19197::\"Admission\",\"Consult\"}:  Evaluate for ***    History of Present Illness:  Colton Hurley is a 59 y.o. male  with h/o *** who was admitted to [facility] {Blank single:19197::\"[place]\",\"[place]\",\"[place]\",\"[place]\",\"[place]\",\"[place]\"} on 5/11/2025 with presentation with ***    Denies headaches, vision problems, speech disturbance, weakness, numbness, bladder or bowel problems. Denies chest pain dizziness, light headedness.        Past Medical History:        Diagnosis Date    Arthritis     Asthma 02/26/2018    BPH (benign prostatic hyperplasia)     Chronic back pain     Plates inseted from L2-L5    COPD (chronic obstructive pulmonary disease) (HCC)     Depression     Emphysema lung (HCC)     Hepatitis C 2011    Schizo affective schizophrenia (HCC)            Procedure Laterality Date    BACK SURGERY  1994    IR CAROTID STENT W PROTECTION  2/26/2025    IR CAROTID STENT W PROTECTION 2/26/2025 Kehinde Coffey MD [facility]    IR MECHANICAL ART THROMBECTOMY INTRACRANIAL  2/26/2025    IR MECHANICAL ART THROMBECTOMY INTRACRANIAL 2/26/2025 Kehinde Coffey MD [facility]    LEG SURGERY Left     Pins inserted tib/ fib    UPPER GASTROINTESTINAL ENDOSCOPY N/A 06/25/2021    EGD BIOPSY performed by Leo Zurita MD at [facility] ENDOSCOPY       Social History:  Social History

## 2025-05-13 NOTE — PLAN OF CARE
Problem: Chronic Conditions and Co-morbidities  Goal: Patient's chronic conditions and co-morbidity symptoms are monitored and maintained or improved  5/13/2025 0126 by Ashley Nichols RN  Outcome: Progressing  5/12/2025 1127 by Felicia Resendiz RN  Outcome: Progressing     Problem: Discharge Planning  Goal: Discharge to home or other facility with appropriate resources  5/13/2025 0126 by Ashley Nichols RN  Outcome: Progressing  5/12/2025 1127 by Felicia Resendiz RN  Outcome: Progressing     Problem: Safety - Adult  Goal: Free from fall injury  5/13/2025 0126 by Ashley Nichols RN  Outcome: Progressing  5/12/2025 1127 by Felicia Resendiz RN  Outcome: Progressing     Problem: Pain  Goal: Verbalizes/displays adequate comfort level or baseline comfort level  Outcome: Progressing

## 2025-05-13 NOTE — PATIENT CARE CONFERENCE
P Quality Flow/Interdisciplinary Rounds Progress Note        Quality Flow Rounds held on May 13, 2025    Disciplines Attending:  Bedside Nurse, , , and Nursing Unit Leadership    Colton Hurley was admitted on 5/11/2025  5:32 PM    Anticipated Discharge Date:       Disposition:    Miki Score:  Miki Scale Score: 20    [facility] RISK OF UNPLANNED READMISSION 2.0             14.8 Total Score        Discussed patient goal for the day, patient clinical progression, and barriers to discharge.  The following Goal(s) of the Day/Commitment(s) have been identified:  Report labs/diagnostics       Kasandra Hubbard RN  May 13, 2025

## 2025-05-13 NOTE — CARE COORDINATION
5/13/25  Spoke with patient regarding transition of care.  Patient admitted for abdominal pain and hematuria. Patient noted to have bilateral pulmonary embolism. Patients heparin is being transitioned to Eliquis. ECHO complete with EF of 60-65%. CT shows no evidence of heart strain. US of bilateral lower ext is negative.  Patient noted to have dependence issues with alcohol.  Spoke with patient and completed an alcohol screening.  Peer recovery support was offered but declined.  Patient has a history of non compliane with meds.  Cost is not an issue with medication coverage. Discharge meds to be provided though the hospital pharmacy .  Patient lives with his sister and states the plan is to return home with her.  Patients only DME is a cane. Patient is independent with ADL's.  PCP is Dr. Barney and patient goes to [place] for case management. Discharge goal is home with sister.  No discharge needs noted per patient.  SW/CM to follow.    Electronically signed by MCKENZIE Dietrich on 5/13/2025 at 2:07 PM     Case Management Assessment  Initial Evaluation    Date/Time of Evaluation: 5/13/2025 2:12 PM  Assessment Completed by: MCKENZIE Dietrich    If patient is discharged prior to next notation, then this note serves as note for discharge by case management.    Patient Name: Colton Hurley                   YOB: 1965  Diagnosis: Generalized abdominal pain [R10.84]  Bilateral pulmonary embolism (HCC) [I26.99]  Nonadherence to medication [Z91.148]  Acute pulmonary embolism without acute cor pulmonale, unspecified pulmonary embolism type (HCC) [I26.99]                   Date / Time: 5/11/2025  5:32 PM    Patient Admission Status: Inpatient   Readmission Risk (Low < 19, Mod (19-27), High > 27): Readmission Risk Score: 15.8    Current PCP: Luis Antonio Barney, DO  PCP verified by CM? Yes    Chart Reviewed: Yes      History Provided by: Patient  Patient Orientation: Alert and Oriented, Person, Place,

## 2025-05-14 ENCOUNTER — TELEPHONE (OUTPATIENT)
Dept: PRIMARY CARE CLINIC | Age: 60
End: 2025-05-14

## 2025-05-14 NOTE — PROGRESS NOTES
HOSPITALIST PROGRESS NOTES     ADMITTING DATE AND TIME: 5/11/2025  5:32 PM  had concerns including Diarrhea (Patient states he has been having black diarrhea x 3 weeks since he had a stroke. - thinners), Abdominal Pain (Left lower side abdominal pain ), and Hematuria (States he has blood in his urine).    ADMIT DX: Generalized abdominal pain [R10.84]  Bilateral pulmonary embolism (HCC) [I26.99]  Nonadherence to medication [Z91.148]  Acute pulmonary embolism without acute cor pulmonale, unspecified pulmonary embolism type (HCC) [I26.99]      SUBJECTIVE:  Patient is being followed for Generalized abdominal pain [R10.84]  Bilateral pulmonary embolism (HCC) [I26.99]  Nonadherence to medication [Z91.148]  Acute pulmonary embolism without acute cor pulmonale, unspecified pulmonary embolism type (HCC) [I26.99]     Patient was seen examined and evaluated  Recent lab results, charts and pertinent diagnostic imaging reviewed   Ancillary service notes reviewed   Right sided facial drop     Care reviewed with nursing staff, medical and surgical specialty care, primary care and the patient's family as available.   ROS: denies fever, chills, cp, sob, n/v, HA unless stated above.      sodium chloride flush  5-40 mL IntraVENous 2 times per day    melatonin  3 mg Oral Nightly    sodium chloride flush  5-40 mL IntraVENous 2 times per day    thiamine  100 mg Oral Daily    PHENobarbital  32.4 mg Oral 4x daily    Followed by    PHENobarbital  32.4 mg Oral BID    Followed by    [START ON 5/13/2025] PHENobarbital  16.2 mg Oral BID    aspirin  81 mg Oral Daily    atorvastatin  40 mg Oral Nightly    mirtazapine  7.5 mg Oral Nightly    OLANZapine  15 mg Oral Nightly     sodium chloride flush, 5-40 mL, PRN  sodium chloride, , PRN  potassium chloride, 40 mEq, PRN   Or  potassium alternative oral replacement, 40 mEq, 
    [facility]  [facility]  Admit Date: 2025                            PCP: Luis Antonio Barney DO  Principal Problem:    Acute pulmonary embolism without acute cor pulmonale, unspecified pulmonary embolism type (HCC)  Resolved Problems:    * No resolved hospital problems. *      Subjective:  Resting on ra- pox 96%  C/o dyspnea at rest, dry cough  No cp or wz     Medications:   sodium chloride      heparin (PORCINE) Infusion 13 Units/kg/hr (25 0748)    sodium chloride          benztropine  0.5 mg Oral BID    PARoxetine  30 mg Oral QAM    vitamin D  50,000 Units Oral Weekly    sodium chloride flush  5-40 mL IntraVENous 2 times per day    melatonin  3 mg Oral Nightly    sodium chloride flush  5-40 mL IntraVENous 2 times per day    thiamine  100 mg Oral Daily    PHENobarbital  16.2 mg Oral BID    aspirin  81 mg Oral Daily    atorvastatin  40 mg Oral Nightly    mirtazapine  7.5 mg Oral Nightly    OLANZapine  15 mg Oral Nightly       Vitals:  VITALS:  /65   Pulse 64   Temp 97.6 °F (36.4 °C) (Temporal)   Resp 18   Ht 1.803 m (5' 11\")   Wt 80.7 kg (178 lb)   SpO2 97%   BMI 24.83 kg/m²   24HR INTAKE/OUTPUT:    Intake/Output Summary (Last 24 hours) at 2025 09  Last data filed at 2025 0748  Gross per 24 hour   Intake 562.72 ml   Output 950 ml   Net -387.28 ml     CURRENT PULSE OXIMETRY:  SpO2: 97 %  24HR PULSE OXIMETRY RANGE:  SpO2  Av.3 %  Min: 96 %  Max: 99 %  CVP:    VENT SETTINGS:      Additional Respiratory Assessments  Pulse: 64  Respirations: 18  SpO2: 97 %      EXAM:  General: No distress. Alert. On RA  ENT: No discharge. Pharynx clear.  Neck: Trachea midline.   Resp: No accessory muscle use. No crackles. No wheezing. No rhonchi. Dimminished   CV: Regular rate. Regular rhythm. No mumur or rub. No edema.   ABD: Non-tender. Non-distended.  Normal bowel sounds.   Skin: Warm and dry.   Lymph: No cervical LAD. No supraclavicular LAD.   M/S: No cyanosis. No joint 
  Inpatient consult to Pulmonology sent.     
4 Eyes Skin Assessment     NAME:  Colton Hurley  YOB: 1965  MEDICAL RECORD NUMBER:  [id number]    The patient is being assessed for  Admission    I agree that at least one RN has performed a thorough Head to Toe Skin Assessment on the patient. ALL assessment sites listed below have been assessed.      Areas assessed by both nurses:    Head, Face, Ears, Shoulders, Back, Chest, Arms, Elbows, Hands, Sacrum. Buttock, Coccyx, Ischium, Legs. Feet and Heels, and Under Medical Devices         Does the Patient have a Wound? No noted wound(s)       Miki Prevention initiated by RN: No  Wound Care Orders initiated by RN: No    Pressure Injury (Stage 3,4, Unstageable, DTI, NWPT, and Complex wounds) if present, place Wound referral order by RN under : No    New Ostomies, if present place, Ostomy referral order under : No     Nurse 1 eSignature: Electronically signed by Felicia Resendiz RN on 5/12/25 at 11:15 AM EDT    **SHARE this note so that the co-signing nurse can place an eSignature**    Nurse 2 eSignature: Electronically signed by Yenny Hernandez RN on 5/12/25 at 11:44 AM EDT  
CLINICAL PHARMACY NOTE: MEDS TO BEDS    Total # of Prescriptions Filled: 3   The following medications were delivered to the patient:  Eliquis 5 mg  Vitamin b-12 100 mcg  Thiamine mono 100 mg    Additional Documentation:   Delivered to pt  
Patient arrived to [facility] with glasses, cane, and clothes. All belonings double wrapped in bag due to patient stating he has bedbugs. Patient states he has not been home since February so bedbug exposure since the. No visible bites noted.Charge nurse speaking to infection prevention    Felicia Resendiz RN    
Patient okay to discharge per pulmonology via perfect serve    Felicia Resendiz RN    
RN notified dr. Kwong of pulmonology stating after transition and CXR patient can be discharged if CXR is negative.    Felicia Resendiz RN    
RN notified pulmonology of primary asking if patient can be transitioned to oral atg and be discharged    Felicia Resendiz RN    
RN removed patient IV, and heart monitor and packed up all documented belongings. Patient ride was supposed to be here at 5PM. RN called sister. Niece is on her way.    Felicia Resendiz RN    
RN spoke to patient sister. His niece will be able to provide transportation for discharge at 5PM today. RN awaiting pulmonology sign off for discharge. All medications will be delivered to room at 4:30 by meds to tiara Resendiz RN    
Spiritual Health History and Assessment/Progress Note  [facility]    Initial Encounter,  ,  ,      Name: Colton Hurley MRN: [id number]    Age: 59 y.o.     Sex: male   Language: English   Congregation: [employer]   Acute pulmonary embolism without acute cor pulmonale, unspecified pulmonary embolism type (HCC)     Date: 5/12/2025                           Spiritual Assessment began in [facility]        Referral/Consult From: Rounding   Encounter Overview/Reason: Initial Encounter  Service Provided For: Patient    Zohreh, Belief, Meaning:   Patient identifies as spiritual  Family/Friends No family/friends present      Importance and Influence:  Patient has spiritual/personal beliefs that influence decisions regarding their health  Family/Friends No family/friends present    Community:  Patient is connected with a spiritual community and feels well-supported. Support system includes: Extended family  Family/Friends No family/friends present    Assessment and Plan of Care:     Patient Interventions include: Facilitated expression of thoughts and feelings and Explored spiritual coping/struggle/distress  Family/Friends Interventions include: No family/friends present    Patient Plan of Care: Spiritual Care available upon further referral  Family/Friends Plan of Care: No family/friends present    Electronically signed by JASON CAUSEY on 5/12/2025 at 3:59 PM   
consult tomorrow.  He also has a history of alcohol dependence.  Per ED physician spoke to patient's sister who mentioned he has been drinking heavily since leaving the rehab. Today was last alcohol use and he may be had a beer or 2.  Patient mention he does hurt when he takes deep breaths.  And has not take his medications for maybe a month.Patient is presently admitted for acute bilateral pulm embolism without RV strain.                                                      ASSESSMENT AND PLAN:    Unprovoked acute bilateral pulmonary embolism  Hemodynamically stable  No right heart strain on CT chest, heart echo  LE DVT US are negative   Heparin to eliquis   Pulmonology following  Thrombophilia panel    Recent Left  MCA stroke  Status post thrombectomy and stent placement 02 2025  Residual right-sided facial droop  Continue aspirin, statin  Neurology on board     Chronic alcohol use  Noncompliance issues  Monitor withdrawal  Continue phenobarb taper   Bedside counseling provided    Chronic comorbidities:  Schizoaffective disorder, COPD no exacerbation  Continue home medicine and monitor    DISPOSITION: Pending final pulmo recs                                                               This note was generated by the epic EMR system/Dragon speech recognition and may contain inherent errors or omissions not intended by the user. Grammatical errors, random word insertions, deletions, pronoun errors and incomplete sentences are occasional consequences of this technology due to software limitations. Not all errors are caught and corrected. If there are questions or concerns about the content of this note or information contained within the body of this dictation they should be addressed directly with the author for clarification.    Electronically signed by Delvis Kwong MD on 5/13/2025 at 11:43 AM

## 2025-05-14 NOTE — TELEPHONE ENCOUNTER
Care Transitions Initial Follow Up Call    Outreach made within 2 business days of discharge: Yes    Patient: Colton Hurley Patient : 1965   MRN: [id number]  Reason for Admission: Acute pulmonary embolism without acute cor pulmonale  Discharge Date: 25       Spoke with: Left message for patient to call the office to schedule HFU    Discharge department/facility: [facility]        Scheduled appointment with PCP within 7-14 days    Follow Up  No future appointments.    Letitia Perry MA

## 2025-05-14 NOTE — DISCHARGE SUMMARY
[facility] Hospitalist Physician Discharge Summary       Luis Antonio Barney JOSE RAMON, DO  [address]  [place] [id number]  [phone]    Call        Activity level: As tolerated     Dispo: Home      Condition on discharge: Stable     Patient ID:  Colton Hurley  [id number]  59 y.o.  1965    Patient's PCP: Luis Antonio Barney DO    Admit Date: 5/11/2025     Discharge Date: 5/13/2025      Admitting Physician: Davdi Bravo MD     Discharge Physician: Delvis Kwong MD     Admission Diagnoses: Principal Problem:    Acute pulmonary embolism without acute cor pulmonale, unspecified pulmonary embolism type (HCC)  Resolved Problems:    * No resolved hospital problems. *      Discharge Diagnoses: Principal Problem:    Acute pulmonary embolism without acute cor pulmonale, unspecified pulmonary embolism type (HCC)  Resolved Problems:    * No resolved hospital problems. *      Consults:  IP CONSULT TO HOSPITALIST  IP CONSULT TO NEUROLOGY  IP CONSULT TO SOCIAL WORK  IP CONSULT TO PULMONOLOGY    Hospital Course:   Patient Colton Hurley is a 59 y.o. presented with Generalized abdominal pain [R10.84]  Bilateral pulmonary embolism (HCC) [I26.99]  Nonadherence to medication [Z91.148]  Acute pulmonary embolism without acute cor pulmonale, unspecified pulmonary embolism type (HCC) [I26.99]      59 y.o. male who presented to [facility] with concern for diarrhea and abdominal pain going on for last 2 weeks.  He also concerned that he has blood in his urine.  Mention his stool looks dark.  Patient recently had a stroke back in February of this year left MCA stroke status post thrombectomy was discharged on aspirin and Brilinta he has not been taking the medication since he left the nursing home about a month ago.  Hemoccult in the ER negative and hemoglobin stable at 16.5.  CTA chest obtained showed acute bilateral pulmonary emboli predominantly towards the left lower lung with no signs of RV

## 2025-05-14 NOTE — TELEPHONE ENCOUNTER
Care Transitions Initial Follow Up Call    Outreach made within 2 business days of discharge: Yes    Patient: Colton Hurley Patient : 1965   MRN: [id number]  Reason for Admission: Acute pulmonary embolism without acute cor pulmonale  Discharge Date: 25       Spoke with: No answer    Discharge department/facility: [facility]        Scheduled appointment with PCP within 7-14 days    Follow Up  No future appointments.    Letitia Perry MA

## 2025-05-18 LAB
F2 C.20210G>A GENO BLD/T: NEGATIVE
F5 P.R506Q BLD/T QL: NEGATIVE
FACTOR XIII (F13A1) V34L VARIANT: NORMAL
FACTOR XIII VARIANT SPECIMEN: NORMAL
PAI-1 INTERPRETATION: ABNORMAL
PLASMINOGEN ACT. INHIBITOR-1 (PAI-1) SPECIMEN: ABNORMAL
SPECIMEN SOURCE: NORMAL
SPECIMEN SOURCE: NORMAL

## 2025-05-19 ENCOUNTER — RESULTS FOLLOW-UP (OUTPATIENT)
Dept: PRIMARY CARE CLINIC | Age: 60
End: 2025-05-19

## 2025-05-19 LAB
MTHFR INTERPRETATION: NORMAL
MTHFR MUTATION A1286C: NORMAL
MTHFR MUTATION C665T: NEGATIVE
SPECIMEN: NORMAL

## 2025-06-17 ENCOUNTER — APPOINTMENT (OUTPATIENT)
Dept: CT IMAGING | Age: 60
End: 2025-06-17
Payer: MEDICARE

## 2025-06-17 ENCOUNTER — HOSPITAL ENCOUNTER (EMERGENCY)
Age: 60
Discharge: HOME | End: 2025-06-18
Attending: EMERGENCY MEDICINE
Payer: MEDICARE

## 2025-06-17 DIAGNOSIS — R19.7 DIARRHEA, UNSPECIFIED TYPE: ICD-10-CM

## 2025-06-17 DIAGNOSIS — D73.5 SPLENIC INFARCT: ICD-10-CM

## 2025-06-17 DIAGNOSIS — R06.02 SHORTNESS OF BREATH: Primary | ICD-10-CM

## 2025-06-17 LAB
ALBUMIN SERPL-MCNC: 3.6 G/DL (ref 3.5–5.2)
ALP SERPL-CCNC: 110 U/L (ref 40–129)
ALT SERPL-CCNC: 9 U/L (ref 0–50)
AMMONIA PLAS-SCNC: 12 UMOL/L (ref 16–60)
ANION GAP SERPL CALCULATED.3IONS-SCNC: 14 MMOL/L (ref 7–16)
AST SERPL-CCNC: 17 U/L (ref 0–50)
BASOPHILS # BLD: 0.03 K/UL (ref 0–0.2)
BASOPHILS NFR BLD: 0 % (ref 0–2)
BILIRUB SERPL-MCNC: 0.8 MG/DL (ref 0–1.2)
BUN SERPL-MCNC: 16 MG/DL (ref 6–20)
CALCIUM SERPL-MCNC: 9.4 MG/DL (ref 8.6–10)
CHLORIDE SERPL-SCNC: 98 MMOL/L (ref 98–107)
CO2 SERPL-SCNC: 24 MMOL/L (ref 22–29)
CREAT SERPL-MCNC: 0.7 MG/DL (ref 0.7–1.2)
EOSINOPHIL # BLD: 0.03 K/UL (ref 0.05–0.5)
EOSINOPHILS RELATIVE PERCENT: 0 % (ref 0–6)
ERYTHROCYTE [DISTWIDTH] IN BLOOD BY AUTOMATED COUNT: 16.5 % (ref 11.5–15)
GFR, ESTIMATED: >90 ML/MIN/1.73M2
GLUCOSE SERPL-MCNC: 88 MG/DL (ref 74–99)
HCT VFR BLD AUTO: 45.6 % (ref 37–54)
HGB BLD-MCNC: 16.2 G/DL (ref 12.5–16.5)
IMM GRANULOCYTES # BLD AUTO: 0.04 K/UL (ref 0–0.58)
IMM GRANULOCYTES NFR BLD: 0 % (ref 0–5)
LACTATE BLDV-SCNC: 1.5 MMOL/L (ref 0.5–2.2)
LIPASE SERPL-CCNC: 22 U/L (ref 13–60)
LYMPHOCYTES NFR BLD: 1.51 K/UL (ref 1.5–4)
LYMPHOCYTES RELATIVE PERCENT: 17 % (ref 20–42)
MCH RBC QN AUTO: 36.8 PG (ref 26–35)
MCHC RBC AUTO-ENTMCNC: 35.5 G/DL (ref 32–34.5)
MCV RBC AUTO: 103.6 FL (ref 80–99.9)
MONOCYTES NFR BLD: 0.85 K/UL (ref 0.1–0.95)
MONOCYTES NFR BLD: 9 % (ref 2–12)
NEUTROPHILS NFR BLD: 73 % (ref 43–80)
NEUTS SEG NFR BLD: 6.65 K/UL (ref 1.8–7.3)
PLATELET # BLD AUTO: 177 K/UL (ref 130–450)
PMV BLD AUTO: 10.4 FL (ref 7–12)
POTASSIUM SERPL-SCNC: 4.3 MMOL/L (ref 3.5–5.1)
PROT SERPL-MCNC: 7 G/DL (ref 6.4–8.3)
RBC # BLD AUTO: 4.4 M/UL (ref 3.8–5.8)
SODIUM SERPL-SCNC: 135 MMOL/L (ref 136–145)
TROPONIN I SERPL HS-MCNC: 8 NG/L (ref 0–22)
WBC OTHER # BLD: 9.1 K/UL (ref 4.5–11.5)

## 2025-06-17 PROCEDURE — G0480 DRUG TEST DEF 1-7 CLASSES: HCPCS

## 2025-06-17 PROCEDURE — 84484 ASSAY OF TROPONIN QUANT: CPT

## 2025-06-17 PROCEDURE — 80143 DRUG ASSAY ACETAMINOPHEN: CPT

## 2025-06-17 PROCEDURE — 82140 ASSAY OF AMMONIA: CPT

## 2025-06-17 PROCEDURE — 74177 CT ABD & PELVIS W/CONTRAST: CPT

## 2025-06-17 PROCEDURE — 80053 COMPREHEN METABOLIC PANEL: CPT

## 2025-06-17 PROCEDURE — 70450 CT HEAD/BRAIN W/O DYE: CPT

## 2025-06-17 PROCEDURE — 83605 ASSAY OF LACTIC ACID: CPT

## 2025-06-17 PROCEDURE — 83690 ASSAY OF LIPASE: CPT

## 2025-06-17 PROCEDURE — 6360000004 HC RX CONTRAST MEDICATION: Performed by: RADIOLOGY

## 2025-06-17 PROCEDURE — 80179 DRUG ASSAY SALICYLATE: CPT

## 2025-06-17 PROCEDURE — 71275 CT ANGIOGRAPHY CHEST: CPT

## 2025-06-17 PROCEDURE — 99285 EMERGENCY DEPT VISIT HI MDM: CPT

## 2025-06-17 PROCEDURE — 80307 DRUG TEST PRSMV CHEM ANLYZR: CPT

## 2025-06-17 PROCEDURE — 85025 COMPLETE CBC W/AUTO DIFF WBC: CPT

## 2025-06-17 PROCEDURE — 93005 ELECTROCARDIOGRAM TRACING: CPT | Performed by: EMERGENCY MEDICINE

## 2025-06-17 RX ORDER — IOPAMIDOL 755 MG/ML
75 INJECTION, SOLUTION INTRAVASCULAR
Status: COMPLETED | OUTPATIENT
Start: 2025-06-17 | End: 2025-06-17

## 2025-06-17 RX ADMIN — IOPAMIDOL 75 ML: 755 INJECTION, SOLUTION INTRAVENOUS at 23:41

## 2025-06-17 ASSESSMENT — ENCOUNTER SYMPTOMS
EYE PAIN: 0
WHEEZING: 0
DIARRHEA: 1
SHORTNESS OF BREATH: 1
COUGH: 0
SORE THROAT: 0
ABDOMINAL PAIN: 0
SINUS PRESSURE: 0
EYE REDNESS: 0
BACK PAIN: 1
NAUSEA: 0
VOMITING: 0
EYE DISCHARGE: 0

## 2025-06-18 VITALS
TEMPERATURE: 98.6 F | SYSTOLIC BLOOD PRESSURE: 122 MMHG | RESPIRATION RATE: 18 BRPM | OXYGEN SATURATION: 98 % | HEART RATE: 96 BPM | DIASTOLIC BLOOD PRESSURE: 88 MMHG

## 2025-06-18 LAB
AMPHET UR QL SCN: NEGATIVE
APAP SERPL-MCNC: <5 UG/ML (ref 10–30)
BACTERIA URNS QL MICRO: ABNORMAL
BARBITURATES UR QL SCN: POSITIVE
BENZODIAZ UR QL: NEGATIVE
BILIRUB UR QL STRIP: ABNORMAL
BUPRENORPHINE UR QL: NEGATIVE
CANNABINOIDS UR QL SCN: NEGATIVE
CASTS #/AREA URNS LPF: ABNORMAL /LPF
CLARITY UR: ABNORMAL
COCAINE UR QL SCN: NEGATIVE
COLOR UR: YELLOW
ETHANOLAMINE SERPL-MCNC: <10 MG/DL (ref 0–0.08)
FENTANYL UR QL: NEGATIVE
GLUCOSE UR STRIP-MCNC: NEGATIVE MG/DL
HGB UR QL STRIP.AUTO: NEGATIVE
KETONES UR STRIP-MCNC: 40 MG/DL
LEUKOCYTE ESTERASE UR QL STRIP: NEGATIVE
METHADONE UR QL: NEGATIVE
MUCOUS THREADS URNS QL MICRO: PRESENT
NITRITE UR QL STRIP: POSITIVE
OPIATES UR QL SCN: NEGATIVE
OXYCODONE UR QL SCN: NEGATIVE
PCP UR QL SCN: NEGATIVE
PH UR STRIP: 6 [PH] (ref 5–8)
PROT UR STRIP-MCNC: 30 MG/DL
RBC #/AREA URNS HPF: ABNORMAL /HPF
SALICYLATES SERPL-MCNC: <0.5 MG/DL (ref 0–30)
SP GR UR STRIP: 1.02 (ref 1–1.03)
TEST INFORMATION: ABNORMAL
TOXIC TRICYCLIC SC,BLOOD: NEGATIVE
TROPONIN I SERPL HS-MCNC: 8 NG/L (ref 0–22)
UROBILINOGEN UR STRIP-ACNC: 1 EU/DL (ref 0–1)
WBC #/AREA URNS HPF: ABNORMAL /HPF

## 2025-06-18 PROCEDURE — 81001 URINALYSIS AUTO W/SCOPE: CPT

## 2025-06-18 PROCEDURE — 80307 DRUG TEST PRSMV CHEM ANLYZR: CPT

## 2025-06-18 NOTE — CARE COORDINATION
1000  6/18/25 SS Note: Pt here for SOB. He was d/c'd & is sitting in ED lobby. Patient w/recent [facility] for abdominal pain and hematuria. Pt had PE & was transitioned to Eliquis. During that admission, pt noted to have dependence issues with alcohol & declined PRS. Patient has a history of non compliane with meds & hx SNF @ [facility].     SW spoke to pt and explained role. He reports he went to stay w/his sister after his last d/c. He then left there & has been staying @ [facility]. He notes he is not going back there. It's not clean & not a nice place. He receives 981/mo SS & manages this himself. He does not receive FS. Pt reports address listed in chart, [address] [place] [address] [address], was an apartment he was renting but had to leave there d/t bed bugs. Pt also noted he stayed @ [employer] (low income housing via [facility]) a few months ago but left there d/t drugs being prevalent. Pt states he still drinks alcohol himself but not interested in any recovery. Patients only DME is a cane which he has with him. Patient is independent with ADL's. PCP is Dr. Barney but has not gone to him for 3 yrs- he is in [facility]. Pt notes he used to go to [facility] for case management but they closed his case d/t no shows. He states he has trouble w/transportation. Pt does have [facility] for medical transports. He is 60 yo & will be 60 in July and able to use [facility] Transportation Program. Pt wants no services and notes he will call his brother to pick him up & maybe stay with him for a while. SW talked about local shelters & resources. Pt stated he needed no assistance @ this time but did accept the following resources:    1) PCP list for [facility] stressed need to get into PCP asap, especially for his meds.   2) [facility] transportation # for medical appointments   3) [facility] Sheet which includes Transportation Program,

## 2025-06-18 NOTE — ED PROVIDER NOTES
Patient is a 58 y/o male who presents to the ED via EMS with shortness of breath, weight loss and diarrhea. Patient states he had a stroke two months ago and has since has been losing weight. He reports chronic diarrhea. He is short of breath. He states that he had a blood clot in his lung. He was prescribed Eliquis but has not taken it. He denies any fever. He denies any abdominal pain.         Review of Systems   Constitutional:  Positive for unexpected weight change. Negative for chills and fever.   HENT:  Negative for ear pain, sinus pressure and sore throat.    Eyes:  Negative for pain, discharge and redness.   Respiratory:  Positive for shortness of breath. Negative for cough and wheezing.    Cardiovascular:  Negative for chest pain.   Gastrointestinal:  Positive for diarrhea. Negative for abdominal pain, nausea and vomiting.   Genitourinary:  Negative for dysuria and frequency.   Musculoskeletal:  Positive for back pain. Negative for arthralgias.   Skin:  Negative for rash and wound.   Neurological:  Negative for weakness and headaches.   Hematological:  Negative for adenopathy.   All other systems reviewed and are negative.       Physical Exam  Vitals and nursing note reviewed.   Constitutional:       General: He is not in acute distress.  HENT:      Head: Normocephalic and atraumatic.      Mouth/Throat:      Mouth: Mucous membranes are moist.   Eyes:      Pupils: Pupils are equal, round, and reactive to light.   Cardiovascular:      Rate and Rhythm: Normal rate and regular rhythm.      Heart sounds: No murmur heard.  Pulmonary:      Effort: Pulmonary effort is normal. No respiratory distress.      Breath sounds: Normal breath sounds. No stridor. No wheezing, rhonchi or rales.   Abdominal:      General: Bowel sounds are normal.      Palpations: Abdomen is soft.      Tenderness: There is no abdominal tenderness. There is no guarding.   Musculoskeletal:         General: Normal range of motion.      Cervical

## 2025-06-19 LAB
EKG ATRIAL RATE: 97 BPM
EKG P AXIS: 60 DEGREES
EKG P-R INTERVAL: 120 MS
EKG Q-T INTERVAL: 344 MS
EKG QRS DURATION: 66 MS
EKG QTC CALCULATION (BAZETT): 436 MS
EKG R AXIS: 2 DEGREES
EKG T AXIS: 41 DEGREES
EKG VENTRICULAR RATE: 97 BPM

## 2025-06-19 PROCEDURE — 93010 ELECTROCARDIOGRAM REPORT: CPT | Performed by: INTERNAL MEDICINE

## 2025-06-22 ENCOUNTER — ANESTHESIA (OUTPATIENT)
Dept: OPERATING ROOM | Age: 60
End: 2025-06-22
Payer: MEDICARE

## 2025-06-22 ENCOUNTER — APPOINTMENT (OUTPATIENT)
Dept: CT IMAGING | Age: 60
DRG: 253 | End: 2025-06-22
Payer: MEDICARE

## 2025-06-22 ENCOUNTER — ANESTHESIA EVENT (OUTPATIENT)
Dept: OPERATING ROOM | Age: 60
End: 2025-06-22
Payer: MEDICARE

## 2025-06-22 ENCOUNTER — HOSPITAL ENCOUNTER (INPATIENT)
Age: 60
LOS: 9 days | Discharge: HOME | DRG: 253 | End: 2025-07-01
Attending: EMERGENCY MEDICINE | Admitting: STUDENT IN AN ORGANIZED HEALTH CARE EDUCATION/TRAINING PROGRAM
Payer: MEDICARE

## 2025-06-22 DIAGNOSIS — I99.8 ACUTE LOWER EXTREMITY ISCHEMIA: ICD-10-CM

## 2025-06-22 DIAGNOSIS — I99.8 ISCHEMIA OF LEFT UPPER EXTREMITY: Primary | ICD-10-CM

## 2025-06-22 LAB
ABO + RH BLD: NORMAL
ALBUMIN SERPL-MCNC: 2.9 G/DL (ref 3.5–5.2)
ALP SERPL-CCNC: 63 U/L (ref 40–129)
ALT SERPL-CCNC: 8 U/L (ref 0–50)
ANION GAP SERPL CALCULATED.3IONS-SCNC: 15 MMOL/L (ref 7–16)
APAP SERPL-MCNC: 9 UG/ML (ref 10–30)
ARM BAND NUMBER: NORMAL
AST SERPL-CCNC: 20 U/L (ref 0–50)
BASOPHILS # BLD: 0.01 K/UL (ref 0–0.2)
BASOPHILS NFR BLD: 0 % (ref 0–2)
BILIRUB SERPL-MCNC: 0.3 MG/DL (ref 0–1.2)
BLOOD BANK SAMPLE EXPIRATION: NORMAL
BLOOD GROUP ANTIBODIES SERPL: NEGATIVE
BUN BLD-MCNC: 5 MG/DL (ref 6–20)
BUN BLD-MCNC: 5 MG/DL (ref 6–20)
BUN SERPL-MCNC: 10 MG/DL (ref 6–20)
CA-I BLD-SCNC: 1.16 MMOL/L (ref 1.15–1.33)
CA-I BLD-SCNC: 1.21 MMOL/L (ref 1.15–1.33)
CALCIUM SERPL-MCNC: 8.6 MG/DL (ref 8.6–10)
CHLORIDE BLD-SCNC: 108 MMOL/L (ref 100–108)
CHLORIDE BLD-SCNC: 109 MMOL/L (ref 100–108)
CHLORIDE SERPL-SCNC: 103 MMOL/L (ref 98–107)
CO2 BLD CALC-SCNC: 19 MMOL/L (ref 22–29)
CO2 BLD CALC-SCNC: 20 MMOL/L (ref 22–29)
CO2 SERPL-SCNC: 20 MMOL/L (ref 22–29)
CREAT BLD-MCNC: 0.5 MG/DL (ref 0.7–1.2)
CREAT BLD-MCNC: 0.5 MG/DL (ref 0.7–1.2)
CREAT SERPL-MCNC: 0.5 MG/DL (ref 0.7–1.2)
EGFR, POC: >90 ML/MIN/1.73M2
EGFR, POC: >90 ML/MIN/1.73M2
EOSINOPHIL # BLD: 0.01 K/UL (ref 0.05–0.5)
EOSINOPHILS RELATIVE PERCENT: 0 % (ref 0–6)
ERYTHROCYTE [DISTWIDTH] IN BLOOD BY AUTOMATED COUNT: 15.8 % (ref 11.5–15)
ETHANOLAMINE SERPL-MCNC: 188 MG/DL (ref 0–0.08)
GFR, ESTIMATED: >90 ML/MIN/1.73M2
GLUCOSE BLD-MCNC: 113 MG/DL (ref 74–99)
GLUCOSE BLD-MCNC: 69 MG/DL (ref 74–99)
GLUCOSE BLD-MCNC: 94 MG/DL (ref 74–99)
GLUCOSE SERPL-MCNC: 99 MG/DL (ref 74–99)
HCT VFR BLD AUTO: 25 % (ref 37–54)
HCT VFR BLD AUTO: 29 % (ref 37–54)
HCT VFR BLD AUTO: 32.6 % (ref 37–54)
HGB BLD-MCNC: 11.8 G/DL (ref 12.5–16.5)
IMM GRANULOCYTES # BLD AUTO: 0.03 K/UL (ref 0–0.58)
IMM GRANULOCYTES NFR BLD: 1 % (ref 0–5)
INR PPP: 1.2
LYMPHOCYTES NFR BLD: 1.19 K/UL (ref 1.5–4)
LYMPHOCYTES RELATIVE PERCENT: 20 % (ref 20–42)
MAGNESIUM SERPL-MCNC: 1.9 MG/DL (ref 1.6–2.6)
MCH RBC QN AUTO: 37.5 PG (ref 26–35)
MCHC RBC AUTO-ENTMCNC: 36.2 G/DL (ref 32–34.5)
MCV RBC AUTO: 103.5 FL (ref 80–99.9)
MONOCYTES NFR BLD: 0.55 K/UL (ref 0.1–0.95)
MONOCYTES NFR BLD: 9 % (ref 2–12)
NEGATIVE BASE EXCESS, ART: 3.9 MMOL/L
NEGATIVE BASE EXCESS, ART: 5.8 MMOL/L
NEUTROPHILS NFR BLD: 70 % (ref 43–80)
NEUTS SEG NFR BLD: 4.16 K/UL (ref 1.8–7.3)
PARTIAL THROMBOPLASTIN TIME: 32.3 SEC (ref 24.5–35.1)
PLATELET # BLD AUTO: 230 K/UL (ref 130–450)
PMV BLD AUTO: 10.1 FL (ref 7–12)
POC ANION GAP: 13 MMOL/L (ref 7–16)
POC ANION GAP: 14 MMOL/L (ref 7–16)
POC HCO3: 19.7 MMOL/L (ref 22–26)
POC HCO3: 20.6 MMOL/L (ref 22–26)
POC HEMOGLOBIN (CALC): 8.5 G/DL (ref 12.5–15.5)
POC HEMOGLOBIN (CALC): 9.9 G/DL (ref 12.5–15.5)
POC LACTIC ACID: 4.4 MMOL/L (ref 0.5–2.2)
POC LACTIC ACID: 4.8 MMOL/L (ref 0.5–2.2)
POC O2 SATURATION: 99.8 % (ref 92–98.5)
POC O2 SATURATION: 99.8 % (ref 92–98.5)
POC PCO2: 34.6 MM HG (ref 35–45)
POC PCO2: 38.1 MM HG (ref 35–45)
POC PH: 7.32 (ref 7.35–7.45)
POC PH: 7.38 (ref 7.35–7.45)
POC PO2: 242.4 MM HG (ref 80–100)
POC PO2: 244.8 MM HG (ref 80–100)
POTASSIUM BLD-SCNC: 2.8 MMOL/L (ref 3.5–5)
POTASSIUM BLD-SCNC: 3.1 MMOL/L (ref 3.5–5)
POTASSIUM SERPL-SCNC: 2.7 MMOL/L (ref 3.5–5.1)
PROT SERPL-MCNC: 5.5 G/DL (ref 6.4–8.3)
PROTHROMBIN TIME: 12.8 SEC (ref 9.3–12.4)
RBC # BLD AUTO: 3.15 M/UL (ref 3.8–5.8)
SALICYLATES SERPL-MCNC: <0.5 MG/DL (ref 0–30)
SODIUM BLD-SCNC: 141 MMOL/L (ref 132–146)
SODIUM BLD-SCNC: 142 MMOL/L (ref 132–146)
SODIUM SERPL-SCNC: 138 MMOL/L (ref 136–145)
TOXIC TRICYCLIC SC,BLOOD: NEGATIVE
WBC OTHER # BLD: 6 K/UL (ref 4.5–11.5)

## 2025-06-22 PROCEDURE — 2500000003 HC RX 250 WO HCPCS: Performed by: SURGERY

## 2025-06-22 PROCEDURE — 6360000002 HC RX W HCPCS: Performed by: STUDENT IN AN ORGANIZED HEALTH CARE EDUCATION/TRAINING PROGRAM

## 2025-06-22 PROCEDURE — 73206 CT ANGIO UPR EXTRM W/O&W/DYE: CPT

## 2025-06-22 PROCEDURE — 84100 ASSAY OF PHOSPHORUS: CPT

## 2025-06-22 PROCEDURE — 85610 PROTHROMBIN TIME: CPT

## 2025-06-22 PROCEDURE — 80053 COMPREHEN METABOLIC PANEL: CPT

## 2025-06-22 PROCEDURE — 3700000000 HC ANESTHESIA ATTENDED CARE: Performed by: SURGERY

## 2025-06-22 PROCEDURE — 80307 DRUG TEST PRSMV CHEM ANLYZR: CPT

## 2025-06-22 PROCEDURE — P9045 ALBUMIN (HUMAN), 5%, 250 ML: HCPCS

## 2025-06-22 PROCEDURE — 6360000002 HC RX W HCPCS: Performed by: SURGERY

## 2025-06-22 PROCEDURE — 3700000001 HC ADD 15 MINUTES (ANESTHESIA): Performed by: SURGERY

## 2025-06-22 PROCEDURE — 83605 ASSAY OF LACTIC ACID: CPT

## 2025-06-22 PROCEDURE — 94002 VENT MGMT INPAT INIT DAY: CPT

## 2025-06-22 PROCEDURE — 2000000000 HC ICU R&B

## 2025-06-22 PROCEDURE — 86900 BLOOD TYPING SEROLOGIC ABO: CPT

## 2025-06-22 PROCEDURE — 6360000002 HC RX W HCPCS: Performed by: EMERGENCY MEDICINE

## 2025-06-22 PROCEDURE — 85025 COMPLETE CBC W/AUTO DIFF WBC: CPT

## 2025-06-22 PROCEDURE — 83735 ASSAY OF MAGNESIUM: CPT

## 2025-06-22 PROCEDURE — 82962 GLUCOSE BLOOD TEST: CPT

## 2025-06-22 PROCEDURE — 36415 COLL VENOUS BLD VENIPUNCTURE: CPT

## 2025-06-22 PROCEDURE — 37799 UNLISTED PX VASCULAR SURGERY: CPT

## 2025-06-22 PROCEDURE — 82803 BLOOD GASES ANY COMBINATION: CPT

## 2025-06-22 PROCEDURE — 96374 THER/PROPH/DIAG INJ IV PUSH: CPT

## 2025-06-22 PROCEDURE — 88304 TISSUE EXAM BY PATHOLOGIST: CPT

## 2025-06-22 PROCEDURE — 6360000004 HC RX CONTRAST MEDICATION: Performed by: RADIOLOGY

## 2025-06-22 PROCEDURE — 03HC33Z INSERTION OF INFUSION DEVICE INTO LEFT RADIAL ARTERY, PERCUTANEOUS APPROACH: ICD-10-PCS | Performed by: SURGERY

## 2025-06-22 PROCEDURE — 80179 DRUG ASSAY SALICYLATE: CPT

## 2025-06-22 PROCEDURE — 6360000002 HC RX W HCPCS

## 2025-06-22 PROCEDURE — 85014 HEMATOCRIT: CPT

## 2025-06-22 PROCEDURE — 85730 THROMBOPLASTIN TIME PARTIAL: CPT

## 2025-06-22 PROCEDURE — 2580000003 HC RX 258

## 2025-06-22 PROCEDURE — 3600000015 HC SURGERY LEVEL 5 ADDTL 15MIN: Performed by: SURGERY

## 2025-06-22 PROCEDURE — C1757 CATH, THROMBECTOMY/EMBOLECT: HCPCS | Performed by: SURGERY

## 2025-06-22 PROCEDURE — 82330 ASSAY OF CALCIUM: CPT

## 2025-06-22 PROCEDURE — 3600000005 HC SURGERY LEVEL 5 BASE: Performed by: SURGERY

## 2025-06-22 PROCEDURE — 85347 COAGULATION TIME ACTIVATED: CPT

## 2025-06-22 PROCEDURE — 80143 DRUG ASSAY ACETAMINOPHEN: CPT

## 2025-06-22 PROCEDURE — 99285 EMERGENCY DEPT VISIT HI MDM: CPT

## 2025-06-22 PROCEDURE — 2580000003 HC RX 258: Performed by: STUDENT IN AN ORGANIZED HEALTH CARE EDUCATION/TRAINING PROGRAM

## 2025-06-22 PROCEDURE — 2709999900 HC NON-CHARGEABLE SUPPLY: Performed by: SURGERY

## 2025-06-22 PROCEDURE — G0480 DRUG TEST DEF 1-7 CLASSES: HCPCS

## 2025-06-22 PROCEDURE — 86901 BLOOD TYPING SEROLOGIC RH(D): CPT

## 2025-06-22 PROCEDURE — 80047 BASIC METABLC PNL IONIZED CA: CPT

## 2025-06-22 PROCEDURE — 2500000003 HC RX 250 WO HCPCS

## 2025-06-22 PROCEDURE — 86850 RBC ANTIBODY SCREEN: CPT

## 2025-06-22 RX ORDER — PROPOFOL 10 MG/ML
INJECTION, EMULSION INTRAVENOUS
Status: DISPENSED
Start: 2025-06-22 | End: 2025-06-23

## 2025-06-22 RX ORDER — MINERAL OIL AND WHITE PETROLATUM 150; 830 MG/G; MG/G
OINTMENT OPHTHALMIC EVERY 4 HOURS
Status: DISCONTINUED | OUTPATIENT
Start: 2025-06-23 | End: 2025-06-23

## 2025-06-22 RX ORDER — SODIUM CHLORIDE, SODIUM LACTATE, POTASSIUM CHLORIDE, CALCIUM CHLORIDE 600; 310; 30; 20 MG/100ML; MG/100ML; MG/100ML; MG/100ML
INJECTION, SOLUTION INTRAVENOUS CONTINUOUS
Status: DISCONTINUED | OUTPATIENT
Start: 2025-06-23 | End: 2025-06-27

## 2025-06-22 RX ORDER — HEPARIN SODIUM 1000 [USP'U]/ML
40 INJECTION, SOLUTION INTRAVENOUS; SUBCUTANEOUS PRN
Status: DISCONTINUED | OUTPATIENT
Start: 2025-06-22 | End: 2025-06-22

## 2025-06-22 RX ORDER — PHENYLEPHRINE HCL IN 0.9% NACL 1 MG/10 ML
SYRINGE (ML) INTRAVENOUS
Status: DISCONTINUED | OUTPATIENT
Start: 2025-06-22 | End: 2025-06-22 | Stop reason: SDUPTHER

## 2025-06-22 RX ORDER — SODIUM CHLORIDE, SODIUM LACTATE, POTASSIUM CHLORIDE, CALCIUM CHLORIDE 600; 310; 30; 20 MG/100ML; MG/100ML; MG/100ML; MG/100ML
INJECTION, SOLUTION INTRAVENOUS
Status: DISCONTINUED | OUTPATIENT
Start: 2025-06-22 | End: 2025-06-22 | Stop reason: SDUPTHER

## 2025-06-22 RX ORDER — HEPARIN SODIUM 1000 [USP'U]/ML
40 INJECTION, SOLUTION INTRAVENOUS; SUBCUTANEOUS PRN
Status: DISCONTINUED | OUTPATIENT
Start: 2025-06-22 | End: 2025-06-24

## 2025-06-22 RX ORDER — PROPOFOL 10 MG/ML
INJECTION, EMULSION INTRAVENOUS
Status: DISCONTINUED | OUTPATIENT
Start: 2025-06-22 | End: 2025-06-22 | Stop reason: SDUPTHER

## 2025-06-22 RX ORDER — HEPARIN SODIUM 1000 [USP'U]/ML
5700 INJECTION, SOLUTION INTRAVENOUS; SUBCUTANEOUS ONCE
Status: COMPLETED | OUTPATIENT
Start: 2025-06-22 | End: 2025-06-22

## 2025-06-22 RX ORDER — ROCURONIUM BROMIDE 10 MG/ML
INJECTION, SOLUTION INTRAVENOUS
Status: DISCONTINUED | OUTPATIENT
Start: 2025-06-22 | End: 2025-06-22 | Stop reason: SDUPTHER

## 2025-06-22 RX ORDER — POTASSIUM CHLORIDE 7.45 MG/ML
10 INJECTION INTRAVENOUS
Status: DISPENSED | OUTPATIENT
Start: 2025-06-22 | End: 2025-06-22

## 2025-06-22 RX ORDER — IOPAMIDOL 755 MG/ML
75 INJECTION, SOLUTION INTRAVASCULAR
Status: COMPLETED | OUTPATIENT
Start: 2025-06-22 | End: 2025-06-22

## 2025-06-22 RX ORDER — SUCCINYLCHOLINE/SOD CL,ISO/PF 200MG/10ML
SYRINGE (ML) INTRAVENOUS
Status: DISCONTINUED | OUTPATIENT
Start: 2025-06-22 | End: 2025-06-22 | Stop reason: SDUPTHER

## 2025-06-22 RX ORDER — CHLORHEXIDINE GLUCONATE ORAL RINSE 1.2 MG/ML
15 SOLUTION DENTAL 2 TIMES DAILY
Status: DISCONTINUED | OUTPATIENT
Start: 2025-06-23 | End: 2025-06-23

## 2025-06-22 RX ORDER — HEPARIN SODIUM 1000 [USP'U]/ML
80 INJECTION, SOLUTION INTRAVENOUS; SUBCUTANEOUS ONCE
Status: DISCONTINUED | OUTPATIENT
Start: 2025-06-22 | End: 2025-06-22

## 2025-06-22 RX ORDER — ONDANSETRON 2 MG/ML
INJECTION INTRAMUSCULAR; INTRAVENOUS
Status: DISCONTINUED | OUTPATIENT
Start: 2025-06-22 | End: 2025-06-22 | Stop reason: SDUPTHER

## 2025-06-22 RX ORDER — HEPARIN SODIUM 1000 [USP'U]/ML
INJECTION, SOLUTION INTRAVENOUS; SUBCUTANEOUS
Status: DISCONTINUED | OUTPATIENT
Start: 2025-06-22 | End: 2025-06-22 | Stop reason: SDUPTHER

## 2025-06-22 RX ORDER — HEPARIN SODIUM 10000 [USP'U]/100ML
5-30 INJECTION, SOLUTION INTRAVENOUS CONTINUOUS
Status: DISCONTINUED | OUTPATIENT
Start: 2025-06-22 | End: 2025-06-24

## 2025-06-22 RX ORDER — FENTANYL CITRATE 50 UG/ML
100 INJECTION, SOLUTION INTRAMUSCULAR; INTRAVENOUS
Status: DISCONTINUED | OUTPATIENT
Start: 2025-06-22 | End: 2025-06-23

## 2025-06-22 RX ORDER — LIDOCAINE HYDROCHLORIDE 20 MG/ML
INJECTION, SOLUTION INTRAVENOUS
Status: DISCONTINUED | OUTPATIENT
Start: 2025-06-22 | End: 2025-06-22 | Stop reason: SDUPTHER

## 2025-06-22 RX ORDER — VASOPRESSIN 20 [USP'U]/ML
INJECTION, SOLUTION INTRAVENOUS
Status: DISCONTINUED | OUTPATIENT
Start: 2025-06-22 | End: 2025-06-22 | Stop reason: SDUPTHER

## 2025-06-22 RX ORDER — PROPOFOL 10 MG/ML
5-50 INJECTION, EMULSION INTRAVENOUS CONTINUOUS
Status: DISCONTINUED | OUTPATIENT
Start: 2025-06-23 | End: 2025-06-23

## 2025-06-22 RX ORDER — DEXAMETHASONE SODIUM PHOSPHATE 10 MG/ML
INJECTION, SOLUTION INTRA-ARTICULAR; INTRALESIONAL; INTRAMUSCULAR; INTRAVENOUS; SOFT TISSUE
Status: DISCONTINUED | OUTPATIENT
Start: 2025-06-22 | End: 2025-06-22 | Stop reason: SDUPTHER

## 2025-06-22 RX ORDER — ALBUMIN HUMAN 50 G/1000ML
SOLUTION INTRAVENOUS
Status: DISCONTINUED | OUTPATIENT
Start: 2025-06-22 | End: 2025-06-22 | Stop reason: SDUPTHER

## 2025-06-22 RX ORDER — FENTANYL CITRATE 50 UG/ML
INJECTION, SOLUTION INTRAMUSCULAR; INTRAVENOUS
Status: DISCONTINUED | OUTPATIENT
Start: 2025-06-22 | End: 2025-06-22 | Stop reason: SDUPTHER

## 2025-06-22 RX ORDER — HEPARIN SODIUM 1000 [USP'U]/ML
80 INJECTION, SOLUTION INTRAVENOUS; SUBCUTANEOUS PRN
Status: DISCONTINUED | OUTPATIENT
Start: 2025-06-22 | End: 2025-06-22

## 2025-06-22 RX ORDER — HEPARIN SODIUM 10000 [USP'U]/100ML
5-30 INJECTION, SOLUTION INTRAVENOUS CONTINUOUS
Status: DISCONTINUED | OUTPATIENT
Start: 2025-06-22 | End: 2025-06-22

## 2025-06-22 RX ORDER — DEXTROSE MONOHYDRATE 25 G/50ML
INJECTION, SOLUTION INTRAVENOUS
Status: DISCONTINUED | OUTPATIENT
Start: 2025-06-22 | End: 2025-06-22 | Stop reason: SDUPTHER

## 2025-06-22 RX ORDER — HEPARIN SODIUM 1000 [USP'U]/ML
80 INJECTION, SOLUTION INTRAVENOUS; SUBCUTANEOUS PRN
Status: DISCONTINUED | OUTPATIENT
Start: 2025-06-22 | End: 2025-06-24

## 2025-06-22 RX ORDER — MIDAZOLAM HYDROCHLORIDE 1 MG/ML
INJECTION, SOLUTION INTRAMUSCULAR; INTRAVENOUS
Status: DISCONTINUED | OUTPATIENT
Start: 2025-06-22 | End: 2025-06-22 | Stop reason: SDUPTHER

## 2025-06-22 RX ADMIN — VASOPRESSIN 3 UNITS: 20 INJECTION INTRAVENOUS at 21:53

## 2025-06-22 RX ADMIN — Medication 100 MG: at 19:58

## 2025-06-22 RX ADMIN — VASOPRESSIN 2 UNITS: 20 INJECTION INTRAVENOUS at 22:54

## 2025-06-22 RX ADMIN — SODIUM CHLORIDE, SODIUM LACTATE, POTASSIUM CHLORIDE, AND CALCIUM CHLORIDE: .6; .31; .03; .02 INJECTION, SOLUTION INTRAVENOUS at 23:59

## 2025-06-22 RX ADMIN — Medication 100 MCG: at 20:49

## 2025-06-22 RX ADMIN — VASOPRESSIN 2 UNITS: 20 INJECTION INTRAVENOUS at 22:32

## 2025-06-22 RX ADMIN — VASOPRESSIN 1 UNITS: 20 INJECTION INTRAVENOUS at 20:54

## 2025-06-22 RX ADMIN — VASOPRESSIN 1 UNITS: 20 INJECTION INTRAVENOUS at 20:06

## 2025-06-22 RX ADMIN — VASOPRESSIN 1 UNITS: 20 INJECTION INTRAVENOUS at 20:50

## 2025-06-22 RX ADMIN — POTASSIUM CHLORIDE 10 MEQ: 7.46 INJECTION, SOLUTION INTRAVENOUS at 20:03

## 2025-06-22 RX ADMIN — ROCURONIUM BROMIDE 30 MG: 10 INJECTION, SOLUTION INTRAVENOUS at 19:58

## 2025-06-22 RX ADMIN — VASOPRESSIN 2 UNITS: 20 INJECTION INTRAVENOUS at 22:05

## 2025-06-22 RX ADMIN — HEPARIN SODIUM 3000 UNITS: 1000 INJECTION INTRAVENOUS; SUBCUTANEOUS at 20:57

## 2025-06-22 RX ADMIN — SODIUM CHLORIDE, SODIUM LACTATE, POTASSIUM CHLORIDE, CALCIUM CHLORIDE: 600; 310; 30; 20 INJECTION, SOLUTION INTRAVENOUS at 20:10

## 2025-06-22 RX ADMIN — HEPARIN SODIUM 5700 UNITS: 1000 INJECTION INTRAVENOUS; SUBCUTANEOUS at 19:39

## 2025-06-22 RX ADMIN — DEXAMETHASONE SODIUM PHOSPHATE 10 MG: 10 INJECTION INTRAMUSCULAR; INTRAVENOUS at 20:04

## 2025-06-22 RX ADMIN — ALBUMIN (HUMAN) 25 G: 12.5 INJECTION, SOLUTION INTRAVENOUS at 21:05

## 2025-06-22 RX ADMIN — LIDOCAINE HYDROCHLORIDE 60 MG: 20 INJECTION, SOLUTION INTRAVENOUS at 19:58

## 2025-06-22 RX ADMIN — VASOPRESSIN 2 UNITS: 20 INJECTION INTRAVENOUS at 22:22

## 2025-06-22 RX ADMIN — FENTANYL CITRATE 50 MCG: 0.05 INJECTION, SOLUTION INTRAMUSCULAR; INTRAVENOUS at 19:58

## 2025-06-22 RX ADMIN — VASOPRESSIN 0.02 UNITS/MIN: 20 INJECTION INTRAVENOUS at 21:16

## 2025-06-22 RX ADMIN — SODIUM CHLORIDE, POTASSIUM CHLORIDE, SODIUM LACTATE AND CALCIUM CHLORIDE: 600; 310; 30; 20 INJECTION, SOLUTION INTRAVENOUS at 19:52

## 2025-06-22 RX ADMIN — FENTANYL CITRATE 100 MCG: 0.05 INJECTION, SOLUTION INTRAMUSCULAR; INTRAVENOUS at 21:36

## 2025-06-22 RX ADMIN — VASOPRESSIN 2 UNITS: 20 INJECTION INTRAVENOUS at 21:48

## 2025-06-22 RX ADMIN — VASOPRESSIN 1 UNITS: 20 INJECTION INTRAVENOUS at 21:42

## 2025-06-22 RX ADMIN — IOPAMIDOL 75 ML: 755 INJECTION, SOLUTION INTRAVENOUS at 18:42

## 2025-06-22 RX ADMIN — FENTANYL CITRATE 50 MCG: 0.05 INJECTION, SOLUTION INTRAMUSCULAR; INTRAVENOUS at 20:46

## 2025-06-22 RX ADMIN — FENTANYL CITRATE 50 MCG: 0.05 INJECTION, SOLUTION INTRAMUSCULAR; INTRAVENOUS at 22:07

## 2025-06-22 RX ADMIN — PROPOFOL 120 MG: 10 INJECTION, EMULSION INTRAVENOUS at 19:58

## 2025-06-22 RX ADMIN — Medication 100 MCG: at 20:00

## 2025-06-22 RX ADMIN — FENTANYL CITRATE 50 MCG: 0.05 INJECTION, SOLUTION INTRAMUSCULAR; INTRAVENOUS at 22:44

## 2025-06-22 RX ADMIN — FENTANYL CITRATE 100 MCG: 50 INJECTION INTRAMUSCULAR; INTRAVENOUS at 23:44

## 2025-06-22 RX ADMIN — FENTANYL CITRATE 50 MCG: 0.05 INJECTION, SOLUTION INTRAMUSCULAR; INTRAVENOUS at 23:06

## 2025-06-22 RX ADMIN — SODIUM CHLORIDE, POTASSIUM CHLORIDE, SODIUM LACTATE AND CALCIUM CHLORIDE: 600; 310; 30; 20 INJECTION, SOLUTION INTRAVENOUS at 21:37

## 2025-06-22 RX ADMIN — VASOPRESSIN 1 UNITS: 20 INJECTION INTRAVENOUS at 21:17

## 2025-06-22 RX ADMIN — POTASSIUM CHLORIDE 10 MEQ: 7.46 INJECTION, SOLUTION INTRAVENOUS at 21:01

## 2025-06-22 RX ADMIN — HEPARIN SODIUM 18 UNITS/KG/HR: 10000 INJECTION, SOLUTION INTRAVENOUS at 19:43

## 2025-06-22 RX ADMIN — HEPARIN SODIUM 5000 UNITS: 1000 INJECTION INTRAVENOUS; SUBCUTANEOUS at 21:33

## 2025-06-22 RX ADMIN — ROCURONIUM BROMIDE 20 MG: 10 INJECTION, SOLUTION INTRAVENOUS at 22:48

## 2025-06-22 RX ADMIN — DEXTROSE MONOHYDRATE 12.5 G: 25 INJECTION, SOLUTION INTRAVENOUS at 20:22

## 2025-06-22 RX ADMIN — Medication 200 MCG: at 20:02

## 2025-06-22 RX ADMIN — MIDAZOLAM 2 MG: 1 INJECTION INTRAMUSCULAR; INTRAVENOUS at 19:56

## 2025-06-22 RX ADMIN — VASOPRESSIN 1 UNITS: 20 INJECTION INTRAVENOUS at 21:04

## 2025-06-22 RX ADMIN — ONDANSETRON 4 MG: 2 INJECTION, SOLUTION INTRAMUSCULAR; INTRAVENOUS at 20:00

## 2025-06-22 RX ADMIN — PROPOFOL 20 MCG/KG/MIN: 10 INJECTION, EMULSION INTRAVENOUS at 23:42

## 2025-06-22 RX ADMIN — VASOPRESSIN 1 UNITS: 20 INJECTION INTRAVENOUS at 21:45

## 2025-06-22 RX ADMIN — ALBUMIN (HUMAN) 25 G: 12.5 INJECTION, SOLUTION INTRAVENOUS at 21:50

## 2025-06-22 ASSESSMENT — PAIN SCALES - GENERAL
PAINLEVEL_OUTOF10: 0
PAINLEVEL_OUTOF10: 10
PAINLEVEL_OUTOF10: 7

## 2025-06-22 ASSESSMENT — PAIN DESCRIPTION - ORIENTATION: ORIENTATION: LEFT

## 2025-06-22 ASSESSMENT — LIFESTYLE VARIABLES
HOW MANY STANDARD DRINKS CONTAINING ALCOHOL DO YOU HAVE ON A TYPICAL DAY: 3 OR 4
HOW OFTEN DO YOU HAVE A DRINK CONTAINING ALCOHOL: MONTHLY OR LESS

## 2025-06-22 ASSESSMENT — PAIN DESCRIPTION - PAIN TYPE: TYPE: ACUTE PAIN

## 2025-06-22 ASSESSMENT — PULMONARY FUNCTION TESTS
PIF_VALUE: 18
PIF_VALUE: 33

## 2025-06-22 ASSESSMENT — PAIN DESCRIPTION - LOCATION: LOCATION: SHOULDER

## 2025-06-22 ASSESSMENT — PAIN - FUNCTIONAL ASSESSMENT: PAIN_FUNCTIONAL_ASSESSMENT: 0-10

## 2025-06-22 NOTE — CONSULTS
Vascular Surgery Consultation Note    Reason for Consult: Ischemia of the left upper extremity    HPI:    This is a 59 y.o. male who presented to the emergency department today, 6/22/25 with discolored left upper extremity. The patient is a poor historian. He reportedly had vascular procedures on his left upper extremity recently done at [facility] within the past few weeks. He admits to drinking 2 quarts of liquor in the past 24hrs. He reports last PO intake to be last night. Distal pulses are not found in the LUE today in the ED. The patient also had carotid stenting and mechanical thrombectomy for CVA in February 2025.    The patient was reportedly prescribed Eliquis. It is unclear whether he has been taking this    He has no focal deficits. No reports of trauma. No signs of trauma, specifically no signs of trauma to the head. Elevated blood alcohol level of 188    ROS: Negative if blank [], Positive if [x]  General Vascular   [] Fevers [] Claudication (Blocks)   [] Chills [x] Rest Pain   [] Weight Loss [] Tissue Loss   [] Chest Pain [] Clotting Disorder   [] SOB at rest [] Leg Swelling   [] SOB with exertion [] DVT/PE      [] Nausea    [] Vomiting [x] Stroke/TIA   [] Abdominal Pain [] Focal weakness   [] Melena [] Slurred Speech   [] Hematochezia [] Vision Changes   [] Hematuria    [] Dysuria [] Hx of Central Catheters   [] Wears Glasses/Contacts [] Dialysis:    [] Blindness    [x] Left Hand Dominant   [] Difficulty swallowing        Past Medical History:   Diagnosis Date    Arthritis     Asthma 02/26/2018    BPH (benign prostatic hyperplasia)     Chronic back pain     Plates inseted from L2-L5    COPD (chronic obstructive pulmonary disease) (HCC)     Depression     Emphysema lung (HCC)     Hepatitis C 2011    Schizo affective schizophrenia (HCC)         Past Surgical History:   Procedure Laterality Date    BACK SURGERY  1994    IR CAROTID STENT W PROTECTION  2/26/2025    IR CAROTID STENT W PROTECTION

## 2025-06-22 NOTE — ED PROVIDER NOTES
Department of Emergency Medicine   ED  Provider Note  Admit Date/RoomTime: 6/22/2025  6:08 PM  ED Room: 22/22          History of Present Illness:  6/22/25, Time: 7:42 PM EDT  Chief Complaint   Patient presents with    Cold Extremity     Left lower arm and hand cold to touch, no palpable pulses. Per pt/EMS, he had \"stents\" placed and did not  his blood thinner prescription                 Colton Hurley is a 59 y.o. male presenting to the ED for hand discoloration.  Patient recently had a procedure done at outside facility.  He states he had a stent placed for some issues with his artery in his arm.  He was supposed to leave with anticoagulation.  However, he states he left AMA.  He did not take his anticoagulation with him.  It is reported by EMS his wife called today as she noticed his arm was discolored.  There is no report of injury or trauma.  He has no pain, however, he says it feels tingly and numb.  He is not sure when this all began.  He admits to drinking alcohol today.  EMS reports that the family reported to them the patient signed AMA and felt that alcohol was sufficient to keep his blood thin, they declined any anticoagulation medications.  He does have a history of PE and ischemic stroke, he supposed with Eliquis, he is not taking it.  He is not sure about the procedure exactly when or which procedure was done.  He denies any chest pain or shortness of breath.  Denies a head pain or neck pain.  Denies any nausea or vomit.  Denies any paresthesias otherwise.  Denies any other symptoms or complaints.    Review of Systems:   Pertinent positives and negatives are stated within HPI, all other systems reviewed and are negative.        --------------------------------------------- PAST HISTORY ---------------------------------------------  Past Medical History:  has a past medical history of Arthritis, Asthma, BPH (benign prostatic hyperplasia), Chronic back pain, COPD (chronic obstructive

## 2025-06-22 NOTE — ANESTHESIA PRE PROCEDURE
Nisa VIRGEN MD       • heparin 25,000 units in dextrose 5% 250 mL (premix) infusion  5-30 Units/kg/hr IntraVENous Continuous Nisa Sherman MD 12.7 mL/hr at 06/22/25 1943 18 Units/kg/hr at 06/22/25 1943   • potassium chloride 10 mEq/100 mL IVPB (Peripheral Line)  10 mEq IntraVENous Q1H Nisa Sherman MD         Current Outpatient Medications   Medication Sig Dispense Refill   • thiamine 100 MG tablet Take 1 tablet by mouth daily 30 tablet 3   • apixaban (ELIQUIS) 5 MG TABS tablet Take 2 tablets by mouth 2 times daily for 7 days, THEN 1 tablet 2 times daily. 60 tablet 3   • vitamin B-12 (CYANOCOBALAMIN) 100 MCG tablet Take 1 tablet by mouth daily 30 tablet 3   • PARoxetine (PAXIL) 30 MG tablet Take 1 tablet by mouth every morning     • vitamin D (ERGOCALCIFEROL) 1.25 MG (58861 UT) CAPS capsule Take 1 capsule by mouth once a week     • benztropine (COGENTIN) 0.5 MG tablet Take 1 tablet by mouth 2 times daily     • aspirin 81 MG chewable tablet Take 1 tablet by mouth daily 30 tablet 0   • atorvastatin (LIPITOR) 40 MG tablet Take 1 tablet by mouth nightly 30 tablet 0   • albuterol sulfate HFA (VENTOLIN HFA) 108 (90 Base) MCG/ACT inhaler Inhale 2 puffs into the lungs every 6 hours as needed for Wheezing 18 g 2   • mirtazapine (REMERON) 15 MG tablet      • OLANZapine (ZYPREXA) 15 MG tablet Take 1 tablet by mouth nightly         Allergies:    Allergies   Allergen Reactions   • Latex Rash   • Shellfish-Derived Products Hives   • Strawberry Extract Hives   • Adhesive Tape Rash   • Penicillins Rash     Trouble breathing       Problem List:    Patient Active Problem List   Diagnosis Code   • Schizophrenia, schizo-affective ([facility]) F25.9   • Dermatitis L30.9   • Pulmonary emphysema ([facility]) J43.9   • Cystic mass of pancreas K86.2   • Spondylosis of lumbar region without myelopathy or radiculopathy M47.816   • Gastroesophageal reflux disease without esophagitis K21.9   • Cervical adenopathy R59.0   • Depression F32.A   • Severe

## 2025-06-22 NOTE — ED NOTES
Name: Colton Hurley  : 1965  MRN: [id number]    Date: 2025    Benefits of immediately proceeding with Radiology exam outweigh the risks and therefore the following is being waived:      [] Pregnancy test    [] Protocol for Iodine allergy    [] MRI questionnaire    [x] BUN/Creatinine        MD Lane Cervantes, Nisa VIRGEN MD  25 [phone]

## 2025-06-23 ENCOUNTER — APPOINTMENT (OUTPATIENT)
Dept: GENERAL RADIOLOGY | Age: 60
DRG: 253 | End: 2025-06-23
Payer: MEDICARE

## 2025-06-23 PROBLEM — E87.6 HYPOKALEMIA: Status: ACTIVE | Noted: 2025-06-23

## 2025-06-23 PROBLEM — F10.929 ALCOHOLIC INTOXICATION WITH COMPLICATION: Status: ACTIVE | Noted: 2025-06-23

## 2025-06-23 LAB
AADO2: 138.9 MMHG
AADO2: 95.1 MMHG
ACTIVATED CLOTTING TIME, LOW RANGE: 223 SEC
ACTIVATED CLOTTING TIME, LOW RANGE: 256 SEC
ACTIVATED CLOTTING TIME, LOW RANGE: 272 SEC
ACTIVATED CLOTTING TIME, LOW RANGE: 299 SEC
ACTIVATED CLOTTING TIME, LOW RANGE: >400 SEC
ALBUMIN SERPL-MCNC: 3.4 G/DL (ref 3.5–5.2)
ALBUMIN SERPL-MCNC: 3.6 G/DL (ref 3.5–5.2)
ALP SERPL-CCNC: 51 U/L (ref 40–129)
ALP SERPL-CCNC: 58 U/L (ref 40–129)
ALT SERPL-CCNC: 6 U/L (ref 0–50)
ALT SERPL-CCNC: 8 U/L (ref 0–50)
ANION GAP SERPL CALCULATED.3IONS-SCNC: 11 MMOL/L (ref 7–16)
ANION GAP SERPL CALCULATED.3IONS-SCNC: 17 MMOL/L (ref 7–16)
AST SERPL-CCNC: 16 U/L (ref 0–50)
AST SERPL-CCNC: 18 U/L (ref 0–50)
B.E.: -2.3 MMOL/L (ref -3–3)
B.E.: -6.3 MMOL/L (ref -3–3)
B.E.: 4.7 MMOL/L (ref -3–3)
BASOPHILS # BLD: 0 K/UL (ref 0–0.2)
BASOPHILS NFR BLD: 0 % (ref 0–2)
BILIRUB SERPL-MCNC: 0.4 MG/DL (ref 0–1.2)
BILIRUB SERPL-MCNC: 0.4 MG/DL (ref 0–1.2)
BUN SERPL-MCNC: 3 MG/DL (ref 6–20)
BUN SERPL-MCNC: 6 MG/DL (ref 6–20)
CA-I BLD-SCNC: 1.14 MMOL/L (ref 1.15–1.33)
CA-I BLD-SCNC: 1.2 MMOL/L (ref 1.15–1.33)
CALCIUM SERPL-MCNC: 8.1 MG/DL (ref 8.6–10)
CALCIUM SERPL-MCNC: 8.8 MG/DL (ref 8.6–10)
CHLORIDE SERPL-SCNC: 105 MMOL/L (ref 98–107)
CHLORIDE SERPL-SCNC: 107 MMOL/L (ref 98–107)
CK SERPL-CCNC: 110 U/L (ref 0–190)
CK SERPL-CCNC: 203 U/L (ref 0–190)
CO2 SERPL-SCNC: 16 MMOL/L (ref 22–29)
CO2 SERPL-SCNC: 23 MMOL/L (ref 22–29)
COHB: 0.1 % (ref 0–1.5)
COHB: 0.3 % (ref 0–1.5)
COHB: 1.5 % (ref 0–1.5)
CREAT SERPL-MCNC: 0.4 MG/DL (ref 0.7–1.2)
CREAT SERPL-MCNC: 0.4 MG/DL (ref 0.7–1.2)
CRITICAL: ABNORMAL
DATE ANALYZED: ABNORMAL
DATE OF COLLECTION: ABNORMAL
EOSINOPHIL # BLD: 0 K/UL (ref 0.05–0.5)
EOSINOPHILS RELATIVE PERCENT: 0 % (ref 0–6)
ERYTHROCYTE [DISTWIDTH] IN BLOOD BY AUTOMATED COUNT: 16 % (ref 11.5–15)
ETHANOLAMINE SERPL-MCNC: 32 MG/DL (ref 0–0.08)
FIO2: 50 %
FIO2: 50 %
GFR, ESTIMATED: >90 ML/MIN/1.73M2
GFR, ESTIMATED: >90 ML/MIN/1.73M2
GLUCOSE BLD-MCNC: 108 MG/DL (ref 74–99)
GLUCOSE BLD-MCNC: 109 MG/DL (ref 74–99)
GLUCOSE BLD-MCNC: 111 MG/DL (ref 74–99)
GLUCOSE BLD-MCNC: 113 MG/DL (ref 74–99)
GLUCOSE BLD-MCNC: 97 MG/DL (ref 74–99)
GLUCOSE SERPL-MCNC: 118 MG/DL (ref 74–99)
GLUCOSE SERPL-MCNC: 131 MG/DL (ref 74–99)
HCO3: 18.8 MMOL/L (ref 22–26)
HCO3: 21.7 MMOL/L (ref 22–26)
HCO3: 28.6 MMOL/L (ref 22–26)
HCT VFR BLD AUTO: 30 % (ref 37–54)
HGB BLD-MCNC: 10.6 G/DL (ref 12.5–16.5)
HHB: 0.6 % (ref 0–5)
HHB: 0.9 % (ref 0–5)
HHB: 8.3 % (ref 0–5)
IMM GRANULOCYTES # BLD AUTO: <0.03 K/UL (ref 0–0.58)
IMM GRANULOCYTES NFR BLD: 0 % (ref 0–5)
LAB: ABNORMAL
LACTATE BLDV-SCNC: 2.3 MMOL/L (ref 0.5–2.2)
LACTATE BLDV-SCNC: 3.7 MMOL/L (ref 0.5–2.2)
LYMPHOCYTES NFR BLD: 0.46 K/UL (ref 1.5–4)
LYMPHOCYTES RELATIVE PERCENT: 8 % (ref 20–42)
Lab: 0
Lab: 1435
Lab: 414
MAGNESIUM SERPL-MCNC: 1.8 MG/DL (ref 1.6–2.6)
MAGNESIUM SERPL-MCNC: 2.5 MG/DL (ref 1.6–2.6)
MCH RBC QN AUTO: 37.1 PG (ref 26–35)
MCHC RBC AUTO-ENTMCNC: 35.3 G/DL (ref 32–34.5)
MCV RBC AUTO: 104.9 FL (ref 80–99.9)
METHB: 0.1 % (ref 0–1.5)
MODE: ABNORMAL
MODE: AC
MODE: AC
MONOCYTES NFR BLD: 0.14 K/UL (ref 0.1–0.95)
MONOCYTES NFR BLD: 2 % (ref 2–12)
NEUTROPHILS NFR BLD: 89 % (ref 43–80)
NEUTS SEG NFR BLD: 5.13 K/UL (ref 1.8–7.3)
O2 SATURATION: 91.7 % (ref 92–98.5)
O2 SATURATION: 99.1 % (ref 92–98.5)
O2 SATURATION: 99.4 % (ref 92–98.5)
O2HB: 91.3 % (ref 94–97)
O2HB: 97.8 % (ref 94–97)
O2HB: 98.9 % (ref 94–97)
OPERATOR ID: 7291
OPERATOR ID: 7291
OPERATOR ID: ABNORMAL
PARTIAL THROMBOPLASTIN TIME: 49.7 SEC (ref 24.5–35.1)
PARTIAL THROMBOPLASTIN TIME: 66 SEC (ref 24.5–35.1)
PARTIAL THROMBOPLASTIN TIME: 69.1 SEC (ref 24.5–35.1)
PARTIAL THROMBOPLASTIN TIME: >240 SEC (ref 24.5–35.1)
PATIENT TEMP: 37 C
PCO2: 34.7 MMHG (ref 35–45)
PCO2: 36.1 MMHG (ref 35–45)
PCO2: 39.5 MMHG (ref 35–45)
PEEP/CPAP: 8 CMH2O
PEEP/CPAP: 8 CMH2O
PFO2: 3.57 MMHG/%
PFO2: 4.42 MMHG/%
PH BLOOD GAS: 7.33 (ref 7.35–7.45)
PH BLOOD GAS: 7.41 (ref 7.35–7.45)
PH BLOOD GAS: 7.48 (ref 7.35–7.45)
PHOSPHATE SERPL-MCNC: 2.7 MG/DL (ref 2.5–4.5)
PHOSPHATE SERPL-MCNC: 3 MG/DL (ref 2.5–4.5)
PLATELET # BLD AUTO: 219 K/UL (ref 130–450)
PMV BLD AUTO: 10.6 FL (ref 7–12)
PO2: 178.6 MMHG (ref 75–100)
PO2: 220.8 MMHG (ref 75–100)
PO2: 63 MMHG (ref 75–100)
POTASSIUM SERPL-SCNC: 3.5 MMOL/L (ref 3.5–5.1)
POTASSIUM SERPL-SCNC: 4.2 MMOL/L (ref 3.5–5.1)
PROT SERPL-MCNC: 5.6 G/DL (ref 6.4–8.3)
PROT SERPL-MCNC: 5.8 G/DL (ref 6.4–8.3)
RBC # BLD AUTO: 2.86 M/UL (ref 3.8–5.8)
RBC # BLD: ABNORMAL 10*6/UL
RI(T): 0.43
RI(T): 0.78
RR MECHANICAL: 12 B/MIN
RR MECHANICAL: 12 B/MIN
SODIUM SERPL-SCNC: 138 MMOL/L (ref 136–145)
SODIUM SERPL-SCNC: 141 MMOL/L (ref 136–145)
SOURCE, BLOOD GAS: ABNORMAL
THB: 11 G/DL (ref 11.5–16.5)
THB: 11 G/DL (ref 11.5–16.5)
THB: 11.5 G/DL (ref 11.5–16.5)
TIME ANALYZED: 1
TIME ANALYZED: 1441
TIME ANALYZED: 417
VT MECHANICAL: 450 ML
VT MECHANICAL: 450 ML
WBC OTHER # BLD: 5.8 K/UL (ref 4.5–11.5)

## 2025-06-23 PROCEDURE — 99291 CRITICAL CARE FIRST HOUR: CPT | Performed by: STUDENT IN AN ORGANIZED HEALTH CARE EDUCATION/TRAINING PROGRAM

## 2025-06-23 PROCEDURE — 74018 RADEX ABDOMEN 1 VIEW: CPT

## 2025-06-23 PROCEDURE — 99223 1ST HOSP IP/OBS HIGH 75: CPT | Performed by: STUDENT IN AN ORGANIZED HEALTH CARE EDUCATION/TRAINING PROGRAM

## 2025-06-23 PROCEDURE — 82962 GLUCOSE BLOOD TEST: CPT

## 2025-06-23 PROCEDURE — 03CA0ZZ EXTIRPATION OF MATTER FROM LEFT ULNAR ARTERY, OPEN APPROACH: ICD-10-PCS | Performed by: SURGERY

## 2025-06-23 PROCEDURE — 2700000000 HC OXYGEN THERAPY PER DAY

## 2025-06-23 PROCEDURE — 36415 COLL VENOUS BLD VENIPUNCTURE: CPT

## 2025-06-23 PROCEDURE — 83735 ASSAY OF MAGNESIUM: CPT

## 2025-06-23 PROCEDURE — 0DH67UZ INSERTION OF FEEDING DEVICE INTO STOMACH, VIA NATURAL OR ARTIFICIAL OPENING: ICD-10-PCS | Performed by: SURGERY

## 2025-06-23 PROCEDURE — 37799 UNLISTED PX VASCULAR SURGERY: CPT

## 2025-06-23 PROCEDURE — 6360000002 HC RX W HCPCS: Performed by: STUDENT IN AN ORGANIZED HEALTH CARE EDUCATION/TRAINING PROGRAM

## 2025-06-23 PROCEDURE — 71045 X-RAY EXAM CHEST 1 VIEW: CPT

## 2025-06-23 PROCEDURE — 2580000003 HC RX 258: Performed by: STUDENT IN AN ORGANIZED HEALTH CARE EDUCATION/TRAINING PROGRAM

## 2025-06-23 PROCEDURE — 03CC0ZZ EXTIRPATION OF MATTER FROM LEFT RADIAL ARTERY, OPEN APPROACH: ICD-10-PCS | Performed by: SURGERY

## 2025-06-23 PROCEDURE — 94003 VENT MGMT INPAT SUBQ DAY: CPT

## 2025-06-23 PROCEDURE — 6370000000 HC RX 637 (ALT 250 FOR IP): Performed by: STUDENT IN AN ORGANIZED HEALTH CARE EDUCATION/TRAINING PROGRAM

## 2025-06-23 PROCEDURE — 2500000003 HC RX 250 WO HCPCS: Performed by: STUDENT IN AN ORGANIZED HEALTH CARE EDUCATION/TRAINING PROGRAM

## 2025-06-23 PROCEDURE — 85730 THROMBOPLASTIN TIME PARTIAL: CPT

## 2025-06-23 PROCEDURE — 82330 ASSAY OF CALCIUM: CPT

## 2025-06-23 PROCEDURE — 82550 ASSAY OF CK (CPK): CPT

## 2025-06-23 PROCEDURE — 6360000002 HC RX W HCPCS: Performed by: EMERGENCY MEDICINE

## 2025-06-23 PROCEDURE — 03C80ZZ EXTIRPATION OF MATTER FROM LEFT BRACHIAL ARTERY, OPEN APPROACH: ICD-10-PCS | Performed by: SURGERY

## 2025-06-23 PROCEDURE — 82805 BLOOD GASES W/O2 SATURATION: CPT

## 2025-06-23 PROCEDURE — 83605 ASSAY OF LACTIC ACID: CPT

## 2025-06-23 PROCEDURE — 80053 COMPREHEN METABOLIC PANEL: CPT

## 2025-06-23 PROCEDURE — 85025 COMPLETE CBC W/AUTO DIFF WBC: CPT

## 2025-06-23 PROCEDURE — 2000000000 HC ICU R&B

## 2025-06-23 PROCEDURE — 84100 ASSAY OF PHOSPHORUS: CPT

## 2025-06-23 PROCEDURE — G0480 DRUG TEST DEF 1-7 CLASSES: HCPCS

## 2025-06-23 RX ORDER — FOLIC ACID 5 MG/ML
1 INJECTION, SOLUTION INTRAMUSCULAR; INTRAVENOUS; SUBCUTANEOUS DAILY
Status: DISCONTINUED | OUTPATIENT
Start: 2025-06-23 | End: 2025-07-01 | Stop reason: HOSPADM

## 2025-06-23 RX ORDER — PHENOBARBITAL 32.4 MG/1
16.2 TABLET ORAL EVERY 6 HOURS PRN
Status: DISCONTINUED | OUTPATIENT
Start: 2025-06-26 | End: 2025-06-24

## 2025-06-23 RX ORDER — PHENOBARBITAL 32.4 MG/1
64.8 TABLET ORAL EVERY 6 HOURS PRN
Status: DISCONTINUED | OUTPATIENT
Start: 2025-06-23 | End: 2025-06-24

## 2025-06-23 RX ORDER — POLYETHYLENE GLYCOL 3350 17 G/17G
17 POWDER, FOR SOLUTION ORAL DAILY PRN
Status: DISCONTINUED | OUTPATIENT
Start: 2025-06-23 | End: 2025-06-25

## 2025-06-23 RX ORDER — MULTIVITAMIN WITH IRON
1 TABLET ORAL DAILY
Status: DISCONTINUED | OUTPATIENT
Start: 2025-06-23 | End: 2025-07-01 | Stop reason: HOSPADM

## 2025-06-23 RX ORDER — PHENOBARBITAL 32.4 MG/1
32.4 TABLET ORAL EVERY 6 HOURS PRN
Status: DISCONTINUED | OUTPATIENT
Start: 2025-06-24 | End: 2025-06-24

## 2025-06-23 RX ORDER — PHENOBARBITAL SODIUM 65 MG/ML
130 INJECTION, SOLUTION INTRAMUSCULAR; INTRAVENOUS EVERY 6 HOURS SCHEDULED
Status: DISCONTINUED | OUTPATIENT
Start: 2025-06-24 | End: 2025-06-23

## 2025-06-23 RX ORDER — MAGNESIUM SULFATE IN WATER 40 MG/ML
2000 INJECTION, SOLUTION INTRAVENOUS PRN
Status: DISCONTINUED | OUTPATIENT
Start: 2025-06-23 | End: 2025-07-01 | Stop reason: HOSPADM

## 2025-06-23 RX ORDER — SODIUM CHLORIDE 0.9 % (FLUSH) 0.9 %
5-40 SYRINGE (ML) INJECTION EVERY 12 HOURS SCHEDULED
Status: DISCONTINUED | OUTPATIENT
Start: 2025-06-23 | End: 2025-06-30 | Stop reason: SDUPTHER

## 2025-06-23 RX ORDER — POTASSIUM CHLORIDE 1500 MG/1
40 TABLET, EXTENDED RELEASE ORAL PRN
Status: DISCONTINUED | OUTPATIENT
Start: 2025-06-23 | End: 2025-07-01 | Stop reason: HOSPADM

## 2025-06-23 RX ORDER — POTASSIUM CHLORIDE 7.45 MG/ML
10 INJECTION INTRAVENOUS
Status: DISCONTINUED | OUTPATIENT
Start: 2025-06-23 | End: 2025-06-23

## 2025-06-23 RX ORDER — OXYCODONE HYDROCHLORIDE 5 MG/1
5 TABLET ORAL EVERY 4 HOURS PRN
Refills: 0 | Status: DISCONTINUED | OUTPATIENT
Start: 2025-06-23 | End: 2025-06-25

## 2025-06-23 RX ORDER — FAMOTIDINE 20 MG/1
20 TABLET, FILM COATED ORAL 2 TIMES DAILY
Status: DISCONTINUED | OUTPATIENT
Start: 2025-06-23 | End: 2025-07-01 | Stop reason: HOSPADM

## 2025-06-23 RX ORDER — SODIUM CHLORIDE 0.9 % (FLUSH) 0.9 %
5-40 SYRINGE (ML) INJECTION PRN
Status: DISCONTINUED | OUTPATIENT
Start: 2025-06-23 | End: 2025-06-30 | Stop reason: SDUPTHER

## 2025-06-23 RX ORDER — SENNOSIDES 8.6 MG/1
1 TABLET ORAL NIGHTLY
Status: DISCONTINUED | OUTPATIENT
Start: 2025-06-23 | End: 2025-07-01 | Stop reason: HOSPADM

## 2025-06-23 RX ORDER — SODIUM CHLORIDE 9 MG/ML
INJECTION, SOLUTION INTRAVENOUS PRN
Status: DISCONTINUED | OUTPATIENT
Start: 2025-06-23 | End: 2025-07-01 | Stop reason: HOSPADM

## 2025-06-23 RX ORDER — ACETAMINOPHEN 325 MG/1
650 TABLET ORAL EVERY 6 HOURS
Status: DISCONTINUED | OUTPATIENT
Start: 2025-06-23 | End: 2025-06-24

## 2025-06-23 RX ORDER — POLYETHYLENE GLYCOL 3350 17 G/17G
17 POWDER, FOR SOLUTION ORAL DAILY
Status: DISCONTINUED | OUTPATIENT
Start: 2025-06-23 | End: 2025-07-01 | Stop reason: HOSPADM

## 2025-06-23 RX ORDER — ONDANSETRON 2 MG/ML
4 INJECTION INTRAMUSCULAR; INTRAVENOUS EVERY 6 HOURS PRN
Status: DISCONTINUED | OUTPATIENT
Start: 2025-06-23 | End: 2025-07-01 | Stop reason: HOSPADM

## 2025-06-23 RX ORDER — INSULIN LISPRO 100 [IU]/ML
0-4 INJECTION, SOLUTION INTRAVENOUS; SUBCUTANEOUS EVERY 4 HOURS
Status: DISCONTINUED | OUTPATIENT
Start: 2025-06-23 | End: 2025-06-28

## 2025-06-23 RX ORDER — PHENOBARBITAL SODIUM 65 MG/ML
260 INJECTION, SOLUTION INTRAMUSCULAR; INTRAVENOUS EVERY 6 HOURS SCHEDULED
Status: COMPLETED | OUTPATIENT
Start: 2025-06-23 | End: 2025-06-23

## 2025-06-23 RX ORDER — SODIUM CHLORIDE 0.9 % (FLUSH) 0.9 %
10 SYRINGE (ML) INJECTION PRN
Status: DISCONTINUED | OUTPATIENT
Start: 2025-06-23 | End: 2025-07-01 | Stop reason: HOSPADM

## 2025-06-23 RX ORDER — POTASSIUM CHLORIDE 7.45 MG/ML
10 INJECTION INTRAVENOUS PRN
Status: DISCONTINUED | OUTPATIENT
Start: 2025-06-23 | End: 2025-07-01 | Stop reason: HOSPADM

## 2025-06-23 RX ORDER — ACETAMINOPHEN 650 MG/1
650 SUPPOSITORY RECTAL EVERY 6 HOURS PRN
Status: DISCONTINUED | OUTPATIENT
Start: 2025-06-23 | End: 2025-06-25

## 2025-06-23 RX ORDER — PHENOBARBITAL SODIUM 65 MG/ML
130 INJECTION, SOLUTION INTRAMUSCULAR; INTRAVENOUS EVERY 6 HOURS SCHEDULED
Status: DISCONTINUED | OUTPATIENT
Start: 2025-06-24 | End: 2025-06-24

## 2025-06-23 RX ORDER — SODIUM CHLORIDE 0.9 % (FLUSH) 0.9 %
10 SYRINGE (ML) INJECTION EVERY 12 HOURS SCHEDULED
Status: DISCONTINUED | OUTPATIENT
Start: 2025-06-23 | End: 2025-07-01 | Stop reason: HOSPADM

## 2025-06-23 RX ORDER — ASPIRIN 81 MG/1
81 TABLET, CHEWABLE ORAL DAILY
Status: DISCONTINUED | OUTPATIENT
Start: 2025-06-23 | End: 2025-07-01 | Stop reason: HOSPADM

## 2025-06-23 RX ORDER — THIAMINE HYDROCHLORIDE 100 MG/ML
100 INJECTION, SOLUTION INTRAMUSCULAR; INTRAVENOUS DAILY
Status: DISCONTINUED | OUTPATIENT
Start: 2025-06-23 | End: 2025-07-01 | Stop reason: HOSPADM

## 2025-06-23 RX ORDER — MAGNESIUM SULFATE IN WATER 40 MG/ML
2000 INJECTION, SOLUTION INTRAVENOUS ONCE
Status: COMPLETED | OUTPATIENT
Start: 2025-06-23 | End: 2025-06-23

## 2025-06-23 RX ORDER — ONDANSETRON 4 MG/1
4 TABLET, ORALLY DISINTEGRATING ORAL EVERY 8 HOURS PRN
Status: DISCONTINUED | OUTPATIENT
Start: 2025-06-23 | End: 2025-07-01 | Stop reason: HOSPADM

## 2025-06-23 RX ORDER — ACETAMINOPHEN 325 MG/1
650 TABLET ORAL EVERY 6 HOURS PRN
Status: DISCONTINUED | OUTPATIENT
Start: 2025-06-23 | End: 2025-06-25

## 2025-06-23 RX ORDER — PHENOBARBITAL SODIUM 65 MG/ML
260 INJECTION, SOLUTION INTRAMUSCULAR; INTRAVENOUS EVERY 6 HOURS SCHEDULED
Status: DISCONTINUED | OUTPATIENT
Start: 2025-06-23 | End: 2025-06-23

## 2025-06-23 RX ADMIN — Medication 500 MG: at 03:00

## 2025-06-23 RX ADMIN — PHENOBARBITAL SODIUM 260 MG: 65 INJECTION INTRAMUSCULAR; INTRAVENOUS at 20:23

## 2025-06-23 RX ADMIN — POLYVINYL ALCOHOL, POVIDONE 1 DROP: 14; 6 SOLUTION/ DROPS OPHTHALMIC at 03:00

## 2025-06-23 RX ADMIN — FENTANYL CITRATE 100 MCG: 50 INJECTION INTRAMUSCULAR; INTRAVENOUS at 06:13

## 2025-06-23 RX ADMIN — PHENOBARBITAL SODIUM 260 MG: 65 INJECTION INTRAMUSCULAR at 03:00

## 2025-06-23 RX ADMIN — MAGNESIUM SULFATE HEPTAHYDRATE 2000 MG: 40 INJECTION, SOLUTION INTRAVENOUS at 03:10

## 2025-06-23 RX ADMIN — FAMOTIDINE 20 MG: 10 INJECTION, SOLUTION INTRAVENOUS at 08:21

## 2025-06-23 RX ADMIN — ASPIRIN 81 MG CHEWABLE TABLET 81 MG: 81 TABLET CHEWABLE at 08:21

## 2025-06-23 RX ADMIN — FENTANYL CITRATE 100 MCG: 50 INJECTION INTRAMUSCULAR; INTRAVENOUS at 07:38

## 2025-06-23 RX ADMIN — FENTANYL CITRATE 100 MCG: 50 INJECTION INTRAMUSCULAR; INTRAVENOUS at 00:58

## 2025-06-23 RX ADMIN — POLYVINYL ALCOHOL, POVIDONE 1 DROP: 14; 6 SOLUTION/ DROPS OPHTHALMIC at 06:13

## 2025-06-23 RX ADMIN — FENTANYL CITRATE 100 MCG: 50 INJECTION INTRAMUSCULAR; INTRAVENOUS at 05:01

## 2025-06-23 RX ADMIN — ACETAMINOPHEN 650 MG: 325 TABLET ORAL at 15:45

## 2025-06-23 RX ADMIN — HEPARIN SODIUM 17 UNITS/KG/HR: 10000 INJECTION, SOLUTION INTRAVENOUS at 18:45

## 2025-06-23 RX ADMIN — FOLIC ACID 1 MG: 5 INJECTION, SOLUTION INTRAMUSCULAR; INTRAVENOUS; SUBCUTANEOUS at 10:41

## 2025-06-23 RX ADMIN — SODIUM CHLORIDE, PRESERVATIVE FREE 10 ML: 5 INJECTION INTRAVENOUS at 20:20

## 2025-06-23 RX ADMIN — SODIUM CHLORIDE, SODIUM LACTATE, POTASSIUM CHLORIDE, AND CALCIUM CHLORIDE: .6; .31; .03; .02 INJECTION, SOLUTION INTRAVENOUS at 15:49

## 2025-06-23 RX ADMIN — PROPOFOL 40 MCG/KG/MIN: 10 INJECTION, EMULSION INTRAVENOUS at 04:34

## 2025-06-23 RX ADMIN — PHENOBARBITAL SODIUM 260 MG: 65 INJECTION INTRAMUSCULAR; INTRAVENOUS at 14:27

## 2025-06-23 RX ADMIN — PHENOBARBITAL SODIUM 260 MG: 65 INJECTION INTRAMUSCULAR; INTRAVENOUS at 08:14

## 2025-06-23 RX ADMIN — POLYVINYL ALCOHOL, POVIDONE 1 DROP: 14; 6 SOLUTION/ DROPS OPHTHALMIC at 10:41

## 2025-06-23 RX ADMIN — CHLORHEXIDINE GLUCONATE, 0.12% ORAL RINSE 15 ML: 1.2 SOLUTION DENTAL at 09:34

## 2025-06-23 RX ADMIN — SENNOSIDES 8.6 MG: 8.6 TABLET, FILM COATED ORAL at 20:20

## 2025-06-23 RX ADMIN — POLYVINYL ALCOHOL, POVIDONE 1 DROP: 14; 6 SOLUTION/ DROPS OPHTHALMIC at 14:24

## 2025-06-23 RX ADMIN — ACETAMINOPHEN 650 MG: 325 TABLET ORAL at 20:18

## 2025-06-23 RX ADMIN — MULTIVITAMIN TABLET 1 TABLET: TABLET at 08:21

## 2025-06-23 RX ADMIN — CHLORHEXIDINE GLUCONATE, 0.12% ORAL RINSE 15 ML: 1.2 SOLUTION DENTAL at 02:38

## 2025-06-23 RX ADMIN — FENTANYL CITRATE 100 MCG: 50 INJECTION INTRAMUSCULAR; INTRAVENOUS at 10:37

## 2025-06-23 RX ADMIN — SODIUM CHLORIDE, PRESERVATIVE FREE 10 ML: 5 INJECTION INTRAVENOUS at 08:22

## 2025-06-23 RX ADMIN — OXYCODONE 5 MG: 5 TABLET ORAL at 20:19

## 2025-06-23 RX ADMIN — POTASSIUM BICARBONATE 40 MEQ: 782 TABLET, EFFERVESCENT ORAL at 03:00

## 2025-06-23 RX ADMIN — FAMOTIDINE 20 MG: 20 TABLET, FILM COATED ORAL at 20:20

## 2025-06-23 RX ADMIN — POLYETHYLENE GLYCOL 3350 17 G: 17 POWDER, FOR SOLUTION ORAL at 15:45

## 2025-06-23 RX ADMIN — FAMOTIDINE 20 MG: 10 INJECTION, SOLUTION INTRAVENOUS at 02:38

## 2025-06-23 RX ADMIN — SODIUM CHLORIDE, SODIUM LACTATE, POTASSIUM CHLORIDE, AND CALCIUM CHLORIDE: .6; .31; .03; .02 INJECTION, SOLUTION INTRAVENOUS at 23:41

## 2025-06-23 RX ADMIN — FENTANYL CITRATE 100 MCG: 50 INJECTION INTRAMUSCULAR; INTRAVENOUS at 13:47

## 2025-06-23 RX ADMIN — THIAMINE HYDROCHLORIDE 100 MG: 100 INJECTION, SOLUTION INTRAMUSCULAR; INTRAVENOUS at 08:21

## 2025-06-23 RX ADMIN — PROPOFOL 45 MCG/KG/MIN: 10 INJECTION, EMULSION INTRAVENOUS at 09:31

## 2025-06-23 ASSESSMENT — PULMONARY FUNCTION TESTS
PIF_VALUE: 21
PIF_VALUE: 23
PIF_VALUE: 21
PIF_VALUE: 10
PIF_VALUE: 22
PIF_VALUE: 24
PIF_VALUE: 22
PIF_VALUE: 19
PIF_VALUE: 25
PIF_VALUE: 16
PIF_VALUE: 22
PIF_VALUE: 21
PIF_VALUE: 21
PIF_VALUE: 17
PIF_VALUE: 11
PIF_VALUE: 23
PIF_VALUE: 22
PIF_VALUE: 16
PIF_VALUE: 40
PIF_VALUE: 24
PIF_VALUE: 30
PIF_VALUE: 22
PIF_VALUE: 19
PIF_VALUE: 20
PIF_VALUE: 24
PIF_VALUE: 12
PIF_VALUE: 19
PIF_VALUE: 40
PIF_VALUE: 22
PIF_VALUE: 24
PIF_VALUE: 27

## 2025-06-23 ASSESSMENT — PAIN SCALES - GENERAL
PAINLEVEL_OUTOF10: 0
PAINLEVEL_OUTOF10: 7
PAINLEVEL_OUTOF10: 2
PAINLEVEL_OUTOF10: 5
PAINLEVEL_OUTOF10: 6
PAINLEVEL_OUTOF10: 5
PAINLEVEL_OUTOF10: 6
PAINLEVEL_OUTOF10: 0
PAINLEVEL_OUTOF10: 0
PAINLEVEL_OUTOF10: 5
PAINLEVEL_OUTOF10: 5
PAINLEVEL_OUTOF10: 1

## 2025-06-23 ASSESSMENT — PAIN DESCRIPTION - DESCRIPTORS
DESCRIPTORS: ACHING;DISCOMFORT;STABBING
DESCRIPTORS: BURNING
DESCRIPTORS: ACHING;DISCOMFORT;STABBING

## 2025-06-23 ASSESSMENT — PAIN DESCRIPTION - ORIENTATION
ORIENTATION: LEFT

## 2025-06-23 ASSESSMENT — PAIN - FUNCTIONAL ASSESSMENT
PAIN_FUNCTIONAL_ASSESSMENT: PREVENTS OR INTERFERES SOME ACTIVE ACTIVITIES AND ADLS
PAIN_FUNCTIONAL_ASSESSMENT: PREVENTS OR INTERFERES SOME ACTIVE ACTIVITIES AND ADLS

## 2025-06-23 ASSESSMENT — PAIN DESCRIPTION - FREQUENCY
FREQUENCY: INTERMITTENT
FREQUENCY: INTERMITTENT

## 2025-06-23 ASSESSMENT — PAIN DESCRIPTION - PAIN TYPE
TYPE: ACUTE PAIN
TYPE: ACUTE PAIN

## 2025-06-23 ASSESSMENT — PAIN DESCRIPTION - LOCATION
LOCATION: HAND
LOCATION: ARM
LOCATION: ARM
LOCATION: HAND

## 2025-06-23 ASSESSMENT — PAIN DESCRIPTION - ONSET
ONSET: ON-GOING
ONSET: ON-GOING

## 2025-06-23 NOTE — OP NOTE
Operative Note      Patient: Colton Hurley  YOB: 1965  MRN: [id number]    Date of Procedure: 6/22/2025    Preop Dx  Acute L UE ischemia    Post-Op Diagnosis: Same       Procedure(s):  LEFT BRACHIAL ARTERY  THROMBECTOMY  Left radial artery and ulnar artery thrombectomy    Surgeon(s):  Kirstie Calix MD    Assistant:   Resident: Wilbert Joseph DO    Anesthesia: General    Estimated Blood Loss (mL): 300     Complications: None    Specimens:   ID Type Source Tests Collected by Time Destination   A : LEFT BRACHIAL ARTERY THROMBUS Specimen Arm SURGICAL PATHOLOGY Kirstie Calix MD 6/22/2025 2104        Implants:  * No implants in log *      Drains:   Urinary Catheter 06/22/25 2 Way (Active)   Output (mL) 350 mL 06/23/25 0000       Findings:  Infection Present At Time Of Surgery (PATOS) (choose all levels that have infection present):  No infection present  Other Findings: palpable radial and ulnar pulse - no doppler signal in palm, though appearance of fingers, hand and forearm is much improved    DESCRIPTION OF PROCEDURE: The patient was identified and the procedure was confirmed. General anesthesia was established.  The Left arm was prepped and draped in the usual sterile fashion.     There were already 3 previous incision in the antecubital fossa extending into forearm, overlying the radial artery and ulnar artery at the level just above the wrist.      The antecubital incision, staples removed and sutures cut.  The Brachial artery, and the bifurcation with the ulnar and radial artery was identified.  Each of these arteries was controlled with a loop.      The patient was already on heparin drip and ACTs were monitored and re dosed as needed throughout the case.      The previous arteriotomy in the brachial artery was used, sutures were removed.  A #3 neyda was passed proximally.  Passes were done until no further thrombus was removed.  Large amount of thrombus was removed.

## 2025-06-23 NOTE — CONSULTS
SURGICAL INTENSIVE CARE  CONSULT NOTE      Date of admission:  6/22/2025  Reason for ICU transfer: Postop thrombectomy, ischemic left upper extremity    Physician Consulted: Dr. Sheth  Reason for Consult: critical care, ventilator management  Referring Physician: Dr. Calix     SUBJECTIVE:  Colton Hurley is a 59 y.o. male who presents to the CVICU following left brachial, radial, and ulnar cutdown and thrombectomy 6/22/2025 for acute limb ischemia left upper extremity.  The patient was sent into the emergency department by his family due to left upper extremity and hand discoloration.  The patient was reportedly [facility] to [facility] recently, where he had some vascular procedures done on his left upper extremity.  The details of these procedures are unclear.  He has incisions over the brachial, radial, and ulnar arteries.  He left that hospitalization AMA.  He was reportedly prescribed anticoagulation.  However, it is reported that he has believed that alcohol consumption is sufficient to keep his blood thinner and he has not been taking his anticoagulant medications.  In the emergency department, the patient was found to have no distal signals in his left upper extremity.  He was taken to the operating room emergently by the vascular surgery team.    The patient also has history of pulmonary embolism and CVA.  He underwent mechanical thrombectomy and carotid artery stenting in February 2025.    In the emergency department, the patient was found to have blood alcohol level of 188.  He had no signs of external trauma.  He had no focal neurodeficits.  He was confused, and two-physician consent was utilized for operative intervention as no family was able to be reached by phone at that time.    Postoperatively, the patient was admitted to the ICU intubated, sedated, and mechanically ventilated.      EMR  reports that he has been smoking cigarettes. He has a 22.5 pack-year smoking history. He has never

## 2025-06-23 NOTE — H&P
[facility] Hospitalist Group History and Physical      CHIEF COMPLAINT:  left arm ischemia    History of Present Illness:  58 yo male w/ hx of PTE (supposed to be on Eliquis, not complaint), CVA s/p mechanical thrombectomy, carotid stenosis s/p carotid stenting, EtOH use disorder who p/w LUE discoloration. History / HPI obtained from nursing staff and chart review as pt was seen post-op and remains intubated and on the vent. Pt recently had LUE arterial thrombectomy done at [facility] and subsequently left AMA. Sister noted that pt's left hand was purple and discolored and had pt come to [facility] ED where pt was found to have complete occlusion of left subclavian artery. Pt was also reportedly intoxicated in [facility] ED and was not able to provide consent for Vascular Surgery to take pt to the OR. As such, pt was emergently taken due to critical limb ischemia, and left brachial/radial/ulnar artery thrombectomy was performed. Admitted to CVICU for further management.        REVIEW OF SYSTEMS:  ROS limited by pt being on the ventilator    PMH:  Past Medical History:   Diagnosis Date    Arthritis     Asthma 02/26/2018    BPH (benign prostatic hyperplasia)     Chronic back pain     Plates inseted from L2-L5    COPD (chronic obstructive pulmonary disease) (HCC)     Depression     Emphysema lung (HCC)     Hepatitis C 2011    Schizo affective schizophrenia (HCC)        Surgical History:  Past Surgical History:   Procedure Laterality Date    BACK SURGERY  1994    IR CAROTID STENT W PROTECTION  02/26/2025    IR CAROTID STENT W PROTECTION 2/26/2025 Kehinde Coffey MD [facility]    IR MECHANICAL ART THROMBECTOMY INTRACRANIAL  02/26/2025    IR MECHANICAL ART THROMBECTOMY INTRACRANIAL 2/26/2025 Kehinde Coffey MD [facility]    LEG SURGERY Left     Pins inserted tib/ fib    UPPER GASTROINTESTINAL ENDOSCOPY N/A 06/25/2021    EGD BIOPSY performed by Leo Zurita MD at [facility]

## 2025-06-23 NOTE — ANESTHESIA PROCEDURE NOTES
Arterial Line:    An arterial line was placed using surface landmarks, in the OR for the following indication(s): continuous blood pressure monitoring and blood sampling needed.    A 20 gauge (size), 1 and 3/8 inch (length), Arrow (type) catheter was placed, Seldinger technique not used, into the right radial artery, secured by tape and Tegaderm.  Anesthesia type: General    Events:  patient tolerated procedure well with no complications and EBL 0mL.  Anesthesiologist: Melissa Lazo MD  Resident/CRNA: Stevie Andrews APRN - CRNA  Other anesthesia staff: Lilian Quinones RN  Performed: Other staff   Preanesthetic Checklist  Completed: patient identified, IV checked, site marked, risks and benefits discussed, surgical/procedural consents, equipment checked, pre-op evaluation, timeout performed, anesthesia consent given, oxygen available and monitors applied/VS acknowledged

## 2025-06-24 ENCOUNTER — ANESTHESIA (OUTPATIENT)
Dept: OPERATING ROOM | Age: 60
End: 2025-06-24
Payer: MEDICARE

## 2025-06-24 ENCOUNTER — ANESTHESIA EVENT (OUTPATIENT)
Dept: OPERATING ROOM | Age: 60
End: 2025-06-24
Payer: MEDICARE

## 2025-06-24 LAB
ALBUMIN SERPL-MCNC: 3 G/DL (ref 3.5–5.2)
ALP SERPL-CCNC: 55 U/L (ref 40–129)
ALT SERPL-CCNC: 8 U/L (ref 0–50)
ANION GAP SERPL CALCULATED.3IONS-SCNC: 6 MMOL/L (ref 7–16)
AST SERPL-CCNC: 23 U/L (ref 0–50)
BASOPHILS # BLD: 0.01 K/UL (ref 0–0.2)
BASOPHILS NFR BLD: 0 % (ref 0–2)
BILIRUB SERPL-MCNC: 0.3 MG/DL (ref 0–1.2)
BUN SERPL-MCNC: 9 MG/DL (ref 6–20)
CA-I BLD-SCNC: 1.23 MMOL/L (ref 1.15–1.33)
CALCIUM SERPL-MCNC: 8.7 MG/DL (ref 8.6–10)
CHLORIDE SERPL-SCNC: 105 MMOL/L (ref 98–107)
CO2 SERPL-SCNC: 30 MMOL/L (ref 22–29)
CREAT SERPL-MCNC: 0.5 MG/DL (ref 0.7–1.2)
EKG ATRIAL RATE: 58 BPM
EKG P AXIS: 64 DEGREES
EKG P-R INTERVAL: 126 MS
EKG Q-T INTERVAL: 428 MS
EKG QRS DURATION: 76 MS
EKG QTC CALCULATION (BAZETT): 420 MS
EKG R AXIS: 7 DEGREES
EKG T AXIS: 44 DEGREES
EKG VENTRICULAR RATE: 58 BPM
EOSINOPHIL # BLD: 0.01 K/UL (ref 0.05–0.5)
EOSINOPHILS RELATIVE PERCENT: 0 % (ref 0–6)
ERYTHROCYTE [DISTWIDTH] IN BLOOD BY AUTOMATED COUNT: 16 % (ref 11.5–15)
ERYTHROCYTE [DISTWIDTH] IN BLOOD BY AUTOMATED COUNT: 16.2 % (ref 11.5–15)
GFR, ESTIMATED: >90 ML/MIN/1.73M2
GLUCOSE BLD-MCNC: 100 MG/DL (ref 74–99)
GLUCOSE BLD-MCNC: 101 MG/DL (ref 74–99)
GLUCOSE BLD-MCNC: 116 MG/DL (ref 74–99)
GLUCOSE BLD-MCNC: 134 MG/DL (ref 74–99)
GLUCOSE SERPL-MCNC: 102 MG/DL (ref 74–99)
HCT VFR BLD AUTO: 28.9 % (ref 37–54)
HCT VFR BLD AUTO: 30.2 % (ref 37–54)
HGB BLD-MCNC: 10 G/DL (ref 12.5–16.5)
HGB BLD-MCNC: 10.7 G/DL (ref 12.5–16.5)
IMM GRANULOCYTES # BLD AUTO: 0.04 K/UL (ref 0–0.58)
IMM GRANULOCYTES NFR BLD: 1 % (ref 0–5)
LYMPHOCYTES NFR BLD: 1.95 K/UL (ref 1.5–4)
LYMPHOCYTES RELATIVE PERCENT: 29 % (ref 20–42)
MAGNESIUM SERPL-MCNC: 2.2 MG/DL (ref 1.6–2.6)
MCH RBC QN AUTO: 36.9 PG (ref 26–35)
MCH RBC QN AUTO: 37 PG (ref 26–35)
MCHC RBC AUTO-ENTMCNC: 34.6 G/DL (ref 32–34.5)
MCHC RBC AUTO-ENTMCNC: 35.4 G/DL (ref 32–34.5)
MCV RBC AUTO: 104.5 FL (ref 80–99.9)
MCV RBC AUTO: 106.6 FL (ref 80–99.9)
MONOCYTES NFR BLD: 0.57 K/UL (ref 0.1–0.95)
MONOCYTES NFR BLD: 8 % (ref 2–12)
NEUTROPHILS NFR BLD: 62 % (ref 43–80)
NEUTS SEG NFR BLD: 4.24 K/UL (ref 1.8–7.3)
PARTIAL THROMBOPLASTIN TIME: 60.6 SEC (ref 24.5–35.1)
PARTIAL THROMBOPLASTIN TIME: 87.5 SEC (ref 24.5–35.1)
PHOSPHATE SERPL-MCNC: 2.9 MG/DL (ref 2.5–4.5)
PLATELET # BLD AUTO: 199 K/UL (ref 130–450)
PLATELET # BLD AUTO: 233 K/UL (ref 130–450)
PMV BLD AUTO: 10.6 FL (ref 7–12)
PMV BLD AUTO: 10.8 FL (ref 7–12)
POTASSIUM SERPL-SCNC: 3.9 MMOL/L (ref 3.5–5.1)
PROT SERPL-MCNC: 5.1 G/DL (ref 6.4–8.3)
RBC # BLD AUTO: 2.71 M/UL (ref 3.8–5.8)
RBC # BLD AUTO: 2.89 M/UL (ref 3.8–5.8)
SODIUM SERPL-SCNC: 141 MMOL/L (ref 136–145)
WBC OTHER # BLD: 6.8 K/UL (ref 4.5–11.5)
WBC OTHER # BLD: 7.1 K/UL (ref 4.5–11.5)

## 2025-06-24 PROCEDURE — 2500000003 HC RX 250 WO HCPCS: Performed by: SURGERY

## 2025-06-24 PROCEDURE — 2709999900 HC NON-CHARGEABLE SUPPLY: Performed by: SURGERY

## 2025-06-24 PROCEDURE — 2500000003 HC RX 250 WO HCPCS: Performed by: NURSE PRACTITIONER

## 2025-06-24 PROCEDURE — 6360000002 HC RX W HCPCS: Performed by: NURSE PRACTITIONER

## 2025-06-24 PROCEDURE — 85347 COAGULATION TIME ACTIVATED: CPT

## 2025-06-24 PROCEDURE — 6370000000 HC RX 637 (ALT 250 FOR IP): Performed by: STUDENT IN AN ORGANIZED HEALTH CARE EDUCATION/TRAINING PROGRAM

## 2025-06-24 PROCEDURE — 99233 SBSQ HOSP IP/OBS HIGH 50: CPT | Performed by: INTERNAL MEDICINE

## 2025-06-24 PROCEDURE — 82962 GLUCOSE BLOOD TEST: CPT

## 2025-06-24 PROCEDURE — 3700000001 HC ADD 15 MINUTES (ANESTHESIA): Performed by: SURGERY

## 2025-06-24 PROCEDURE — 2580000003 HC RX 258

## 2025-06-24 PROCEDURE — 3700000000 HC ANESTHESIA ATTENDED CARE: Performed by: SURGERY

## 2025-06-24 PROCEDURE — 93010 ELECTROCARDIOGRAM REPORT: CPT | Performed by: INTERNAL MEDICINE

## 2025-06-24 PROCEDURE — 85025 COMPLETE CBC W/AUTO DIFF WBC: CPT

## 2025-06-24 PROCEDURE — 6360000002 HC RX W HCPCS: Performed by: STUDENT IN AN ORGANIZED HEALTH CARE EDUCATION/TRAINING PROGRAM

## 2025-06-24 PROCEDURE — 93005 ELECTROCARDIOGRAM TRACING: CPT

## 2025-06-24 PROCEDURE — 84100 ASSAY OF PHOSPHORUS: CPT

## 2025-06-24 PROCEDURE — 83735 ASSAY OF MAGNESIUM: CPT

## 2025-06-24 PROCEDURE — 2000000000 HC ICU R&B

## 2025-06-24 PROCEDURE — 85730 THROMBOPLASTIN TIME PARTIAL: CPT

## 2025-06-24 PROCEDURE — 6370000000 HC RX 637 (ALT 250 FOR IP)

## 2025-06-24 PROCEDURE — 6370000000 HC RX 637 (ALT 250 FOR IP): Performed by: NURSE PRACTITIONER

## 2025-06-24 PROCEDURE — 37799 UNLISTED PX VASCULAR SURGERY: CPT

## 2025-06-24 PROCEDURE — 82330 ASSAY OF CALCIUM: CPT

## 2025-06-24 PROCEDURE — 2700000000 HC OXYGEN THERAPY PER DAY

## 2025-06-24 PROCEDURE — 99291 CRITICAL CARE FIRST HOUR: CPT | Performed by: STUDENT IN AN ORGANIZED HEALTH CARE EDUCATION/TRAINING PROGRAM

## 2025-06-24 PROCEDURE — 3600000015 HC SURGERY LEVEL 5 ADDTL 15MIN: Performed by: SURGERY

## 2025-06-24 PROCEDURE — 6360000002 HC RX W HCPCS

## 2025-06-24 PROCEDURE — 03CC0ZZ EXTIRPATION OF MATTER FROM LEFT RADIAL ARTERY, OPEN APPROACH: ICD-10-PCS | Performed by: SURGERY

## 2025-06-24 PROCEDURE — 2500000003 HC RX 250 WO HCPCS

## 2025-06-24 PROCEDURE — C1757 CATH, THROMBECTOMY/EMBOLECT: HCPCS | Performed by: SURGERY

## 2025-06-24 PROCEDURE — 2500000003 HC RX 250 WO HCPCS: Performed by: STUDENT IN AN ORGANIZED HEALTH CARE EDUCATION/TRAINING PROGRAM

## 2025-06-24 PROCEDURE — 6360000002 HC RX W HCPCS: Performed by: SURGERY

## 2025-06-24 PROCEDURE — 2580000003 HC RX 258: Performed by: STUDENT IN AN ORGANIZED HEALTH CARE EDUCATION/TRAINING PROGRAM

## 2025-06-24 PROCEDURE — 03CA0ZZ EXTIRPATION OF MATTER FROM LEFT ULNAR ARTERY, OPEN APPROACH: ICD-10-PCS | Performed by: SURGERY

## 2025-06-24 PROCEDURE — 3600000005 HC SURGERY LEVEL 5 BASE: Performed by: SURGERY

## 2025-06-24 PROCEDURE — 88304 TISSUE EXAM BY PATHOLOGIST: CPT

## 2025-06-24 PROCEDURE — 2720000010 HC SURG SUPPLY STERILE: Performed by: SURGERY

## 2025-06-24 PROCEDURE — 80053 COMPREHEN METABOLIC PANEL: CPT

## 2025-06-24 PROCEDURE — 85027 COMPLETE CBC AUTOMATED: CPT

## 2025-06-24 RX ORDER — PHENOBARBITAL SODIUM 65 MG/ML
65 INJECTION, SOLUTION INTRAMUSCULAR; INTRAVENOUS EVERY 6 HOURS
Status: DISCONTINUED | OUTPATIENT
Start: 2025-06-24 | End: 2025-06-27

## 2025-06-24 RX ORDER — HEPARIN SODIUM 1000 [USP'U]/ML
INJECTION, SOLUTION INTRAVENOUS; SUBCUTANEOUS
Status: DISCONTINUED | OUTPATIENT
Start: 2025-06-24 | End: 2025-06-24 | Stop reason: SDUPTHER

## 2025-06-24 RX ORDER — SODIUM CHLORIDE, SODIUM LACTATE, POTASSIUM CHLORIDE, CALCIUM CHLORIDE 600; 310; 30; 20 MG/100ML; MG/100ML; MG/100ML; MG/100ML
INJECTION, SOLUTION INTRAVENOUS ONCE
Status: COMPLETED | OUTPATIENT
Start: 2025-06-24 | End: 2025-06-24

## 2025-06-24 RX ORDER — ONDANSETRON 2 MG/ML
INJECTION INTRAMUSCULAR; INTRAVENOUS
Status: DISCONTINUED | OUTPATIENT
Start: 2025-06-24 | End: 2025-06-24 | Stop reason: SDUPTHER

## 2025-06-24 RX ORDER — SUCCINYLCHOLINE/SOD CL,ISO/PF 200MG/10ML
SYRINGE (ML) INTRAVENOUS
Status: DISCONTINUED | OUTPATIENT
Start: 2025-06-24 | End: 2025-06-24 | Stop reason: SDUPTHER

## 2025-06-24 RX ORDER — PROPOFOL 10 MG/ML
INJECTION, EMULSION INTRAVENOUS
Status: DISCONTINUED | OUTPATIENT
Start: 2025-06-24 | End: 2025-06-24 | Stop reason: SDUPTHER

## 2025-06-24 RX ORDER — FENTANYL CITRATE 50 UG/ML
INJECTION, SOLUTION INTRAMUSCULAR; INTRAVENOUS
Status: DISCONTINUED | OUTPATIENT
Start: 2025-06-24 | End: 2025-06-24 | Stop reason: SDUPTHER

## 2025-06-24 RX ORDER — MIDAZOLAM HYDROCHLORIDE 1 MG/ML
INJECTION, SOLUTION INTRAMUSCULAR; INTRAVENOUS
Status: DISCONTINUED | OUTPATIENT
Start: 2025-06-24 | End: 2025-06-24 | Stop reason: SDUPTHER

## 2025-06-24 RX ORDER — GLYCOPYRROLATE 1 MG/5 ML
SYRINGE (ML) INTRAVENOUS
Status: DISCONTINUED | OUTPATIENT
Start: 2025-06-24 | End: 2025-06-24 | Stop reason: SDUPTHER

## 2025-06-24 RX ORDER — CEFAZOLIN SODIUM 1 G/3ML
INJECTION, POWDER, FOR SOLUTION INTRAMUSCULAR; INTRAVENOUS
Status: DISCONTINUED | OUTPATIENT
Start: 2025-06-24 | End: 2025-06-24 | Stop reason: SDUPTHER

## 2025-06-24 RX ORDER — LIDOCAINE HYDROCHLORIDE 20 MG/ML
INJECTION, SOLUTION INTRAVENOUS
Status: DISCONTINUED | OUTPATIENT
Start: 2025-06-24 | End: 2025-06-24 | Stop reason: SDUPTHER

## 2025-06-24 RX ORDER — SODIUM CHLORIDE 9 MG/ML
INJECTION, SOLUTION INTRAVENOUS
Status: DISCONTINUED | OUTPATIENT
Start: 2025-06-24 | End: 2025-06-24 | Stop reason: SDUPTHER

## 2025-06-24 RX ORDER — HEPARIN SODIUM 1000 [USP'U]/ML
80 INJECTION, SOLUTION INTRAVENOUS; SUBCUTANEOUS PRN
Status: DISCONTINUED | OUTPATIENT
Start: 2025-06-24 | End: 2025-06-27

## 2025-06-24 RX ORDER — HEPARIN SODIUM 10000 [USP'U]/100ML
5-30 INJECTION, SOLUTION INTRAVENOUS CONTINUOUS
Status: DISCONTINUED | OUTPATIENT
Start: 2025-06-24 | End: 2025-06-26

## 2025-06-24 RX ORDER — HEPARIN SODIUM 1000 [USP'U]/ML
40 INJECTION, SOLUTION INTRAVENOUS; SUBCUTANEOUS PRN
Status: DISCONTINUED | OUTPATIENT
Start: 2025-06-24 | End: 2025-06-27

## 2025-06-24 RX ORDER — DEXAMETHASONE SODIUM PHOSPHATE 10 MG/ML
INJECTION, SOLUTION INTRA-ARTICULAR; INTRALESIONAL; INTRAMUSCULAR; INTRAVENOUS; SOFT TISSUE
Status: DISCONTINUED | OUTPATIENT
Start: 2025-06-24 | End: 2025-06-24 | Stop reason: SDUPTHER

## 2025-06-24 RX ORDER — ROCURONIUM BROMIDE 10 MG/ML
INJECTION, SOLUTION INTRAVENOUS
Status: DISCONTINUED | OUTPATIENT
Start: 2025-06-24 | End: 2025-06-24 | Stop reason: SDUPTHER

## 2025-06-24 RX ADMIN — MULTIVITAMIN TABLET 1 TABLET: TABLET at 08:17

## 2025-06-24 RX ADMIN — OXYCODONE 5 MG: 5 TABLET ORAL at 08:12

## 2025-06-24 RX ADMIN — PHENOBARBITAL SODIUM 65 MG: 65 INJECTION INTRAMUSCULAR at 10:08

## 2025-06-24 RX ADMIN — SODIUM CHLORIDE, PRESERVATIVE FREE 10 ML: 5 INJECTION INTRAVENOUS at 21:08

## 2025-06-24 RX ADMIN — SODIUM CHLORIDE, PRESERVATIVE FREE 10 ML: 5 INJECTION INTRAVENOUS at 08:23

## 2025-06-24 RX ADMIN — SODIUM CHLORIDE, PRESERVATIVE FREE 10 ML: 5 INJECTION INTRAVENOUS at 08:19

## 2025-06-24 RX ADMIN — LIDOCAINE HYDROCHLORIDE 50 MG: 20 INJECTION, SOLUTION INTRAVENOUS at 15:05

## 2025-06-24 RX ADMIN — PHENOBARBITAL SODIUM 65 MG: 65 INJECTION INTRAMUSCULAR at 18:47

## 2025-06-24 RX ADMIN — Medication 250 MG: at 21:08

## 2025-06-24 RX ADMIN — FENTANYL CITRATE 50 MCG: 0.05 INJECTION, SOLUTION INTRAMUSCULAR; INTRAVENOUS at 15:51

## 2025-06-24 RX ADMIN — FAMOTIDINE 20 MG: 20 TABLET, FILM COATED ORAL at 08:16

## 2025-06-24 RX ADMIN — ACETAMINOPHEN 650 MG: 325 TABLET ORAL at 02:49

## 2025-06-24 RX ADMIN — ACETAMINOPHEN 650 MG: 325 TABLET ORAL at 09:03

## 2025-06-24 RX ADMIN — Medication 100 MG: at 15:05

## 2025-06-24 RX ADMIN — CEFAZOLIN 2 G: 1 INJECTION, POWDER, FOR SOLUTION INTRAMUSCULAR; INTRAVENOUS at 15:19

## 2025-06-24 RX ADMIN — ROCURONIUM BROMIDE 50 MG: 10 INJECTION, SOLUTION INTRAVENOUS at 15:05

## 2025-06-24 RX ADMIN — Medication 250 MG: at 08:14

## 2025-06-24 RX ADMIN — FOLIC ACID 1 MG: 5 INJECTION, SOLUTION INTRAMUSCULAR; INTRAVENOUS; SUBCUTANEOUS at 09:07

## 2025-06-24 RX ADMIN — FENTANYL CITRATE 50 MCG: 0.05 INJECTION, SOLUTION INTRAMUSCULAR; INTRAVENOUS at 16:45

## 2025-06-24 RX ADMIN — POTASSIUM BICARBONATE 20 MEQ: 782 TABLET, EFFERVESCENT ORAL at 08:04

## 2025-06-24 RX ADMIN — Medication 0.2 MG: at 15:49

## 2025-06-24 RX ADMIN — HEPARIN SODIUM 9 UNITS/KG/HR: 10000 INJECTION, SOLUTION INTRAVENOUS at 19:55

## 2025-06-24 RX ADMIN — ASPIRIN 81 MG CHEWABLE TABLET 81 MG: 81 TABLET CHEWABLE at 08:14

## 2025-06-24 RX ADMIN — SODIUM CHLORIDE, SODIUM LACTATE, POTASSIUM CHLORIDE, AND CALCIUM CHLORIDE: .6; .31; .03; .02 INJECTION, SOLUTION INTRAVENOUS at 13:29

## 2025-06-24 RX ADMIN — SODIUM CHLORIDE, SODIUM LACTATE, POTASSIUM CHLORIDE, AND CALCIUM CHLORIDE: .6; .31; .03; .02 INJECTION, SOLUTION INTRAVENOUS at 07:34

## 2025-06-24 RX ADMIN — FAMOTIDINE 20 MG: 20 TABLET, FILM COATED ORAL at 21:08

## 2025-06-24 RX ADMIN — PHENYLEPHRINE HYDROCHLORIDE 50 MCG/MIN: 10 INJECTION INTRAVENOUS at 15:39

## 2025-06-24 RX ADMIN — Medication 250 MG: at 13:03

## 2025-06-24 RX ADMIN — APIXABAN 5 MG: 5 TABLET, FILM COATED ORAL at 09:03

## 2025-06-24 RX ADMIN — HYDROMORPHONE HYDROCHLORIDE 0.5 MG: 1 INJECTION, SOLUTION INTRAMUSCULAR; INTRAVENOUS; SUBCUTANEOUS at 17:42

## 2025-06-24 RX ADMIN — SODIUM CHLORIDE, PRESERVATIVE FREE 10 ML: 5 INJECTION INTRAVENOUS at 08:24

## 2025-06-24 RX ADMIN — FENTANYL CITRATE 50 MCG: 0.05 INJECTION, SOLUTION INTRAMUSCULAR; INTRAVENOUS at 15:22

## 2025-06-24 RX ADMIN — SODIUM CHLORIDE: 9 INJECTION, SOLUTION INTRAVENOUS at 14:53

## 2025-06-24 RX ADMIN — DEXAMETHASONE SODIUM PHOSPHATE 10 MG: 10 INJECTION INTRAMUSCULAR; INTRAVENOUS at 15:16

## 2025-06-24 RX ADMIN — PHENYLEPHRINE HYDROCHLORIDE 100 MCG: 10 INJECTION INTRAVENOUS at 15:34

## 2025-06-24 RX ADMIN — POLYETHYLENE GLYCOL 3350 17 G: 17 POWDER, FOR SOLUTION ORAL at 08:18

## 2025-06-24 RX ADMIN — PROPOFOL 150 MG: 10 INJECTION, EMULSION INTRAVENOUS at 15:05

## 2025-06-24 RX ADMIN — SUGAMMADEX 200 MG: 100 INJECTION, SOLUTION INTRAVENOUS at 16:35

## 2025-06-24 RX ADMIN — SODIUM CHLORIDE, SODIUM LACTATE, POTASSIUM CHLORIDE, AND CALCIUM CHLORIDE: .6; .31; .03; .02 INJECTION, SOLUTION INTRAVENOUS at 14:39

## 2025-06-24 RX ADMIN — PHENOBARBITAL SODIUM 65 MG: 65 INJECTION INTRAMUSCULAR at 04:55

## 2025-06-24 RX ADMIN — THIAMINE HYDROCHLORIDE 100 MG: 100 INJECTION, SOLUTION INTRAMUSCULAR; INTRAVENOUS at 08:19

## 2025-06-24 RX ADMIN — SENNOSIDES 8.6 MG: 8.6 TABLET, FILM COATED ORAL at 21:08

## 2025-06-24 RX ADMIN — PHENYLEPHRINE HYDROCHLORIDE 100 MCG: 10 INJECTION INTRAVENOUS at 15:30

## 2025-06-24 RX ADMIN — FENTANYL CITRATE 50 MCG: 0.05 INJECTION, SOLUTION INTRAMUSCULAR; INTRAVENOUS at 15:05

## 2025-06-24 RX ADMIN — ONDANSETRON 4 MG: 2 INJECTION, SOLUTION INTRAMUSCULAR; INTRAVENOUS at 15:02

## 2025-06-24 RX ADMIN — MIDAZOLAM 2 MG: 1 INJECTION INTRAMUSCULAR; INTRAVENOUS at 15:02

## 2025-06-24 RX ADMIN — PHENOBARBITAL SODIUM 65 MG: 65 INJECTION INTRAMUSCULAR at 22:30

## 2025-06-24 RX ADMIN — HEPARIN SODIUM 8000 UNITS: 1000 INJECTION INTRAVENOUS; SUBCUTANEOUS at 15:18

## 2025-06-24 ASSESSMENT — PAIN - FUNCTIONAL ASSESSMENT
PAIN_FUNCTIONAL_ASSESSMENT: ACTIVITIES ARE NOT PREVENTED

## 2025-06-24 ASSESSMENT — PAIN DESCRIPTION - DESCRIPTORS
DESCRIPTORS: ACHING;THROBBING;BURNING
DESCRIPTORS: ACHING;BURNING;SORE
DESCRIPTORS: SORE;ACHING;DISCOMFORT

## 2025-06-24 ASSESSMENT — PAIN DESCRIPTION - ORIENTATION
ORIENTATION: LEFT

## 2025-06-24 ASSESSMENT — PAIN SCALES - GENERAL
PAINLEVEL_OUTOF10: 4
PAINLEVEL_OUTOF10: 0
PAINLEVEL_OUTOF10: 2
PAINLEVEL_OUTOF10: 2
PAINLEVEL_OUTOF10: 10
PAINLEVEL_OUTOF10: 7

## 2025-06-24 ASSESSMENT — PAIN DESCRIPTION - LOCATION
LOCATION: ARM;HAND
LOCATION: ARM;HAND
LOCATION: ARM

## 2025-06-24 ASSESSMENT — LIFESTYLE VARIABLES: SMOKING_STATUS: 1

## 2025-06-24 NOTE — OP NOTE
Operative Note      Patient: Colton Hurley  YOB: 1965  MRN: [id number]    Date of Procedure: 6/24/2025    Preop Dx  Acute L UE ischemia    Post-Op Diagnosis: Same       Procedure(s):  LEFT UPPER EXTREMITY THROMBECTOMY  Left radial artery and ulnar artery thrombectomy    Surgeon(s):  Kirstie Calix MD Chen, Winsor, MD    Assistant:   Surgical Assistant: Destiney Vines, APRN - CNP    Anesthesia: General    Estimated Blood Loss (mL): 300     Complications: None    Specimens:   ID Type Source Tests Collected by Time Destination   A : LEFT ARM THROMBUS Tissue Arm SURGICAL PATHOLOGY Kirstie Calix MD 6/24/2025 1633        Implants:  * No implants in log *      Drains:   Urinary Catheter 06/22/25 2 Way (Active)   Output (mL) 350 mL 06/23/25 0000       Findings:  Infection Present At Time Of Surgery (PATOS) (choose all levels that have infection present):  No infection present  Other Findings: palpable radial and ulnar pulse - no doppler signal in palm, though appearance of fingers, hand and forearm is much improved    DESCRIPTION OF PROCEDURE: The patient was identified and the procedure was confirmed. General anesthesia was established.  The Left arm was prepped and draped in the usual sterile fashion.     There were already 3 previous incision in the antecubital fossa extending into forearm, overlying the radial artery and ulnar artery at the level just above the wrist.      The antecubital incision, staples removed and sutures cut.  The Brachial artery, and the bifurcation with the ulnar and radial artery was identified.  Each of these arteries was controlled with a loop.      The patient was already on heparin drip and ACTs were monitored and re dosed as needed throughout the case.      The previous vein patch of the brachial artery was incised.  A #3 neyda was passed proximally.  Passes were done until no further thrombus was removed.  Large amount of thrombus was removed.

## 2025-06-24 NOTE — ACP (ADVANCE CARE PLANNING)
Advance Care Planning   Healthcare Decision Maker:    Primary Decision Maker: Nenita Jessica - Brother/Sister - [phone]    Secondary Decision Maker: Remy Hurley - Brother/Sister - [phone]    Click here to complete Healthcare Decision Makers including selection of the Healthcare Decision Maker Relationship (ie \"Primary\").

## 2025-06-24 NOTE — ANESTHESIA PRE PROCEDURE
Department of Anesthesiology  Preprocedure Note       Name:  Colton Hurley   Age:  59 y.o.  :  1965                                          MRN:  [id number]         Date:  2025      Surgeon: Surgeon(s):  Kirstie Calix MD Chen, Winsor, MD    Procedure: Procedure(s):  LEFT UPPER EXTREMITY THROMBECTOMY/POSSIBLE ANGIOGRAM    Medications prior to admission:   Prior to Admission medications    Medication Sig Start Date End Date Taking? Authorizing Provider   thiamine 100 MG tablet Take 1 tablet by mouth daily 25   Delvis Kwong MD   apixaban (ELIQUIS) 5 MG TABS tablet Take 2 tablets by mouth 2 times daily for 7 days, THEN 1 tablet 2 times daily. 25  Delvis Kwong MD   vitamin B-12 (CYANOCOBALAMIN) 100 MCG tablet Take 1 tablet by mouth daily 5/13/25 9/10/25  Delvis Kwong MD   PARoxetine (PAXIL) 30 MG tablet Take 1 tablet by mouth every morning    Jessica Hu MD   vitamin D (ERGOCALCIFEROL) 1.25 MG (88827 UT) CAPS capsule Take 1 capsule by mouth once a week    Jessica Hu MD   benztropine (COGENTIN) 0.5 MG tablet Take 1 tablet by mouth 2 times daily    Jessica Hu MD   aspirin 81 MG chewable tablet Take 1 tablet by mouth daily 3/2/25   Carin Almendarez MD   atorvastatin (LIPITOR) 40 MG tablet Take 1 tablet by mouth nightly 3/1/25   Carin Almendarez MD   albuterol sulfate HFA (VENTOLIN HFA) 108 (90 Base) MCG/ACT inhaler Inhale 2 puffs into the lungs every 6 hours as needed for Wheezing 22   Luis Antonio Barney DO   mirtazapine (REMERON) 15 MG tablet  21   Jessica Hu MD   OLANZapine (ZYPREXA) 15 MG tablet Take 1 tablet by mouth nightly    Jessica Hu MD       Current medications:    Current Facility-Administered Medications   Medication Dose Route Frequency Provider Last Rate Last Admin    PHENobarbital (LUMINAL) injection 65 mg  65 mg IntraVENous Q6H Kath Jauregui DO   65 mg at 25 1008    apixaban (ELIQUIS) tablet 5 mg  5

## 2025-06-24 NOTE — ANESTHESIA POSTPROCEDURE EVALUATION
Department of Anesthesiology  Postprocedure Note    Patient: Colton Hurley  MRN: [id number]  YOB: 1965  Date of evaluation: 6/24/2025    Procedure Summary       Date: 06/22/25 Room / Location: [address]    Anesthesia Start: 1952 Anesthesia Stop: 2331    Procedure: LEFT BRACHIAL ARTERY  THROMBECTOMY (Left: Arm Lower) Diagnosis:       Acute lower extremity ischemia      (Acute lower extremity ischemia [I99.8])    Surgeons: Kirstie Calix MD Responsible Provider: Melissa Lazo MD    Anesthesia Type: general ASA Status: 4 - Emergent            Anesthesia Type: No value filed.    Osman Phase I:      Osman Phase II:      Anesthesia Post Evaluation    Patient location during evaluation: ICU  Level of consciousness: sedated and ventilated  Airway patency: patent  Cardiovascular status: hemodynamically stable  Respiratory status: intubated  Hydration status: euvolemic  Multimodal analgesia pain management approach  Pain management: adequate        No notable events documented.

## 2025-06-25 LAB
ACTIVATED CLOTTING TIME, LOW RANGE: 160 SEC
ACTIVATED CLOTTING TIME, LOW RANGE: 301 SEC
ALBUMIN SERPL-MCNC: 2.6 G/DL (ref 3.5–5.2)
ALP SERPL-CCNC: 50 U/L (ref 40–129)
ALT SERPL-CCNC: 7 U/L (ref 0–50)
ANION GAP SERPL CALCULATED.3IONS-SCNC: 7 MMOL/L (ref 7–16)
AST SERPL-CCNC: 18 U/L (ref 0–50)
BASOPHILS # BLD: 0 K/UL (ref 0–0.2)
BASOPHILS NFR BLD: 0 % (ref 0–2)
BILIRUB SERPL-MCNC: 0.3 MG/DL (ref 0–1.2)
BUN SERPL-MCNC: 8 MG/DL (ref 6–20)
CA-I BLD-SCNC: 1.21 MMOL/L (ref 1.15–1.33)
CALCIUM SERPL-MCNC: 8.4 MG/DL (ref 8.6–10)
CHLORIDE SERPL-SCNC: 104 MMOL/L (ref 98–107)
CO2 SERPL-SCNC: 28 MMOL/L (ref 22–29)
CREAT SERPL-MCNC: 0.5 MG/DL (ref 0.7–1.2)
EOSINOPHIL # BLD: 0 K/UL (ref 0.05–0.5)
EOSINOPHILS RELATIVE PERCENT: 0 % (ref 0–6)
ERYTHROCYTE [DISTWIDTH] IN BLOOD BY AUTOMATED COUNT: 15.7 % (ref 11.5–15)
GFR, ESTIMATED: >90 ML/MIN/1.73M2
GLUCOSE BLD-MCNC: 114 MG/DL (ref 74–99)
GLUCOSE BLD-MCNC: 117 MG/DL (ref 74–99)
GLUCOSE BLD-MCNC: 126 MG/DL (ref 74–99)
GLUCOSE BLD-MCNC: 175 MG/DL (ref 74–99)
GLUCOSE SERPL-MCNC: 126 MG/DL (ref 74–99)
HCT VFR BLD AUTO: 27 % (ref 37–54)
HGB BLD-MCNC: 9.4 G/DL (ref 12.5–16.5)
LYMPHOCYTES NFR BLD: 0.81 K/UL (ref 1.5–4)
LYMPHOCYTES RELATIVE PERCENT: 16 % (ref 20–42)
MAGNESIUM SERPL-MCNC: 2 MG/DL (ref 1.6–2.6)
MCH RBC QN AUTO: 36.7 PG (ref 26–35)
MCHC RBC AUTO-ENTMCNC: 34.8 G/DL (ref 32–34.5)
MCV RBC AUTO: 105.5 FL (ref 80–99.9)
MONOCYTES NFR BLD: 0.18 K/UL (ref 0.1–0.95)
MONOCYTES NFR BLD: 4 % (ref 2–12)
NEUTROPHILS NFR BLD: 81 % (ref 43–80)
NEUTS SEG NFR BLD: 4.21 K/UL (ref 1.8–7.3)
PARTIAL THROMBOPLASTIN TIME: 30.7 SEC (ref 24.5–35.1)
PARTIAL THROMBOPLASTIN TIME: 31.4 SEC (ref 24.5–35.1)
PARTIAL THROMBOPLASTIN TIME: 36.8 SEC (ref 24.5–35.1)
PHOSPHATE SERPL-MCNC: 3.5 MG/DL (ref 2.5–4.5)
PLATELET # BLD AUTO: 222 K/UL (ref 130–450)
PMV BLD AUTO: 11 FL (ref 7–12)
POTASSIUM SERPL-SCNC: 4.1 MMOL/L (ref 3.5–5.1)
PROT SERPL-MCNC: 4.5 G/DL (ref 6.4–8.3)
RBC # BLD AUTO: 2.56 M/UL (ref 3.8–5.8)
RBC # BLD: ABNORMAL 10*6/UL
SODIUM SERPL-SCNC: 139 MMOL/L (ref 136–145)
WBC OTHER # BLD: 5.2 K/UL (ref 4.5–11.5)

## 2025-06-25 PROCEDURE — 99291 CRITICAL CARE FIRST HOUR: CPT | Performed by: STUDENT IN AN ORGANIZED HEALTH CARE EDUCATION/TRAINING PROGRAM

## 2025-06-25 PROCEDURE — 84100 ASSAY OF PHOSPHORUS: CPT

## 2025-06-25 PROCEDURE — 82330 ASSAY OF CALCIUM: CPT

## 2025-06-25 PROCEDURE — 80053 COMPREHEN METABOLIC PANEL: CPT

## 2025-06-25 PROCEDURE — 6370000000 HC RX 637 (ALT 250 FOR IP): Performed by: NURSE PRACTITIONER

## 2025-06-25 PROCEDURE — 6370000000 HC RX 637 (ALT 250 FOR IP): Performed by: STUDENT IN AN ORGANIZED HEALTH CARE EDUCATION/TRAINING PROGRAM

## 2025-06-25 PROCEDURE — 6360000002 HC RX W HCPCS: Performed by: NURSE PRACTITIONER

## 2025-06-25 PROCEDURE — 97530 THERAPEUTIC ACTIVITIES: CPT

## 2025-06-25 PROCEDURE — 2500000003 HC RX 250 WO HCPCS: Performed by: NURSE PRACTITIONER

## 2025-06-25 PROCEDURE — 2000000000 HC ICU R&B

## 2025-06-25 PROCEDURE — 83735 ASSAY OF MAGNESIUM: CPT

## 2025-06-25 PROCEDURE — 97162 PT EVAL MOD COMPLEX 30 MIN: CPT

## 2025-06-25 PROCEDURE — 82962 GLUCOSE BLOOD TEST: CPT

## 2025-06-25 PROCEDURE — 85730 THROMBOPLASTIN TIME PARTIAL: CPT

## 2025-06-25 PROCEDURE — 97166 OT EVAL MOD COMPLEX 45 MIN: CPT

## 2025-06-25 PROCEDURE — 37799 UNLISTED PX VASCULAR SURGERY: CPT

## 2025-06-25 PROCEDURE — 2580000003 HC RX 258: Performed by: NURSE PRACTITIONER

## 2025-06-25 PROCEDURE — 80048 BASIC METABOLIC PNL TOTAL CA: CPT

## 2025-06-25 PROCEDURE — 99232 SBSQ HOSP IP/OBS MODERATE 35: CPT | Performed by: STUDENT IN AN ORGANIZED HEALTH CARE EDUCATION/TRAINING PROGRAM

## 2025-06-25 PROCEDURE — 83605 ASSAY OF LACTIC ACID: CPT

## 2025-06-25 PROCEDURE — 85025 COMPLETE CBC W/AUTO DIFF WBC: CPT

## 2025-06-25 RX ORDER — OXYCODONE HYDROCHLORIDE 5 MG/1
5 TABLET ORAL EVERY 4 HOURS PRN
Refills: 0 | Status: DISCONTINUED | OUTPATIENT
Start: 2025-06-25 | End: 2025-07-01 | Stop reason: HOSPADM

## 2025-06-25 RX ORDER — OXYCODONE AND ACETAMINOPHEN 5; 325 MG/1; MG/1
2 TABLET ORAL EVERY 6 HOURS PRN
Refills: 0 | Status: DISCONTINUED | OUTPATIENT
Start: 2025-06-25 | End: 2025-07-01 | Stop reason: HOSPADM

## 2025-06-25 RX ADMIN — FAMOTIDINE 20 MG: 20 TABLET, FILM COATED ORAL at 08:45

## 2025-06-25 RX ADMIN — FAMOTIDINE 20 MG: 20 TABLET, FILM COATED ORAL at 20:58

## 2025-06-25 RX ADMIN — SENNOSIDES 8.6 MG: 8.6 TABLET, FILM COATED ORAL at 20:58

## 2025-06-25 RX ADMIN — THIAMINE HYDROCHLORIDE 100 MG: 100 INJECTION, SOLUTION INTRAMUSCULAR; INTRAVENOUS at 08:45

## 2025-06-25 RX ADMIN — HEPARIN SODIUM 2800 UNITS: 1000 INJECTION INTRAVENOUS; SUBCUTANEOUS at 04:08

## 2025-06-25 RX ADMIN — HYDROMORPHONE HYDROCHLORIDE 0.5 MG: 1 INJECTION, SOLUTION INTRAMUSCULAR; INTRAVENOUS; SUBCUTANEOUS at 19:56

## 2025-06-25 RX ADMIN — POLYETHYLENE GLYCOL 3350 17 G: 17 POWDER, FOR SOLUTION ORAL at 08:45

## 2025-06-25 RX ADMIN — OXYCODONE AND ACETAMINOPHEN 2 TABLET: 5; 325 TABLET ORAL at 11:54

## 2025-06-25 RX ADMIN — PHENOBARBITAL SODIUM 65 MG: 65 INJECTION INTRAMUSCULAR at 10:40

## 2025-06-25 RX ADMIN — SODIUM CHLORIDE, PRESERVATIVE FREE 10 ML: 5 INJECTION INTRAVENOUS at 08:47

## 2025-06-25 RX ADMIN — SODIUM CHLORIDE, SODIUM LACTATE, POTASSIUM CHLORIDE, AND CALCIUM CHLORIDE: .6; .31; .03; .02 INJECTION, SOLUTION INTRAVENOUS at 19:47

## 2025-06-25 RX ADMIN — FOLIC ACID 1 MG: 5 INJECTION, SOLUTION INTRAMUSCULAR; INTRAVENOUS; SUBCUTANEOUS at 16:39

## 2025-06-25 RX ADMIN — HEPARIN SODIUM 2800 UNITS: 1000 INJECTION INTRAVENOUS; SUBCUTANEOUS at 19:12

## 2025-06-25 RX ADMIN — OXYCODONE AND ACETAMINOPHEN 2 TABLET: 5; 325 TABLET ORAL at 18:54

## 2025-06-25 RX ADMIN — ASPIRIN 81 MG CHEWABLE TABLET 81 MG: 81 TABLET CHEWABLE at 08:44

## 2025-06-25 RX ADMIN — SODIUM CHLORIDE, PRESERVATIVE FREE 10 ML: 5 INJECTION INTRAVENOUS at 08:48

## 2025-06-25 RX ADMIN — HEPARIN SODIUM 2800 UNITS: 1000 INJECTION INTRAVENOUS; SUBCUTANEOUS at 11:31

## 2025-06-25 RX ADMIN — PHENOBARBITAL SODIUM 65 MG: 65 INJECTION INTRAMUSCULAR at 21:41

## 2025-06-25 RX ADMIN — PHENOBARBITAL SODIUM 65 MG: 65 INJECTION INTRAMUSCULAR at 16:39

## 2025-06-25 RX ADMIN — MULTIVITAMIN TABLET 1 TABLET: TABLET at 08:44

## 2025-06-25 RX ADMIN — PHENOBARBITAL SODIUM 65 MG: 65 INJECTION INTRAMUSCULAR at 05:30

## 2025-06-25 RX ADMIN — OXYCODONE 5 MG: 5 TABLET ORAL at 08:44

## 2025-06-25 ASSESSMENT — PAIN SCALES - GENERAL
PAINLEVEL_OUTOF10: 4
PAINLEVEL_OUTOF10: 10
PAINLEVEL_OUTOF10: 8
PAINLEVEL_OUTOF10: 6
PAINLEVEL_OUTOF10: 2
PAINLEVEL_OUTOF10: 3
PAINLEVEL_OUTOF10: 10

## 2025-06-25 ASSESSMENT — PAIN DESCRIPTION - LOCATION
LOCATION: ARM
LOCATION: HAND;ARM
LOCATION: ARM;HAND
LOCATION: ARM;LEG

## 2025-06-25 ASSESSMENT — PAIN DESCRIPTION - DESCRIPTORS
DESCRIPTORS: THROBBING
DESCRIPTORS: ACHING;BURNING;THROBBING

## 2025-06-25 ASSESSMENT — PAIN DESCRIPTION - ORIENTATION
ORIENTATION: LEFT

## 2025-06-25 ASSESSMENT — PAIN - FUNCTIONAL ASSESSMENT
PAIN_FUNCTIONAL_ASSESSMENT: ACTIVITIES ARE NOT PREVENTED
PAIN_FUNCTIONAL_ASSESSMENT: ACTIVITIES ARE NOT PREVENTED

## 2025-06-25 NOTE — ANESTHESIA POSTPROCEDURE EVALUATION
Department of Anesthesiology  Postprocedure Note    Patient: Colton Hurley  MRN: [id number]  YOB: 1965  Date of evaluation: 6/24/2025    Procedure Summary       Date: 06/24/25 Room / Location: [address]    Anesthesia Start: 1453 Anesthesia Stop: 1700    Procedure: LEFT UPPER EXTREMITY THROMBECTOMY (Left: Arm Lower) Diagnosis:       Ischemia of left upper extremity      (Ischemia of left upper extremity [I99.8])    Surgeons: Kirstie Calix MD Responsible Provider: Melissa Lazo MD    Anesthesia Type: general ASA Status: 4 - Emergent            Anesthesia Type: No value filed.    Osman Phase I:      Osman Phase II:      Anesthesia Post Evaluation    Patient location during evaluation: ICU  Patient participation: complete - patient participated  Level of consciousness: awake  Airway patency: patent  Nausea & Vomiting: no nausea and no vomiting  Cardiovascular status: hemodynamically stable  Respiratory status: acceptable  Hydration status: euvolemic  Pain management: adequate    No notable events documented.

## 2025-06-26 LAB
ALBUMIN SERPL-MCNC: 2.4 G/DL (ref 3.5–5.2)
ALP SERPL-CCNC: 42 U/L (ref 40–129)
ALT SERPL-CCNC: 6 U/L (ref 0–50)
ANION GAP SERPL CALCULATED.3IONS-SCNC: 7 MMOL/L (ref 7–16)
ANION GAP SERPL CALCULATED.3IONS-SCNC: 8 MMOL/L (ref 7–16)
AST SERPL-CCNC: 14 U/L (ref 0–50)
B.E.: 7.2 MMOL/L (ref -3–3)
BASOPHILS # BLD: 0 K/UL (ref 0–0.2)
BASOPHILS # BLD: 0.01 K/UL (ref 0–0.2)
BASOPHILS NFR BLD: 0 % (ref 0–2)
BASOPHILS NFR BLD: 0 % (ref 0–2)
BILIRUB SERPL-MCNC: <0.2 MG/DL (ref 0–1.2)
BUN SERPL-MCNC: 7 MG/DL (ref 6–20)
BUN SERPL-MCNC: 9 MG/DL (ref 6–20)
CA-I BLD-SCNC: 1.16 MMOL/L (ref 1.15–1.33)
CALCIUM SERPL-MCNC: 7.7 MG/DL (ref 8.6–10)
CALCIUM SERPL-MCNC: 7.7 MG/DL (ref 8.6–10)
CHLORIDE SERPL-SCNC: 103 MMOL/L (ref 98–107)
CHLORIDE SERPL-SCNC: 105 MMOL/L (ref 98–107)
CO2 SERPL-SCNC: 28 MMOL/L (ref 22–29)
CO2 SERPL-SCNC: 28 MMOL/L (ref 22–29)
COHB: 0.3 % (ref 0–1.5)
CREAT SERPL-MCNC: 0.6 MG/DL (ref 0.7–1.2)
CREAT SERPL-MCNC: 0.7 MG/DL (ref 0.7–1.2)
CRITICAL: ABNORMAL
DATE ANALYZED: ABNORMAL
DATE OF COLLECTION: ABNORMAL
EOSINOPHIL # BLD: 0.03 K/UL (ref 0.05–0.5)
EOSINOPHIL # BLD: 0.05 K/UL (ref 0.05–0.5)
EOSINOPHILS RELATIVE PERCENT: 1 % (ref 0–6)
EOSINOPHILS RELATIVE PERCENT: 1 % (ref 0–6)
ERYTHROCYTE [DISTWIDTH] IN BLOOD BY AUTOMATED COUNT: 15.6 % (ref 11.5–15)
ERYTHROCYTE [DISTWIDTH] IN BLOOD BY AUTOMATED COUNT: 15.9 % (ref 11.5–15)
GFR, ESTIMATED: >90 ML/MIN/1.73M2
GFR, ESTIMATED: >90 ML/MIN/1.73M2
GLUCOSE BLD-MCNC: 106 MG/DL (ref 74–99)
GLUCOSE BLD-MCNC: 108 MG/DL (ref 74–99)
GLUCOSE BLD-MCNC: 111 MG/DL (ref 74–99)
GLUCOSE SERPL-MCNC: 105 MG/DL (ref 74–99)
GLUCOSE SERPL-MCNC: 138 MG/DL (ref 74–99)
HCO3: 31.4 MMOL/L (ref 22–26)
HCT VFR BLD AUTO: 24.6 % (ref 37–54)
HCT VFR BLD AUTO: 25.1 % (ref 37–54)
HGB BLD-MCNC: 8.6 G/DL (ref 12.5–16.5)
HGB BLD-MCNC: 8.6 G/DL (ref 12.5–16.5)
HHB: 6.7 % (ref 0–5)
IMM GRANULOCYTES # BLD AUTO: <0.03 K/UL (ref 0–0.58)
IMM GRANULOCYTES NFR BLD: 0 % (ref 0–5)
LAB: ABNORMAL
LACTATE BLDV-SCNC: 1.8 MMOL/L (ref 0.5–2.2)
LYMPHOCYTES NFR BLD: 2.91 K/UL (ref 1.5–4)
LYMPHOCYTES NFR BLD: 3.05 K/UL (ref 1.5–4)
LYMPHOCYTES RELATIVE PERCENT: 47 % (ref 20–42)
LYMPHOCYTES RELATIVE PERCENT: 55 % (ref 20–42)
Lab: 6
MAGNESIUM SERPL-MCNC: 1.8 MG/DL (ref 1.6–2.6)
MCH RBC QN AUTO: 36.4 PG (ref 26–35)
MCH RBC QN AUTO: 37.1 PG (ref 26–35)
MCHC RBC AUTO-ENTMCNC: 34.3 G/DL (ref 32–34.5)
MCHC RBC AUTO-ENTMCNC: 35 G/DL (ref 32–34.5)
MCV RBC AUTO: 106 FL (ref 80–99.9)
MCV RBC AUTO: 106.4 FL (ref 80–99.9)
METHB: 0.3 % (ref 0–1.5)
MODE: ABNORMAL
MONOCYTES NFR BLD: 0.21 K/UL (ref 0.1–0.95)
MONOCYTES NFR BLD: 0.37 K/UL (ref 0.1–0.95)
MONOCYTES NFR BLD: 3 % (ref 2–12)
MONOCYTES NFR BLD: 7 % (ref 2–12)
NEUTROPHILS NFR BLD: 37 % (ref 43–80)
NEUTROPHILS NFR BLD: 49 % (ref 43–80)
NEUTS SEG NFR BLD: 2.04 K/UL (ref 1.8–7.3)
NEUTS SEG NFR BLD: 3.02 K/UL (ref 1.8–7.3)
O2 SATURATION: 93.3 % (ref 92–98.5)
O2HB: 92.7 % (ref 94–97)
OPERATOR ID: 3214
PARTIAL THROMBOPLASTIN TIME: 30.3 SEC (ref 24.5–35.1)
PATIENT TEMP: 37 C
PCO2: 43.1 MMHG (ref 35–45)
PH BLOOD GAS: 7.48 (ref 7.35–7.45)
PHENOBARBITAL DATE LAST DOSE: NORMAL
PHENOBARBITAL DOSE AMOUNT: NORMAL
PHENOBARBITAL TIME LAST DOSE: NORMAL
PHENOBARBITAL: 24.6 UG/ML (ref 10–30)
PHOSPHATE SERPL-MCNC: 2.6 MG/DL (ref 2.5–4.5)
PLATELET # BLD AUTO: 223 K/UL (ref 130–450)
PLATELET # BLD AUTO: 225 K/UL (ref 130–450)
PMV BLD AUTO: 10.7 FL (ref 7–12)
PMV BLD AUTO: 10.9 FL (ref 7–12)
PO2: 68.7 MMHG (ref 75–100)
POTASSIUM SERPL-SCNC: 3.4 MMOL/L (ref 3.5–5.1)
POTASSIUM SERPL-SCNC: 3.4 MMOL/L (ref 3.5–5.1)
PROT SERPL-MCNC: 4 G/DL (ref 6.4–8.3)
RBC # BLD AUTO: 2.32 M/UL (ref 3.8–5.8)
RBC # BLD AUTO: 2.36 M/UL (ref 3.8–5.8)
RBC # BLD: ABNORMAL 10*6/UL
SODIUM SERPL-SCNC: 139 MMOL/L (ref 136–145)
SODIUM SERPL-SCNC: 140 MMOL/L (ref 136–145)
SOURCE, BLOOD GAS: ABNORMAL
SURGICAL PATHOLOGY REPORT: NORMAL
THB: 10.1 G/DL (ref 11.5–16.5)
TIME ANALYZED: 8
WBC OTHER # BLD: 5.5 K/UL (ref 4.5–11.5)
WBC OTHER # BLD: 6.2 K/UL (ref 4.5–11.5)

## 2025-06-26 PROCEDURE — 97530 THERAPEUTIC ACTIVITIES: CPT

## 2025-06-26 PROCEDURE — 80184 ASSAY OF PHENOBARBITAL: CPT

## 2025-06-26 PROCEDURE — 6370000000 HC RX 637 (ALT 250 FOR IP): Performed by: STUDENT IN AN ORGANIZED HEALTH CARE EDUCATION/TRAINING PROGRAM

## 2025-06-26 PROCEDURE — 6360000002 HC RX W HCPCS

## 2025-06-26 PROCEDURE — 6360000002 HC RX W HCPCS: Performed by: NURSE PRACTITIONER

## 2025-06-26 PROCEDURE — 6370000000 HC RX 637 (ALT 250 FOR IP)

## 2025-06-26 PROCEDURE — 82330 ASSAY OF CALCIUM: CPT

## 2025-06-26 PROCEDURE — 2000000000 HC ICU R&B

## 2025-06-26 PROCEDURE — 2580000003 HC RX 258: Performed by: NURSE PRACTITIONER

## 2025-06-26 PROCEDURE — 80053 COMPREHEN METABOLIC PANEL: CPT

## 2025-06-26 PROCEDURE — 2500000003 HC RX 250 WO HCPCS: Performed by: NURSE PRACTITIONER

## 2025-06-26 PROCEDURE — 84100 ASSAY OF PHOSPHORUS: CPT

## 2025-06-26 PROCEDURE — 83735 ASSAY OF MAGNESIUM: CPT

## 2025-06-26 PROCEDURE — 99232 SBSQ HOSP IP/OBS MODERATE 35: CPT | Performed by: STUDENT IN AN ORGANIZED HEALTH CARE EDUCATION/TRAINING PROGRAM

## 2025-06-26 PROCEDURE — 82962 GLUCOSE BLOOD TEST: CPT

## 2025-06-26 PROCEDURE — 85025 COMPLETE CBC W/AUTO DIFF WBC: CPT

## 2025-06-26 PROCEDURE — 2700000000 HC OXYGEN THERAPY PER DAY

## 2025-06-26 PROCEDURE — 6370000000 HC RX 637 (ALT 250 FOR IP): Performed by: NURSE PRACTITIONER

## 2025-06-26 PROCEDURE — 82805 BLOOD GASES W/O2 SATURATION: CPT

## 2025-06-26 RX ORDER — MAGNESIUM SULFATE IN WATER 40 MG/ML
2000 INJECTION, SOLUTION INTRAVENOUS ONCE
Status: COMPLETED | OUTPATIENT
Start: 2025-06-26 | End: 2025-06-26

## 2025-06-26 RX ORDER — POTASSIUM CHLORIDE 1500 MG/1
40 TABLET, EXTENDED RELEASE ORAL 2 TIMES DAILY WITH MEALS
Status: DISCONTINUED | OUTPATIENT
Start: 2025-06-26 | End: 2025-06-27

## 2025-06-26 RX ADMIN — HEPARIN SODIUM 13 UNITS/KG/HR: 10000 INJECTION, SOLUTION INTRAVENOUS at 05:30

## 2025-06-26 RX ADMIN — OXYCODONE AND ACETAMINOPHEN 2 TABLET: 5; 325 TABLET ORAL at 22:41

## 2025-06-26 RX ADMIN — Medication 250 MG: at 16:23

## 2025-06-26 RX ADMIN — OXYCODONE AND ACETAMINOPHEN 2 TABLET: 5; 325 TABLET ORAL at 10:23

## 2025-06-26 RX ADMIN — SODIUM CHLORIDE, PRESERVATIVE FREE 10 ML: 5 INJECTION INTRAVENOUS at 08:55

## 2025-06-26 RX ADMIN — SODIUM CHLORIDE, PRESERVATIVE FREE 10 ML: 5 INJECTION INTRAVENOUS at 21:32

## 2025-06-26 RX ADMIN — SODIUM CHLORIDE, SODIUM LACTATE, POTASSIUM CHLORIDE, AND CALCIUM CHLORIDE: .6; .31; .03; .02 INJECTION, SOLUTION INTRAVENOUS at 20:35

## 2025-06-26 RX ADMIN — PHENOBARBITAL SODIUM 65 MG: 65 INJECTION INTRAMUSCULAR at 20:37

## 2025-06-26 RX ADMIN — SODIUM CHLORIDE, SODIUM LACTATE, POTASSIUM CHLORIDE, AND CALCIUM CHLORIDE: .6; .31; .03; .02 INJECTION, SOLUTION INTRAVENOUS at 04:01

## 2025-06-26 RX ADMIN — SODIUM CHLORIDE, PRESERVATIVE FREE 10 ML: 5 INJECTION INTRAVENOUS at 09:00

## 2025-06-26 RX ADMIN — PHENOBARBITAL SODIUM 65 MG: 65 INJECTION INTRAMUSCULAR at 03:51

## 2025-06-26 RX ADMIN — ASPIRIN 81 MG CHEWABLE TABLET 81 MG: 81 TABLET CHEWABLE at 08:43

## 2025-06-26 RX ADMIN — POTASSIUM BICARBONATE 40 MEQ: 782 TABLET, EFFERVESCENT ORAL at 20:38

## 2025-06-26 RX ADMIN — FAMOTIDINE 20 MG: 20 TABLET, FILM COATED ORAL at 20:38

## 2025-06-26 RX ADMIN — HYDROMORPHONE HYDROCHLORIDE 0.5 MG: 1 INJECTION, SOLUTION INTRAMUSCULAR; INTRAVENOUS; SUBCUTANEOUS at 00:44

## 2025-06-26 RX ADMIN — MAGNESIUM SULFATE HEPTAHYDRATE 2000 MG: 40 INJECTION, SOLUTION INTRAVENOUS at 08:47

## 2025-06-26 RX ADMIN — PHENOBARBITAL SODIUM 65 MG: 65 INJECTION INTRAMUSCULAR at 10:23

## 2025-06-26 RX ADMIN — Medication 250 MG: at 20:38

## 2025-06-26 RX ADMIN — OXYCODONE AND ACETAMINOPHEN 2 TABLET: 5; 325 TABLET ORAL at 15:55

## 2025-06-26 RX ADMIN — MULTIVITAMIN TABLET 1 TABLET: TABLET at 08:43

## 2025-06-26 RX ADMIN — Medication 250 MG: at 13:30

## 2025-06-26 RX ADMIN — POTASSIUM CHLORIDE 40 MEQ: 1500 TABLET, EXTENDED RELEASE ORAL at 11:11

## 2025-06-26 RX ADMIN — APIXABAN 5 MG: 5 TABLET, FILM COATED ORAL at 20:37

## 2025-06-26 RX ADMIN — POLYETHYLENE GLYCOL 3350 17 G: 17 POWDER, FOR SOLUTION ORAL at 08:42

## 2025-06-26 RX ADMIN — SODIUM CHLORIDE, SODIUM LACTATE, POTASSIUM CHLORIDE, AND CALCIUM CHLORIDE: .6; .31; .03; .02 INJECTION, SOLUTION INTRAVENOUS at 12:01

## 2025-06-26 RX ADMIN — SODIUM CHLORIDE, PRESERVATIVE FREE 10 ML: 5 INJECTION INTRAVENOUS at 08:47

## 2025-06-26 RX ADMIN — FAMOTIDINE 20 MG: 20 TABLET, FILM COATED ORAL at 08:43

## 2025-06-26 RX ADMIN — POTASSIUM CHLORIDE 40 MEQ: 1500 TABLET, EXTENDED RELEASE ORAL at 05:46

## 2025-06-26 RX ADMIN — SENNOSIDES 8.6 MG: 8.6 TABLET, FILM COATED ORAL at 20:39

## 2025-06-26 RX ADMIN — PHENOBARBITAL SODIUM 65 MG: 65 INJECTION INTRAMUSCULAR at 16:23

## 2025-06-26 RX ADMIN — FOLIC ACID 1 MG: 5 INJECTION, SOLUTION INTRAMUSCULAR; INTRAVENOUS; SUBCUTANEOUS at 09:00

## 2025-06-26 RX ADMIN — Medication 250 MG: at 08:41

## 2025-06-26 RX ADMIN — APIXABAN 5 MG: 5 TABLET, FILM COATED ORAL at 08:43

## 2025-06-26 RX ADMIN — THIAMINE HYDROCHLORIDE 100 MG: 100 INJECTION, SOLUTION INTRAMUSCULAR; INTRAVENOUS at 08:46

## 2025-06-26 ASSESSMENT — PAIN DESCRIPTION - DESCRIPTORS
DESCRIPTORS: ACHING;BURNING;THROBBING
DESCRIPTORS: THROBBING
DESCRIPTORS: ACHING;THROBBING;BURNING
DESCRIPTORS: ACHING;DISCOMFORT;SORE

## 2025-06-26 ASSESSMENT — PAIN SCALES - GENERAL
PAINLEVEL_OUTOF10: 0
PAINLEVEL_OUTOF10: 0
PAINLEVEL_OUTOF10: 10
PAINLEVEL_OUTOF10: 0
PAINLEVEL_OUTOF10: 0
PAINLEVEL_OUTOF10: 4
PAINLEVEL_OUTOF10: 9
PAINLEVEL_OUTOF10: 8
PAINLEVEL_OUTOF10: 9
PAINLEVEL_OUTOF10: 9
PAINLEVEL_OUTOF10: 3

## 2025-06-26 ASSESSMENT — PAIN - FUNCTIONAL ASSESSMENT
PAIN_FUNCTIONAL_ASSESSMENT: ACTIVITIES ARE NOT PREVENTED

## 2025-06-26 ASSESSMENT — PAIN DESCRIPTION - ORIENTATION
ORIENTATION: LEFT

## 2025-06-26 ASSESSMENT — PAIN DESCRIPTION - LOCATION
LOCATION: ARM
LOCATION: HAND
LOCATION: ARM

## 2025-06-26 ASSESSMENT — PAIN DESCRIPTION - ONSET: ONSET: ON-GOING

## 2025-06-26 ASSESSMENT — PAIN DESCRIPTION - PAIN TYPE: TYPE: ACUTE PAIN;SURGICAL PAIN

## 2025-06-26 ASSESSMENT — PAIN DESCRIPTION - FREQUENCY: FREQUENCY: INTERMITTENT

## 2025-06-27 PROBLEM — F10.10 ALCOHOL ABUSE: Status: ACTIVE | Noted: 2025-06-27

## 2025-06-27 PROBLEM — F20.3 UNDIFFERENTIATED SCHIZOPHRENIA (HCC): Status: ACTIVE | Noted: 2025-06-27

## 2025-06-27 PROBLEM — F20.9 SCHIZOPHRENIA (HCC): Status: ACTIVE | Noted: 2025-06-27

## 2025-06-27 LAB
ALBUMIN SERPL-MCNC: 2.4 G/DL (ref 3.5–5.2)
ALP SERPL-CCNC: 46 U/L (ref 40–129)
ALT SERPL-CCNC: 5 U/L (ref 0–50)
ANION GAP SERPL CALCULATED.3IONS-SCNC: 7 MMOL/L (ref 7–16)
AST SERPL-CCNC: 15 U/L (ref 0–50)
BASOPHILS # BLD: 0.02 K/UL (ref 0–0.2)
BASOPHILS NFR BLD: 0 % (ref 0–2)
BILIRUB SERPL-MCNC: <0.2 MG/DL (ref 0–1.2)
BUN SERPL-MCNC: 5 MG/DL (ref 6–20)
CA-I BLD-SCNC: 1.2 MMOL/L (ref 1.15–1.33)
CALCIUM SERPL-MCNC: 8.2 MG/DL (ref 8.6–10)
CHLORIDE SERPL-SCNC: 102 MMOL/L (ref 98–107)
CO2 SERPL-SCNC: 27 MMOL/L (ref 22–29)
CREAT SERPL-MCNC: 0.6 MG/DL (ref 0.7–1.2)
EOSINOPHIL # BLD: 0.12 K/UL (ref 0.05–0.5)
EOSINOPHILS RELATIVE PERCENT: 2 % (ref 0–6)
ERYTHROCYTE [DISTWIDTH] IN BLOOD BY AUTOMATED COUNT: 15.8 % (ref 11.5–15)
GFR, ESTIMATED: >90 ML/MIN/1.73M2
GLUCOSE BLD-MCNC: 101 MG/DL (ref 74–99)
GLUCOSE BLD-MCNC: 109 MG/DL (ref 74–99)
GLUCOSE BLD-MCNC: 132 MG/DL (ref 74–99)
GLUCOSE BLD-MCNC: 84 MG/DL (ref 74–99)
GLUCOSE BLD-MCNC: 87 MG/DL (ref 74–99)
GLUCOSE BLD-MCNC: 94 MG/DL (ref 74–99)
GLUCOSE SERPL-MCNC: 88 MG/DL (ref 74–99)
HCT VFR BLD AUTO: 26.7 % (ref 37–54)
HGB BLD-MCNC: 9.3 G/DL (ref 12.5–16.5)
IMM GRANULOCYTES # BLD AUTO: 0.03 K/UL (ref 0–0.58)
IMM GRANULOCYTES NFR BLD: 0 % (ref 0–5)
LYMPHOCYTES NFR BLD: 2.5 K/UL (ref 1.5–4)
LYMPHOCYTES RELATIVE PERCENT: 34 % (ref 20–42)
MAGNESIUM SERPL-MCNC: 2.1 MG/DL (ref 1.6–2.6)
MCH RBC QN AUTO: 37.2 PG (ref 26–35)
MCHC RBC AUTO-ENTMCNC: 34.8 G/DL (ref 32–34.5)
MCV RBC AUTO: 106.8 FL (ref 80–99.9)
MONOCYTES NFR BLD: 0.48 K/UL (ref 0.1–0.95)
MONOCYTES NFR BLD: 7 % (ref 2–12)
NEUTROPHILS NFR BLD: 57 % (ref 43–80)
NEUTS SEG NFR BLD: 4.24 K/UL (ref 1.8–7.3)
PHOSPHATE SERPL-MCNC: 3.3 MG/DL (ref 2.5–4.5)
PLATELET # BLD AUTO: 258 K/UL (ref 130–450)
PMV BLD AUTO: 10.5 FL (ref 7–12)
POTASSIUM SERPL-SCNC: 4.2 MMOL/L (ref 3.5–5.1)
PROT SERPL-MCNC: 4.4 G/DL (ref 6.4–8.3)
RBC # BLD AUTO: 2.5 M/UL (ref 3.8–5.8)
SODIUM SERPL-SCNC: 137 MMOL/L (ref 136–145)
WBC OTHER # BLD: 7.4 K/UL (ref 4.5–11.5)

## 2025-06-27 PROCEDURE — 6370000000 HC RX 637 (ALT 250 FOR IP): Performed by: NURSE PRACTITIONER

## 2025-06-27 PROCEDURE — 6360000002 HC RX W HCPCS: Performed by: STUDENT IN AN ORGANIZED HEALTH CARE EDUCATION/TRAINING PROGRAM

## 2025-06-27 PROCEDURE — 85025 COMPLETE CBC W/AUTO DIFF WBC: CPT

## 2025-06-27 PROCEDURE — 80053 COMPREHEN METABOLIC PANEL: CPT

## 2025-06-27 PROCEDURE — 84100 ASSAY OF PHOSPHORUS: CPT

## 2025-06-27 PROCEDURE — 6370000000 HC RX 637 (ALT 250 FOR IP): Performed by: STUDENT IN AN ORGANIZED HEALTH CARE EDUCATION/TRAINING PROGRAM

## 2025-06-27 PROCEDURE — 2500000003 HC RX 250 WO HCPCS: Performed by: NURSE PRACTITIONER

## 2025-06-27 PROCEDURE — 2000000000 HC ICU R&B

## 2025-06-27 PROCEDURE — 6370000000 HC RX 637 (ALT 250 FOR IP)

## 2025-06-27 PROCEDURE — 2580000003 HC RX 258: Performed by: NURSE PRACTITIONER

## 2025-06-27 PROCEDURE — 6360000002 HC RX W HCPCS: Performed by: NURSE PRACTITIONER

## 2025-06-27 PROCEDURE — 83735 ASSAY OF MAGNESIUM: CPT

## 2025-06-27 PROCEDURE — 99221 1ST HOSP IP/OBS SF/LOW 40: CPT

## 2025-06-27 PROCEDURE — 82330 ASSAY OF CALCIUM: CPT

## 2025-06-27 PROCEDURE — 99232 SBSQ HOSP IP/OBS MODERATE 35: CPT | Performed by: STUDENT IN AN ORGANIZED HEALTH CARE EDUCATION/TRAINING PROGRAM

## 2025-06-27 PROCEDURE — 2700000000 HC OXYGEN THERAPY PER DAY

## 2025-06-27 PROCEDURE — 82962 GLUCOSE BLOOD TEST: CPT

## 2025-06-27 PROCEDURE — 97530 THERAPEUTIC ACTIVITIES: CPT

## 2025-06-27 RX ORDER — PHENOBARBITAL SODIUM 65 MG/ML
32.5 INJECTION, SOLUTION INTRAMUSCULAR; INTRAVENOUS EVERY 6 HOURS PRN
Status: ACTIVE | OUTPATIENT
Start: 2025-06-28 | End: 2025-06-30

## 2025-06-27 RX ORDER — PHENOBARBITAL SODIUM 65 MG/ML
65 INJECTION, SOLUTION INTRAMUSCULAR; INTRAVENOUS EVERY 6 HOURS PRN
Status: DISPENSED | OUTPATIENT
Start: 2025-06-27 | End: 2025-06-28

## 2025-06-27 RX ORDER — PHENOBARBITAL SODIUM 65 MG/ML
16.2 INJECTION, SOLUTION INTRAMUSCULAR; INTRAVENOUS EVERY 6 HOURS PRN
Status: ACTIVE | OUTPATIENT
Start: 2025-06-30 | End: 2025-07-01

## 2025-06-27 RX ADMIN — PHENOBARBITAL SODIUM 65 MG: 65 INJECTION INTRAMUSCULAR at 11:09

## 2025-06-27 RX ADMIN — PHENOBARBITAL SODIUM 65 MG: 65 INJECTION INTRAMUSCULAR at 04:15

## 2025-06-27 RX ADMIN — THIAMINE HYDROCHLORIDE 100 MG: 100 INJECTION, SOLUTION INTRAMUSCULAR; INTRAVENOUS at 09:09

## 2025-06-27 RX ADMIN — PHENOBARBITAL SODIUM 65 MG: 65 INJECTION INTRAMUSCULAR; INTRAVENOUS at 17:14

## 2025-06-27 RX ADMIN — ASPIRIN 81 MG CHEWABLE TABLET 81 MG: 81 TABLET CHEWABLE at 09:09

## 2025-06-27 RX ADMIN — SODIUM CHLORIDE, SODIUM LACTATE, POTASSIUM CHLORIDE, AND CALCIUM CHLORIDE: .6; .31; .03; .02 INJECTION, SOLUTION INTRAVENOUS at 05:05

## 2025-06-27 RX ADMIN — SODIUM CHLORIDE, PRESERVATIVE FREE 10 ML: 5 INJECTION INTRAVENOUS at 09:09

## 2025-06-27 RX ADMIN — APIXABAN 5 MG: 5 TABLET, FILM COATED ORAL at 09:09

## 2025-06-27 RX ADMIN — APIXABAN 5 MG: 5 TABLET, FILM COATED ORAL at 20:16

## 2025-06-27 RX ADMIN — MULTIVITAMIN TABLET 1 TABLET: TABLET at 09:09

## 2025-06-27 RX ADMIN — SODIUM CHLORIDE, SODIUM LACTATE, POTASSIUM CHLORIDE, AND CALCIUM CHLORIDE: .6; .31; .03; .02 INJECTION, SOLUTION INTRAVENOUS at 12:11

## 2025-06-27 RX ADMIN — SODIUM CHLORIDE, PRESERVATIVE FREE 10 ML: 5 INJECTION INTRAVENOUS at 20:23

## 2025-06-27 RX ADMIN — FAMOTIDINE 20 MG: 20 TABLET, FILM COATED ORAL at 20:16

## 2025-06-27 RX ADMIN — FAMOTIDINE 20 MG: 20 TABLET, FILM COATED ORAL at 09:09

## 2025-06-27 RX ADMIN — OXYCODONE AND ACETAMINOPHEN 2 TABLET: 5; 325 TABLET ORAL at 20:15

## 2025-06-27 RX ADMIN — SENNOSIDES 8.6 MG: 8.6 TABLET, FILM COATED ORAL at 20:16

## 2025-06-27 RX ADMIN — OLANZAPINE 15 MG: 5 TABLET, FILM COATED ORAL at 20:16

## 2025-06-27 RX ADMIN — POTASSIUM CHLORIDE 40 MEQ: 1500 TABLET, EXTENDED RELEASE ORAL at 10:00

## 2025-06-27 RX ADMIN — POLYETHYLENE GLYCOL 3350 17 G: 17 POWDER, FOR SOLUTION ORAL at 09:09

## 2025-06-27 RX ADMIN — FOLIC ACID 1 MG: 5 INJECTION, SOLUTION INTRAMUSCULAR; INTRAVENOUS; SUBCUTANEOUS at 09:10

## 2025-06-27 ASSESSMENT — PAIN DESCRIPTION - ORIENTATION
ORIENTATION: LEFT

## 2025-06-27 ASSESSMENT — PAIN DESCRIPTION - PAIN TYPE
TYPE: ACUTE PAIN;SURGICAL PAIN

## 2025-06-27 ASSESSMENT — PAIN DESCRIPTION - LOCATION
LOCATION: HAND

## 2025-06-27 ASSESSMENT — PAIN DESCRIPTION - FREQUENCY
FREQUENCY: CONTINUOUS

## 2025-06-27 ASSESSMENT — PAIN SCALES - GENERAL
PAINLEVEL_OUTOF10: 3
PAINLEVEL_OUTOF10: 10
PAINLEVEL_OUTOF10: 10
PAINLEVEL_OUTOF10: 5

## 2025-06-27 ASSESSMENT — PAIN DESCRIPTION - DESCRIPTORS
DESCRIPTORS: ACHING;SHARP
DESCRIPTORS: ACHING

## 2025-06-27 ASSESSMENT — PAIN DESCRIPTION - ONSET
ONSET: ON-GOING

## 2025-06-27 ASSESSMENT — PAIN - FUNCTIONAL ASSESSMENT
PAIN_FUNCTIONAL_ASSESSMENT: ACTIVITIES ARE NOT PREVENTED

## 2025-06-27 NOTE — CONSULTS
Department of Psychiatry  Behavioral Health Consult    REASON FOR CONSULT: Hallucination     CONSULTING PHYSICIAN: Erickson Mcfarland MD     History obtained from: Patient and chart review    HISTORY OF PRESENT ILLNESS:     The patient is a 59 y.o. male with reported psychiatric history of schizophrenia and alcohol use disorder with previous inpatient psychiatric hospitalizations presented to the ED after patient's left hand was purple and discolored patient was found to have complete occlusion of left subclavian artery.  Patient was admitted to CVICU for further management and psychiatry was consulted for psychiatric evaluation.  Urine drug screen positive for barbiturates, alcohol level initially 188, QTc 420.    Upon evaluation today patient was seen at bedside in the CVICU where he remains for treatment.  He is flat, blunted cooperative however is a poor historian at times.  Patient states that he has a history of schizophrenia states that he thinks he is on Zyprexa but is not sure what his current medication.  He states that he was at [facility] and had a procedure done he thinks this is his home he states that he was on the table and he states that they did this and he reports that he did go home after that.  He is somewhat difficult to understand and follow in conversation and is somewhat disorganized.  Patient states that he has had previous inpatient psychiatric hospitalization and he states that most recently he believes he was at [facility] but can not recall when that was.  Patient states that he was brought into the hospital because he had the issue with his arm he is unable to write any further relevant information.  Patient does state he lives alone and he reports his sister is supportive. Patient does have history of CVA s/p mechanical thrombectomy, carotid stenosis s/p carotid stenting.  Patient reportedly was at [place] and had this completed patient subsequently left [place].  Patient sister reported

## 2025-06-28 LAB
ALBUMIN SERPL-MCNC: 3 G/DL (ref 3.5–5.2)
ALP SERPL-CCNC: 57 U/L (ref 40–129)
ALT SERPL-CCNC: 8 U/L (ref 0–50)
ANION GAP SERPL CALCULATED.3IONS-SCNC: 10 MMOL/L (ref 7–16)
AST SERPL-CCNC: 25 U/L (ref 0–50)
BASOPHILS # BLD: 0 K/UL (ref 0–0.2)
BASOPHILS NFR BLD: 0 % (ref 0–2)
BILIRUB SERPL-MCNC: 0.3 MG/DL (ref 0–1.2)
BUN SERPL-MCNC: 5 MG/DL (ref 6–20)
CA-I BLD-SCNC: 1.23 MMOL/L (ref 1.15–1.33)
CALCIUM SERPL-MCNC: 8.6 MG/DL (ref 8.6–10)
CHLORIDE SERPL-SCNC: 103 MMOL/L (ref 98–107)
CO2 SERPL-SCNC: 24 MMOL/L (ref 22–29)
CREAT SERPL-MCNC: 0.5 MG/DL (ref 0.7–1.2)
EOSINOPHIL # BLD: 0.22 K/UL (ref 0.05–0.5)
EOSINOPHILS RELATIVE PERCENT: 3 % (ref 0–6)
ERYTHROCYTE [DISTWIDTH] IN BLOOD BY AUTOMATED COUNT: 15.7 % (ref 11.5–15)
GFR, ESTIMATED: >90 ML/MIN/1.73M2
GLUCOSE BLD-MCNC: 94 MG/DL (ref 74–99)
GLUCOSE BLD-MCNC: 98 MG/DL (ref 74–99)
GLUCOSE SERPL-MCNC: 94 MG/DL (ref 74–99)
HCT VFR BLD AUTO: 30 % (ref 37–54)
HGB BLD-MCNC: 10.5 G/DL (ref 12.5–16.5)
LYMPHOCYTES NFR BLD: 1.24 K/UL (ref 1.5–4)
LYMPHOCYTES RELATIVE PERCENT: 15 % (ref 20–42)
MAGNESIUM SERPL-MCNC: 2.2 MG/DL (ref 1.6–2.6)
MCH RBC QN AUTO: 37 PG (ref 26–35)
MCHC RBC AUTO-ENTMCNC: 35 G/DL (ref 32–34.5)
MCV RBC AUTO: 105.6 FL (ref 80–99.9)
MONOCYTES NFR BLD: 0.8 K/UL (ref 0.1–0.95)
MONOCYTES NFR BLD: 10 % (ref 2–12)
NEUTROPHILS NFR BLD: 73 % (ref 43–80)
NEUTS SEG NFR BLD: 6.04 K/UL (ref 1.8–7.3)
PHOSPHATE SERPL-MCNC: 3.1 MG/DL (ref 2.5–4.5)
PLATELET # BLD AUTO: 305 K/UL (ref 130–450)
PMV BLD AUTO: 10.4 FL (ref 7–12)
POTASSIUM SERPL-SCNC: 4.4 MMOL/L (ref 3.5–5.1)
PROT SERPL-MCNC: 5.3 G/DL (ref 6.4–8.3)
RBC # BLD AUTO: 2.84 M/UL (ref 3.8–5.8)
RBC # BLD: ABNORMAL 10*6/UL
SODIUM SERPL-SCNC: 137 MMOL/L (ref 136–145)
WBC OTHER # BLD: 8.3 K/UL (ref 4.5–11.5)

## 2025-06-28 PROCEDURE — 6370000000 HC RX 637 (ALT 250 FOR IP): Performed by: STUDENT IN AN ORGANIZED HEALTH CARE EDUCATION/TRAINING PROGRAM

## 2025-06-28 PROCEDURE — 6370000000 HC RX 637 (ALT 250 FOR IP)

## 2025-06-28 PROCEDURE — 2000000000 HC ICU R&B

## 2025-06-28 PROCEDURE — 99232 SBSQ HOSP IP/OBS MODERATE 35: CPT | Performed by: STUDENT IN AN ORGANIZED HEALTH CARE EDUCATION/TRAINING PROGRAM

## 2025-06-28 PROCEDURE — 2500000003 HC RX 250 WO HCPCS: Performed by: NURSE PRACTITIONER

## 2025-06-28 PROCEDURE — 6370000000 HC RX 637 (ALT 250 FOR IP): Performed by: NURSE PRACTITIONER

## 2025-06-28 PROCEDURE — 83735 ASSAY OF MAGNESIUM: CPT

## 2025-06-28 PROCEDURE — 82330 ASSAY OF CALCIUM: CPT

## 2025-06-28 PROCEDURE — 84100 ASSAY OF PHOSPHORUS: CPT

## 2025-06-28 PROCEDURE — 6360000002 HC RX W HCPCS: Performed by: NURSE PRACTITIONER

## 2025-06-28 PROCEDURE — 85025 COMPLETE CBC W/AUTO DIFF WBC: CPT

## 2025-06-28 PROCEDURE — 82962 GLUCOSE BLOOD TEST: CPT

## 2025-06-28 PROCEDURE — 80053 COMPREHEN METABOLIC PANEL: CPT

## 2025-06-28 RX ADMIN — OLANZAPINE 15 MG: 5 TABLET, FILM COATED ORAL at 20:00

## 2025-06-28 RX ADMIN — OXYCODONE 5 MG: 5 TABLET ORAL at 06:09

## 2025-06-28 RX ADMIN — OXYCODONE 5 MG: 5 TABLET ORAL at 20:00

## 2025-06-28 RX ADMIN — THIAMINE HYDROCHLORIDE 100 MG: 100 INJECTION, SOLUTION INTRAMUSCULAR; INTRAVENOUS at 09:27

## 2025-06-28 RX ADMIN — MULTIVITAMIN TABLET 1 TABLET: TABLET at 09:27

## 2025-06-28 RX ADMIN — SODIUM CHLORIDE, PRESERVATIVE FREE 10 ML: 5 INJECTION INTRAVENOUS at 09:28

## 2025-06-28 RX ADMIN — SENNOSIDES 8.6 MG: 8.6 TABLET, FILM COATED ORAL at 20:00

## 2025-06-28 RX ADMIN — ASPIRIN 81 MG CHEWABLE TABLET 81 MG: 81 TABLET CHEWABLE at 09:27

## 2025-06-28 RX ADMIN — FOLIC ACID 1 MG: 5 INJECTION, SOLUTION INTRAMUSCULAR; INTRAVENOUS; SUBCUTANEOUS at 09:27

## 2025-06-28 RX ADMIN — POLYETHYLENE GLYCOL 3350 17 G: 17 POWDER, FOR SOLUTION ORAL at 09:27

## 2025-06-28 RX ADMIN — FAMOTIDINE 20 MG: 20 TABLET, FILM COATED ORAL at 09:27

## 2025-06-28 RX ADMIN — APIXABAN 5 MG: 5 TABLET, FILM COATED ORAL at 09:27

## 2025-06-28 RX ADMIN — SODIUM CHLORIDE, PRESERVATIVE FREE 10 ML: 5 INJECTION INTRAVENOUS at 09:27

## 2025-06-28 RX ADMIN — SODIUM CHLORIDE, PRESERVATIVE FREE 10 ML: 5 INJECTION INTRAVENOUS at 20:00

## 2025-06-28 RX ADMIN — FAMOTIDINE 20 MG: 20 TABLET, FILM COATED ORAL at 20:00

## 2025-06-28 RX ADMIN — APIXABAN 5 MG: 5 TABLET, FILM COATED ORAL at 20:00

## 2025-06-28 ASSESSMENT — PAIN DESCRIPTION - ORIENTATION
ORIENTATION: LEFT

## 2025-06-28 ASSESSMENT — PAIN DESCRIPTION - LOCATION
LOCATION: HAND

## 2025-06-28 ASSESSMENT — PAIN SCALES - GENERAL
PAINLEVEL_OUTOF10: 0
PAINLEVEL_OUTOF10: 3
PAINLEVEL_OUTOF10: 6
PAINLEVEL_OUTOF10: 0
PAINLEVEL_OUTOF10: 3
PAINLEVEL_OUTOF10: 0
PAINLEVEL_OUTOF10: 4
PAINLEVEL_OUTOF10: 6
PAINLEVEL_OUTOF10: 6
PAINLEVEL_OUTOF10: 0

## 2025-06-28 ASSESSMENT — PAIN DESCRIPTION - FREQUENCY: FREQUENCY: CONTINUOUS

## 2025-06-28 ASSESSMENT — PAIN - FUNCTIONAL ASSESSMENT
PAIN_FUNCTIONAL_ASSESSMENT: ACTIVITIES ARE NOT PREVENTED

## 2025-06-28 ASSESSMENT — PAIN DESCRIPTION - ONSET: ONSET: ON-GOING

## 2025-06-28 ASSESSMENT — PAIN DESCRIPTION - PAIN TYPE: TYPE: SURGICAL PAIN;ACUTE PAIN

## 2025-06-28 ASSESSMENT — PAIN DESCRIPTION - DESCRIPTORS
DESCRIPTORS: ACHING;DISCOMFORT
DESCRIPTORS: ACHING;SHARP
DESCRIPTORS: ACHING

## 2025-06-28 NOTE — FLOWSHEET NOTE
PT OFFERED TO SIT IN CHAIR TO EAT BREAKFAST PT REFUSED SAT AT SIDE OF BED TO EAT THAN STATED HE WANTED TO SLEEP

## 2025-06-29 LAB
ALBUMIN SERPL-MCNC: 2.9 G/DL (ref 3.5–5.2)
ALP SERPL-CCNC: 62 U/L (ref 40–129)
ALT SERPL-CCNC: 13 U/L (ref 0–50)
ANION GAP SERPL CALCULATED.3IONS-SCNC: 9 MMOL/L (ref 7–16)
AST SERPL-CCNC: 27 U/L (ref 0–50)
BASOPHILS # BLD: 0.04 K/UL (ref 0–0.2)
BASOPHILS NFR BLD: 1 % (ref 0–2)
BILIRUB SERPL-MCNC: 0.2 MG/DL (ref 0–1.2)
BUN SERPL-MCNC: 4 MG/DL (ref 6–20)
CA-I BLD-SCNC: 1.23 MMOL/L (ref 1.15–1.33)
CALCIUM SERPL-MCNC: 8.7 MG/DL (ref 8.6–10)
CHLORIDE SERPL-SCNC: 107 MMOL/L (ref 98–107)
CO2 SERPL-SCNC: 24 MMOL/L (ref 22–29)
CREAT SERPL-MCNC: 0.6 MG/DL (ref 0.7–1.2)
EOSINOPHIL # BLD: 0.23 K/UL (ref 0.05–0.5)
EOSINOPHILS RELATIVE PERCENT: 3 % (ref 0–6)
ERYTHROCYTE [DISTWIDTH] IN BLOOD BY AUTOMATED COUNT: 16.3 % (ref 11.5–15)
GFR, ESTIMATED: >90 ML/MIN/1.73M2
GLUCOSE SERPL-MCNC: 92 MG/DL (ref 74–99)
HCT VFR BLD AUTO: 30.6 % (ref 37–54)
HGB BLD-MCNC: 10.5 G/DL (ref 12.5–16.5)
IMM GRANULOCYTES # BLD AUTO: 0.03 K/UL (ref 0–0.58)
IMM GRANULOCYTES NFR BLD: 0 % (ref 0–5)
LYMPHOCYTES NFR BLD: 2.52 K/UL (ref 1.5–4)
LYMPHOCYTES RELATIVE PERCENT: 31 % (ref 20–42)
MAGNESIUM SERPL-MCNC: 2.2 MG/DL (ref 1.6–2.6)
MCH RBC QN AUTO: 36.6 PG (ref 26–35)
MCHC RBC AUTO-ENTMCNC: 34.3 G/DL (ref 32–34.5)
MCV RBC AUTO: 106.6 FL (ref 80–99.9)
MONOCYTES NFR BLD: 0.6 K/UL (ref 0.1–0.95)
MONOCYTES NFR BLD: 7 % (ref 2–12)
NEUTROPHILS NFR BLD: 58 % (ref 43–80)
NEUTS SEG NFR BLD: 4.73 K/UL (ref 1.8–7.3)
PHOSPHATE SERPL-MCNC: 3.5 MG/DL (ref 2.5–4.5)
PLATELET # BLD AUTO: 331 K/UL (ref 130–450)
PMV BLD AUTO: 10.1 FL (ref 7–12)
POTASSIUM SERPL-SCNC: 4.2 MMOL/L (ref 3.5–5.1)
PROT SERPL-MCNC: 5.4 G/DL (ref 6.4–8.3)
RBC # BLD AUTO: 2.87 M/UL (ref 3.8–5.8)
SODIUM SERPL-SCNC: 140 MMOL/L (ref 136–145)
WBC OTHER # BLD: 8.2 K/UL (ref 4.5–11.5)

## 2025-06-29 PROCEDURE — 82330 ASSAY OF CALCIUM: CPT

## 2025-06-29 PROCEDURE — 80053 COMPREHEN METABOLIC PANEL: CPT

## 2025-06-29 PROCEDURE — 6370000000 HC RX 637 (ALT 250 FOR IP): Performed by: NURSE PRACTITIONER

## 2025-06-29 PROCEDURE — 6370000000 HC RX 637 (ALT 250 FOR IP): Performed by: STUDENT IN AN ORGANIZED HEALTH CARE EDUCATION/TRAINING PROGRAM

## 2025-06-29 PROCEDURE — 83735 ASSAY OF MAGNESIUM: CPT

## 2025-06-29 PROCEDURE — 2500000003 HC RX 250 WO HCPCS: Performed by: NURSE PRACTITIONER

## 2025-06-29 PROCEDURE — 6370000000 HC RX 637 (ALT 250 FOR IP)

## 2025-06-29 PROCEDURE — 99232 SBSQ HOSP IP/OBS MODERATE 35: CPT | Performed by: STUDENT IN AN ORGANIZED HEALTH CARE EDUCATION/TRAINING PROGRAM

## 2025-06-29 PROCEDURE — 6360000002 HC RX W HCPCS: Performed by: NURSE PRACTITIONER

## 2025-06-29 PROCEDURE — 85025 COMPLETE CBC W/AUTO DIFF WBC: CPT

## 2025-06-29 PROCEDURE — 2060000000 HC ICU INTERMEDIATE R&B

## 2025-06-29 PROCEDURE — 84100 ASSAY OF PHOSPHORUS: CPT

## 2025-06-29 RX ADMIN — FAMOTIDINE 20 MG: 20 TABLET, FILM COATED ORAL at 08:52

## 2025-06-29 RX ADMIN — SENNOSIDES 8.6 MG: 8.6 TABLET, FILM COATED ORAL at 20:21

## 2025-06-29 RX ADMIN — APIXABAN 5 MG: 5 TABLET, FILM COATED ORAL at 20:21

## 2025-06-29 RX ADMIN — FOLIC ACID 1 MG: 5 INJECTION, SOLUTION INTRAMUSCULAR; INTRAVENOUS; SUBCUTANEOUS at 09:43

## 2025-06-29 RX ADMIN — SODIUM CHLORIDE, PRESERVATIVE FREE 10 ML: 5 INJECTION INTRAVENOUS at 08:52

## 2025-06-29 RX ADMIN — HYDROMORPHONE HYDROCHLORIDE 0.5 MG: 1 INJECTION, SOLUTION INTRAMUSCULAR; INTRAVENOUS; SUBCUTANEOUS at 22:47

## 2025-06-29 RX ADMIN — OLANZAPINE 15 MG: 5 TABLET, FILM COATED ORAL at 20:21

## 2025-06-29 RX ADMIN — ASPIRIN 81 MG CHEWABLE TABLET 81 MG: 81 TABLET CHEWABLE at 08:52

## 2025-06-29 RX ADMIN — OXYCODONE 5 MG: 5 TABLET ORAL at 20:21

## 2025-06-29 RX ADMIN — MULTIVITAMIN TABLET 1 TABLET: TABLET at 08:51

## 2025-06-29 RX ADMIN — APIXABAN 5 MG: 5 TABLET, FILM COATED ORAL at 08:52

## 2025-06-29 RX ADMIN — SODIUM CHLORIDE, PRESERVATIVE FREE 10 ML: 5 INJECTION INTRAVENOUS at 20:22

## 2025-06-29 RX ADMIN — POLYETHYLENE GLYCOL 3350 17 G: 17 POWDER, FOR SOLUTION ORAL at 08:52

## 2025-06-29 RX ADMIN — FAMOTIDINE 20 MG: 20 TABLET, FILM COATED ORAL at 20:21

## 2025-06-29 RX ADMIN — THIAMINE HYDROCHLORIDE 100 MG: 100 INJECTION, SOLUTION INTRAMUSCULAR; INTRAVENOUS at 08:52

## 2025-06-29 RX ADMIN — OXYCODONE 5 MG: 5 TABLET ORAL at 10:34

## 2025-06-29 ASSESSMENT — PAIN DESCRIPTION - LOCATION
LOCATION: ARM
LOCATION: HAND
LOCATION: HAND
LOCATION: HAND;ARM

## 2025-06-29 ASSESSMENT — PAIN - FUNCTIONAL ASSESSMENT
PAIN_FUNCTIONAL_ASSESSMENT: ACTIVITIES ARE NOT PREVENTED
PAIN_FUNCTIONAL_ASSESSMENT: ACTIVITIES ARE NOT PREVENTED
PAIN_FUNCTIONAL_ASSESSMENT: PREVENTS OR INTERFERES SOME ACTIVE ACTIVITIES AND ADLS
PAIN_FUNCTIONAL_ASSESSMENT: ACTIVITIES ARE NOT PREVENTED

## 2025-06-29 ASSESSMENT — PAIN SCALES - GENERAL
PAINLEVEL_OUTOF10: 6
PAINLEVEL_OUTOF10: 0
PAINLEVEL_OUTOF10: 6
PAINLEVEL_OUTOF10: 0
PAINLEVEL_OUTOF10: 10
PAINLEVEL_OUTOF10: 10
PAINLEVEL_OUTOF10: 5
PAINLEVEL_OUTOF10: 0

## 2025-06-29 ASSESSMENT — PAIN SCALES - WONG BAKER
WONGBAKER_NUMERICALRESPONSE: NO HURT

## 2025-06-29 ASSESSMENT — PAIN DESCRIPTION - FREQUENCY: FREQUENCY: CONTINUOUS

## 2025-06-29 ASSESSMENT — PAIN DESCRIPTION - DESCRIPTORS
DESCRIPTORS: ACHING;SHARP
DESCRIPTORS: SORE;TENDER;THROBBING
DESCRIPTORS: SHOOTING;THROBBING;ACHING
DESCRIPTORS: ACHING

## 2025-06-29 ASSESSMENT — PAIN DESCRIPTION - ORIENTATION
ORIENTATION: LEFT

## 2025-06-29 ASSESSMENT — PAIN DESCRIPTION - ONSET: ONSET: ON-GOING

## 2025-06-29 ASSESSMENT — PAIN DESCRIPTION - PAIN TYPE: TYPE: ACUTE PAIN;SURGICAL PAIN

## 2025-06-30 LAB — SURGICAL PATHOLOGY REPORT: NORMAL

## 2025-06-30 PROCEDURE — 6370000000 HC RX 637 (ALT 250 FOR IP): Performed by: NURSE PRACTITIONER

## 2025-06-30 PROCEDURE — 97530 THERAPEUTIC ACTIVITIES: CPT

## 2025-06-30 PROCEDURE — 6370000000 HC RX 637 (ALT 250 FOR IP)

## 2025-06-30 PROCEDURE — 97535 SELF CARE MNGMENT TRAINING: CPT

## 2025-06-30 PROCEDURE — 6370000000 HC RX 637 (ALT 250 FOR IP): Performed by: STUDENT IN AN ORGANIZED HEALTH CARE EDUCATION/TRAINING PROGRAM

## 2025-06-30 PROCEDURE — 99232 SBSQ HOSP IP/OBS MODERATE 35: CPT | Performed by: STUDENT IN AN ORGANIZED HEALTH CARE EDUCATION/TRAINING PROGRAM

## 2025-06-30 PROCEDURE — 2500000003 HC RX 250 WO HCPCS: Performed by: NURSE PRACTITIONER

## 2025-06-30 PROCEDURE — 1200000000 HC SEMI PRIVATE

## 2025-06-30 PROCEDURE — 6360000002 HC RX W HCPCS: Performed by: NURSE PRACTITIONER

## 2025-06-30 RX ADMIN — THIAMINE HYDROCHLORIDE 100 MG: 100 INJECTION, SOLUTION INTRAMUSCULAR; INTRAVENOUS at 08:25

## 2025-06-30 RX ADMIN — FAMOTIDINE 20 MG: 20 TABLET, FILM COATED ORAL at 20:03

## 2025-06-30 RX ADMIN — POLYETHYLENE GLYCOL 3350 17 G: 17 POWDER, FOR SOLUTION ORAL at 08:25

## 2025-06-30 RX ADMIN — OXYCODONE AND ACETAMINOPHEN 2 TABLET: 5; 325 TABLET ORAL at 22:04

## 2025-06-30 RX ADMIN — APIXABAN 5 MG: 5 TABLET, FILM COATED ORAL at 20:03

## 2025-06-30 RX ADMIN — SENNOSIDES 8.6 MG: 8.6 TABLET, FILM COATED ORAL at 20:03

## 2025-06-30 RX ADMIN — OXYCODONE AND ACETAMINOPHEN 2 TABLET: 5; 325 TABLET ORAL at 11:27

## 2025-06-30 RX ADMIN — OXYCODONE 5 MG: 5 TABLET ORAL at 04:08

## 2025-06-30 RX ADMIN — OLANZAPINE 15 MG: 5 TABLET, FILM COATED ORAL at 20:04

## 2025-06-30 RX ADMIN — MULTIVITAMIN TABLET 1 TABLET: TABLET at 08:25

## 2025-06-30 RX ADMIN — FAMOTIDINE 20 MG: 20 TABLET, FILM COATED ORAL at 08:27

## 2025-06-30 RX ADMIN — SODIUM CHLORIDE, PRESERVATIVE FREE 10 ML: 5 INJECTION INTRAVENOUS at 20:03

## 2025-06-30 RX ADMIN — SODIUM CHLORIDE, PRESERVATIVE FREE 10 ML: 5 INJECTION INTRAVENOUS at 08:27

## 2025-06-30 RX ADMIN — APIXABAN 5 MG: 5 TABLET, FILM COATED ORAL at 08:27

## 2025-06-30 RX ADMIN — FOLIC ACID 1 MG: 5 INJECTION, SOLUTION INTRAMUSCULAR; INTRAVENOUS; SUBCUTANEOUS at 08:29

## 2025-06-30 RX ADMIN — ASPIRIN 81 MG CHEWABLE TABLET 81 MG: 81 TABLET CHEWABLE at 08:26

## 2025-06-30 ASSESSMENT — PAIN SCALES - GENERAL
PAINLEVEL_OUTOF10: 0
PAINLEVEL_OUTOF10: 4
PAINLEVEL_OUTOF10: 10
PAINLEVEL_OUTOF10: 0
PAINLEVEL_OUTOF10: 6
PAINLEVEL_OUTOF10: 6

## 2025-06-30 ASSESSMENT — PAIN - FUNCTIONAL ASSESSMENT
PAIN_FUNCTIONAL_ASSESSMENT: PREVENTS OR INTERFERES SOME ACTIVE ACTIVITIES AND ADLS
PAIN_FUNCTIONAL_ASSESSMENT: ACTIVITIES ARE NOT PREVENTED
PAIN_FUNCTIONAL_ASSESSMENT: ACTIVITIES ARE NOT PREVENTED

## 2025-06-30 ASSESSMENT — PAIN SCALES - WONG BAKER
WONGBAKER_NUMERICALRESPONSE: HURTS A LITTLE BIT
WONGBAKER_NUMERICALRESPONSE: HURTS A LITTLE BIT

## 2025-06-30 ASSESSMENT — PAIN DESCRIPTION - DESCRIPTORS
DESCRIPTORS: ACHING;THROBBING
DESCRIPTORS: ACHING;DISCOMFORT;SORE
DESCRIPTORS: ACHING;DISCOMFORT;HEAVINESS
DESCRIPTORS: BURNING;THROBBING;TENDER

## 2025-06-30 ASSESSMENT — PAIN DESCRIPTION - LOCATION
LOCATION: HAND;ARM
LOCATION: ARM;WRIST
LOCATION: ARM
LOCATION: ARM

## 2025-06-30 ASSESSMENT — PAIN DESCRIPTION - ONSET: ONSET: ON-GOING

## 2025-06-30 ASSESSMENT — PAIN DESCRIPTION - PAIN TYPE: TYPE: ACUTE PAIN;SURGICAL PAIN

## 2025-06-30 ASSESSMENT — PAIN DESCRIPTION - ORIENTATION
ORIENTATION: LEFT

## 2025-06-30 ASSESSMENT — PAIN DESCRIPTION - FREQUENCY: FREQUENCY: CONTINUOUS

## 2025-06-30 NOTE — DISCHARGE INSTR - COC
Continuity of Care Form    Patient Name: Colton Hurley   :  1965  MRN:  [id number]    Admit date:  2025  Discharge date:  2025    Code Status Order: Full Code   Advance Directives:     Admitting Physician:  Sanket Mendoza MD  PCP: No primary care provider on file.    Discharging Nurse:   Discharging Hospital Unit/Room#: 6518/6518-A  Discharging Unit Phone Number: [phone]    Emergency Contact:   Extended Emergency Contact Information  Primary Emergency Contact: Nenita Jessica  Mobile Phone: [phone]  Relation: Brother/Sister  Secondary Emergency Contact: Remy Hurley  Mobile Phone: [phone]  Relation: Brother/Sister  Preferred language: English   needed? No    Past Surgical History:  Past Surgical History:   Procedure Laterality Date    BACK SURGERY      IR CAROTID STENT W PROTECTION  2025    IR CAROTID STENT W PROTECTION 2025 Kehinde Coffey MD [facility]    IR MECHANICAL ART THROMBECTOMY INTRACRANIAL  2025    IR MECHANICAL ART THROMBECTOMY INTRACRANIAL 2025 Kehinde Coffey MD [facility]    LEG SURGERY Left     Pins inserted tib/ fib    UPPER GASTROINTESTINAL ENDOSCOPY N/A 2021    EGD BIOPSY performed by Leo Zurita MD at [facility] ENDOSCOPY    VASCULAR SURGERY Left 2025    LEFT BRACHIAL ARTERY  THROMBECTOMY performed by Kirstie Calix MD at [facility] OR    VASCULAR SURGERY Left 2025    LEFT UPPER EXTREMITY THROMBECTOMY performed by Kirstie Calix MD at [facility] OR       Immunization History:   Immunization History   Administered Date(s) Administered    COVID-19, MODERNA BLUE border, Primary or Immunocompromised, (age 12y+), IM, 100 mcg/0.5mL 2021    Influenza, AFLURIA (age 3 y+), FLUZONE, (age 6 mo+), Quadv MDV, 0.5mL 2017, 2017    Influenza, FLUCELVAX, (age 6 mo+), MDCK, Quadv PF, 0.5mL 2021    Pneumococcal, PPSV23, PNEUMOVAX 23, (age 2y+), SC/IM, 0.5mL

## 2025-06-30 NOTE — PATIENT CARE CONFERENCE
[facility] Quality Flow/Interdisciplinary Rounds Progress Note        Quality Flow Rounds held on June 30, 2025    Disciplines Attending:  Bedside Nurse, , , and Nursing Unit Leadership    Colton Hurley was admitted on 6/22/2025  6:08 PM    Anticipated Discharge Date:       Disposition:    Miki Score:  Miki Scale Score: 19    [facility] RISK OF UNPLANNED READMISSION 2.0             23.4 Total Score        Discussed patient goal for the day, patient clinical progression, and barriers to discharge.  The following Goal(s) of the Day/Commitment(s) have been identified:  downgrade/discharge planning      Kasandra Hubbard RN  June 30, 2025

## 2025-07-01 VITALS
RESPIRATION RATE: 16 BRPM | HEART RATE: 70 BPM | SYSTOLIC BLOOD PRESSURE: 111 MMHG | WEIGHT: 152.9 LBS | HEIGHT: 71 IN | DIASTOLIC BLOOD PRESSURE: 68 MMHG | BODY MASS INDEX: 21.4 KG/M2 | TEMPERATURE: 97.7 F | OXYGEN SATURATION: 96 %

## 2025-07-01 LAB
ALBUMIN SERPL-MCNC: 3.3 G/DL (ref 3.5–5.2)
ALP SERPL-CCNC: 67 U/L (ref 40–129)
ALT SERPL-CCNC: 18 U/L (ref 0–50)
ANION GAP SERPL CALCULATED.3IONS-SCNC: 11 MMOL/L (ref 7–16)
AST SERPL-CCNC: 25 U/L (ref 0–50)
BASOPHILS # BLD: 0.06 K/UL (ref 0–0.2)
BASOPHILS NFR BLD: 1 % (ref 0–2)
BILIRUB SERPL-MCNC: 0.3 MG/DL (ref 0–1.2)
BUN SERPL-MCNC: 7 MG/DL (ref 6–20)
CA-I BLD-SCNC: 1.24 MMOL/L (ref 1.15–1.33)
CALCIUM SERPL-MCNC: 9 MG/DL (ref 8.6–10)
CHLORIDE SERPL-SCNC: 103 MMOL/L (ref 98–107)
CO2 SERPL-SCNC: 22 MMOL/L (ref 22–29)
CREAT SERPL-MCNC: 0.7 MG/DL (ref 0.7–1.2)
EOSINOPHIL # BLD: 0.22 K/UL (ref 0.05–0.5)
EOSINOPHILS RELATIVE PERCENT: 3 % (ref 0–6)
ERYTHROCYTE [DISTWIDTH] IN BLOOD BY AUTOMATED COUNT: 15.9 % (ref 11.5–15)
GFR, ESTIMATED: >90 ML/MIN/1.73M2
GLUCOSE SERPL-MCNC: 95 MG/DL (ref 74–99)
HCT VFR BLD AUTO: 32 % (ref 37–54)
HGB BLD-MCNC: 11.2 G/DL (ref 12.5–16.5)
IMM GRANULOCYTES # BLD AUTO: 0.04 K/UL (ref 0–0.58)
IMM GRANULOCYTES NFR BLD: 1 % (ref 0–5)
LYMPHOCYTES NFR BLD: 2.37 K/UL (ref 1.5–4)
LYMPHOCYTES RELATIVE PERCENT: 32 % (ref 20–42)
MAGNESIUM SERPL-MCNC: 2.2 MG/DL (ref 1.6–2.6)
MCH RBC QN AUTO: 37.1 PG (ref 26–35)
MCHC RBC AUTO-ENTMCNC: 35 G/DL (ref 32–34.5)
MCV RBC AUTO: 106 FL (ref 80–99.9)
MONOCYTES NFR BLD: 0.67 K/UL (ref 0.1–0.95)
MONOCYTES NFR BLD: 9 % (ref 2–12)
NEUTROPHILS NFR BLD: 55 % (ref 43–80)
NEUTS SEG NFR BLD: 4.02 K/UL (ref 1.8–7.3)
PHOSPHATE SERPL-MCNC: 3.6 MG/DL (ref 2.5–4.5)
PLATELET # BLD AUTO: 350 K/UL (ref 130–450)
PMV BLD AUTO: 10.3 FL (ref 7–12)
POTASSIUM SERPL-SCNC: 3.9 MMOL/L (ref 3.5–5.1)
PROT SERPL-MCNC: 6.3 G/DL (ref 6.4–8.3)
RBC # BLD AUTO: 3.02 M/UL (ref 3.8–5.8)
SODIUM SERPL-SCNC: 136 MMOL/L (ref 136–145)
WBC OTHER # BLD: 7.4 K/UL (ref 4.5–11.5)

## 2025-07-01 PROCEDURE — 6370000000 HC RX 637 (ALT 250 FOR IP): Performed by: STUDENT IN AN ORGANIZED HEALTH CARE EDUCATION/TRAINING PROGRAM

## 2025-07-01 PROCEDURE — 6370000000 HC RX 637 (ALT 250 FOR IP)

## 2025-07-01 PROCEDURE — 82330 ASSAY OF CALCIUM: CPT

## 2025-07-01 PROCEDURE — 80053 COMPREHEN METABOLIC PANEL: CPT

## 2025-07-01 PROCEDURE — 6370000000 HC RX 637 (ALT 250 FOR IP): Performed by: NURSE PRACTITIONER

## 2025-07-01 PROCEDURE — 85025 COMPLETE CBC W/AUTO DIFF WBC: CPT

## 2025-07-01 PROCEDURE — 84100 ASSAY OF PHOSPHORUS: CPT

## 2025-07-01 PROCEDURE — 36415 COLL VENOUS BLD VENIPUNCTURE: CPT

## 2025-07-01 PROCEDURE — 2500000003 HC RX 250 WO HCPCS: Performed by: NURSE PRACTITIONER

## 2025-07-01 PROCEDURE — 99239 HOSP IP/OBS DSCHRG MGMT >30: CPT | Performed by: STUDENT IN AN ORGANIZED HEALTH CARE EDUCATION/TRAINING PROGRAM

## 2025-07-01 PROCEDURE — 6360000002 HC RX W HCPCS: Performed by: NURSE PRACTITIONER

## 2025-07-01 PROCEDURE — 83735 ASSAY OF MAGNESIUM: CPT

## 2025-07-01 RX ORDER — OXYCODONE HYDROCHLORIDE 5 MG/1
5 TABLET ORAL EVERY 4 HOURS PRN
Qty: 30 TABLET | Refills: 0 | Status: ON HOLD | OUTPATIENT
Start: 2025-07-01 | End: 2025-07-06

## 2025-07-01 RX ORDER — FAMOTIDINE 20 MG/1
20 TABLET, FILM COATED ORAL 2 TIMES DAILY
Status: ON HOLD | DISCHARGE
Start: 2025-07-01

## 2025-07-01 RX ORDER — SENNOSIDES 8.6 MG/1
1 TABLET ORAL NIGHTLY
Refills: 0 | Status: ON HOLD | DISCHARGE
Start: 2025-07-01 | End: 2025-07-08

## 2025-07-01 RX ORDER — MULTIVITAMIN WITH IRON
1 TABLET ORAL DAILY
Status: ON HOLD | DISCHARGE
Start: 2025-07-02

## 2025-07-01 RX ORDER — THIAMINE MONONITRATE (VIT B1) 100 MG
100 TABLET ORAL DAILY
Status: ON HOLD | DISCHARGE
Start: 2025-07-01

## 2025-07-01 RX ADMIN — THIAMINE HYDROCHLORIDE 100 MG: 100 INJECTION, SOLUTION INTRAMUSCULAR; INTRAVENOUS at 09:10

## 2025-07-01 RX ADMIN — MULTIVITAMIN TABLET 1 TABLET: TABLET at 09:10

## 2025-07-01 RX ADMIN — OXYCODONE AND ACETAMINOPHEN 2 TABLET: 5; 325 TABLET ORAL at 05:34

## 2025-07-01 RX ADMIN — ASPIRIN 81 MG CHEWABLE TABLET 81 MG: 81 TABLET CHEWABLE at 09:10

## 2025-07-01 RX ADMIN — FAMOTIDINE 20 MG: 20 TABLET, FILM COATED ORAL at 09:11

## 2025-07-01 RX ADMIN — APIXABAN 5 MG: 5 TABLET, FILM COATED ORAL at 09:10

## 2025-07-01 RX ADMIN — SODIUM CHLORIDE, PRESERVATIVE FREE 10 ML: 5 INJECTION INTRAVENOUS at 09:11

## 2025-07-01 ASSESSMENT — PAIN SCALES - GENERAL
PAINLEVEL_OUTOF10: 5
PAINLEVEL_OUTOF10: 7
PAINLEVEL_OUTOF10: 5

## 2025-07-01 ASSESSMENT — PAIN DESCRIPTION - LOCATION
LOCATION: ARM
LOCATION: ARM

## 2025-07-01 ASSESSMENT — PAIN DESCRIPTION - ORIENTATION
ORIENTATION: LEFT
ORIENTATION: LEFT

## 2025-07-01 ASSESSMENT — PAIN SCALES - WONG BAKER
WONGBAKER_NUMERICALRESPONSE: HURTS A LITTLE BIT
WONGBAKER_NUMERICALRESPONSE: NO HURT

## 2025-07-01 ASSESSMENT — PAIN DESCRIPTION - DESCRIPTORS: DESCRIPTORS: ACHING;DISCOMFORT;THROBBING

## 2025-07-01 NOTE — PROGRESS NOTES
[facility] Hospitalist Progress Note    Admitting Date and Time: 6/22/2025  6:08 PM  Admit Dx: Ischemia of left upper extremity [I99.8]    Subjective:  Patient is being followed for Ischemia of left upper extremity [I99.8]   Patient is seen today. He appears tired. He endorses left arm pain.     ROS: denies fever, chills, cp, sob, n/v, HA unless stated above.      PHENobarbital  65 mg IntraVENous Q6H    apixaban  5 mg Oral BID    potassium bicarb-citric acid  20 mEq Oral Once    potassium & sodium phosphates  1 packet Oral 4x Daily    sodium chloride flush  10 mL IntraVENous 2 times per day    aspirin  81 mg Oral Daily    thiamine  100 mg IntraVENous Daily    folic acid  1 mg IntraVENous Daily    insulin lispro  0-4 Units SubCUTAneous Q4H    sodium chloride flush  5-40 mL IntraVENous 2 times per day    multivitamin  1 tablet Oral Daily    sodium chloride flush  5-40 mL IntraVENous 2 times per day    acetaminophen  650 mg Oral Q6H    polyethylene glycol  17 g Oral Daily    senna  1 tablet Oral Nightly    famotidine  20 mg Oral BID     sodium chloride flush, 10 mL, PRN  sodium chloride, , PRN  ondansetron, 4 mg, Q8H PRN   Or  ondansetron, 4 mg, Q6H PRN  sodium chloride flush, 5-40 mL, PRN  sodium chloride, , PRN  sodium chloride flush, 5-40 mL, PRN  sodium chloride, , PRN  potassium chloride, 40 mEq, PRN   Or  potassium alternative oral replacement, 40 mEq, PRN   Or  potassium chloride, 10 mEq, PRN  magnesium sulfate, 2,000 mg, PRN  polyethylene glycol, 17 g, Daily PRN  acetaminophen, 650 mg, Q6H PRN   Or  acetaminophen, 650 mg, Q6H PRN  oxyCODONE, 5 mg, Q4H PRN  HYDROmorphone, 0.5 mg, Q2H PRN         Objective:    /61   Pulse (!) 44   Temp 98 °F (36.7 °C) (Oral)   Resp 14   Ht 1.803 m (5' 11\")   Wt 71 kg (156 lb 8.4 oz)   SpO2 100%   BMI 21.83 kg/m²     General Appearance: alert and oriented to person, place and time and in no acute distress  Skin: warm and dry  Head: normocephalic and 
    Hospitalist Progress Note      SYNOPSIS: Patient admitted on 2025 for Ischemia of left upper extremity  Patient admitted on 2025 for Ischemia of left upper extremity  Taken to the OR for thrombectomy of left upper extremity on , repeat thrombectomy OR on  after losing signals   switched heparin to Eliquis    SUBJECTIVE:  Stable overnight. No other overnight issues reported.   Patient seen and examined at bedside today a.m. does complain of left hand pain  Records reviewed.       Temp (24hrs), Av.9 °F (36.6 °C), Min:97.7 °F (36.5 °C), Max:98 °F (36.7 °C)    DIET: ADULT DIET; Regular  CODE: Full Code    Intake/Output Summary (Last 24 hours) at 2025 1152  Last data filed at 2025 1109  Gross per 24 hour   Intake 1267.3 ml   Output 2285 ml   Net -1017.7 ml       Review of Systems  All bolded are positive; please see HPI  General:  Fever, chills, diaphoresis, fatigue, malaise, night sweats, weight loss  Psychological:  Anxiety, disorientation, hallucinations.  ENT:  Epistaxis, headaches, vertigo, visual changes.  Cardiovascular:  Chest pain, irregular heartbeats, palpitations, paroxysmal nocturnal dyspnea.  Respiratory:  Shortness of breath, coughing, sputum production, hemoptysis, wheezing, orthopnea.  Gastrointestinal:  Nausea, vomiting, diarrhea, heartburn, constipation, abdominal pain, hematemesis, hematochezia, melena, acholic stools  Genito-Urinary:  Dysuria, urgency, frequency, hematuria  Musculoskeletal:  Joint pain, joint stiffness, joint swelling, muscle pain  Neurology:  Headache, focal neurological deficits, weakness, numbness, paresthesia  Derm:  Rashes, ulcers, excoriations, bruising  Extremities:  Decreased ROM, peripheral edema, mottling      OBJECTIVE:    /60   Pulse 67   Temp 97.9 °F (36.6 °C)   Resp 14   Ht 1.803 m (5' 11\")   Wt 71.2 kg (156 lb 15.5 oz)   SpO2 96%   BMI 21.89 kg/m²     General appearance:  awake, alert, and oriented to person, 
    Hospitalist Progress Note      SYNOPSIS: Patient admitted on 2025 for Ischemia of left upper extremity  Patient admitted on 2025 for Ischemia of left upper extremity  Taken to the OR for thrombectomy of left upper extremity on , repeat thrombectomy OR on  after losing signals   switched heparin to Eliquis    SUBJECTIVE:  Stable overnight. No other overnight issues reported.   Patient seen and examined at bedside today a.m., poor historian, still complains of left hand pain, patient does complain of hearing voices  Records reviewed.         Temp (24hrs), Av.1 °F (36.7 °C), Min:97.7 °F (36.5 °C), Max:98.4 °F (36.9 °C)    DIET: ADULT DIET; Regular  CODE: Full Code    Intake/Output Summary (Last 24 hours) at 2025 1158  Last data filed at 2025 1000  Gross per 24 hour   Intake 5656.44 ml   Output 4375 ml   Net 1281.44 ml       Review of Systems  All bolded are positive; please see HPI  General:  Fever, chills, diaphoresis, fatigue, malaise, night sweats, weight loss  Psychological:  Anxiety, disorientation, hallucinations.  ENT:  Epistaxis, headaches, vertigo, visual changes.  Cardiovascular:  Chest pain, irregular heartbeats, palpitations, paroxysmal nocturnal dyspnea.  Respiratory:  Shortness of breath, coughing, sputum production, hemoptysis, wheezing, orthopnea.  Gastrointestinal:  Nausea, vomiting, diarrhea, heartburn, constipation, abdominal pain, hematemesis, hematochezia, melena, acholic stools  Genito-Urinary:  Dysuria, urgency, frequency, hematuria  Musculoskeletal:  Joint pain, joint stiffness, joint swelling, muscle pain  Neurology:  Headache, focal neurological deficits, weakness, numbness, paresthesia  Derm:  Rashes, ulcers, excoriations, bruising  Extremities:  Decreased ROM, peripheral edema, mottling      OBJECTIVE:    /70   Pulse 79   Temp 97.7 °F (36.5 °C) (Oral)   Resp 15   Ht 1.803 m (5' 11\")   Wt 71.6 kg (157 lb 13.6 oz)   SpO2 95%   BMI 22.02 
    Hospitalist Progress Note      SYNOPSIS: Patient admitted on 2025 for Ischemia of left upper extremity  Patient admitted on 2025 for Ischemia of left upper extremity  Taken to the OR for thrombectomy of left upper extremity on , repeat thrombectomy OR on  after losing signals   switched heparin to Eliquis  Psychiatry consulted for auditory hallucination started on Zyprexa    SUBJECTIVE:  Stable overnight. No other overnight issues reported.   Patient seen and examined at bedside today a.m., states improvement in his pain  Records reviewed.         Temp (24hrs), Av °F (36.7 °C), Min:97.9 °F (36.6 °C), Max:98.1 °F (36.7 °C)    DIET: ADULT DIET; Regular  CODE: Full Code    Intake/Output Summary (Last 24 hours) at 2025 1053  Last data filed at 2025 1000  Gross per 24 hour   Intake 2627.48 ml   Output 4250 ml   Net -1622.52 ml       Review of Systems  All bolded are positive; please see HPI  General:  Fever, chills, diaphoresis, fatigue, malaise, night sweats, weight loss  Psychological:  Anxiety, disorientation, hallucinations.  ENT:  Epistaxis, headaches, vertigo, visual changes.  Cardiovascular:  Chest pain, irregular heartbeats, palpitations, paroxysmal nocturnal dyspnea.  Respiratory:  Shortness of breath, coughing, sputum production, hemoptysis, wheezing, orthopnea.  Gastrointestinal:  Nausea, vomiting, diarrhea, heartburn, constipation, abdominal pain, hematemesis, hematochezia, melena, acholic stools  Genito-Urinary:  Dysuria, urgency, frequency, hematuria  Musculoskeletal:  Joint pain, joint stiffness, joint swelling, muscle pain  Neurology:  Headache, focal neurological deficits, weakness, numbness, paresthesia  Derm:  Rashes, ulcers, excoriations, bruising  Extremities:  Decreased ROM, peripheral edema, mottling      OBJECTIVE:    BP (!) 105/52   Pulse 78   Temp 98 °F (36.7 °C) (Oral)   Resp 22   Ht 1.803 m (5' 11\")   Wt 71.3 kg (157 lb 1.6 oz)   SpO2 98%   BMI 
    Hospitalist Progress Note      SYNOPSIS: Patient admitted on 2025 for Ischemia of left upper extremity  Patient admitted on 2025 for Ischemia of left upper extremity  Taken to the OR for thrombectomy of left upper extremity on , repeat thrombectomy OR on  after losing signals   switched heparin to Eliquis  Psychiatry consulted for auditory hallucination started on Zyprexa   transferred out of the  surgical ICU    SUBJECTIVE:  Stable overnight. No other overnight issues reported.   Patient seen and examined at bedside today a.m. does complain of pain in left upper extremity, patient still has flat affect  Records reviewed.       Temp (24hrs), Av.2 °F (36.8 °C), Min:97.5 °F (36.4 °C), Max:98.8 °F (37.1 °C)    DIET: ADULT DIET; Regular  CODE: Full Code    Intake/Output Summary (Last 24 hours) at 2025 1002  Last data filed at 2025 0710  Gross per 24 hour   Intake 720 ml   Output 1725 ml   Net -1005 ml       Review of Systems  All bolded are positive; please see HPI  General:  Fever, chills, diaphoresis, fatigue, malaise, night sweats, weight loss  Psychological:  Anxiety, disorientation, hallucinations.  ENT:  Epistaxis, headaches, vertigo, visual changes.  Cardiovascular:  Chest pain, irregular heartbeats, palpitations, paroxysmal nocturnal dyspnea.  Respiratory:  Shortness of breath, coughing, sputum production, hemoptysis, wheezing, orthopnea.  Gastrointestinal:  Nausea, vomiting, diarrhea, heartburn, constipation, abdominal pain, hematemesis, hematochezia, melena, acholic stools  Genito-Urinary:  Dysuria, urgency, frequency, hematuria  Musculoskeletal:  Joint pain, joint stiffness, joint swelling, muscle pain  Neurology:  Headache, focal neurological deficits, weakness, numbness, paresthesia  Derm:  Rashes, ulcers, excoriations, bruising  Extremities:  Decreased ROM, peripheral edema, mottling      OBJECTIVE:    /71   Pulse 76   Temp 97.6 °F (36.4 °C) 
   06/23/25 1027   Patient Observation   Pulse 72   Respirations 16   SpO2 100 %   Patient Observations SAT pause on propofol   Vent Information   Vent Mode CPAP/PS   Ventilator Settings   PEEP/CPAP (cmH2O) 8   FiO2  40 %   Pressure Support (cm H2O) 8 cm H2O   Vent Patient Data (Readings)   Vt (Measured) 214 mL   Peak Inspiratory Pressure (cmH2O) 17 cmH2O   Rate Measured 6 br/min   Minute Volume (L/min) 2.02 Liters   Mean Airway Pressure (cmH2O) 10 cmH20   Plateau Pressure (cm H2O) 13 cm H2O   Driving Pressure 5   I:E Ratio 1.20:1   Vent Alarm Settings   High Pressure (cmH2O) 40 cmH2O   Low Exhaled Vt (ml) 0 mL   B: Both Spontaneous Awakening and Breathing Trials   Did Patient Receive Sedative and/or Opioid IV Medications in the Last 24 Hours Continuously infused meds for sedation   Safety Screening Spontaneous Awakening Trial (SAT) Active alcohol withdrawal   Was Patient Receiving Mechanical Ventilation Yes   Safety Screening Spontaneous Breathing Trial (SBT) Patient is agitated   Weaning Parameters   Davis Agitation Sedation Scale (RASS) +1       
4 Eyes Skin Assessment     NAME:  Colton Hurley  YOB: 1965  MEDICAL RECORD NUMBER:  [id number]    The patient is being assessed for  Post-Op Surgical    I agree that at least one RN has performed a thorough Head to Toe Skin Assessment on the patient. ALL assessment sites listed below have been assessed.      Areas assessed by both nurses:    Head, Face, Ears, Shoulders, Back, Chest, Arms, Elbows, Hands, Sacrum. Buttock, Coccyx, Ischium, Legs. Feet and Heels, and Under Medical Devices         Does the Patient have a Wound? No noted wound(s)    Surgical wounds x3 to L arm. L arm bruised/red - L hand cyanotic/mottled        Miki Prevention initiated by RN: Yes  Wound Care Orders initiated by RN: No    Pressure Injury (Stage 3,4, Unstageable, DTI, NWPT, and Complex wounds) if present, place Wound referral order by RN under : No    New Ostomies, if present place, Ostomy referral order under : No     Nurse 1 eSignature: Electronically signed by Dmitriy Mendieta RN on 6/23/25 at 6:29 AM EDT    **SHARE this note so that the co-signing nurse can place an eSignature**    Nurse 2 eSignature: Electronically signed by Oneyda Weir RN on 6/23/25 at 12:00PM EDT  
4 Eyes Skin Assessment     NAME:  Colton Hurley  YOB: 1965  MEDICAL RECORD NUMBER:  [id number]    The patient is being assessed for  Transfer to New Unit    I agree that at least one RN has performed a thorough Head to Toe Skin Assessment on the patient. ALL assessment sites listed below have been assessed.      Areas assessed by both nurses:    Head, Face, Ears, Shoulders, Back, Chest, Arms, Elbows, Hands, Sacrum. Buttock, Coccyx, Ischium, Legs. Feet and Heels, and Under Medical Devices         Does the Patient have a Wound? No noted wound(s)       Miki Prevention initiated by RN: No  Wound Care Orders initiated by RN: No    Pressure Injury (Stage 1,2,3,4, Unstageable, DT  I, NWPT, and Complex wounds) if present, place Wound referral order by RN under : No    New Ostomies, if present place, Ostomy referral order under : No     Nurse 1 eSignature: Electronically signed by Ashley Nichols RN on 6/29/25 at 10:58 PM EDT    **SHARE this note so that the co-signing nurse can place an eSignature**    Nurse 2 eSignature: Electronically signed by Dorinda Briceño RN on 6/29/25 at 10:58 PM EDT  
4 Eyes Skin Assessment     NAME:  Colton Hurley  YOB: 1965  MEDICAL RECORD NUMBER:  [id number]    The patient is being assessed for  Transfer to New Unit    I agree that at least one RN has performed a thorough Head to Toe Skin Assessment on the patient. ALL assessment sites listed below have been assessed.      Areas assessed by both nurses:    Head, Face, Ears, Shoulders, Back, Chest, Arms, Elbows, Hands, Sacrum. Buttock, Coccyx, Ischium, Legs. Feet and Heels, and Under Medical Devices         Does the Patient have a Wound? No noted wound(s)       Miki Prevention initiated by RN: Yes  Wound Care Orders initiated by RN: No    Pressure Injury (Stage 1,2,3,4, Unstageable, DTI, NWPT, and Complex wounds) if present, place Wound referral order by RN under : No    New Ostomies, if present place, Ostomy referral order under : No     Nurse 1 eSignature: Electronically signed by Howard Terrell RN on 7/1/25 at 12:28 AM EDT    **SHARE this note so that the co-signing nurse can place an eSignature**    Nurse 2 eSignature: Electronically signed by Kath Mckay RN on 7/1/25 at 12:47 AM EDT  
CARDIOVASCULAR INTENSIVE CARE  PROGRESS NOTE    Date of admission:  6/22/2025  Reason for ICU transfer:  critical care      HOSPITAL COURSE:  6/23: taken to OR for thrombectomy of upper extremity  6/24: doing well. Still has signals this morning      PHYSICAL EXAM:  BP (!) 94/55   Pulse 72   Temp 97.5 °F (36.4 °C) (Bladder)   Resp 15   Ht 1.803 m (5' 11\")   Wt 71 kg (156 lb 8.4 oz)   SpO2 96%   BMI 21.83 kg/m²     GENERAL:  resting in bed  HEAD:  Normocephalic, atraumatic.   EYES:   No scleral icterus. PERRLA.  LUNGS:  No increased work of breathing. On RA  CARDIOVASCULAR: Warm throughout. Regular rate  ABDOMEN:  Soft, nontender, nondistended,   EXTREMITIES:   MAEx4. Moving LUE, appears to have intact sensation, palmar arch signal on doppler, aqucel dressing in place with minimal strikethrough  SKIN:  Warm and dry  NEUROLOGIC:  GCS 15    ASSESSMENT / PLAN:  Neuro: Acute postoperative pain.  Fentanyl 100 mcg nightly hours as needed.  Sedation with propofol while ventilated.  Chronic heavy alcohol use.  High-dose phenobarbital protocol.  Phenobarbital level to be checked 6/26.  Thiamine/folate supplementation.  History of CVA s/p mechanical thrombectomy and carotid artery stenting in February 2025.  Monitor neuro exam  CV: Acute limb ischemia of left upper extremity s/p thrombectomy of brachial/radial/ulnar arteries 6/22 - Monitor pulse exam.    Intermittent hypotension - monitor BP and fluid status  Pulm: History of COPD - extubated 6/23, currently tolerating RA, continue to encourage deep breathing and incentive spirometry  GI: No acute issues - bedside swallow completed, pt is okay for diet as tolerated, continue bowel regimen. Zofran PRN.   Renal: no acute issues - monitor electrolytes and replace as needed, will continue IVF as pt has had some lower UOPH  Endocrine: No acute issues - Monitor glucose, SSI as needed  MSK: Monitor left upper extremity exam.  PT/OT as able once extubated  Heme: acute limb 
CARDIOVASCULAR INTENSIVE CARE  PROGRESS NOTE    Date of admission:  6/22/2025  Reason for ICU transfer:  critical care      HOSPITAL COURSE:  6/23: taken to OR for thrombectomy of upper extremity  6/24: doing well. Still has signals this morning  6/25: went to OR yesterday afternoon for repeat thrombectomy after losing signals.      PHYSICAL EXAM:  /79   Pulse 68   Temp 97.5 °F (36.4 °C) (Bladder)   Resp 19   Ht 1.803 m (5' 11\")   Wt 71.2 kg (156 lb 15.5 oz)   SpO2 94%   BMI 21.89 kg/m²     GENERAL:  resting in bed  HEAD:  Normocephalic, atraumatic.   EYES:   No scleral icterus. PERRLA.  LUNGS:  No increased work of breathing. On RA  CARDIOVASCULAR: Warm throughout. Regular rate  ABDOMEN:  Soft, nontender, nondistended,   EXTREMITIES:   MAEx4. Moving LUE, appears to have intact sensation, palmar arch signal on doppler as well as radial and ulnar. Aquacel dressing with strikethrough, wrapped with kerlix  SKIN:  Warm and dry  NEUROLOGIC:  GCS 15    ASSESSMENT / PLAN:  Neuro: Acute postoperative pain - manage pain as needed with oxy vs percocet, breakthrough diluadid   Alcohol use - currently on PNB protocol, will check level 6/26, continue to monitor neuro status   History of CVA s/p mechanical thrombectomy and carotid artery stenting in February 2025.  Monitor neuro exam  CV: Acute limb ischemia of left upper extremity s/p thrombectomy of brachial/radial/ulnar arteries 6/22, repeat thrombectomy 6/24 - monitor vascular exam as well as motor and sensation of LUE to ensure adequate perfusion   Intermittent hypotension - (resolved) monitor BP and fluid status  Pulm: History of COPD - extubated 6/23, currently tolerating RA, continue to encourage deep breathing and incentive spirometry  GI: No acute issues - bedside swallow completed, pt is okay for diet as tolerated, continue bowel regimen. Zofran PRN.   Renal: no acute issues - monitor electrolytes and replace as needed, will continue IVF as pt has had some 
CARDIOVASCULAR INTENSIVE CARE  PROGRESS NOTE    Date of admission:  6/22/2025  Reason for ICU transfer:  critical care      HOSPITAL COURSE:  6/23: taken to OR for thrombectomy of upper extremity  6/24: doing well. Still has signals this morning  6/25: went to OR yesterday afternoon for repeat thrombectomy after losing signals.  6/26: no issues.       PHYSICAL EXAM:  BP (!) 93/58   Pulse 54   Temp 98 °F (36.7 °C) (Temporal)   Resp 15   Ht 1.803 m (5' 11\")   Wt 71.2 kg (156 lb 15.5 oz)   SpO2 99%   BMI 21.89 kg/m²     GENERAL:  resting in bed  HEAD:  Normocephalic, atraumatic.   EYES:   No scleral icterus. PERRLA.  LUNGS:  No increased work of breathing. On 2L  CARDIOVASCULAR: Warm throughout. Regular rate  ABDOMEN:  Soft, nontender, nondistended,   EXTREMITIES:   MAEx4. Moving LUE, appears to have intact sensation, palmar arch signal on doppler as well as radial and ulnar. Aquacel dressing with strikethrough, wrapped with kerlix  SKIN:  Warm and dry  NEUROLOGIC:  GCS 15    ASSESSMENT / PLAN:  Neuro: Acute postoperative pain - manage pain as needed with oxy vs percocet, breakthrough diluadid   Alcohol use - currently on PNB protocol, will check level 6/26, continue to monitor neuro status   History of CVA s/p mechanical thrombectomy and carotid artery stenting in February 2025.  Monitor neuro exam  CV: Acute limb ischemia of left upper extremity s/p thrombectomy of brachial/radial/ulnar arteries 6/22, repeat thrombectomy 6/24 - monitor vascular exam as well as motor and sensation of LUE to ensure adequate perfusion   Intermittent hypotension - (resolved) monitor BP and fluid status  Pulm: History of COPD - extubated 6/23, currently tolerating RA, continue to encourage deep breathing and incentive spirometry  GI: No acute issues - bedside swallow completed, pt is okay for diet as tolerated, continue bowel regimen. Zofran PRN.   Renal: no acute issues - monitor electrolytes and replace as needed, will continue 
Comprehensive Nutrition Assessment    Type and Reason for Visit:  Initial, LOS    Nutrition Recommendations/Plan:   Continue regular diet as tolerated  If PO intake <50% at meals; add Ensure Plus High Protein BID      Malnutrition Assessment:  Malnutrition Status:  At risk for malnutrition (06/30/25 1042)    Context:  Chronic Illness     Findings of the 6 clinical characteristics of malnutrition:  Energy Intake:  No decrease in energy intake  Weight Loss:  Mild weight loss (Comp weight hx endorses -2kg weight loss within 1 month not significant for malnutrition)     Body Fat Loss:  Unable to assess (NFPE deferred)     Muscle Mass Loss:  Unable to assess    Fluid Accumulation:  Unable to assess     Strength:  Not Performed    Nutrition Assessment:    Pt admitted for ischemia of L upper extremity; RD screened for LOS. Tolerating regular diet with intakes documented >75% at meals. Inpatient assessment for ischemia, noted s/p thrombectomy 6/25. Noted alcohol intoxication, hypokalemia. Continue PO diet as tolerated.    Nutrition Related Findings:    AAOx4; GCS 15; missing teeth; hyperactive bowel sounds to + constipation; soft contender abdomen; Labs: BUN 4, Creat 0.6, K 2.8; Meds: eliquis, peptic, MVI, folic acid, senokot, thiamine Wound Type: None       Current Nutrition Intake & Therapies:    Average Meal Intake: %  Average Supplements Intake: None Ordered  ADULT DIET; Regular    Anthropometric Measures:  Height: 180.3 cm (5' 10.98\")  Ideal Body Weight (IBW): 172 lbs (78 kg)    Current Body Weight: 69.4 kg (153 lb), 89 % IBW. Weight Source: Bed scale  Current BMI (kg/m2): 21.3  Usual Body Weight: 72 kg (158 lb 11.7 oz) (6/26 per pt statement, no pertinent wt hx per EMR)  % Weight Change (Calculated): -3.6  Weight Adjustment For: No Adjustment  BMI Categories: Normal Weight (BMI 18.5-24.9)    Estimated Daily Nutrient Needs:  Energy Requirements Based On: Kcal/kg  Weight Used for Energy Requirements: 
Could not assess.  Marianne    
DAILY VENTILATOR WEANING ASSESSMENT PERFORMED    P/FIO2 Ratio =357          (<100= do not Wean)                  Cs = 51                         (<32= Instability)  Plat. Pressure = 13    Was SAT completed no,but can yet  ON propofol , pt sitting up in bed attempting to pull out OET    If yes proceed with the Spontaneous Breathing Trial (SBT) as long as none of the following exclusion criteria are met:     [] P/FIO2 <100  [] FiO2 >50%  [] Peep >10 CMH2O  [] Patient on vasopressor  [] Failed SAT  [] SpO2 < 88%  [] Active MI  [] Lack of inspiratory effort     If any exclusion criteria are meet Do Not Proceed to SBT.    Was SBT completed awaiting SAT     Ask Physician for a weaning plan yes    Instabilities:    Cardiovascular     CNS      [] Mean BP less than or equal to 75   [] Neuromuscular blockade  [] Heart Rate greater than 130   [] RASS of -3, -4, -5  [] Mechanical Assist Device    []  RASS of +3, +4         [] ICP > 15 or Intracranial Hypertension         Respiratory      Metabolic   [] Temp. (8hrs) < 95 or > 103  [] Respiratory Rate greater than 35   [] WBC < 5000 or > 04169  [] Minute Volume greater than 15L  [] pH less than 7.30  [] Deteriorating chest X-ray         Parameters   most likely awaiting SAT    NIF=  VC=  MV =  RSBI =      Additional Comments:     Performed by Melissa Dejesus RCP RRT                 06/23/25 0903   Patient Observation   Pulse 91   Respirations 24   SpO2 100 %   Breath Sounds   Breath Sounds Bilateral Clear;Diminished   Right Upper Lobe Clear;Diminished   Right Middle Lobe Diminished   Right Lower Lobe Diminished   Left Upper Lobe Clear   Left Lower Lobe Clear;Diminished   Vent Information   Ventilator Day(s) 2   Ventilator ID [id number]   Vent Mode AC/VC   Ventilator Settings   Vt (Set, mL) 450 mL   Resp Rate (Set) 12 bpm   PEEP/CPAP (cmH2O) 8   FiO2  40 %   Peak Inspiratory Flow (Set) 60 L/sec   Vent Patient Data (Readings)   Vt (Measured) 418 mL   Peak Inspiratory Pressure (cmH2O) 
DC instructions given to and reviewed with pt @ bedside. Pt verbalized understand of all information given and had no further questions for nursing at this time. Pt iv was removed prior to leaving unit. Printed script in dc instruction given to pt. This nurse transported pt to main entrance where pt's sister transported home.  
Lab called regarding result for BMP, they do not have an order for BMP.   Add on order placed.   
Notified SROC of hypotension. New order for LR bolus obtained.   
Notified SROC of low uop and bradycardia. EKG obtained  
Notified SROC patient was having bloody drainage left wrist incision site, awaiting response.  
Nurse to nurse report called to Araceli, report given to Magaly. All questions and concerns were addressed and answered with nurse receiving report. There were no further needs from this nurse. Pt IV removed without complication. Pt awaiting transport scheduled for 1230p.   
OCCUPATIONAL THERAPY INITIAL EVALUATION    [facility] [address] [place], [place]       Date:2025                                                  Patient Name: Colton Hurley  MRN: [id number]  : 1965  Room: [address] [place], [place] [address]    Evaluating OT: Acacia Tina, OTR/L [id number]    Referring Provider:   Destiney Roman APRN - CNP        Specific Provider Orders/Date: OT evaluation and treatment (25)    Diagnosis:  Ischemia of left upper extremity [I99.8]      Pertinent Medical History:  has a past medical history of Arthritis, Asthma, BPH (benign prostatic hyperplasia), Chronic back pain, COPD (chronic obstructive pulmonary disease) ([facility]), Depression, Emphysema lung (HCC), Hepatitis C, and Schizo affective schizophrenia ([facility]).     Surgery/Procedure:     LEFT UPPER EXTREMITY THROMBECTOMY  Left radial artery and ulnar artery thrombectomy    LEFT BRACHIAL ARTERY  THROMBECTOMY  Left radial artery and ulnar artery thrombectomy    Precautions:  Fall Risk, L UE pain and edema with limited tolerance for ROM; R hemiparesis from prior stroke     Assessment of current deficits   [x] Functional mobility  [x]ADLs  [x] Strength               []Cognition   [x] Functional transfers   [x] IADLs         [x] Safety Awareness   [x]Endurance   [x] Fine Coordination        [x] ROM     [] Vision/perception   [x]Sensation    [x]Gross Motor Coordination [x] Balance   [x] Delirium                  [x]Motor Control     [x] Communication    OT PLAN OF CARE      OT POC based on physician orders, patient diagnosis and results of clinical assessment.       Frequency/Duration: 1-3 days/wk for 1-2 weeks PRN    Specific OT Treatment to include:   ADL retraining/adapted techniques and AE recommendations to increase functional independence within precautions                    Energy conservation techniques to improve tolerance for selfcare routine   Functional 
OCCUPATIONAL THERAPY TREATMENT SESSION  [facility] [address] [place], [place]       Date:2025                                                  Patient Name: Colton Hurley  MRN: [id number]  : 1965  Room: [address] [place] [address]      Evaluating OT: Acacia Tina, OTR/L [id number]     Referring Provider:   Destiney Roman APRN - CNP                                         Specific Provider Orders/Date: OT evaluation and treatment (25)     Diagnosis:  Ischemia of left upper extremity [I99.8]       Pertinent Medical History:  has a past medical history of Arthritis, Asthma, BPH (benign prostatic hyperplasia), Chronic back pain, COPD (chronic obstructive pulmonary disease) ([facility]), Depression, Emphysema lung (HCC), Hepatitis C, and Schizo affective schizophrenia (HCC).      Surgery/Procedure:     LEFT UPPER EXTREMITY THROMBECTOMY  Left radial artery and ulnar artery thrombectomy    LEFT BRACHIAL ARTERY  THROMBECTOMY  Left radial artery and ulnar artery thrombectomy     Precautions:  Fall Risk, L UE pain and edema with limited tolerance for ROM; R hemiparesis from prior stroke, Assessment of current deficits   [x] Functional mobility          [x]ADLs           [x] Strength                  []Cognition   [x] Functional transfers        [x] IADLs         [x] Safety Awareness   [x]Endurance   [x] Fine Coordination           [x] ROM           [] Vision/perception    [x]Sensation     [x]Gross Motor Coordination [x] Balance    [x] Delirium                  [x]Motor Control     [x] Communication     OT PLAN OF CARE      OT POC based on physician orders, patient diagnosis and results of clinical assessment.        Frequency/Duration: 1-3 days/wk for 1-2 weeks PRN    Specific OT Treatment to include:   ADL retraining/adapted techniques and AE recommendations to increase functional independence within precautions                    Energy 
Patient admitted for left arm ischemia due to thrombus.  Patient taken to the OR and thrombectomy done.  He was a chronic alcoholic and developed DT patient intubated and kept in CVICU.  He is on heparin drip now.  Plan to extubate IN transition to oral anticoagulation.  Continue CIWA protocol.  Patient admitted today and charged for today's service.  
Patient arrived to CVICU bed 3817 from OR. Patient connected to ventilator and all ICU monitoring.     Dr. DREW said to restart heparin gtt at 2330, gtt restarted at 18units/kg/hr per his order.    L hand cyanotic, slight capillary refill. L arm red/ecchymosis - 3 dressings noted. Pulses present for radial and ulnar, unable to doppler palmar pulse - PK aware.     Patient arrived to the CVICU from OR with hart catheter intact and patent. Securing device applied. Hart bag is hanging below the level of the bladder, safety clip attached to the bed sheet, tamper seal is intact, drainage bag is not on the floor, lack of dependent loop in tubing, and the drainage bag is less than half full.     Patient admitted to CVIC with the following belongings: Pants and Shirt. The following belongings were sent home with the patient's family:  None - belongings noted on admission remain in patient's room.    Per patients family, they said to throw patients clothes away because they are soiled.               
Patient arrived to unit as a transfer from CVICU with the following belongings: pants, shirt, socks, shoes, and undergarments. All belongings remain with the patient at the bedside.   
Patient returned from OR .Report received from anesthesia and OR RN. Hemodynamic lines connected, 4 liters oxygen placed via nasal canula from simple mask. Left radial and ulnar pulses palpable. Left hand cool to touch dusky and reddened.Silver dressings to left arm intact and dry.  
Patient taken to OR with RN and Dr Calix.  
Physical Therapy  Physical Therapy Treatment Note    Name: Colton Hurley  : 1965  MRN: [id number]      Date of Service: 2025    Evaluating PT:  Sam Law, PT, DPT [id number]    Room #:  3817/3817-A  Diagnosis:  Ischemia of left upper extremity [I99.8]  PMHx/PSHx:    Past Medical History:   Diagnosis Date    Arthritis     Asthma 2018    BPH (benign prostatic hyperplasia)     Chronic back pain     Plates inseted from L2-L5    COPD (chronic obstructive pulmonary disease) (HCC)     Depression     Emphysema lung (HCC)     Hepatitis C     Schizo affective schizophrenia (HCC)      Procedure/Surgery:      LEFT BRACHIAL ARTERY  THROMBECTOMY  Left radial artery and ulnar artery thrombectomy    LEFT UPPER EXTREMITY THROMBECTOMY  Left radial artery and ulnar artery thrombectomy  Precautions:  Falls, flat affect, taciturn, limited to no use of LUE, alarms  Equipment Needs:  TBD    SUBJECTIVE:    Per chart, pt has stayed many places but unsure of discharge plan.  Pt ambulated with SPC PTA.    OBJECTIVE:   Initial Evaluation  Date: 25 Treatment  25 Short Term/ Long Term   Goals   AM-PAC 6 Clicks 15/24 16/24    Was pt agreeable to Eval/treatment? Yes Yes    Does pt have pain? Significant LUE pain Significant LUE pain    Bed Mobility  Rolling: NT  Supine to sit: Kelsey  Sit to supine: NT  Scooting: Kelsey Rolling: NT  Supine to sit: Kelsey  Sit to supine: NT  Scooting: Kelsey Mod Independent   Transfers Sit to stand: Kelsey  Stand to sit: Kelsey  Stand pivot: Kelsey no device Sit to stand: Kelsey  Stand to sit: Kelsey  Stand pivot: Kelsey no device Mod Independent with SPC if needed   Ambulation   A few steps to chair with Kelsey no device 12 feet with Kelsey no device >400 feet with Mod Independent with SPC if needed   Stair negotiation: ascended and descended NT NT >4 steps with 1 rail Mod Independent   ROM BUE:  Defer to OT note  BLE:  WFL     Strength BUE:  Defer to OT note  BLE:  WFL  Increase by 1/3 MMT 
Physical Therapy  Physical Therapy Treatment Note    Name: Colton Hurley  : 1965  MRN: [id number]      Date of Service: 2025    Evaluating PT:  Sam Law, PT, DPT [id number]    Room #:  3817/3817-A  Diagnosis:  Ischemia of left upper extremity [I99.8]  PMHx/PSHx:    Past Medical History:   Diagnosis Date    Arthritis     Asthma 2018    BPH (benign prostatic hyperplasia)     Chronic back pain     Plates inseted from L2-L5    COPD (chronic obstructive pulmonary disease) (HCC)     Depression     Emphysema lung (HCC)     Hepatitis C     Schizo affective schizophrenia (HCC)      Procedure/Surgery:      LEFT BRACHIAL ARTERY  THROMBECTOMY  Left radial artery and ulnar artery thrombectomy    LEFT UPPER EXTREMITY THROMBECTOMY  Left radial artery and ulnar artery thrombectomy  Precautions:  Falls, flat affect, taciturn, limited to no use of LUE, alarms  Equipment Needs:  TBD    SUBJECTIVE:    Per chart, pt has stayed many places but unsure of discharge plan.  Pt ambulated with SPC PTA.    OBJECTIVE:   Initial Evaluation  Date: 25 Treatment  25 Short Term/ Long Term   Goals   AM-PAC 6 Clicks 15/24 16/24    Was pt agreeable to Eval/treatment? Yes Yes    Does pt have pain? Significant LUE pain Significant LUE pain    Bed Mobility  Rolling: NT  Supine to sit: Kelsey  Sit to supine: NT  Scooting: Kelsey Rolling: NT  Supine to sit: Kelsey  Sit to supine: NT  Scooting: Kelsey Mod Independent   Transfers Sit to stand: Kelsey  Stand to sit: Kelsey  Stand pivot: Kelsey no device Sit to stand: Kelsey  Stand to sit: Kelsey  Stand pivot: Kelsey with SPC Mod Independent with SPC if needed   Ambulation   A few steps to chair with Kelsey no device 50 feet with Kelsey with SPC >400 feet with Mod Independent with SPC if needed   Stair negotiation: ascended and descended NT NT >4 steps with 1 rail Mod Independent   ROM BUE:  Defer to OT note  BLE:  WFL     Strength BUE:  Defer to OT note  BLE:  WFL  Increase by 1/3 MMT grade 
Plan for L UE thrombectomy, possible angiogram, possible fasciotomy    Patient was found at home by family who called EMS in re to ischemic changes of his left hand.  He was also drunk upon arrival to ER.      He has no focal deficits.  He has no evidence of trauma to the head.      He is arousable but not consistent enough to be deemed appropriate to make decisions.  When I did explain he needs surgery emergently and make lose his left arm he expressed an understanding and was willing to proceed.    Multiple calls have been made to family members with no response    I discussed case with Dr Lane LANE.  We both agree that pt needs emergent intervention and as patient is not able to make decision and family is not responding will plan on taking pt on an emergency basis due to risk of limb loss.    Kirstie Calix MD    
Pt came to OR with [place] in place. Draining clear yellow urine.   
Pt extubated, on room air.   Instructed to take slow deep breaths.   No stridor noted.   
Pt refusing to leave hospital to go to [place], social work notified  
Pt seen and examined    No longer has signals in distal arm    Increased left hand pain    Or for thrombectomy  Pt understands he is high risk for limb loss    Kirstie Calix MD    
Report called to 6 PCCU. Transport Requested. The following patient belongings: Pants, Shirt, Socks, Shoes, and Other undergarments were gathered and remain with the patient on transfer to their new room. Family notified by phone, updated on new unit and room number.  
Sitting up. Good progress.  Thanking God.    
Spoke to patient niece alternate contact notified patient in OR states will try to come to hospital this evening and will notified patient brother to call unit.  
Spoke with pt's sister yamini about picking pt up to transport home as pt refused to discharge to [place]. Pt family said they will be to  pt @ 0062   
Spontaneous Parameters performed on ps 5 fio2 0.40 peep5    VT = 505 ml  f = 18  B/M  Ve = 9.0 L/M  NIF = -60  cmH2O  VC = 1100 L  RSBI = 35        Performed by Melissa Dejesus RCP   06/23/25 1324   Patient Observation   Pulse (!) 110   Respirations 20   SpO2 100 %   Patient Observations spontaneous breathing parameters and OET leak test . With Dr Sipple at bedside, pt sxd orally and oet ; balloon deflated. insp vt 450 go=iven and exhaled vt 300 measured in return with audibe airflow heard orally. leak present . Dr Sipple to RT extubate from [facility] vent. pt extubated  to Room air spo2 >97 . resp rate 12 comfortable breathing pt verbalized name clearly. strong cough.   Vent Information   Vent Mode CPAP/PS   Ventilator Settings   PEEP/CPAP (cmH2O) 5   FiO2  40 %   Pressure Support (cm H2O) 5 cm H2O   Vent Patient Data (Readings)   Vt (Measured) 549 mL   Peak Inspiratory Pressure (cmH2O) 12 cmH2O   Rate Measured 20 br/min   Minute Volume (L/min) 12.4 Liters   Mean Airway Pressure (cmH2O) 8 cmH20   Plateau Pressure (cm H2O) 13 cm H2O   Driving Pressure 8   I:E Ratio 1.10:1   Vent Alarm Settings   High Pressure (cmH2O) 40 cmH2O   Low Exhaled Vt (ml) 0 mL   Weaning Parameters   Spontaneous Breathing Trial Complete Yes   Respiratory Rate Observed 18   Ve 9      RSBI 35   NIF -60   VC 1100   Davis Agitation Sedation Scale (RASS) 0       
Unable to assess.  Martita.    
VASCULAR SURGERY PROGRESS NOTE    Pt is being seen in f/u today regarding L brachial, radial, ulnar artery thrombectomy    SUBJECTIVE  Pt seen/examined.  Awake alert and following commands this morning. Having some pain to left hand    CURRENT MEDICATIONS    sodium chloride      sodium chloride      sodium chloride      heparin (PORCINE) Infusion 15 Units/kg/hr (06/23/25 0215)    propofol 45 mcg/kg/min (06/23/25 0532)    lactated ringers 125 mL/hr at 06/22/25 4626      sodium chloride flush, sodium chloride, ondansetron **OR** ondansetron, sodium chloride flush, sodium chloride, PHENobarbital **FOLLOWED BY** [START ON 6/24/2025] PHENobarbital **FOLLOWED BY** [START ON 6/26/2025] PHENobarbital, sodium chloride flush, sodium chloride, potassium chloride **OR** potassium alternative oral replacement **OR** potassium chloride, magnesium sulfate, polyethylene glycol, acetaminophen **OR** acetaminophen, heparin (porcine), heparin (porcine), fentanNYL, propofol    sodium chloride flush  10 mL IntraVENous 2 times per day    aspirin  81 mg Oral Daily    PHENobarbital  260 mg IntraVENous 4 times per day    Followed by    [START ON 6/24/2025] PHENobarbital  130 mg IntraVENous 4 times per day    thiamine  100 mg IntraVENous Daily    folic acid  1 mg IntraVENous Daily    insulin lispro  0-4 Units SubCUTAneous Q4H    sodium chloride flush  5-40 mL IntraVENous 2 times per day    multivitamin  1 tablet Oral Daily    sodium chloride flush  5-40 mL IntraVENous 2 times per day    propofol        chlorhexidine  15 mL Mouth/Throat BID    famotidine (PEPCID) injection  20 mg IntraVENous BID    Polyvinyl Alcohol-Povidone PF  1 drop Both Eyes Q4H    Or    artificial tears   Both Eyes Q4H        PHYSICAL EXAM   /61   Pulse 63   Temp 97.5 °F (36.4 °C) (Bladder)   Resp 12   Ht 1.803 m (5' 11\")   Wt 70.8 kg (156 lb)   SpO2 100%   BMI 21.76 kg/m²     Intake/Output Summary (Last 24 hours) at 6/23/2025 5406  Last data filed at 
VASCULAR SURGERY PROGRESS NOTE    Pt is being seen in f/u today regarding L brachial, radial, ulnar artery thrombectomy    SUBJECTIVE  Pt seen/examined.  Fairly somnolent this morning but rousable and following commands. Says his left arm feels sore but otherwise no acute complaints.     CURRENT MEDICATIONS    sodium chloride      sodium chloride      sodium chloride      lactated ringers 125 mL/hr at 06/24/25 0600      sodium chloride flush, sodium chloride, ondansetron **OR** ondansetron, sodium chloride flush, sodium chloride, sodium chloride flush, sodium chloride, potassium chloride **OR** potassium alternative oral replacement **OR** potassium chloride, magnesium sulfate, polyethylene glycol, acetaminophen **OR** acetaminophen, oxyCODONE, HYDROmorphone    PHENobarbital  65 mg IntraVENous Q6H    apixaban  5 mg Oral BID    sodium chloride flush  10 mL IntraVENous 2 times per day    aspirin  81 mg Oral Daily    thiamine  100 mg IntraVENous Daily    folic acid  1 mg IntraVENous Daily    insulin lispro  0-4 Units SubCUTAneous Q4H    sodium chloride flush  5-40 mL IntraVENous 2 times per day    multivitamin  1 tablet Oral Daily    sodium chloride flush  5-40 mL IntraVENous 2 times per day    acetaminophen  650 mg Oral Q6H    polyethylene glycol  17 g Oral Daily    senna  1 tablet Oral Nightly    famotidine  20 mg Oral BID        PHYSICAL EXAM   /61   Pulse (!) 44   Temp 98 °F (36.7 °C) (Oral)   Resp 14   Ht 1.803 m (5' 11\")   Wt 71 kg (156 lb 8.4 oz)   SpO2 100%   BMI 21.83 kg/m²     Intake/Output Summary (Last 24 hours) at 6/24/2025 0714  Last data filed at 6/24/2025 0700  Gross per 24 hour   Intake 3473.03 ml   Output 1390 ml   Net 2083.03 ml          GENERAL:  NAD. Sedated, intubated  NEURO: GCS 10T following commands all 4 extremities  LUNGS:  No increased work of breathing.  CARDIOVASCULAR: RR  ABDOMEN:  Soft, non-distended, non-tender. No guarding, rigidity, rebound.  EXTREMITIES:   LUE w 2 x 
VASCULAR SURGERY PROGRESS NOTE    Pt is being seen in f/u today regarding L brachial, radial, ulnar artery thrombectomy  and     SUBJECTIVE    No issues. Awaiting bed.     CURRENT MEDICATIONS    sodium chloride      sodium chloride      sodium chloride        [] PHENobarbital **FOLLOWED BY** PHENobarbital **FOLLOWED BY** [START ON 2025] PHENobarbital, oxyCODONE **OR** oxyCODONE-acetaminophen, sodium chloride flush, sodium chloride, ondansetron **OR** ondansetron, sodium chloride flush, sodium chloride, sodium chloride flush, sodium chloride, potassium chloride **OR** potassium alternative oral replacement **OR** potassium chloride, magnesium sulfate, HYDROmorphone    OLANZapine  15 mg Oral Nightly    apixaban  5 mg Oral BID    sodium chloride flush  10 mL IntraVENous 2 times per day    aspirin  81 mg Oral Daily    thiamine  100 mg IntraVENous Daily    folic acid  1 mg IntraVENous Daily    sodium chloride flush  5-40 mL IntraVENous 2 times per day    multivitamin  1 tablet Oral Daily    sodium chloride flush  5-40 mL IntraVENous 2 times per day    polyethylene glycol  17 g Oral Daily    senna  1 tablet Oral Nightly    famotidine  20 mg Oral BID        PHYSICAL EXAM   /82   Pulse 94   Temp 97.8 °F (36.6 °C) (Temporal)   Resp 27   Ht 1.803 m (5' 11\")   Wt 71.4 kg (157 lb 8 oz)   SpO2 98%   BMI 21.97 kg/m²     Intake/Output Summary (Last 24 hours) at 2025 0818  Last data filed at 2025 0200  Gross per 24 hour   Intake 1800 ml   Output 3800 ml   Net -2000 ml        GENERAL:  NAD.   LUNGS:  No increased work of breathing.  CARDIOVASCULAR: RR  ABDOMEN:  Soft, non-distended, non-tender. No guarding, rigidity, rebound.  EXTREMITIES:   LUE w brachial incision with staples in place, ulnar/radial incisions with steri-strips  - palmar signal, radial multiphasic, ulnar multiphasic, brachial multiphasic, 3/5  strength, sensation to hand intact, edema present to hand    LABS    Lab 
VASCULAR SURGERY PROGRESS NOTE    Pt is being seen in f/u today regarding L brachial, radial, ulnar artery thrombectomy  and     SUBJECTIVE  Patient seen and examined. Complaining of some pain in his left upper extremity. Denies numbness or tingling.     CURRENT MEDICATIONS    sodium chloride      sodium chloride      sodium chloride        [] PHENobarbital **FOLLOWED BY** [] PHENobarbital **FOLLOWED BY** PHENobarbital, oxyCODONE **OR** oxyCODONE-acetaminophen, sodium chloride flush, sodium chloride, ondansetron **OR** ondansetron, sodium chloride, sodium chloride, potassium chloride **OR** potassium alternative oral replacement **OR** potassium chloride, magnesium sulfate, HYDROmorphone    OLANZapine  15 mg Oral Nightly    apixaban  5 mg Oral BID    sodium chloride flush  10 mL IntraVENous 2 times per day    aspirin  81 mg Oral Daily    thiamine  100 mg IntraVENous Daily    folic acid  1 mg IntraVENous Daily    multivitamin  1 tablet Oral Daily    polyethylene glycol  17 g Oral Daily    senna  1 tablet Oral Nightly    famotidine  20 mg Oral BID      PHYSICAL EXAM   BP (!) 101/48   Pulse 80   Temp 97.7 °F (36.5 °C) (Temporal)   Resp 16   Ht 1.803 m (5' 10.98\")   Wt 69.4 kg (152 lb 14.4 oz)   SpO2 98%   BMI 21.33 kg/m²     Intake/Output Summary (Last 24 hours) at 2025 0714  Last data filed at 2025 0427  Gross per 24 hour   Intake 480 ml   Output 2000 ml   Net -1520 ml     GENERAL:  NAD.   LUNGS:  No increased work of breathing.  CARDIOVASCULAR: RR  ABDOMEN:  Soft, non-distended, non-tender. No guarding, rigidity, rebound.  EXTREMITIES:   LUE w brachial incision with staples in place, ulnar/radial incisions with steri-strips. No palmar signal noted, radial multiphasic, ulnar biphasic, brachial multiphasic, 3/5  strength, sensation to hand intact, edema present to hand    LABS    Lab Results   Component Value Date    WBC 7.4 2025    HGB 11.2 (L) 2025    HCT 32.0 (L) 
VASCULAR SURGERY PROGRESS NOTE    Pt is being seen in f/u today regarding L brachial, radial, ulnar artery thrombectomy 6/23 and 6/24    SUBJECTIVE  Pt seen/examined. Patient started on Zyprexa by psychiatry. 16/24 with PT. Remains on Eliquis.     CURRENT MEDICATIONS    sodium chloride      sodium chloride      sodium chloride        PHENobarbital **FOLLOWED BY** PHENobarbital **FOLLOWED BY** [START ON 6/30/2025] PHENobarbital, oxyCODONE **OR** oxyCODONE-acetaminophen, sodium chloride flush, sodium chloride, ondansetron **OR** ondansetron, sodium chloride flush, sodium chloride, sodium chloride flush, sodium chloride, potassium chloride **OR** potassium alternative oral replacement **OR** potassium chloride, magnesium sulfate, HYDROmorphone    OLANZapine  15 mg Oral Nightly    apixaban  5 mg Oral BID    sodium chloride flush  10 mL IntraVENous 2 times per day    aspirin  81 mg Oral Daily    thiamine  100 mg IntraVENous Daily    folic acid  1 mg IntraVENous Daily    insulin lispro  0-4 Units SubCUTAneous Q4H    sodium chloride flush  5-40 mL IntraVENous 2 times per day    multivitamin  1 tablet Oral Daily    sodium chloride flush  5-40 mL IntraVENous 2 times per day    polyethylene glycol  17 g Oral Daily    senna  1 tablet Oral Nightly    famotidine  20 mg Oral BID        PHYSICAL EXAM   /65   Pulse 57   Temp 98.1 °F (36.7 °C) (Oral)   Resp 18   Ht 1.803 m (5' 11\")   Wt 71.6 kg (157 lb 13.6 oz)   SpO2 96%   BMI 22.02 kg/m²     Intake/Output Summary (Last 24 hours) at 6/28/2025 0432  Last data filed at 6/28/2025 0100  Gross per 24 hour   Intake 3944.21 ml   Output 3575 ml   Net 369.21 ml        GENERAL:  NAD.   LUNGS:  No increased work of breathing.  CARDIOVASCULAR: RR  ABDOMEN:  Soft, non-distended, non-tender. No guarding, rigidity, rebound.  EXTREMITIES:   LUE w brachial incision with staples in place, ulnar/radial incisions with steri-strips  - palmar signal, radial multiphasic, ulnar multiphasic, 
VASCULAR SURGERY PROGRESS NOTE    Pt is being seen in f/u today regarding L brachial, radial, ulnar artery thrombectomy 6/23 and 6/24    SUBJECTIVE  Pt seen/examined. Still complaining of L hand pain but says it is not worse.     CURRENT MEDICATIONS    sodium chloride      sodium chloride      sodium chloride      lactated ringers 125 mL/hr at 06/26/25 0401      oxyCODONE **OR** oxyCODONE-acetaminophen, heparin (porcine), heparin (porcine), sodium chloride flush, sodium chloride, ondansetron **OR** ondansetron, sodium chloride flush, sodium chloride, sodium chloride flush, sodium chloride, potassium chloride **OR** potassium alternative oral replacement **OR** potassium chloride, magnesium sulfate, HYDROmorphone    apixaban  5 mg Oral BID    PHENobarbital  65 mg IntraVENous Q6H    sodium chloride flush  10 mL IntraVENous 2 times per day    aspirin  81 mg Oral Daily    thiamine  100 mg IntraVENous Daily    folic acid  1 mg IntraVENous Daily    insulin lispro  0-4 Units SubCUTAneous Q4H    sodium chloride flush  5-40 mL IntraVENous 2 times per day    multivitamin  1 tablet Oral Daily    sodium chloride flush  5-40 mL IntraVENous 2 times per day    polyethylene glycol  17 g Oral Daily    senna  1 tablet Oral Nightly    famotidine  20 mg Oral BID        PHYSICAL EXAM   BP (!) 93/58   Pulse 54   Temp 98 °F (36.7 °C) (Temporal)   Resp 15   Ht 1.803 m (5' 11\")   Wt 71.2 kg (156 lb 15.5 oz)   SpO2 99%   BMI 21.89 kg/m²     Intake/Output Summary (Last 24 hours) at 6/26/2025 0713  Last data filed at 6/26/2025 0530  Gross per 24 hour   Intake 1267.3 ml   Output 2010 ml   Net -742.7 ml        GENERAL:  NAD. Agitated, not conversant.  NEURO: GCS 15. Selectively follows commands.   LUNGS:  No increased work of breathing.  CARDIOVASCULAR: RR  ABDOMEN:  Soft, non-distended, non-tender. No guarding, rigidity, rebound.  EXTREMITIES:   LUE w upper arm aquacel no strikethrough, wrist aquacel w some strikethrough and wrapped in 
VASCULAR SURGERY PROGRESS NOTE    Pt is being seen in f/u today regarding L brachial, radial, ulnar artery thrombectomy 6/23 and 6/24    SUBJECTIVE  Pt seen/examined. Still complaining of L hand pain. Intermittently +/- palmar signal overnight.     CURRENT MEDICATIONS    heparin (PORCINE) Infusion 11 Units/kg/hr (06/25/25 0628)    sodium chloride      sodium chloride      sodium chloride      lactated ringers 125 mL/hr at 06/25/25 0628      heparin (porcine), heparin (porcine), sodium chloride flush, sodium chloride, ondansetron **OR** ondansetron, sodium chloride flush, sodium chloride, sodium chloride flush, sodium chloride, potassium chloride **OR** potassium alternative oral replacement **OR** potassium chloride, magnesium sulfate, polyethylene glycol, acetaminophen **OR** acetaminophen, oxyCODONE, HYDROmorphone    PHENobarbital  65 mg IntraVENous Q6H    potassium & sodium phosphates  1 packet Oral 4x Daily    sodium chloride flush  10 mL IntraVENous 2 times per day    aspirin  81 mg Oral Daily    thiamine  100 mg IntraVENous Daily    folic acid  1 mg IntraVENous Daily    insulin lispro  0-4 Units SubCUTAneous Q4H    sodium chloride flush  5-40 mL IntraVENous 2 times per day    multivitamin  1 tablet Oral Daily    sodium chloride flush  5-40 mL IntraVENous 2 times per day    polyethylene glycol  17 g Oral Daily    senna  1 tablet Oral Nightly    famotidine  20 mg Oral BID        PHYSICAL EXAM   /79   Pulse 53   Temp 97.4 °F (36.3 °C) (Temporal)   Resp 16   Ht 1.803 m (5' 11\")   Wt 71.2 kg (156 lb 15.5 oz)   SpO2 90%   BMI 21.89 kg/m²     Intake/Output Summary (Last 24 hours) at 6/25/2025 0729  Last data filed at 6/25/2025 0700  Gross per 24 hour   Intake 4488.04 ml   Output 1070 ml   Net 3418.04 ml          GENERAL:  NAD. Agitated.   NEURO: GCS 15  LUNGS:  No increased work of breathing.  CARDIOVASCULAR: RR  ABDOMEN:  Soft, non-distended, non-tender. No guarding, rigidity, rebound.  EXTREMITIES:   
Vascular Surgery Progress Note    Pt is being seen in f/u today regarding L UE ischemia s/p thrombectomy     Subjective  Pt s/e. Extubated yesterday. Complains of pain to the left hand with palpation.     Current Medications:    lactated ringers      sodium chloride      sodium chloride      sodium chloride      lactated ringers 125 mL/hr at 06/24/25 0734      sodium chloride flush, sodium chloride, ondansetron **OR** ondansetron, sodium chloride flush, sodium chloride, sodium chloride flush, sodium chloride, potassium chloride **OR** potassium alternative oral replacement **OR** potassium chloride, magnesium sulfate, polyethylene glycol, acetaminophen **OR** acetaminophen, oxyCODONE, HYDROmorphone    PHENobarbital  65 mg IntraVENous Q6H    apixaban  5 mg Oral BID    potassium & sodium phosphates  1 packet Oral 4x Daily    sodium chloride flush  10 mL IntraVENous 2 times per day    aspirin  81 mg Oral Daily    thiamine  100 mg IntraVENous Daily    folic acid  1 mg IntraVENous Daily    insulin lispro  0-4 Units SubCUTAneous Q4H    sodium chloride flush  5-40 mL IntraVENous 2 times per day    multivitamin  1 tablet Oral Daily    sodium chloride flush  5-40 mL IntraVENous 2 times per day    acetaminophen  650 mg Oral Q6H    polyethylene glycol  17 g Oral Daily    senna  1 tablet Oral Nightly    famotidine  20 mg Oral BID      PHYSICAL EXAM:    BP (!) 73/38   Pulse 66   Temp 97.5 °F (36.4 °C) (Bladder)   Resp 20   Ht 1.803 m (5' 11\")   Wt 71 kg (156 lb 8.4 oz)   SpO2 96%   BMI 21.83 kg/m²     Intake/Output Summary (Last 24 hours) at 6/24/2025 1319  Last data filed at 6/24/2025 1300  Gross per 24 hour   Intake 4053.03 ml   Output 510 ml   Net 3543.03 ml        Gen sedated  CVS RRR  Resp intuabted  Abd soft, ndnt  L UE     Hand overall pinker in color, still dusky in areas   Able to wiggle fingers some   Aquacel dressings in place with minimal strike through    LABS:    Lab Results   Component Value Date    WBC 6.8 
Vascular Surgery Progress Note    Pt is being seen in f/u today regarding L UE ischemia s/p thrombectomy     Subjective  Pt s/e. Intubated, sedated. Grimaces to pain with palpation of the left hand.     Current Medications:    sodium chloride      sodium chloride      sodium chloride      heparin (PORCINE) Infusion 15 Units/kg/hr (06/23/25 0215)    propofol 30 mcg/kg/min (06/23/25 1200)    lactated ringers 125 mL/hr at 06/22/25 0932      sodium chloride flush, sodium chloride, ondansetron **OR** ondansetron, sodium chloride flush, sodium chloride, PHENobarbital **FOLLOWED BY** [START ON 6/24/2025] PHENobarbital **FOLLOWED BY** [START ON 6/26/2025] PHENobarbital, sodium chloride flush, sodium chloride, potassium chloride **OR** potassium alternative oral replacement **OR** potassium chloride, magnesium sulfate, polyethylene glycol, acetaminophen **OR** acetaminophen, heparin (porcine), heparin (porcine), fentanNYL    sodium chloride flush  10 mL IntraVENous 2 times per day    aspirin  81 mg Oral Daily    thiamine  100 mg IntraVENous Daily    folic acid  1 mg IntraVENous Daily    insulin lispro  0-4 Units SubCUTAneous Q4H    sodium chloride flush  5-40 mL IntraVENous 2 times per day    multivitamin  1 tablet Oral Daily    sodium chloride flush  5-40 mL IntraVENous 2 times per day    PHENobarbital  260 mg IntraVENous 4 times per day    Followed by    [START ON 6/24/2025] PHENobarbital  130 mg IntraVENous 4 times per day    chlorhexidine  15 mL Mouth/Throat BID    famotidine (PEPCID) injection  20 mg IntraVENous BID    Polyvinyl Alcohol-Povidone PF  1 drop Both Eyes Q4H    Or    artificial tears   Both Eyes Q4H      PHYSICAL EXAM:    /61   Pulse 56   Temp 98.1 °F (36.7 °C) (Bladder)   Resp 12   Ht 1.803 m (5' 11\")   Wt 70.8 kg (156 lb)   SpO2 100%   BMI 21.76 kg/m²     Intake/Output Summary (Last 24 hours) at 6/23/2025 1211  Last data filed at 6/23/2025 1100  Gross per 24 hour   Intake 3100 ml   Output 6129 
Vascular Surgery Progress Note    Pt is being seen in f/u today regarding L UE ischemia s/p thrombectomy ,     Subjective  Pt s/e.  Pain controlled with current regimen.  Hand feels a little better.  Plan for discharge today to rehab.     Current Medications:    sodium chloride      sodium chloride      sodium chloride        [] PHENobarbital **FOLLOWED BY** [] PHENobarbital **FOLLOWED BY** PHENobarbital, oxyCODONE **OR** oxyCODONE-acetaminophen, sodium chloride flush, sodium chloride, ondansetron **OR** ondansetron, sodium chloride, sodium chloride, potassium chloride **OR** potassium alternative oral replacement **OR** potassium chloride, magnesium sulfate, HYDROmorphone    OLANZapine  15 mg Oral Nightly    apixaban  5 mg Oral BID    sodium chloride flush  10 mL IntraVENous 2 times per day    aspirin  81 mg Oral Daily    thiamine  100 mg IntraVENous Daily    folic acid  1 mg IntraVENous Daily    multivitamin  1 tablet Oral Daily    polyethylene glycol  17 g Oral Daily    senna  1 tablet Oral Nightly    famotidine  20 mg Oral BID      PHYSICAL EXAM:    /68   Pulse 70   Temp 97.7 °F (36.5 °C) (Temporal)   Resp 16   Ht 1.803 m (5' 10.98\")   Wt 69.4 kg (152 lb 14.4 oz)   SpO2 96%   BMI 21.33 kg/m²     Intake/Output Summary (Last 24 hours) at 2025 1101  Last data filed at 2025 0427  Gross per 24 hour   Intake 480 ml   Output 2000 ml   Net -1520 ml        Gen Awake, alert, oriented x3, in no apparent distress   CVS RRR  Resp easy, nonlabored on RA  Abd soft, ndnt  L UE   Wrist incision c/d/i   Radial/ulnar biphasic, palmar weakly biphasic   Hand stable in color   Able to wiggle fingers some   2/5 hand    Forearm incision with staples in place      LABS:    Lab Results   Component Value Date    WBC 7.4 2025    HGB 11.2 (L) 2025    HCT 32.0 (L) 2025     2025    PROTIME 12.8 (H) 2025    INR 1.2 2025    APTT 30.3 2025    K 3.9 
Vascular Surgery Progress Note    Pt is being seen in f/u today regarding L UE ischemia s/p thrombectomy ,     Subjective  Pt s/e.  Pain controlled with current regimen.  Understands no further intervention planned if he loses signals again.     Current Medications:    sodium chloride      sodium chloride      sodium chloride        [] PHENobarbital **FOLLOWED BY** PHENobarbital **FOLLOWED BY** PHENobarbital, oxyCODONE **OR** oxyCODONE-acetaminophen, sodium chloride flush, sodium chloride, ondansetron **OR** ondansetron, sodium chloride flush, sodium chloride, sodium chloride flush, sodium chloride, potassium chloride **OR** potassium alternative oral replacement **OR** potassium chloride, magnesium sulfate, HYDROmorphone    OLANZapine  15 mg Oral Nightly    apixaban  5 mg Oral BID    sodium chloride flush  10 mL IntraVENous 2 times per day    aspirin  81 mg Oral Daily    thiamine  100 mg IntraVENous Daily    folic acid  1 mg IntraVENous Daily    sodium chloride flush  5-40 mL IntraVENous 2 times per day    multivitamin  1 tablet Oral Daily    sodium chloride flush  5-40 mL IntraVENous 2 times per day    polyethylene glycol  17 g Oral Daily    senna  1 tablet Oral Nightly    famotidine  20 mg Oral BID      PHYSICAL EXAM:    /71   Pulse 76   Temp 97.6 °F (36.4 °C) (Temporal)   Resp 18   Ht 1.803 m (5' 11\")   Wt 69.4 kg (152 lb 14.4 oz)   SpO2 100%   BMI 21.33 kg/m²     Intake/Output Summary (Last 24 hours) at 2025 1034  Last data filed at 2025 0710  Gross per 24 hour   Intake 720 ml   Output 1725 ml   Net -1005 ml        Gen sedated  CVS RRR  Resp easy, nonlabored on RA  Abd soft, ndnt  L UE   Steri strips in place to wrist   Radial/ulnar biphasic, palmar weakly biphasic   Hand stable in color   Able to wiggle fingers some   1/5 hand    Forearm incision with staples in place      LABS:    Lab Results   Component Value Date    WBC 8.2 2025    HGB 10.5 (L) 2025    
Vascular Surgery Progress Note    Pt is being seen in f/u today regarding L UE ischemia s/p thrombectomy 6/22, 6/24    Subjective  Pt s/e. \"Does not want bothered.\" Psych consulted due to auditory hallucinations.     Current Medications:    sodium chloride      sodium chloride      sodium chloride      lactated ringers 125 mL/hr at 06/27/25 1211      PHENobarbital **FOLLOWED BY** [START ON 6/28/2025] PHENobarbital **FOLLOWED BY** [START ON 6/30/2025] PHENobarbital, oxyCODONE **OR** oxyCODONE-acetaminophen, sodium chloride flush, sodium chloride, ondansetron **OR** ondansetron, sodium chloride flush, sodium chloride, sodium chloride flush, sodium chloride, potassium chloride **OR** potassium alternative oral replacement **OR** potassium chloride, magnesium sulfate, HYDROmorphone    apixaban  5 mg Oral BID    potassium chloride  40 mEq Oral BID WC    sodium chloride flush  10 mL IntraVENous 2 times per day    aspirin  81 mg Oral Daily    thiamine  100 mg IntraVENous Daily    folic acid  1 mg IntraVENous Daily    insulin lispro  0-4 Units SubCUTAneous Q4H    sodium chloride flush  5-40 mL IntraVENous 2 times per day    multivitamin  1 tablet Oral Daily    sodium chloride flush  5-40 mL IntraVENous 2 times per day    polyethylene glycol  17 g Oral Daily    senna  1 tablet Oral Nightly    famotidine  20 mg Oral BID      PHYSICAL EXAM:    /75   Pulse 75   Temp 98 °F (36.7 °C) (Oral)   Resp 22   Ht 1.803 m (5' 11\")   Wt 71.6 kg (157 lb 13.6 oz)   SpO2 100%   BMI 22.02 kg/m²     Intake/Output Summary (Last 24 hours) at 6/27/2025 1253  Last data filed at 6/27/2025 1200  Gross per 24 hour   Intake 6616.44 ml   Output 4390 ml   Net 2226.44 ml        Gen sedated  CVS RRR  Resp easy, nonlabored on RA  Abd soft, ndnt  L UE   Refused    Hand overall pinker in color   Able to wiggle fingers some   Dressing in place on wrist with no strike through     LABS:    Lab Results   Component Value Date    WBC 7.4 06/27/2025    
Voice mail message left for patient brother to call unit.   
Was paged for concern about signals. Went to evaluate patient, his hand austin appear more edematous than earlier in the day. He states he can still feel his left hand and is able to move his fingers. I was able to find radial, ulnar and palmar signals on the doppler.    Will continue to reassess.    Nidhi Bernard DO  General Surgery Resident, PGY-2  
Wean parameters done on PSV :PS 5 , fio2 0.40 peep5.   06/23/25 1030   Weaning Parameters   Spontaneous Breathing Trial Complete   (REquest by Dr Sipple; attemptSAT spont weaning parameters RNpaused propofol, Rt talking to pt reassuring, and instructing calm regular breathing as he begins awakening  .pt attempting to sit up  after 5 min propofol paused. breathing parameters measured.)   Respiratory Rate Observed 12   Ve 5.1      RSBI 28   NIF -35  (-20 then -35)   VC 1610   Davis Agitation Sedation Scale (RASS) +2  (+2,+3  propofol on pause for 5 minutes)       
06/25/2025    HGB 9.4 (L) 06/25/2025    HCT 27.0 (L) 06/25/2025     06/25/2025    PROTIME 12.8 (H) 06/22/2025    INR 1.2 06/22/2025    APTT 30.7 06/25/2025    K 4.1 06/25/2025    BUN 8 06/25/2025    CREATININE 0.5 (L) 06/25/2025     A/P s/p L UE thrombectomy 6/22, 6/25  Flow improved   Remains at risk for limb loss- if shuts down again, will require amputation of the L UE  Continue neurovascular checks  Continue AC with heparin gtt  Will follow    ASHLIE Briggs - CNP     
SBA  Dynamic Standing:  Kelsey no device Sitting EOB:  SBA  Dynamic Standing:  Kelsey with WW Sitting EOB:  Independent  Dynamic Standing:  Mod Independent with SPC if needed     Pt is A & O x 4  Sensation:  paresthesias to LUE  Edema:  LUE    Vitals:   rest  -125 activity       Therapeutic Exercises:  NA    Patient education  Pt educated on safety    Patient response to education:   Pt verbalized understanding Pt demonstrated skill Pt requires further education in this area   partial partial yes     ASSESSMENT:    Conditions Requiring Skilled Therapeutic Intervention:    [x]Decreased strength     [x]Decreased ROM  [x]Decreased functional mobility  [x]Decreased balance   [x]Decreased endurance   [x]Decreased posture  [x]Decreased sensation  []Decreased coordination   []Decreased vision  [x]Decreased safety awareness   [x]Increased pain       Comments:  Pt agreeable to PT. Pt was noted to be soiled with urine. Pt has limited insight into deficits. Pt requiring intermittent steadying assistance with functional transfers and ambulation. Pt activity limited by fatigue. Pt demonstrates strength, balance, and endurance deficits. Patient would benefit from continued skilled PT to maximize functional mobility independence.   Bed alarm activated    Treatment:  Patient practiced and was instructed in the following treatment:    Bed mobility- verbal cues to facilitate independence  Functional transfers-Verbal cues for proper positioning and sequencing to perform transfers safely with maximum independence.   Gait training-Verbal cues for proper positioning and sequencing using assistive device to maximize functional mobility independence.      PLAN:    Patient is making good progress towards established goals.  Will continue with current POC.      Time in  1327  Time out 1350    Total Treatment Time  23 minutes     CPT codes:  [] Gait training 71487 - minutes  [] Manual therapy 05042 - minutes  [x] Therapeutic 
aquacel w some strikethrough and wrapped in kerlix, palmar signal, radial, ulnar multiphasic,  strength somewhat improved, cap refill same as yesterday, LUE somewhat edematous     LABS    Lab Results   Component Value Date    WBC 7.4 06/27/2025    HGB 9.3 (L) 06/27/2025    HCT 26.7 (L) 06/27/2025     06/27/2025    PROTIME 12.8 (H) 06/22/2025    INR 1.2 06/22/2025    APTT 30.3 06/25/2025    K 4.2 06/27/2025    BUN 5 (L) 06/27/2025    CREATININE 0.6 (L) 06/27/2025       ASSESSMENT/PLAN  59 y.o. male s/p L brachial, radial, ulnar artery thrombectomy 6/23 and 6/24    Continue eliquis.  Serial neurovascular exams  Ok for diet from vascular POV.  Likely ok to dc IVF, hart, transfer out of ICU today vs tmrw. Will discuss w Dr. DREW    Patient still at extremely high risk for limb loss.     Zachary Barnard DO  Surgery Resident PGY-3  6/27/2025  6:30 AM     
place)  CAM-ICU: NT  RASS: 0  Sensation:  paresthesias to LUE  Edema:  LUE    Vitals:  Heart Rate at rest 69 bpm Heart Rate post session 81 bpm   SpO2 at rest 93% SpO2 post session 91%   Blood Pressure at rest 135/58 mmHg Blood Pressure post session 125/58 mmHg     Therapeutic Exercises:  NA    Patient education  Pt educated on safety    Patient response to education:   Pt verbalized understanding Pt demonstrated skill Pt requires further education in this area   no partial yes     ASSESSMENT:    Conditions Requiring Skilled Therapeutic Intervention:    [x]Decreased strength     [x]Decreased ROM  [x]Decreased functional mobility  [x]Decreased balance   [x]Decreased endurance   [x]Decreased posture  [x]Decreased sensation  []Decreased coordination   []Decreased vision  [x]Decreased safety awareness   [x]Increased pain       Comments:  NP reported pt was medically stable.  Pt was in bed upon arrival, agreeable to initial evaluation with significant encouragement.  Pt avoided eye contact and was taciturn throughout session.  Pt kept L elbow extended and exhibited weak L shoulder strength with compensation from trunk during mobility.  Pt completed a few steps to chair with mild unsteadiness but no LOBs.  Pt was left in a chair with all needs met and call light in reach.  All lines remained intact and chair alarm on.  Educated pt on safety and need for assistance when getting out of chair; pt understood and agreed to use call light.  RN aware.    Treatment:  Patient practiced and was instructed in the following treatment:    Bed mobility training - pt given verbal and tactile cues to facilitate proper sequencing and safety during supine>sit as well as provided with physical assistance.  Transfer training - pt was given verbal and tactile cues to facilitate proper hand placement, technique and safety during sit to stand, stand to sit and stand pivot transfers as well as provided with physical assistance.  Gait training- 
signal noted, radial multiphasic, ulnar multiphasic, brachial multiphasic, 3/5  strength, sensation to hand intact, edema present to hand    LABS    Lab Results   Component Value Date    WBC 8.2 06/29/2025    HGB 10.5 (L) 06/29/2025    HCT 30.6 (L) 06/29/2025     06/29/2025    PROTIME 12.8 (H) 06/22/2025    INR 1.2 06/22/2025    APTT 30.3 06/25/2025    K 4.2 06/29/2025    BUN 4 (L) 06/29/2025    CREATININE 0.6 (L) 06/29/2025       ASSESSMENT/PLAN  59 y.o. male s/p L brachial, radial, ulnar artery thrombectomy 6/23 and 6/24    Continue eliquis  Serial neurovascular exams  Continue reg diet  Wean IV pain medications as able  Ambulate  Patient still at extremely high risk for limb loss. Continue to monitor vascular exam closely which remains stable this AM.    Leyla Hamlin, DO  Surgery Resident PGY-4  6/30/2025  9:16 AM       
      Medications:  REVIEWED DAILY    Infusion Medications    sodium chloride      sodium chloride      sodium chloride       Scheduled Medications    OLANZapine  15 mg Oral Nightly    apixaban  5 mg Oral BID    sodium chloride flush  10 mL IntraVENous 2 times per day    aspirin  81 mg Oral Daily    thiamine  100 mg IntraVENous Daily    folic acid  1 mg IntraVENous Daily    sodium chloride flush  5-40 mL IntraVENous 2 times per day    multivitamin  1 tablet Oral Daily    sodium chloride flush  5-40 mL IntraVENous 2 times per day    polyethylene glycol  17 g Oral Daily    senna  1 tablet Oral Nightly    famotidine  20 mg Oral BID     PRN Meds: [] PHENobarbital **FOLLOWED BY** PHENobarbital **FOLLOWED BY** [START ON 2025] PHENobarbital, oxyCODONE **OR** oxyCODONE-acetaminophen, sodium chloride flush, sodium chloride, ondansetron **OR** ondansetron, sodium chloride flush, sodium chloride, sodium chloride flush, sodium chloride, potassium chloride **OR** potassium alternative oral replacement **OR** potassium chloride, magnesium sulfate, HYDROmorphone    Labs:     Recent Labs     25  0412 25  0549 25  0406   WBC 7.4 8.3 8.2   HGB 9.3* 10.5* 10.5*   HCT 26.7* 30.0* 30.6*    305 331       Recent Labs     25  0412 25  0450 25  0406    137 140   K 4.2 4.4 4.2    103 107   CO2 27 24 24   BUN 5* 5* 4*   CREATININE 0.6* 0.5* 0.6*   CALCIUM 8.2* 8.6 8.7   PHOS 3.3 3.1 3.5       Recent Labs     25  0412 25  0450 25  0406   ALKPHOS 46 57 62   ALT 5 8 13   AST 15 25 27   BILITOT <0.2 0.3 0.2       No results for input(s): \"INR\" in the last 72 hours.    No results for input(s): \"CKTOTAL\", \"TROPONINI\" in the last 72 hours.    Chronic labs:    Lab Results   Component Value Date    CHOL 135 2025    TRIG 60 2025    HDL 51 2025    TSH 1.56 2025    PSA 0.70 2021    INR 1.2 2025    LABA1C 5.0 2025       Radiology: 
0-4 Units SubCUTAneous Q4H    sodium chloride flush  5-40 mL IntraVENous 2 times per day    multivitamin  1 tablet Oral Daily    sodium chloride flush  5-40 mL IntraVENous 2 times per day    polyethylene glycol  17 g Oral Daily    senna  1 tablet Oral Nightly    famotidine  20 mg Oral BID     PRN Meds: oxyCODONE **OR** oxyCODONE-acetaminophen, heparin (porcine), heparin (porcine), sodium chloride flush, sodium chloride, ondansetron **OR** ondansetron, sodium chloride flush, sodium chloride, sodium chloride flush, sodium chloride, potassium chloride **OR** potassium alternative oral replacement **OR** potassium chloride, magnesium sulfate, polyethylene glycol, HYDROmorphone    Labs:     Recent Labs     06/24/25  0427 06/24/25  1723 06/25/25  0523   WBC 6.8 7.1 5.2   HGB 10.0* 10.7* 9.4*   HCT 28.9* 30.2* 27.0*    233 222       Recent Labs     06/23/25  0612 06/24/25  0427 06/25/25  0523    141 139   K 4.2 3.9 4.1    105 104   CO2 23 30* 28   BUN 3* 9 8   CREATININE 0.4* 0.5* 0.5*   CALCIUM 8.8 8.7 8.4*   PHOS 3.0 2.9 3.5       Recent Labs     06/23/25  0612 06/24/25  0427 06/25/25  0523   ALKPHOS 58 55 50   ALT 8 8 7   AST 18 23 18   BILITOT 0.4 0.3 0.3       Recent Labs     06/22/25  1815   INR 1.2       Recent Labs     06/22/25  2348 06/23/25  0409   CKTOTAL 110 203*       Chronic labs:    Lab Results   Component Value Date    CHOL 135 02/27/2025    TRIG 60 02/27/2025    HDL 51 02/27/2025    TSH 1.56 05/12/2025    PSA 0.70 09/27/2021    INR 1.2 06/22/2025    LABA1C 5.0 02/27/2025       Radiology: REVIEWED DAILY    +++++++++++++++++++++++++++++++++++++++++++++++++  Erickson Mcfarland MD   Hospitalist  [place], [place]  +++++++++++++++++++++++++++++++++++++++++++++++++  NOTE: This report was transcribed using voice recognition software. Every effort was made to ensure accuracy; however, inadvertent computerized transcription errors may be present.       
intact. Pt instructed on use of call light for assistance and fall prevention .    Patient presents with decreased ROM/strength,activity tolerance, dynamic balance, functional mobility and pain limiting completion of ADLs and safety.  Pt can benefit from continued skilled OT to increase safety, functional independence and quality of life.       Time In:1310             Time Out: 1023         Total Treatment time: 13   Min Units   OT Eval Low 40374     OT Eval Medium 17125     OT Eval High 56303     OT Re-Eval 81799     Therapeutic Ex 99916     Therapeutic Activities 95836 13 1   ADL/Self Care 69454     Orthotic Management 53143     Neuro Re-Ed 89852     Non-Billable Time         Acacia Wilder, OTR/L [id number]

## 2025-07-01 NOTE — PLAN OF CARE
Problem: Chronic Conditions and Co-morbidities  Goal: Patient's chronic conditions and co-morbidity symptoms are monitored and maintained or improved  Outcome: Progressing  Flowsheets (Taken 6/26/2025 1600)  Care Plan - Patient's Chronic Conditions and Co-Morbidity Symptoms are Monitored and Maintained or Improved:   Monitor and assess patient's chronic conditions and comorbid symptoms for stability, deterioration, or improvement   Update acute care plan with appropriate goals if chronic or comorbid symptoms are exacerbated and prevent overall improvement and discharge   Collaborate with multidisciplinary team to address chronic and comorbid conditions and prevent exacerbation or deterioration     Problem: Discharge Planning  Goal: Discharge to home or other facility with appropriate resources  Outcome: Progressing  Flowsheets (Taken 6/26/2025 1600)  Discharge to home or other facility with appropriate resources:   Identify barriers to discharge with patient and caregiver   Arrange for needed discharge resources and transportation as appropriate   Identify discharge learning needs (meds, wound care, etc)     
  Problem: Infection - Adult  Goal: Absence of infection at discharge  Note: Monitor inc site and skin adm antibiotics as ordered     Problem: Cardiovascular - Adult  Goal: Maintains optimal cardiac output and hemodynamic stability  Note: Monitor vital signs adm meds as ordered     
  Problem: Pain  Goal: Verbalizes/displays adequate comfort level or baseline comfort level  Outcome: Progressing     Problem: Safety - Medical Restraint  Goal: Remains free of injury from restraints (Restraint for Interference with Medical Device)  Description: INTERVENTIONS:  1. Determine that other, less restrictive measures have been tried or would not be effective before applying the restraint  2. Evaluate the patient's condition at the time of restraint application  3. Inform patient/family regarding the reason for restraint  4. Q2H: Monitor safety, psychosocial status, comfort, nutrition and hydration  6/23/2025 0140 by Dmitriy Mendieta RN  Outcome: Progressing  Flowsheets (Taken 6/23/2025 0140)  Remains free of injury from restraints (restraint for interference with medical device):   Determine that other, less restrictive measures have been tried or would not be effective before applying the restraint   Every 2 hours: Monitor safety, psychosocial status, comfort, nutrition and hydration   Inform patient/family regarding the reason for restraint   Evaluate the patient's condition at the time of restraint application     
  Problem: Safety - Adult  Goal: Free from fall injury  6/25/2025 2233 by Oneyda Ybarra, RN  Outcome: Progressing     Problem: Discharge Planning  Goal: Discharge to home or other facility with appropriate resources  6/25/2025 2233 by Oneyda Ybarra, RN  Outcome: Progressing     Problem: Pain  Goal: Verbalizes/displays adequate comfort level or baseline comfort level  6/25/2025 2233 by Oneyda Ybarra, RN  Outcome: Progressing     
  Problem: Safety - Adult  Goal: Free from fall injury  6/26/2025 2130 by Rehana Adam RN  Outcome: Progressing  6/26/2025 1634 by Maura Keys RN  Outcome: Progressing     Problem: Chronic Conditions and Co-morbidities  Goal: Patient's chronic conditions and co-morbidity symptoms are monitored and maintained or improved  6/26/2025 2130 by Rehana Adam RN  Outcome: Progressing  6/26/2025 1634 by Maura Keys RN  Outcome: Progressing  Flowsheets (Taken 6/26/2025 1600)  Care Plan - Patient's Chronic Conditions and Co-Morbidity Symptoms are Monitored and Maintained or Improved:   Monitor and assess patient's chronic conditions and comorbid symptoms for stability, deterioration, or improvement   Update acute care plan with appropriate goals if chronic or comorbid symptoms are exacerbated and prevent overall improvement and discharge   Collaborate with multidisciplinary team to address chronic and comorbid conditions and prevent exacerbation or deterioration     Problem: Discharge Planning  Goal: Discharge to home or other facility with appropriate resources  6/26/2025 2130 by Rehana Adam RN  Outcome: Progressing  6/26/2025 1634 by Maura Keys RN  Outcome: Progressing  Flowsheets (Taken 6/26/2025 1600)  Discharge to home or other facility with appropriate resources:   Identify barriers to discharge with patient and caregiver   Arrange for needed discharge resources and transportation as appropriate   Identify discharge learning needs (meds, wound care, etc)     Problem: Pain  Goal: Verbalizes/displays adequate comfort level or baseline comfort level  6/26/2025 2130 by Rehana Adam RN  Outcome: Progressing  6/26/2025 1634 by Maura Keys RN  Outcome: Progressing     Problem: Skin/Tissue Integrity  Goal: Skin integrity remains intact  Description: 1.  Monitor for areas of redness and/or skin breakdown  2.  Assess vascular access sites hourly  3.  Every 4-6 hours minimum:  Change oxygen 
  Problem: Safety - Adult  Goal: Free from fall injury  6/27/2025 0851 by Kassy Puckett RN  Outcome: Progressing  6/26/2025 2130 by Rehana Adam RN  Outcome: Progressing     Problem: Pain  Goal: Verbalizes/displays adequate comfort level or baseline comfort level  6/27/2025 0851 by Kassy Puckett RN  Outcome: Progressing  6/26/2025 2130 by Rehana Adam RN  Outcome: Progressing     Problem: Skin/Tissue Integrity  Goal: Skin integrity remains intact  Description: 1.  Monitor for areas of redness and/or skin breakdown  2.  Assess vascular access sites hourly  3.  Every 4-6 hours minimum:  Change oxygen saturation probe site  4.  Every 4-6 hours:  If on nasal continuous positive airway pressure, respiratory therapy assess nares and determine need for appliance change or resting period  6/27/2025 0851 by Kassy Puckett RN  Outcome: Progressing  6/26/2025 2130 by Rehana Adam RN  Outcome: Progressing     
  Problem: Safety - Adult  Goal: Free from fall injury  6/30/2025 0149 by Ashley Nichols RN  Outcome: Progressing  6/29/2025 1221 by Tori Garza RN  Outcome: Progressing     Problem: Chronic Conditions and Co-morbidities  Goal: Patient's chronic conditions and co-morbidity symptoms are monitored and maintained or improved  6/30/2025 0149 by Ashley Nichols RN  Outcome: Progressing  6/29/2025 1221 by Tori Garza RN  Outcome: Progressing     Problem: Discharge Planning  Goal: Discharge to home or other facility with appropriate resources  6/30/2025 0149 by Ashley Nichols RN  Outcome: Progressing  6/29/2025 1221 by oTri Garza RN  Outcome: Progressing     Problem: Pain  Goal: Verbalizes/displays adequate comfort level or baseline comfort level  6/30/2025 0149 by Ashley Nichols RN  Outcome: Progressing  6/29/2025 1221 by Tori Garza RN  Outcome: Progressing     Problem: Skin/Tissue Integrity  Goal: Skin integrity remains intact  Description: 1.  Monitor for areas of redness and/or skin breakdown  2.  Assess vascular access sites hourly  3.  Every 4-6 hours minimum:  Change oxygen saturation probe site  4.  Every 4-6 hours:  If on nasal continuous positive airway pressure, respiratory therapy assess nares and determine need for appliance change or resting period  6/30/2025 0149 by Ashley Nichols RN  Outcome: Progressing  6/29/2025 1221 by Tori Garza RN  Outcome: Progressing     Problem: Skin/Tissue Integrity - Adult  Goal: Skin integrity remains intact  Description: 1.  Monitor for areas of redness and/or skin breakdown  2.  Assess vascular access sites hourly  3.  Every 4-6 hours minimum:  Change oxygen saturation probe site  4.  Every 4-6 hours:  If on nasal continuous positive airway pressure, respiratory therapy assess nares and determine need for appliance change or resting period  6/30/2025 0149 by Ashley Nichols RN  Outcome: Progressing  6/29/2025 1221 by Tori Garza RN  Outcome: 
  Problem: Safety - Adult  Goal: Free from fall injury  6/30/2025 1006 by Kandi Rubio, RN  Outcome: Progressing     Problem: Skin/Tissue Integrity  Goal: Skin integrity remains intact  Description: 1.  Monitor for areas of redness and/or skin breakdown  2.  Assess vascular access sites hourly  3.  Every 4-6 hours minimum:  Change oxygen saturation probe site  4.  Every 4-6 hours:  If on nasal continuous positive airway pressure, respiratory therapy assess nares and determine need for appliance change or resting period  6/30/2025 1006 by Kandi Rubio, RN  Outcome: Progressing     Problem: Neurosensory - Adult  Goal: Achieves stable or improved neurological status  Outcome: Progressing     Problem: Neurosensory - Adult  Goal: Achieves maximal functionality and self care  Outcome: Progressing     Problem: Cardiovascular - Adult  Goal: Maintains optimal cardiac output and hemodynamic stability  Outcome: Progressing     Problem: Cardiovascular - Adult  Goal: Absence of cardiac dysrhythmias or at baseline  Outcome: Progressing     Problem: Skin/Tissue Integrity - Adult  Goal: Skin integrity remains intact  Description: 1.  Monitor for areas of redness and/or skin breakdown  2.  Assess vascular access sites hourly  3.  Every 4-6 hours minimum:  Change oxygen saturation probe site  4.  Every 4-6 hours:  If on nasal continuous positive airway pressure, respiratory therapy assess nares and determine need for appliance change or resting period  6/30/2025 1006 by Kandi Rubio, RN  Outcome: Progressing     Problem: Skin/Tissue Integrity - Adult  Goal: Oral mucous membranes remain intact  Outcome: Progressing     
  Problem: Safety - Adult  Goal: Free from fall injury  Outcome: Progressing     Problem: Chronic Conditions and Co-morbidities  Goal: Patient's chronic conditions and co-morbidity symptoms are monitored and maintained or improved  6/23/2025 2056 by Humberto Thibodeaux RN  Outcome: Progressing  6/23/2025 1114 by Liat Muse RN  Outcome: Progressing     Problem: Discharge Planning  Goal: Discharge to home or other facility with appropriate resources  Outcome: Progressing     Problem: Pain  Goal: Verbalizes/displays adequate comfort level or baseline comfort level  6/23/2025 2056 by Humberto Thibodeaux RN  Outcome: Progressing  6/23/2025 1114 by Liat Muse RN  Outcome: Progressing     Problem: Safety - Medical Restraint  Goal: Remains free of injury from restraints (Restraint for Interference with Medical Device)  Description: INTERVENTIONS:  1. Determine that other, less restrictive measures have been tried or would not be effective before applying the restraint  2. Evaluate the patient's condition at the time of restraint application  3. Inform patient/family regarding the reason for restraint  4. Q2H: Monitor safety, psychosocial status, comfort, nutrition and hydration  Outcome: Progressing  Flowsheets (Taken 6/23/2025 1200 by Jennifer Humphreys RN)  Remains free of injury from restraints (restraint for interference with medical device):   Determine that other, less restrictive measures have been tried or would not be effective before applying the restraint   Evaluate the patient's condition at the time of restraint application   Inform patient/family regarding the reason for restraint   Every 2 hours: Monitor safety, psychosocial status, comfort, nutrition and hydration     Problem: Skin/Tissue Integrity  Goal: Skin integrity remains intact  Description: 1.  Monitor for areas of redness and/or skin breakdown  2.  Assess vascular access sites hourly  3.  Every 4-6 hours minimum:  Change oxygen saturation probe site  4.  
  Problem: Safety - Adult  Goal: Free from fall injury  Outcome: Progressing     Problem: Chronic Conditions and Co-morbidities  Goal: Patient's chronic conditions and co-morbidity symptoms are monitored and maintained or improved  Outcome: Progressing     Problem: Discharge Planning  Goal: Discharge to home or other facility with appropriate resources  Outcome: Progressing     Problem: Pain  Goal: Verbalizes/displays adequate comfort level or baseline comfort level  Outcome: Progressing     Problem: Skin/Tissue Integrity  Goal: Skin integrity remains intact  Description: 1.  Monitor for areas of redness and/or skin breakdown  2.  Assess vascular access sites hourly  3.  Every 4-6 hours minimum:  Change oxygen saturation probe site  4.  Every 4-6 hours:  If on nasal continuous positive airway pressure, respiratory therapy assess nares and determine need for appliance change or resting period  Outcome: Progressing     Problem: Neurosensory - Adult  Goal: Achieves stable or improved neurological status  Outcome: Progressing  Goal: Achieves maximal functionality and self care  Outcome: Progressing     Problem: Cardiovascular - Adult  Goal: Maintains optimal cardiac output and hemodynamic stability  6/28/2025 2034 by Jayson Malagon, RN  Outcome: Progressing  6/28/2025 0902 by Freida Lake, RN  Flowsheets (Taken 6/26/2025 1600 by Maura Keys, RN)  Maintains optimal cardiac output and hemodynamic stability:   Monitor blood pressure and heart rate   Monitor urine output and notify Licensed Independent Practitioner for values outside of normal range   Assess for signs of decreased cardiac output  Note: Monitor vital signs adm meds as ordered  Goal: Absence of cardiac dysrhythmias or at baseline  Outcome: Progressing     Problem: Genitourinary - Adult  Goal: Absence of urinary retention  Outcome: Progressing  Goal: Urinary catheter remains patent  Outcome: Progressing     
  Problem: Safety - Adult  Goal: Free from fall injury  Outcome: Progressing     Problem: Chronic Conditions and Co-morbidities  Goal: Patient's chronic conditions and co-morbidity symptoms are monitored and maintained or improved  Outcome: Progressing     Problem: Discharge Planning  Goal: Discharge to home or other facility with appropriate resources  Outcome: Progressing     Problem: Pain  Goal: Verbalizes/displays adequate comfort level or baseline comfort level  Outcome: Progressing     Problem: Skin/Tissue Integrity  Goal: Skin integrity remains intact  Description: 1.  Monitor for areas of redness and/or skin breakdown  2.  Assess vascular access sites hourly  3.  Every 4-6 hours minimum:  Change oxygen saturation probe site  4.  Every 4-6 hours:  If on nasal continuous positive airway pressure, respiratory therapy assess nares and determine need for appliance change or resting period  Outcome: Progressing     Problem: Neurosensory - Adult  Goal: Achieves stable or improved neurological status  Outcome: Progressing  Goal: Achieves maximal functionality and self care  Outcome: Progressing     Problem: Cardiovascular - Adult  Goal: Maintains optimal cardiac output and hemodynamic stability  Outcome: Progressing  Goal: Absence of cardiac dysrhythmias or at baseline  Outcome: Progressing     Problem: Skin/Tissue Integrity - Adult  Goal: Skin integrity remains intact  Description: 1.  Monitor for areas of redness and/or skin breakdown  2.  Assess vascular access sites hourly  3.  Every 4-6 hours minimum:  Change oxygen saturation probe site  4.  Every 4-6 hours:  If on nasal continuous positive airway pressure, respiratory therapy assess nares and determine need for appliance change or resting period  Outcome: Progressing  Goal: Incisions, wounds, or drain sites healing without S/S of infection  Outcome: Progressing  Goal: Oral mucous membranes remain intact  Outcome: Progressing     Problem: Genitourinary - 
  Problem: Safety - Medical Restraint  Goal: Remains free of injury from restraints (Restraint for Interference with Medical Device)  Description: INTERVENTIONS:  1. Determine that other, less restrictive measures have been tried or would not be effective before applying the restraint  2. Evaluate the patient's condition at the time of restraint application  3. Inform patient/family regarding the reason for restraint  4. Q2H: Monitor safety, psychosocial status, comfort, nutrition and hydration  Outcome: Progressing  Flowsheets (Taken 6/23/2025 0140)  Remains free of injury from restraints (restraint for interference with medical device):   Determine that other, less restrictive measures have been tried or would not be effective before applying the restraint   Every 2 hours: Monitor safety, psychosocial status, comfort, nutrition and hydration   Inform patient/family regarding the reason for restraint   Evaluate the patient's condition at the time of restraint application     
Progressing  Flowsheets (Taken 6/24/2025 1900 by Tuyet Dominguez, RN)  Absence of fever/infection during anticipated neutropenic period: Monitor white blood cell count     Problem: Metabolic/Fluid and Electrolytes - Adult  Goal: Electrolytes maintained within normal limits  Outcome: Progressing  Goal: Hemodynamic stability and optimal renal function maintained  Outcome: Progressing  Goal: Glucose maintained within prescribed range  Outcome: Progressing     Problem: Hematologic - Adult  Goal: Maintains hematologic stability  Outcome: Progressing     Problem: ABCDS Injury Assessment  Goal: Absence of physical injury  Outcome: Progressing     Problem: Safety - Medical Restraint  Goal: Remains free of injury from restraints (Restraint for Interference with Medical Device)  Outcome: Completed     Problem: Seizure Precautions  Goal: Remains free of injury related to seizures activity  Outcome: Completed     Problem: Neurosensory - Adult  Goal: Absence of seizures  Outcome: Completed     Problem: Respiratory - Adult  Goal: Achieves optimal ventilation and oxygenation  Outcome: Completed     
changes in neurological status  Goal: Achieves maximal functionality and self care  Outcome: Progressing  Flowsheets (Taken 6/30/2025 2145)  Achieves maximal functionality and self care: Encourage and assist patient to increase activity and self care with guidance from physical therapy/occupational therapy     Problem: Cardiovascular - Adult  Goal: Maintains optimal cardiac output and hemodynamic stability  Outcome: Progressing  Flowsheets (Taken 6/30/2025 2145)  Maintains optimal cardiac output and hemodynamic stability: Monitor blood pressure and heart rate  Goal: Absence of cardiac dysrhythmias or at baseline  Outcome: Progressing  Flowsheets (Taken 6/30/2025 2145)  Absence of cardiac dysrhythmias or at baseline: Monitor cardiac rate and rhythm     Problem: Skin/Tissue Integrity - Adult  Goal: Skin integrity remains intact  Description: 1.  Monitor for areas of redness and/or skin breakdown  2.  Assess vascular access sites hourly  3.  Every 4-6 hours minimum:  Change oxygen saturation probe site  4.  Every 4-6 hours:  If on nasal continuous positive airway pressure, respiratory therapy assess nares and determine need for appliance change or resting period  Outcome: Progressing  Flowsheets (Taken 6/30/2025 2145)  Skin Integrity Remains Intact: Monitor for areas of redness and/or skin breakdown  Goal: Incisions, wounds, or drain sites healing without S/S of infection  Outcome: Progressing  Flowsheets (Taken 6/30/2025 2145)  Incisions, Wounds, or Drain Sites Healing Without Sign and Symptoms of Infection: TWICE DAILY: Assess and document skin integrity  Goal: Oral mucous membranes remain intact  Outcome: Progressing  Flowsheets (Taken 6/30/2025 2145)  Oral Mucous Membranes Remain Intact: Assess oral mucosa and hygiene practices     Problem: Genitourinary - Adult  Goal: Absence of urinary retention  Outcome: Progressing  Flowsheets (Taken 6/30/2025 2145)  Absence of urinary retention: Assess patient’s ability to 
Assess and monitor for signs and symptoms of infection  Goal: Absence of fever/infection during anticipated neutropenic period  7/1/2025 1121 by Julieth Blanc RN  Outcome: Progressing  7/1/2025 0108 by Howard Terrell RN  Outcome: Progressing  Flowsheets (Taken 6/30/2025 2145)  Absence of fever/infection during anticipated neutropenic period: Monitor white blood cell count     Problem: Metabolic/Fluid and Electrolytes - Adult  Goal: Electrolytes maintained within normal limits  7/1/2025 1121 by Julieth Blanc RN  Outcome: Progressing  7/1/2025 0108 by Howard Terrell RN  Outcome: Progressing  Flowsheets (Taken 6/30/2025 2145)  Electrolytes maintained within normal limits: Monitor labs and assess patient for signs and symptoms of electrolyte imbalances  Goal: Hemodynamic stability and optimal renal function maintained  7/1/2025 1121 by Julieth Blanc RN  Outcome: Progressing  7/1/2025 0108 by Howard Terrell RN  Outcome: Progressing  Flowsheets (Taken 6/30/2025 2145)  Hemodynamic stability and optimal renal function maintained: Monitor labs and assess for signs and symptoms of volume excess or deficit  Goal: Glucose maintained within prescribed range  7/1/2025 1121 by Julieth Blanc RN  Outcome: Progressing  7/1/2025 0108 by Howard Terrell RN  Outcome: Progressing     Problem: Hematologic - Adult  Goal: Maintains hematologic stability  7/1/2025 1121 by Julieth Blanc RN  Outcome: Progressing  7/1/2025 0108 by Howard Terrell RN  Outcome: Progressing  Flowsheets (Taken 6/30/2025 2145)  Maintains hematologic stability: Assess for signs and symptoms of bleeding or hemorrhage     Problem: ABCDS Injury Assessment  Goal: Absence of physical injury  7/1/2025 1121 by Julieth Blanc RN  Outcome: Progressing  7/1/2025 0108 by Howard Terrell RN  Outcome: Progressing  Flowsheets (Taken 6/30/2025 2145)  Absence of Physical Injury: Implement safety measures based on patient assessment     
Electrolytes maintained within normal limits  6/25/2025 0835 by Tuyet Dominguez RN  Outcome: Progressing  Flowsheets (Taken 6/25/2025 0800)  Electrolytes maintained within normal limits: Monitor labs and assess patient for signs and symptoms of electrolyte imbalances  6/25/2025 0112 by Magaly Hernandez RN  Outcome: Progressing  Goal: Hemodynamic stability and optimal renal function maintained  6/25/2025 0835 by Tuyet Dominguez RN  Outcome: Progressing  Flowsheets (Taken 6/25/2025 0800)  Hemodynamic stability and optimal renal function maintained: Monitor labs and assess for signs and symptoms of volume excess or deficit  6/25/2025 0112 by Magaly Hernandez RN  Outcome: Progressing  Goal: Glucose maintained within prescribed range  6/25/2025 0835 by Tuyet Dominguez RN  Outcome: Progressing  Flowsheets (Taken 6/25/2025 0800)  Glucose maintained within prescribed range: Monitor blood glucose as ordered  6/25/2025 0112 by Magaly Hernandez RN  Outcome: Progressing     Problem: Hematologic - Adult  Goal: Maintains hematologic stability  6/25/2025 0835 by Tuyet Dominguez RN  Outcome: Progressing  Flowsheets (Taken 6/25/2025 0800)  Maintains hematologic stability: Assess for signs and symptoms of bleeding or hemorrhage  6/25/2025 0112 by Magaly Hernandez RN  Outcome: Progressing     Problem: ABCDS Injury Assessment  Goal: Absence of physical injury  6/25/2025 0835 by Tuyet Dominguez RN  Outcome: Progressing  6/25/2025 0112 by Magaly Hernandez RN  Outcome: Progressing     
insertion sites i.e., indwelling lines, tubes and drains   Monitor lab/diagnostic results   Administer medications as ordered   Instruct and encourage patient and family to use good hand hygiene technique   Identify and instruct in appropriate isolation precautions for identified infection/condition  Goal: Absence of infection during hospitalization  6/24/2025 0912 by Tuyet Dominguez RN  Outcome: Progressing  Flowsheets (Taken 6/24/2025 0800)  Absence of infection during hospitalization: Assess and monitor for signs and symptoms of infection  6/23/2025 2056 by Humberto Thibodeaux RN  Outcome: Progressing  Flowsheets (Taken 6/23/2025 2056)  Absence of infection during hospitalization:   Assess and monitor for signs and symptoms of infection   Monitor lab/diagnostic results  Goal: Absence of fever/infection during anticipated neutropenic period  6/24/2025 0912 by Tuyet Dominguez RN  Outcome: Progressing  Flowsheets (Taken 6/24/2025 0800)  Absence of fever/infection during anticipated neutropenic period: Monitor white blood cell count  6/23/2025 2056 by Humberto Thibodeaux RN  Outcome: Progressing  Flowsheets (Taken 6/23/2025 2056)  Absence of fever/infection during anticipated neutropenic period: Monitor white blood cell count     Problem: Metabolic/Fluid and Electrolytes - Adult  Goal: Electrolytes maintained within normal limits  6/24/2025 0912 by Tuyet Dominguez RN  Outcome: Progressing  Flowsheets (Taken 6/24/2025 0800)  Electrolytes maintained within normal limits: Monitor labs and assess patient for signs and symptoms of electrolyte imbalances  6/23/2025 2056 by Humberto Thibodeaux RN  Outcome: Progressing  Flowsheets (Taken 6/23/2025 2056)  Electrolytes maintained within normal limits:   Monitor labs and assess patient for signs and symptoms of electrolyte imbalances   Administer electrolyte replacement as ordered   Monitor response to electrolyte replacements, including repeat lab results as appropriate  Goal:

## 2025-07-01 NOTE — CARE COORDINATION
6/23 Care Coordination: Pt presenting to the ED for hand discoloration.  Patient recently had a procedure done at outside facility.  He states he had a stent placed for some issues with his artery in his arm.  He was supposed to leave with anticoagulation.  However, he states he left AMA.  He did not take his anticoagulation with him.  It is reported by EMS his wife called as she noticed his arm was discolored.Dx Ischemia of left upper extremity. Pt was also reportedly intoxicated in [facility] [place].  Vascular consult, Pt to OR for L UE thrombectomy, possible angiogram, possible fasciotomy. Pt admit to [facility]IC post op. Remains sedated and on the vent. With Current status unclear on discharge needs.  CM/SW will continue to follow for discharge planning.   John VALIENTE,RN-CV-BC  [phone]   
6/24 Care Coordination:Pt remains in CVIC. POD#2 S/P L brachial, radial, ulnar artery thrombectomy. Pt now extubated.   CM spoke with pt in his room. Introduced self and role of CM. PTA pt states he stayed in many places.  From chart review he has stayed w/his sister after his last d/c. He then left there & was staying @ [facility]. He notes he is not going back there.  He receives 981/mo SS & manages this himself. He does not receive FS. Pt reports address listed in chart, [address] [place] [address], was an apartment he was renting but had to leave there d/t bed bugs. Pt also [facility] a few months ago but left there d/t drugs being prevalent. He doesn't at this time know where he will stay. Maybe his sister or brother. Pt may be able to go to [facility], will need rehab on Lt arm/hand. Pt agrees and was ok with referral to [facility].Will get PT/OT. Pt does use a cane with ambulation Pt has no PCP.Did see  Dr. Barney but has not gone to him for 3 yrs. If does not go to [facility] will need set up with PCP. Pt does use  Dr. Barney but has not gone to him for 3 yrs. CM will place referral with [facility] via [facility].  CM/SW will continue to follow for discharge planning.   John VALIENTE,RN--BC  [phone]   
6/25 Care Coordination:Pt remains in CVIC. S/P  L brachial, radial, ulnar artery thrombectomy 6/23 and 6/24. Had to return to OR yesterday.  Continue heparin gtt. Per Vascular patient still at extremely high risk for limb loss. Discharge plan remains [facility]. See previous CM note from 6/24. CM also called pt's sister Nenita, left VM. Await her return call.RN states family was here last pm.  CM/SW will continue to follow for discharge planning.   John VALIENTE,RN-CV-BC  [phone]     6/25 Care Coordination: CM spoke with his sister Nenita. Discussed discharge plan. She states when discharged he can stay with her or even after rehab can stay with her. Aliyah is following for [facility]. CM/SW will continue to follow for discharge planning.   John VALIENTE,RN-CV-BC  [phone]   
6/27 Care Coordination:Pt remains in CVIC.S/P L brachial, radial, ulnar artery thrombectomy 6/23 and 6/24. Ok for diet from vascular.  transfer out of ICU today vs tomorrow. Patient still at extremely high risk for limb loss. PT/OT AM-PAC today 16/24. Discharge plan remains Home with Sister or [facility] if needs JOSE. CM spoke with pt today. Discussed discharge plan. Pt stating he is having increase in voices talking to him. Does have e psych hx, schizophrenia. States in past  he used to go to [facility] for case management but they closed his case d/t no shows. States he is not on any of his meds.  CM discussed with pt's RN, she will f/u with Attending about psych consult.  CM/SW will continue to follow for discharge planning.   John GUIDON,RN--BC  [phone]   
Care Coordination  Noted discharge order. Spoke to Aliyah at [facility]. The patient is precert pending skilled. Per Aliyah the liason she will accept him today. Passr /envelope done. Called [facility] to see if they have a time to transport the patient. Helen to transport the patient at 1230 pm today by Wheelchair van. Called the patients family to notify them.        1250 pm   Helen is here to take the patient to [facility] and the patient is declining to go to [facility]. He wants to go home instead. Called the patients brother Remy Hurley and he will be here later to come pick him up and take him home.   
Transition of care update: Plan is JOSE stay at [facility]. Ambulette form and EXEMPT completed. Transport envelope was placed with pt's soft chart. Covering cm on 5WE will check for discharge.   
Transition of care update: Received pt in transfer from [facility]. LOS: day 8. L UE thrombectomy on 06/22, 06/25. Eliquis 5mg 2 times daily. OK for discharge per vascular surgery. Met with pt in room. On RA. Discussed discharge planning- home with his sister or [facility](kassandra). Pt's response was \"I don't know.\" Will ask for updated pt/ot notes to see if pt is appropriate to return home. Cm/sw will follow.     1505  Therapy updates complete. Met with pt in room. Plan is [facility]). Spoke with Aliyah with [facility] and they will do their own authorization with pt's insurance.       
discharge:              Potential DME:    Patient expects to discharge to:    Plan for transportation at discharge:      Financial    Payor: [facility] MEDICARE / Plan: [employer] DUAL COMPLETE / Product Type: *No Product type* /     Does insurance require precert for SNF: Yes    Potential assistance Purchasing Medications:    Meds-to-Beds request:        [address] - [address] [facility] [phone] - F [phone]  [address] [place] [id number]  Phone: [phone] Fax: [phone]      Notes:    Factors facilitating achievement of predicted outcomes: Family support    Barriers to discharge: Limited family support, No caregiver support, and Upper extremity weakness    Additional Case Management Notes:       The Plan for Transition of Care is related to the following treatment goals of Ischemia of left upper extremity [I99.8]    IF APPLICABLE: The Patient and/or patient representative Colton and his family were provided with a choice of provider and agrees with the discharge plan. Freedom of choice list with basic dialogue that supports the patient's individualized plan of care/goals and shares the quality data associated with the providers was provided to:     Patient Representative Name:       The Patient and/or Patient Representative Agree with the Discharge Plan?      John Mccabe RN  Case Management Department

## 2025-07-01 NOTE — DISCHARGE SUMMARY
Hospital Medicine Discharge Summary    Patient ID: Colton Hurley      Patient's PCP: No primary care provider on file.    Admit Date: 6/22/2025     Discharge Date:   07/01/2025    Admitting Physician: Sanket Mendoza MD     Discharge Physician: Erickson Mcfarland MD     Discharge Diagnoses:       Active Hospital Problems    Diagnosis Date Noted    Alcohol abuse [F10.10] 06/27/2025    Schizophrenia (HCC) [F20.9] 06/27/2025    Hypokalemia [E87.6] 06/23/2025    Alcoholic intoxication with complication [F10.929] 06/23/2025    Ischemia of left upper extremity [I99.8] 06/22/2025       The patient was seen and examined on day of discharge and this discharge summary is in conjunction with any daily progress note from day of discharge.    Hospital Course:     58 yo male w/ hx of PTE (supposed to be on Eliquis, not complaint), CVA s/p mechanical thrombectomy, carotid stenosis s/p carotid stenting, EtOH use disorder who p/w LUE discoloration. History / HPI obtained from nursing staff and chart review as pt was seen post-op and remains intubated and on the vent. Pt recently had LUE arterial thrombectomy done at [facility] and subsequently left AMA. Sister noted that pt's left hand was purple and discolored and had pt come to [facility] ED where pt was found to have complete occlusion of left subclavian artery. Pt was also reportedly intoxicated in [facility] ED and was not able to provide consent for Vascular Surgery to take pt to the OR. As such, pt was emergently taken due to critical limb ischemia, and left brachial/radial/ulnar artery thrombectomy was performed. Admitted to CVICU for further management.     repeat thrombectomy OR on 06/25 after losing signals   06/26 switched heparin to Eliquis    Consulted psychiatry for auditory hallucination started on Zyprexa    Transferred out of the surgical ICU06/29    Patient has extremely high risk for limb loss continue to monitor vascular exam closely     Patient is being discharged back to

## 2025-07-03 ENCOUNTER — HOSPITAL ENCOUNTER (INPATIENT)
Age: 60
LOS: 11 days | Discharge: NURSING, REHAB OR LONG-TERM CARE | DRG: 885 | End: 2025-07-14
Attending: EMERGENCY MEDICINE | Admitting: PSYCHIATRY & NEUROLOGY
Payer: MEDICARE

## 2025-07-03 DIAGNOSIS — R44.0 AUDITORY HALLUCINATION: Primary | ICD-10-CM

## 2025-07-03 DIAGNOSIS — F20.9 SCHIZOPHRENIA, UNSPECIFIED TYPE (HCC): ICD-10-CM

## 2025-07-03 LAB
ALBUMIN SERPL-MCNC: 3.5 G/DL (ref 3.5–5.2)
ALP SERPL-CCNC: 69 U/L (ref 40–129)
ALT SERPL-CCNC: 23 U/L (ref 0–50)
AMPHET UR QL SCN: NEGATIVE
ANION GAP SERPL CALCULATED.3IONS-SCNC: 11 MMOL/L (ref 7–16)
APAP SERPL-MCNC: <5 UG/ML (ref 10–30)
AST SERPL-CCNC: 33 U/L (ref 0–50)
BARBITURATES UR QL SCN: POSITIVE
BASOPHILS # BLD: 0.05 K/UL (ref 0–0.2)
BASOPHILS NFR BLD: 1 % (ref 0–2)
BENZODIAZ UR QL: NEGATIVE
BILIRUB SERPL-MCNC: 0.6 MG/DL (ref 0–1.2)
BILIRUB UR QL STRIP: NEGATIVE
BUN SERPL-MCNC: 5 MG/DL (ref 6–20)
BUPRENORPHINE UR QL: NEGATIVE
CALCIUM SERPL-MCNC: 9.3 MG/DL (ref 8.6–10)
CANNABINOIDS UR QL SCN: NEGATIVE
CHLORIDE SERPL-SCNC: 100 MMOL/L (ref 98–107)
CLARITY UR: CLEAR
CO2 SERPL-SCNC: 23 MMOL/L (ref 22–29)
COCAINE UR QL SCN: NEGATIVE
COLOR UR: YELLOW
CREAT SERPL-MCNC: 0.5 MG/DL (ref 0.7–1.2)
EOSINOPHIL # BLD: 0.09 K/UL (ref 0.05–0.5)
EOSINOPHILS RELATIVE PERCENT: 1 % (ref 0–6)
EPI CELLS #/AREA URNS HPF: NORMAL /HPF
ERYTHROCYTE [DISTWIDTH] IN BLOOD BY AUTOMATED COUNT: 15 % (ref 11.5–15)
ETHANOLAMINE SERPL-MCNC: <10 MG/DL (ref 0–0.08)
FENTANYL UR QL: NEGATIVE
GFR, ESTIMATED: >90 ML/MIN/1.73M2
GLUCOSE SERPL-MCNC: 94 MG/DL (ref 74–99)
GLUCOSE UR STRIP-MCNC: NEGATIVE MG/DL
HCT VFR BLD AUTO: 30.6 % (ref 37–54)
HGB BLD-MCNC: 10.8 G/DL (ref 12.5–16.5)
HGB UR QL STRIP.AUTO: NEGATIVE
IMM GRANULOCYTES # BLD AUTO: 0.03 K/UL (ref 0–0.58)
IMM GRANULOCYTES NFR BLD: 0 % (ref 0–5)
KETONES UR STRIP-MCNC: NEGATIVE MG/DL
LEUKOCYTE ESTERASE UR QL STRIP: NEGATIVE
LYMPHOCYTES NFR BLD: 1.94 K/UL (ref 1.5–4)
LYMPHOCYTES RELATIVE PERCENT: 23 % (ref 20–42)
MCH RBC QN AUTO: 36.7 PG (ref 26–35)
MCHC RBC AUTO-ENTMCNC: 35.3 G/DL (ref 32–34.5)
MCV RBC AUTO: 104.1 FL (ref 80–99.9)
METHADONE UR QL: NEGATIVE
MONOCYTES NFR BLD: 0.86 K/UL (ref 0.1–0.95)
MONOCYTES NFR BLD: 10 % (ref 2–12)
NEUTROPHILS NFR BLD: 64 % (ref 43–80)
NEUTS SEG NFR BLD: 5.4 K/UL (ref 1.8–7.3)
NITRITE UR QL STRIP: NEGATIVE
OPIATES UR QL SCN: NEGATIVE
OXYCODONE UR QL SCN: POSITIVE
PCP UR QL SCN: NEGATIVE
PH UR STRIP: 6 [PH] (ref 5–8)
PLATELET # BLD AUTO: 307 K/UL (ref 130–450)
PMV BLD AUTO: 9.7 FL (ref 7–12)
POTASSIUM SERPL-SCNC: 4.9 MMOL/L (ref 3.5–5.1)
PROT SERPL-MCNC: 6.5 G/DL (ref 6.4–8.3)
PROT UR STRIP-MCNC: NEGATIVE MG/DL
RBC # BLD AUTO: 2.94 M/UL (ref 3.8–5.8)
RBC #/AREA URNS HPF: NORMAL /HPF
SALICYLATES SERPL-MCNC: <0.5 MG/DL (ref 0–30)
SODIUM SERPL-SCNC: 133 MMOL/L (ref 136–145)
SP GR UR STRIP: 1.01 (ref 1–1.03)
TEST INFORMATION: ABNORMAL
TOXIC TRICYCLIC SC,BLOOD: NEGATIVE
UROBILINOGEN UR STRIP-ACNC: 1 EU/DL (ref 0–1)
WBC #/AREA URNS HPF: NORMAL /HPF
WBC OTHER # BLD: 8.4 K/UL (ref 4.5–11.5)

## 2025-07-03 PROCEDURE — 90791 PSYCH DIAGNOSTIC EVALUATION: CPT | Performed by: SOCIAL WORKER

## 2025-07-03 PROCEDURE — 85025 COMPLETE CBC W/AUTO DIFF WBC: CPT

## 2025-07-03 PROCEDURE — 80053 COMPREHEN METABOLIC PANEL: CPT

## 2025-07-03 PROCEDURE — 81001 URINALYSIS AUTO W/SCOPE: CPT

## 2025-07-03 PROCEDURE — G0480 DRUG TEST DEF 1-7 CLASSES: HCPCS

## 2025-07-03 PROCEDURE — 80307 DRUG TEST PRSMV CHEM ANLYZR: CPT

## 2025-07-03 PROCEDURE — 6370000000 HC RX 637 (ALT 250 FOR IP): Performed by: PSYCHIATRY & NEUROLOGY

## 2025-07-03 PROCEDURE — 99285 EMERGENCY DEPT VISIT HI MDM: CPT

## 2025-07-03 PROCEDURE — 93005 ELECTROCARDIOGRAM TRACING: CPT | Performed by: PHYSICIAN ASSISTANT

## 2025-07-03 PROCEDURE — 80179 DRUG ASSAY SALICYLATE: CPT

## 2025-07-03 PROCEDURE — 1240000000 HC EMOTIONAL WELLNESS R&B

## 2025-07-03 PROCEDURE — 80143 DRUG ASSAY ACETAMINOPHEN: CPT

## 2025-07-03 RX ORDER — ACETAMINOPHEN 325 MG/1
650 TABLET ORAL EVERY 4 HOURS PRN
Status: DISCONTINUED | OUTPATIENT
Start: 2025-07-03 | End: 2025-07-14 | Stop reason: HOSPADM

## 2025-07-03 RX ORDER — NICOTINE 21 MG/24HR
1 PATCH, TRANSDERMAL 24 HOURS TRANSDERMAL DAILY
Status: DISCONTINUED | OUTPATIENT
Start: 2025-07-03 | End: 2025-07-14 | Stop reason: HOSPADM

## 2025-07-03 RX ORDER — CHLORDIAZEPOXIDE HYDROCHLORIDE 25 MG/1
25 CAPSULE, GELATIN COATED ORAL EVERY 6 HOURS PRN
Status: DISCONTINUED | OUTPATIENT
Start: 2025-07-03 | End: 2025-07-14 | Stop reason: HOSPADM

## 2025-07-03 RX ORDER — MAGNESIUM HYDROXIDE/ALUMINUM HYDROXICE/SIMETHICONE 120; 1200; 1200 MG/30ML; MG/30ML; MG/30ML
30 SUSPENSION ORAL PRN
Status: DISCONTINUED | OUTPATIENT
Start: 2025-07-03 | End: 2025-07-14 | Stop reason: HOSPADM

## 2025-07-03 RX ORDER — HYDROXYZINE HYDROCHLORIDE 25 MG/1
25 TABLET, FILM COATED ORAL 3 TIMES DAILY PRN
Status: DISCONTINUED | OUTPATIENT
Start: 2025-07-03 | End: 2025-07-14 | Stop reason: HOSPADM

## 2025-07-03 RX ORDER — HALOPERIDOL 5 MG/ML
3 INJECTION INTRAMUSCULAR EVERY 6 HOURS PRN
Status: DISCONTINUED | OUTPATIENT
Start: 2025-07-03 | End: 2025-07-14 | Stop reason: HOSPADM

## 2025-07-03 RX ORDER — HALOPERIDOL 2 MG/1
3 TABLET ORAL EVERY 6 HOURS PRN
Status: DISCONTINUED | OUTPATIENT
Start: 2025-07-03 | End: 2025-07-14 | Stop reason: HOSPADM

## 2025-07-03 RX ADMIN — Medication 3 MG: at 22:29

## 2025-07-03 RX ADMIN — ACETAMINOPHEN 650 MG: 325 TABLET ORAL at 22:29

## 2025-07-03 RX ADMIN — HYDROXYZINE HYDROCHLORIDE 25 MG: 25 TABLET ORAL at 22:29

## 2025-07-03 ASSESSMENT — PAIN DESCRIPTION - DESCRIPTORS: DESCRIPTORS: SORE

## 2025-07-03 ASSESSMENT — PAIN SCALES - GENERAL
PAINLEVEL_OUTOF10: 10
PAINLEVEL_OUTOF10: 0

## 2025-07-03 ASSESSMENT — PAIN DESCRIPTION - ORIENTATION: ORIENTATION: LEFT

## 2025-07-03 ASSESSMENT — PAIN DESCRIPTION - LOCATION: LOCATION: ARM

## 2025-07-03 NOTE — VIRTUAL HEALTH
Colton Hurley  [id number]  1965     Social Work Behavioral Health Crisis Assessment    07/03/25    Chief Complaint: Psych Evaluation    HPI: Patient is a 59 y.o. White (non-) male who presents for psych evaluation. Patient presented to the ED on 07/03/25 from street.    ED recorded, Not on Meds, homeless per family, NO SI/HI, Family would like guidance and schizophrenic.    Pt reported, \"I do hear things, all different voices, all the time, its night after night. Its usually a man talking about stuff. I feel like its only right to get some help, Its been three months since I took any medication. I need some, I don't really live anywhere right now and I know they want me to get help. I feel like scared some times like nothing can really help how I feel. Like people are there but maybe they aren't there.\"      Collateral:    Remy Hurley, [phone]  No answer      Past Psychiatric History:  Previous Diagnoses/symptoms: Schizophrenia  Previous suicide attempts/self-harm: \"yeah a couple of times -its been about three years\"  Inpatient psychiatric hospitalizations: yes  Current outpatient psychiatric provider: Denies  Current therapist: States not in therapy  Previous psychiatric medication trials: Remeron, and zyprexa  Current psychiatric medications: its been three months I haven't taken anything    Sleep Hours: 2-3 hours    Sleep concerns: difficulty attaining sleep    Use of sleep medications: denies    Substance Abuse History:  Tobacco: Denies  Alcohol: \"a couple of beers a day\"  Marijuana: Denies  Stimulant: Denies  Opiates: Denies  Benzodiazepine: Denies  Other illicit drug usage: Denies  History of substance/alcohol abuse treatment: Denies    Social History:  Living Situation/Interest: homeless  Marital/Committed relationship and parenting hx: single  Occupation: Unemployed  Legal History/Hx of Violence: Denies  Spiritual History: Druze  Psychological trauma, neglect, or abuse: denies hx of

## 2025-07-03 NOTE — ED NOTES
Nurse to nurse report given to Nichole NGUYEN on [facility] [address] which includes patient presentation complaint, involuntary hold, medications, lab results, EKG Qtc, and past psych history and admitting orders.

## 2025-07-03 NOTE — ED PROVIDER NOTES
Shared ALLYN-ED Attending Visit.  CC: No           [facility] EMERGENCY DEPARTMENT  ED  Encounter Note  Admit Date/RoomTime: 7/3/2025 11:33 AM  ED Room:   NAME: Colton Hurley  : 1965  MRN: [id number]  PCP: No primary care provider on file.    CHIEF COMPLAINT     Suture / Staple Removal (Patient states he had staples placed a couple weeks ago that need removed to left arm)    HISTORY OF PRESENT ILLNESS        Colton Hurley is a 59 y.o. male who presents to the ED by private vehicle for suture removal.  Patient is here today for suture removal.  Patient states \"I do not know when I had surgery, what for but I want these staples out.\"  Patient is unable to provide any further history.  Patient states they are \"painful and itchy.\"  Patient states he is not suicidal or homicidal.  Patient denies any recreational drug abuse. ,The complaint has been stable and are mild in severity.     REVIEW OF SYSTEMS     Pertinent positives and negatives are stated within HPI, all other systems reviewed and are negative.    Past Medical History:  has a past medical history of Arthritis, Asthma, BPH (benign prostatic hyperplasia), Chronic back pain, COPD (chronic obstructive pulmonary disease) (HCC), Depression, Emphysema lung (HCC), Hepatitis C, and Schizo affective schizophrenia (HCC).  Surgical History:  has a past surgical history that includes Leg Surgery (Left); back surgery (); Upper gastrointestinal endoscopy (N/A, 2021); IR MECHANICAL ART THROMBECTOMY INTRACRANIAL (2025); IR CAROTID STENT W PROTECTION (2025); vascular surgery (Left, 2025); and vascular surgery (Left, 2025).  Social History:  reports that he has been smoking cigarettes. He has a 22.5 pack-year smoking history. He has never used smokeless tobacco. He reports current alcohol use of about 6.0 standard drinks of alcohol per week. He reports current drug use. Drug: Marijuana (Weed).  Family

## 2025-07-03 NOTE — ED NOTES
Patient denies SI/HI.  He reports augitory hallucinations of hearing many voices but can not tell what they say.  Reports being off his medications for the last 3 months.

## 2025-07-04 PROBLEM — F20.9 SCHIZOPHRENIA (HCC): Status: RESOLVED | Noted: 2025-06-27 | Resolved: 2025-07-04

## 2025-07-04 PROBLEM — F20.0 ACUTE EXACERBATION OF CHRONIC PARANOID SCHIZOPHRENIA (HCC): Status: ACTIVE | Noted: 2025-07-04

## 2025-07-04 LAB
GLUCOSE BLD-MCNC: 100 MG/DL (ref 74–99)
GLUCOSE BLD-MCNC: 97 MG/DL (ref 74–99)

## 2025-07-04 PROCEDURE — 6370000000 HC RX 637 (ALT 250 FOR IP): Performed by: NURSE PRACTITIONER

## 2025-07-04 PROCEDURE — 90792 PSYCH DIAG EVAL W/MED SRVCS: CPT | Performed by: NURSE PRACTITIONER

## 2025-07-04 PROCEDURE — 1240000000 HC EMOTIONAL WELLNESS R&B

## 2025-07-04 PROCEDURE — 6370000000 HC RX 637 (ALT 250 FOR IP): Performed by: PSYCHIATRY & NEUROLOGY

## 2025-07-04 PROCEDURE — 82962 GLUCOSE BLOOD TEST: CPT

## 2025-07-04 RX ORDER — BENZTROPINE MESYLATE 0.5 MG/1
0.5 TABLET ORAL 2 TIMES DAILY
Status: DISCONTINUED | OUTPATIENT
Start: 2025-07-04 | End: 2025-07-14 | Stop reason: HOSPADM

## 2025-07-04 RX ORDER — ERGOCALCIFEROL 1.25 MG/1
50000 CAPSULE, LIQUID FILLED ORAL WEEKLY
Status: DISCONTINUED | OUTPATIENT
Start: 2025-07-04 | End: 2025-07-14 | Stop reason: HOSPADM

## 2025-07-04 RX ORDER — ASPIRIN 81 MG/1
81 TABLET, CHEWABLE ORAL DAILY
Status: DISCONTINUED | OUTPATIENT
Start: 2025-07-04 | End: 2025-07-14 | Stop reason: HOSPADM

## 2025-07-04 RX ORDER — ATORVASTATIN CALCIUM 40 MG/1
40 TABLET, FILM COATED ORAL NIGHTLY
Status: DISCONTINUED | OUTPATIENT
Start: 2025-07-04 | End: 2025-07-14 | Stop reason: HOSPADM

## 2025-07-04 RX ORDER — MULTIVITAMIN WITH IRON
1 TABLET ORAL DAILY
Status: DISCONTINUED | OUTPATIENT
Start: 2025-07-04 | End: 2025-07-14 | Stop reason: HOSPADM

## 2025-07-04 RX ORDER — SENNOSIDES 8.6 MG/1
1 TABLET ORAL NIGHTLY
Status: DISPENSED | OUTPATIENT
Start: 2025-07-04 | End: 2025-07-11

## 2025-07-04 RX ORDER — FAMOTIDINE 20 MG/1
20 TABLET, FILM COATED ORAL 2 TIMES DAILY
Status: DISCONTINUED | OUTPATIENT
Start: 2025-07-04 | End: 2025-07-14 | Stop reason: HOSPADM

## 2025-07-04 RX ADMIN — ERGOCALCIFEROL 50000 UNITS: 1.25 CAPSULE ORAL at 12:54

## 2025-07-04 RX ADMIN — Medication 3 MG: at 22:41

## 2025-07-04 RX ADMIN — ATORVASTATIN CALCIUM 40 MG: 40 TABLET, FILM COATED ORAL at 22:41

## 2025-07-04 RX ADMIN — ACETAMINOPHEN 650 MG: 325 TABLET ORAL at 22:42

## 2025-07-04 RX ADMIN — SENNOSIDES 8.6 MG: 8.6 TABLET, FILM COATED ORAL at 22:40

## 2025-07-04 RX ADMIN — FAMOTIDINE 20 MG: 20 TABLET, FILM COATED ORAL at 22:41

## 2025-07-04 RX ADMIN — ASPIRIN 81 MG CHEWABLE TABLET 81 MG: 81 TABLET CHEWABLE at 12:54

## 2025-07-04 RX ADMIN — APIXABAN 10 MG: 5 TABLET, FILM COATED ORAL at 22:41

## 2025-07-04 RX ADMIN — ACETAMINOPHEN 650 MG: 325 TABLET ORAL at 16:58

## 2025-07-04 RX ADMIN — APIXABAN 10 MG: 5 TABLET, FILM COATED ORAL at 12:54

## 2025-07-04 RX ADMIN — OLANZAPINE 15 MG: 5 TABLET, FILM COATED ORAL at 22:41

## 2025-07-04 RX ADMIN — BENZTROPINE MESYLATE 0.5 MG: 0.5 TABLET ORAL at 12:54

## 2025-07-04 RX ADMIN — FAMOTIDINE 20 MG: 20 TABLET, FILM COATED ORAL at 12:54

## 2025-07-04 RX ADMIN — MULTIVITAMIN TABLET 1 TABLET: TABLET at 12:54

## 2025-07-04 RX ADMIN — HYDROXYZINE HYDROCHLORIDE 25 MG: 25 TABLET ORAL at 22:41

## 2025-07-04 RX ADMIN — BENZTROPINE MESYLATE 0.5 MG: 0.5 TABLET ORAL at 22:41

## 2025-07-04 ASSESSMENT — PATIENT HEALTH QUESTIONNAIRE - PHQ9
SUM OF ALL RESPONSES TO PHQ QUESTIONS 1-9: 2
2. FEELING DOWN, DEPRESSED OR HOPELESS: SEVERAL DAYS
1. LITTLE INTEREST OR PLEASURE IN DOING THINGS: SEVERAL DAYS

## 2025-07-04 ASSESSMENT — PAIN - FUNCTIONAL ASSESSMENT
PAIN_FUNCTIONAL_ASSESSMENT: PREVENTS OR INTERFERES SOME ACTIVE ACTIVITIES AND ADLS
PAIN_FUNCTIONAL_ASSESSMENT: ACTIVITIES ARE NOT PREVENTED

## 2025-07-04 ASSESSMENT — LIFESTYLE VARIABLES
HOW MANY STANDARD DRINKS CONTAINING ALCOHOL DO YOU HAVE ON A TYPICAL DAY: 3 OR 4
HOW OFTEN DO YOU HAVE A DRINK CONTAINING ALCOHOL: 4 OR MORE TIMES A WEEK

## 2025-07-04 ASSESSMENT — PAIN DESCRIPTION - DESCRIPTORS: DESCRIPTORS: ACHING

## 2025-07-04 ASSESSMENT — SLEEP AND FATIGUE QUESTIONNAIRES
DO YOU HAVE DIFFICULTY SLEEPING: YES
AVERAGE NUMBER OF SLEEP HOURS: 3
DO YOU USE A SLEEP AID: NO

## 2025-07-04 ASSESSMENT — PAIN DESCRIPTION - ORIENTATION
ORIENTATION: LEFT
ORIENTATION: LOWER
ORIENTATION: LEFT

## 2025-07-04 ASSESSMENT — PAIN SCALES - GENERAL
PAINLEVEL_OUTOF10: 6
PAINLEVEL_OUTOF10: 10

## 2025-07-04 ASSESSMENT — PAIN DESCRIPTION - FREQUENCY: FREQUENCY: CONTINUOUS

## 2025-07-04 ASSESSMENT — PAIN DESCRIPTION - LOCATION
LOCATION: ARM;HAND;SHOULDER
LOCATION: BACK
LOCATION: ARM

## 2025-07-04 ASSESSMENT — PAIN DESCRIPTION - PAIN TYPE: TYPE: CHRONIC PAIN

## 2025-07-04 NOTE — BH NOTE
4 Eyes Skin Assessment     NAME:  Colton Hurley  YOB: 1965  MEDICAL RECORD NUMBER:  [id number]    The patient is being assessed for  Admission    I agree that at least one RN has performed a thorough Head to Toe Skin Assessment on the patient. ALL assessment sites listed below have been assessed.      Areas assessed by both nurses:    Head, Face, Ears, Shoulders, Back, Chest, Arms, Elbows, Hands, Sacrum. Buttock, Coccyx, Ischium, Legs. Feet and Heels, and Under Medical Devices         Does the Patient have a Wound? Yes wound(s) were present on assessment. LDA wound assessment was Initiated and completed by RN       Patient has an incision with 20 staples near the left AC. Incision was about 4.5 inches in length. All staples intact. Wound dry and intact.     Miki Prevention initiated by RN: Yes  Wound Care Orders initiated by RN: No    Pressure Injury (Stage 3,4, Unstageable, DTI, NWPT, and Complex wounds) if present, place Wound referral order by RN under : No    New Ostomies, if present place, Ostomy referral order under : No     Nurse 1 eSignature: Electronically signed by Patricia Puckett RN on 7/3/25 at 11:29 PM EDT    **SHARE this note so that the co-signing nurse can place an eSignature**    Nurse 2 eSignature: Electronically signed by Queenie Garcia RN on 7/3/25 at 11:40 PM EDT    Other

## 2025-07-04 NOTE — PLAN OF CARE
Problem: Psychosis  Goal: Will report no hallucinations or delusions  7/4/2025 1255 by Damari Eckert RN  Outcome: Not Progressing     Problem: Depression  Goal: Will be euthymic at discharge  7/4/2025 1255 by Damari Eckert RN  Outcome: Progressing     Problem: Anxiety  Goal: Will report anxiety at manageable levels  7/4/2025 1255 by Damari Eckert RN  Outcome: Progressing     Problem: Sleep Disturbance  Goal: Will exhibit normal sleeping pattern  7/4/2025 1255 by Damari Eckert RN  Outcome: Progressing     Problem: Involuntary Admit  Goal: Will cooperate with staff recommendations and doctor's orders and will demonstrate appropriate behavior  Outcome: Progressing     Problem: Coping  Goal: Pt/Family able to verbalize concerns and demonstrate effective coping strategies  7/4/2025 1255 by Damari Eckert RN  Outcome: Progressing     Problem: Confusion  Goal: Confusion, delirium, dementia, or psychosis is improved or at baseline  Outcome: Progressing     Problem: Pain  Goal: Verbalizes/displays adequate comfort level or baseline comfort level  7/4/2025 1255 by Damari Eckert RN  Outcome: Progressing     Problem: Risk for Elopement  Goal: Patient will not exit the unit/facility without proper excort  7/4/2025 1255 by Damari Eckert RN  Outcome: Progressing     Problem: Safety - Adult  Goal: Free from fall injury  Outcome: Progressing     Problem: ABCDS Injury Assessment  Goal: Absence of physical injury  Outcome: Progressing     Patient denies SI/HI/VH. Endorses AH of \"lots of people talking\" but is unable to make out what they are saying. Endorses 10 out of 10 anxiety and 8 out of 10 depression. Offers minimal responses. Avoids eye contact during assessment. Appears flat, evasive, and guarded. Medication compliant. No groups attended. Remains in control of behaviors.

## 2025-07-04 NOTE — CONSULTS
Vascular Surgery Consultation Note    Reason for Consult:  LUE edema, pain sp thrombectomy     HPI:    This is a 59 y.o. male with a history of schizophrenia, acute limb ischemia of the LUE s/p thrombectomy 6/22 and 6/24 who is admitted to the hospital for treatment of psychosis. Vascular surgery was consulted for evaluation of the LUE with concern for exam changes. The patient appears to be a poor historian and reports that he is currently having increased pain in his left hand and arm. He denies any changes in his baseline paresthesia or swelling. He states his motor function is about the same as well as it was on discharge. He at one point states his Eliquis was stopped but then states he was taking his Eliquis on discharge. He denies any other changes since discharge.     Of note the patient was recently admitted to [facility] for LUE thrombectomy and had left AMA and was noncompliant with anticoagulation at that time. Hx of PE and CVA, mechanical thrombectomy and MANAN 2/2025. Hx of alcohol use, extensive smoking history.       ROS: Negative if blank [], Positive if [x]  General Vascular   [] Fevers [] Claudication (Blocks)   [] Chills [] Rest Pain   [] Weight Loss [] Tissue Loss   [] Chest Pain [] Clotting Disorder   [] SOB at rest [] Leg Swelling   [] SOB with exertion [] DVT/PE      [] Nausea    [] Vomiting [] Stroke/TIA   [] Abdominal Pain [] Focal weakness   [] Melena [] Slurred Speech   [] Hematochezia [] Vision Changes   [] Hematuria    [] Dysuria [] Hx of Central Catheters   [] Wears Glasses/Contacts [] Dialysis and If so date initiated   [] Blindness    [] Left Hand Dominant   [] Difficulty swallowing        Past Medical History:   Diagnosis Date    Arthritis     Asthma 02/26/2018    BPH (benign prostatic hyperplasia)     Chronic back pain     Plates inseted from L2-L5    COPD (chronic obstructive pulmonary disease) (HCC)     Depression     Emphysema lung (HCC)     Hepatitis C 2011    Schizo affective

## 2025-07-04 NOTE — BH NOTE
[facility]  Admission Note     59 y.o. male arrived to the unit via wheelchair from [facility]. Shahnaz slipped on 7/3. Patient dropped off at the ED due to wanting staples removed. Has 20 staples in left brachial due to a thrombectomy in June. Patient has left arm restriction. Patient endorsed AH in the ED. Patient still endorsing chronic AH of multiple voices, describes as whispers. Denies VH, homicidal ideations, and suicidal ideations. Patient has psych hx of schizophrenia. Off medications x3 months. Patient currently homeless. Patient reports he is a daily drinker, averaging 3 drinks per day. Patient currently denies any withdrawal symptoms. Has Librium 25mg PO Q6H PRN for withdrawal. Patient presents flat, sad, withdrawn, and preoccupied. Endorses paranoia, feeling like people are after him. Endorses feelings of loneliness, hopelessness, helplessness, and poor support system. Rates his anxiety a 10/10 and depression an 8/10. Reports poor sleep, averaging 2-3 hours per night. Denies any sleep aids. Patient has hx of CVA with right sided weakness. Ambulates with a cane but provided with walker while here on the unit. Alert and oriented x4. Calm and cooperative during assessment. Shower supplies placed in patient's room. Oriented to room and unit. No questions or concerns voiced by the patient at this time. Q15 minute safety rounds continued.     Admission Type:   Admission Type: Involuntary    Reason for admission:  Reason for Admission: \"Hearing and seeing things\"      Addictive Behavior:   Addictive Behavior  In the Past 3 Months, Have You Felt or Has Someone Told You That You Have a Problem With  : None    Medical Problems:   Past Medical History:   Diagnosis Date    Arthritis     Asthma 02/26/2018    BPH (benign prostatic hyperplasia)     Chronic back pain     Plates inseted from L2-L5    COPD (chronic obstructive pulmonary disease) (HCC)     Depression     Emphysema lung (HCC)     Hepatitis C

## 2025-07-04 NOTE — PLAN OF CARE
Problem: Psychosis  Goal: Will report no hallucinations or delusions  Description: INTERVENTIONS:  1. Administer medication as  ordered  2. Assist with reality testing to support increasing orientation  3. Assess if patient's hallucinations or delusions are encouraging self harm or harm to others and intervene as appropriate  Outcome: Not Progressing     Problem: Anxiety  Goal: Will report anxiety at manageable levels  Description: INTERVENTIONS:  1. Administer medication as ordered  2. Teach and rehearse alternative coping skills  3. Provide emotional support with 1:1 interaction with staff  Outcome: Not Progressing     Problem: Depression  Goal: Will be euthymic at discharge  Description: INTERVENTIONS:  1. Administer medication as ordered  2. Provide emotional support via 1:1 interaction with staff  3. Encourage involvement in milieu/groups/activities  4. Monitor for social isolation  Outcome: Progressing     Problem: Sleep Disturbance  Goal: Will exhibit normal sleeping pattern  Description: INTERVENTIONS:  1. Administer medication as ordered  2. Decrease environmental stimuli, including noise, as appropriate  3. Discourage social isolation and naps during the day  Outcome: Progressing     Problem: Coping  Goal: Pt/Family able to verbalize concerns and demonstrate effective coping strategies  Description: INTERVENTIONS:  1. Assist patient/family to identify coping skills, available support systems and cultural and spiritual values  2. Provide emotional support, including active listening and acknowledgement of concerns of patient and caregivers  3. Reduce environmental stimuli, as able  4. Instruct patient/family in relaxation techniques, as appropriate  5. Assess for spiritual pain/suffering and initiate Spiritual Care, Psychosocial Clinical Specialist consults as needed  Outcome: Progressing     Problem: Pain  Goal: Verbalizes/displays adequate comfort level or baseline comfort level  Outcome: Progressing

## 2025-07-04 NOTE — H&P
Department of Psychiatry  History and Physical - Adult     CHIEF COMPLAINT:    Chief Complaint   Patient presents with    Suture / Staple Removal     Patient states he had staples placed a couple weeks ago that need removed to left arm       Patient was seen after discussing with the treatment team and reviewing the chart    CIRCUMSTANCES OF ADMISSION:     Patient name: Colton Hurley  Patient's past mental health and addiction history: Schizophrenia  Patient's presentation to the ED and why the patient needs admission: Auditory hallucinations  Legal status:  []  Voluntary  [x]  Involuntary  []  Probate  Triggering/precipitating events: Noncompliance with psychiatric medications  Duration of triggering/precipitating events: Just prior to arrival    HISTORY OF PRESENT ILLNESS:      The patient is a 59 y.o. male with significant past history of schizophrenia presented to the ED originally dropped off due to patient wanting sutures out of his arm that he recently had placed patient's family indicated the patient been homeless and wandering there for patient had a psychiatric evaluation where patient reported having auditory hallucinations which is consistent with patient's history of schizophrenia and also his psychiatric evaluation that was conducted in [facility] where patient was just recently in the surgical ICU after receiving upon evaluation today patient was seen at bedside in the CVICU after having vascular surgery due to thrombectomy of his arm.  Apparently patient was to us be discharged to a subacute rehab from that hospitalization however patient refused and patient's brother picked him up and patient is been homeless since.  In the ED patient's urine drug screen was positive for barbiturates and oxycodone, his blood alcohol level was negative he was medically cleared admitted to [facility] for further psychiatric assessment stabilization and treatment      Upon evaluation today patient known to our

## 2025-07-05 LAB
EKG ATRIAL RATE: 73 BPM
EKG P AXIS: 35 DEGREES
EKG P-R INTERVAL: 120 MS
EKG Q-T INTERVAL: 358 MS
EKG QRS DURATION: 76 MS
EKG QTC CALCULATION (BAZETT): 394 MS
EKG R AXIS: 33 DEGREES
EKG T AXIS: 54 DEGREES
EKG VENTRICULAR RATE: 73 BPM
GLUCOSE BLD-MCNC: 119 MG/DL (ref 74–99)
GLUCOSE BLD-MCNC: 98 MG/DL (ref 74–99)

## 2025-07-05 PROCEDURE — 93010 ELECTROCARDIOGRAM REPORT: CPT | Performed by: INTERNAL MEDICINE

## 2025-07-05 PROCEDURE — 6370000000 HC RX 637 (ALT 250 FOR IP): Performed by: NURSE PRACTITIONER

## 2025-07-05 PROCEDURE — 1240000000 HC EMOTIONAL WELLNESS R&B

## 2025-07-05 PROCEDURE — 6370000000 HC RX 637 (ALT 250 FOR IP): Performed by: PSYCHIATRY & NEUROLOGY

## 2025-07-05 PROCEDURE — 99232 SBSQ HOSP IP/OBS MODERATE 35: CPT | Performed by: NURSE PRACTITIONER

## 2025-07-05 PROCEDURE — 82962 GLUCOSE BLOOD TEST: CPT

## 2025-07-05 RX ORDER — DIVALPROEX SODIUM 250 MG/1
250 TABLET, DELAYED RELEASE ORAL EVERY 12 HOURS SCHEDULED
Status: DISCONTINUED | OUTPATIENT
Start: 2025-07-05 | End: 2025-07-09

## 2025-07-05 RX ADMIN — ASPIRIN 81 MG CHEWABLE TABLET 81 MG: 81 TABLET CHEWABLE at 08:53

## 2025-07-05 RX ADMIN — ACETAMINOPHEN 650 MG: 325 TABLET ORAL at 06:57

## 2025-07-05 RX ADMIN — DIVALPROEX SODIUM 250 MG: 250 TABLET, DELAYED RELEASE ORAL at 21:18

## 2025-07-05 RX ADMIN — APIXABAN 10 MG: 5 TABLET, FILM COATED ORAL at 08:53

## 2025-07-05 RX ADMIN — FAMOTIDINE 20 MG: 20 TABLET, FILM COATED ORAL at 08:53

## 2025-07-05 RX ADMIN — OLANZAPINE 15 MG: 5 TABLET, FILM COATED ORAL at 21:18

## 2025-07-05 RX ADMIN — ACETAMINOPHEN 650 MG: 325 TABLET ORAL at 21:15

## 2025-07-05 RX ADMIN — MULTIVITAMIN TABLET 1 TABLET: TABLET at 08:53

## 2025-07-05 RX ADMIN — APIXABAN 10 MG: 5 TABLET, FILM COATED ORAL at 21:17

## 2025-07-05 RX ADMIN — DIVALPROEX SODIUM 250 MG: 250 TABLET, DELAYED RELEASE ORAL at 11:10

## 2025-07-05 RX ADMIN — BENZTROPINE MESYLATE 0.5 MG: 0.5 TABLET ORAL at 21:19

## 2025-07-05 RX ADMIN — FAMOTIDINE 20 MG: 20 TABLET, FILM COATED ORAL at 21:15

## 2025-07-05 RX ADMIN — ATORVASTATIN CALCIUM 40 MG: 40 TABLET, FILM COATED ORAL at 21:16

## 2025-07-05 RX ADMIN — BENZTROPINE MESYLATE 0.5 MG: 0.5 TABLET ORAL at 08:53

## 2025-07-05 ASSESSMENT — PAIN SCALES - GENERAL
PAINLEVEL_OUTOF10: 10
PAINLEVEL_OUTOF10: 10
PAINLEVEL_OUTOF10: 3
PAINLEVEL_OUTOF10: 10

## 2025-07-05 ASSESSMENT — PAIN DESCRIPTION - DESCRIPTORS
DESCRIPTORS: ACHING
DESCRIPTORS: ACHING;SORE
DESCRIPTORS: ACHING;DISCOMFORT;TENDER

## 2025-07-05 ASSESSMENT — PAIN DESCRIPTION - LOCATION
LOCATION: ARM

## 2025-07-05 ASSESSMENT — PAIN DESCRIPTION - ORIENTATION
ORIENTATION: LEFT
ORIENTATION: LEFT

## 2025-07-05 NOTE — PLAN OF CARE
Problem: Psychosis  Goal: Will report no hallucinations or delusions  Description: INTERVENTIONS:  1. Administer medication as  ordered  2. Assist with reality testing to support increasing orientation  3. Assess if patient's hallucinations or delusions are encouraging self harm or harm to others and intervene as appropriate  7/4/2025 2253 by Ladan Moore RN  Outcome: Not Progressing     Problem: Depression  Goal: Will be euthymic at discharge  Description: INTERVENTIONS:  1. Administer medication as ordered  2. Provide emotional support via 1:1 interaction with staff  3. Encourage involvement in milieu/groups/activities  4. Monitor for social isolation  7/4/2025 2253 by Ladan Moore RN  Outcome: Not Progressing     Problem: Anxiety  Goal: Will report anxiety at manageable levels  Description: INTERVENTIONS:  1. Administer medication as ordered  2. Teach and rehearse alternative coping skills  3. Provide emotional support with 1:1 interaction with staff  7/4/2025 2253 by Ladan Moore RN  Outcome: Not Progressing     Problem: Depression  Goal: Will be euthymic at discharge  Description: INTERVENTIONS:  1. Administer medication as ordered  2. Provide emotional support via 1:1 interaction with staff  3. Encourage involvement in milieu/groups/activities  4. Monitor for social isolation  7/4/2025 2253 by Ladan Moore RN  Outcome: Not Progressing  7/4/2025 1255 by Damari Eckert RN  Outcome: Progressing     Problem: Psychosis  Goal: Will report no hallucinations or delusions  Description: INTERVENTIONS:  1. Administer medication as  ordered  2. Assist with reality testing to support increasing orientation  3. Assess if patient's hallucinations or delusions are encouraging self harm or harm to others and intervene as appropriate  7/4/2025 2253 by Ladan Moore RN  Outcome: Not Progressing  7/4/2025 1255 by Damari Eckert RN  Outcome: Not Progressing     Problem: Anxiety  Goal: Will report anxiety at

## 2025-07-05 NOTE — PLAN OF CARE
This RN approached the patient in their room, patient was observed with their head laid on their left arm and the ace bandage taken off their hand. The ace bandage was reapplied to the patient's left hand. Patient was repositioned with their left arm elevated on a pillow, and was educated on the importance of elevating their left arm and using the ace bandage as ordered, patient appeared unreceptive to this.     Patient denies suicidal ideation, and homicidal ideation. Endorses auditory hallucination of voices.  Endorses anxiety  as 10/10 and depression as 8/10, when asked what is contributing to them patient responded \"I don't know\". Denies racing thoughts. Appears flat, sad, withdrawn, guarded and anxious during assessment. Reports appetite and sleep are poor with difficulty falling and staying asleep. Patient is taking medications at this time. Patient is observed isolative to themself and their room.Remains in control of behaviors.       Problem: Sleep Disturbance  Goal: Will exhibit normal sleeping pattern  Description: INTERVENTIONS:  1. Administer medication as ordered  2. Decrease environmental stimuli, including noise, as appropriate  3. Discourage social isolation and naps during the day  Outcome: Progressing     Problem: Involuntary Admit  Goal: Will cooperate with staff recommendations and doctor's orders and will demonstrate appropriate behavior  Description: INTERVENTIONS:  1. Treat underlying conditions and offer medication as ordered  2. Educate regarding involuntary admission procedures and rules  3. Contain excessive/inappropriate behavior per unit and hospital policies  Outcome: Progressing     Problem: Confusion  Goal: Confusion, delirium, dementia, or psychosis is improved or at baseline  Description: INTERVENTIONS:  1. Assess for possible contributors to thought disturbance, including medications, impaired vision or hearing, underlying metabolic abnormalities, dehydration, psychiatric

## 2025-07-05 NOTE — BH NOTE
Patient presents flat, blunt, and evasive.   Patient denies suicidal/homicidal ideations and visual hallucinations, reports auditory hallucinations of \"voices\" with no other description.     Patient reports anxiety 10/10 and depression 8/10   Patient reports pain, prn medication given, see emar  Patient is  taking  ordered medications without issue. Patient is not attending groups per note review.  Purposeful Q15min rounding continues.

## 2025-07-06 LAB
GLUCOSE BLD-MCNC: 114 MG/DL (ref 74–99)
GLUCOSE BLD-MCNC: 121 MG/DL (ref 74–99)

## 2025-07-06 PROCEDURE — 82962 GLUCOSE BLOOD TEST: CPT

## 2025-07-06 PROCEDURE — 6370000000 HC RX 637 (ALT 250 FOR IP): Performed by: NURSE PRACTITIONER

## 2025-07-06 PROCEDURE — 99232 SBSQ HOSP IP/OBS MODERATE 35: CPT | Performed by: NURSE PRACTITIONER

## 2025-07-06 PROCEDURE — 6370000000 HC RX 637 (ALT 250 FOR IP): Performed by: PSYCHIATRY & NEUROLOGY

## 2025-07-06 PROCEDURE — 1240000000 HC EMOTIONAL WELLNESS R&B

## 2025-07-06 RX ADMIN — BENZTROPINE MESYLATE 0.5 MG: 0.5 TABLET ORAL at 22:03

## 2025-07-06 RX ADMIN — OLANZAPINE 15 MG: 5 TABLET, FILM COATED ORAL at 22:03

## 2025-07-06 RX ADMIN — DIVALPROEX SODIUM 250 MG: 250 TABLET, DELAYED RELEASE ORAL at 22:03

## 2025-07-06 RX ADMIN — APIXABAN 10 MG: 5 TABLET, FILM COATED ORAL at 09:44

## 2025-07-06 RX ADMIN — ATORVASTATIN CALCIUM 40 MG: 40 TABLET, FILM COATED ORAL at 22:03

## 2025-07-06 RX ADMIN — DIVALPROEX SODIUM 250 MG: 250 TABLET, DELAYED RELEASE ORAL at 09:44

## 2025-07-06 RX ADMIN — ASPIRIN 81 MG CHEWABLE TABLET 81 MG: 81 TABLET CHEWABLE at 09:44

## 2025-07-06 RX ADMIN — FAMOTIDINE 20 MG: 20 TABLET, FILM COATED ORAL at 22:03

## 2025-07-06 RX ADMIN — APIXABAN 10 MG: 5 TABLET, FILM COATED ORAL at 22:04

## 2025-07-06 RX ADMIN — MULTIVITAMIN TABLET 1 TABLET: TABLET at 09:44

## 2025-07-06 RX ADMIN — ACETAMINOPHEN 650 MG: 325 TABLET ORAL at 22:04

## 2025-07-06 RX ADMIN — BENZTROPINE MESYLATE 0.5 MG: 0.5 TABLET ORAL at 09:44

## 2025-07-06 RX ADMIN — SENNOSIDES 8.6 MG: 8.6 TABLET, FILM COATED ORAL at 22:03

## 2025-07-06 RX ADMIN — FAMOTIDINE 20 MG: 20 TABLET, FILM COATED ORAL at 09:44

## 2025-07-06 ASSESSMENT — PAIN DESCRIPTION - LOCATION
LOCATION: ARM;BACK
LOCATION: ARM

## 2025-07-06 ASSESSMENT — PAIN DESCRIPTION - ORIENTATION
ORIENTATION: LEFT
ORIENTATION: LEFT

## 2025-07-06 ASSESSMENT — PAIN DESCRIPTION - DESCRIPTORS
DESCRIPTORS: ACHING;SORE
DESCRIPTORS: ACHING

## 2025-07-06 ASSESSMENT — PAIN SCALES - GENERAL
PAINLEVEL_OUTOF10: 0
PAINLEVEL_OUTOF10: 10

## 2025-07-06 NOTE — BH NOTE
Upon rounding, RN noted pt to have removed portions of ACE bandage to hand and upper portion of extremity. Pt begrudgingly allowed this nurse to re-wrap limb, and elevate extremity with pillow.

## 2025-07-06 NOTE — PLAN OF CARE
Problem: Depression  Goal: Will be euthymic at discharge  Description: INTERVENTIONS:  1. Administer medication as ordered  2. Provide emotional support via 1:1 interaction with staff  3. Encourage involvement in milieu/groups/activities  4. Monitor for social isolation  Outcome: Progressing     Problem: Psychosis  Goal: Will report no hallucinations or delusions  Description: INTERVENTIONS:  1. Administer medication as  ordered  2. Assist with reality testing to support increasing orientation  3. Assess if patient's hallucinations or delusions are encouraging self harm or harm to others and intervene as appropriate  Outcome: Not Progressing     Problem: Anxiety  Goal: Will report anxiety at manageable levels  Description: INTERVENTIONS:  1. Administer medication as ordered  2. Teach and rehearse alternative coping skills  3. Provide emotional support with 1:1 interaction with staff  Outcome: Not Progressing     Problem: Involuntary Admit  Goal: Will cooperate with staff recommendations and doctor's orders and will demonstrate appropriate behavior  Description: INTERVENTIONS:  1. Treat underlying conditions and offer medication as ordered  2. Educate regarding involuntary admission procedures and rules  3. Contain excessive/inappropriate behavior per unit and hospital policies  Outcome: Progressing

## 2025-07-06 NOTE — BH NOTE
Pt denies suicidal and homicidal ideations at this time. Pt denies visual hallucinations at this time. Pt endorses auditory hallucinations of \"voices\", but does not elaborate further.     Reports anxiety 10/10 and depression 8/10 at this time. Pt reports anxiety and depression are \"just there\", but does not elaborate further when questioned.  Pt observed in room where he has been isolative this shift. Pt is flat, blunt, guarded, evasive with assessment. Pt offers very little conversation. On assessment, pt had partially removed ACE bandage to left arm. Left hand remains with 2+ pitting edema, cool to touch, pt is able to wiggle fingers and weakly grasp- pt does report difficulty grasping d/t weakness in thumb and first digit, brachial pulse present. Incision to left wrist, and LFA both well approximated, without s/sx of infection. Pt allowed this RN to re-wrap ACE bandage to left arm, and elevate limb on pillows. RN reinforced education regarding importance of adhering to instructions provided by vascular team to prevent loss of limb. Pt voiced understanding, but continues to be non-compliant with education. Pt was later found lying on left side, RN instructed pt to reposition to protect affected limb, pt only cooperates superficially.  Pt is compliant with PM medication, requested PRN Tylenol for mild pain.  According to previous notes pt did not attend daytime groups.  No acute behavioral concerns voiced/noted at this time.  Purposeful Q15min rounding continues.

## 2025-07-06 NOTE — PLAN OF CARE
[facility]  Day 3 Interdisciplinary Treatment Plan NOTE    Review Date & Time: 7/6/25 1300    Patient was in treatment team    Estimated Length of Stay Update:  3-5 days  Estimated Discharge Date Update: 5-7 days    EDUCATION:   Learner Progress Toward Treatment Goals: Reviewed results and recommendations of this team    Method: Group    Outcome: Verbalized understanding    PATIENT GOALS:     PLAN/TREATMENT RECOMMENDATIONS UPDATE:week    GOALS UPDATE:   Time frame for Short-Term Goals: 3-5 days      Nichole Stratton RN

## 2025-07-06 NOTE — BH NOTE
Pt ambulating in day room, found to have removed ACE wrap from left hand, as well as upper arm again. RN reinforcing education, and encouraging compliance. Pt wants to have coffee and ambulate back to room before allowing this nurse to re-wrap extremity.

## 2025-07-06 NOTE — PLAN OF CARE
Patient denies suicidal ideation, and homicidal ideation. Patient denied visual/auditory hallucinations but per chart patient endorsed auditory hallucinations to other staff. Denies anxiety and depression. Denies racing thoughts. Appears flat, withdrawn, guarded, and anxious during assessment. Patient offered minimal responses and conversation during assessment. Reports appetite and sleep are poor. Patient is taking medications at this time. Patient is observed isolative to themself and tier room. Remains in control of behaviors.        Problem: Depression  Goal: Will be euthymic at discharge  Description: INTERVENTIONS:  1. Administer medication as ordered  2. Provide emotional support via 1:1 interaction with staff  3. Encourage involvement in milieu/groups/activities  4. Monitor for social isolation    Outcome: Progressing     Problem: Psychosis  Goal: Will report no hallucinations or delusions  Description: INTERVENTIONS:  1. Administer medication as  ordered  2. Assist with reality testing to support increasing orientation  3. Assess if patient's hallucinations or delusions are encouraging self harm or harm to others and intervene as appropriate    Outcome: Progressing     Problem: Anxiety  Goal: Will report anxiety at manageable levels  Description: INTERVENTIONS:  1. Administer medication as ordered  2. Teach and rehearse alternative coping skills  3. Provide emotional support with 1:1 interaction with staff    Outcome: Progressing     Problem: Confusion  Goal: Confusion, delirium, dementia, or psychosis is improved or at baseline  Description: INTERVENTIONS:  1. Assess for possible contributors to thought disturbance, including medications, impaired vision or hearing, underlying metabolic abnormalities, dehydration, psychiatric diagnoses, and notify attending LIP  2. Malone high risk fall precautions, as indicated  3. Provide frequent short contacts to provide reality reorientation, refocusing and

## 2025-07-07 LAB — GLUCOSE BLD-MCNC: 118 MG/DL (ref 74–99)

## 2025-07-07 PROCEDURE — 6370000000 HC RX 637 (ALT 250 FOR IP): Performed by: PSYCHIATRY & NEUROLOGY

## 2025-07-07 PROCEDURE — 99232 SBSQ HOSP IP/OBS MODERATE 35: CPT

## 2025-07-07 PROCEDURE — 1240000000 HC EMOTIONAL WELLNESS R&B

## 2025-07-07 PROCEDURE — 82962 GLUCOSE BLOOD TEST: CPT

## 2025-07-07 PROCEDURE — 6370000000 HC RX 637 (ALT 250 FOR IP): Performed by: NURSE PRACTITIONER

## 2025-07-07 RX ADMIN — ACETAMINOPHEN 650 MG: 325 TABLET ORAL at 21:00

## 2025-07-07 RX ADMIN — APIXABAN 10 MG: 5 TABLET, FILM COATED ORAL at 20:59

## 2025-07-07 RX ADMIN — OLANZAPINE 15 MG: 5 TABLET, FILM COATED ORAL at 20:59

## 2025-07-07 RX ADMIN — APIXABAN 10 MG: 5 TABLET, FILM COATED ORAL at 09:35

## 2025-07-07 RX ADMIN — FAMOTIDINE 20 MG: 20 TABLET, FILM COATED ORAL at 09:35

## 2025-07-07 RX ADMIN — ACETAMINOPHEN 650 MG: 325 TABLET ORAL at 09:35

## 2025-07-07 RX ADMIN — DIVALPROEX SODIUM 250 MG: 250 TABLET, DELAYED RELEASE ORAL at 09:35

## 2025-07-07 RX ADMIN — ASPIRIN 81 MG CHEWABLE TABLET 81 MG: 81 TABLET CHEWABLE at 09:34

## 2025-07-07 RX ADMIN — DIVALPROEX SODIUM 250 MG: 250 TABLET, DELAYED RELEASE ORAL at 20:59

## 2025-07-07 RX ADMIN — ATORVASTATIN CALCIUM 40 MG: 40 TABLET, FILM COATED ORAL at 20:59

## 2025-07-07 RX ADMIN — BENZTROPINE MESYLATE 0.5 MG: 0.5 TABLET ORAL at 09:35

## 2025-07-07 RX ADMIN — FAMOTIDINE 20 MG: 20 TABLET, FILM COATED ORAL at 20:59

## 2025-07-07 RX ADMIN — Medication 3 MG: at 20:59

## 2025-07-07 RX ADMIN — BENZTROPINE MESYLATE 0.5 MG: 0.5 TABLET ORAL at 20:59

## 2025-07-07 RX ADMIN — MULTIVITAMIN TABLET 1 TABLET: TABLET at 09:35

## 2025-07-07 RX ADMIN — SENNOSIDES 8.6 MG: 8.6 TABLET, FILM COATED ORAL at 20:59

## 2025-07-07 ASSESSMENT — PAIN DESCRIPTION - LOCATION
LOCATION: ARM;BACK
LOCATION: ARM
LOCATION: BACK

## 2025-07-07 ASSESSMENT — PAIN SCALES - GENERAL
PAINLEVEL_OUTOF10: 10

## 2025-07-07 ASSESSMENT — PAIN DESCRIPTION - DESCRIPTORS
DESCRIPTORS: ACHING;SORE
DESCRIPTORS: ACHING
DESCRIPTORS: ACHING;DISCOMFORT;SORE

## 2025-07-07 ASSESSMENT — PAIN DESCRIPTION - ORIENTATION
ORIENTATION: LEFT
ORIENTATION: LEFT

## 2025-07-07 NOTE — CARE COORDINATION
Treatment team met with pt. He stated that he is doing \"so-so.\" Sw asked where he would go at discharge. He stated that he doesn't know. [facility]. Sw stated that we will call to see if he can return there. Sw asked if there was anyone we can call, he stated that he is thinking about who we can talk to.

## 2025-07-07 NOTE — PLAN OF CARE
Problem: Depression  Goal: Will be euthymic at discharge  Description: INTERVENTIONS:  1. Administer medication as ordered  2. Provide emotional support via 1:1 interaction with staff  3. Encourage involvement in milieu/groups/activities  4. Monitor for social isolation  Outcome: Progressing     Problem: Anxiety  Goal: Will report anxiety at manageable levels  Description: INTERVENTIONS:  1. Administer medication as ordered  2. Teach and rehearse alternative coping skills  3. Provide emotional support with 1:1 interaction with staff  Outcome: Progressing     Problem: Sleep Disturbance  Goal: Will exhibit normal sleeping pattern  Description: INTERVENTIONS:  1. Administer medication as ordered  2. Decrease environmental stimuli, including noise, as appropriate  3. Discourage social isolation and naps during the day  Outcome: Progressing

## 2025-07-07 NOTE — PLAN OF CARE
Problem: Depression  Goal: Will be euthymic at discharge  Description: INTERVENTIONS:  1. Administer medication as ordered  2. Provide emotional support via 1:1 interaction with staff  3. Encourage involvement in milieu/groups/activities  4. Monitor for social isolation  7/7/2025 1107 by Snow Stratton RN  Outcome: Progressing     Problem: Psychosis  Goal: Will report no hallucinations or delusions  Description: INTERVENTIONS:  1. Administer medication as  ordered  2. Assist with reality testing to support increasing orientation  3. Assess if patient's hallucinations or delusions are encouraging self harm or harm to others and intervene as appropriate  Outcome: Progressing     Problem: Anxiety  Goal: Will report anxiety at manageable levels  Description: INTERVENTIONS:  1. Administer medication as ordered  2. Teach and rehearse alternative coping skills  3. Provide emotional support with 1:1 interaction with staff  7/7/2025 1107 by Snow Stratton RN  Outcome: Progressing     Problem: Sleep Disturbance  Goal: Will exhibit normal sleeping pattern  Description: INTERVENTIONS:  1. Administer medication as ordered  2. Decrease environmental stimuli, including noise, as appropriate  3. Discourage social isolation and naps during the day  7/7/2025 1107 by Snow Stratton RN  Outcome: Progressing     Problem: Coping  Goal: Pt/Family able to verbalize concerns and demonstrate effective coping strategies  Description: INTERVENTIONS:  1. Assist patient/family to identify coping skills, available support systems and cultural and spiritual values  2. Provide emotional support, including active listening and acknowledgement of concerns of patient and caregivers  3. Reduce environmental stimuli, as able  4. Instruct patient/family in relaxation techniques, as appropriate  5. Assess for spiritual pain/suffering and initiate Spiritual Care, Psychosocial Clinical Specialist consults as needed  Outcome: Progressing     Patient

## 2025-07-07 NOTE — CARE COORDINATION
Sw called [facility] [phone] to see if pt is on any restrictions. They stated that he was last there 7 years ago. They stated that he would need a phone intake.

## 2025-07-07 NOTE — GROUP NOTE
Group Therapy Note    Date: 7/7/2025    Group Start Time: 1400  Group End Time: 1430  Group Topic: Cognitive Skills    [facility] AC[facility]    Yamileth Gordon MSW, LSW        Group Therapy Note    Attendees: 4       Patient's Goal:  Pt will be able to understand soft start-up/ communication skills when having difficult conversations.     Notes:  pt participated in group and made connections.    Status After Intervention:  Improved    Participation Level: Active Listener    Participation Quality: Appropriate and Attentive      Speech:  normal      Thought Process/Content: Logical      Affective Functioning: Congruent      Mood: anxious      Level of consciousness:  Alert, Oriented x4, and Attentive      Response to Learning: Able to retain information      Endings: None Reported    Modes of Intervention: Education, Support, Socialization, Exploration, Clarifying, and Problem-solving      Discipline Responsible: /Counselor      Signature:  MINDA Hitchcock LSW

## 2025-07-07 NOTE — BH NOTE
Patient awake in room upon approach.  Patient has fair eye contact.    Patient presents flat, sad, and anxious.   Appears somewhat disheveled  Patient denies suicidal/homicidal ideations and visual hallucinations, reports auditory hallucinations of \"lots of things\" and will not elaborate further.     Patient reports anxiety 10/10 and depression 8/10 due to \"the future\" and \"life, you know\"  Patient reports pain, PRN medication given, see mar  Patient is  taking  ordered medications without issue. Patient is not attending groups per note review.  Purposeful Q15min rounding continues.

## 2025-07-07 NOTE — GROUP NOTE
Group Therapy Note    Date: 7/7/2025    Group Start Time: 1520  Group End Time: 1550  Group Topic: Recovery    [facility] 2    Nia Linares CTRS    Group Therapy Note    Attendees: 5    Date: 7/7/2025  Start Time: 1520  End Time:  1550  Number of Participants: 5    Type of Group: Recovery    Name:  Peer Recovery    Patient's Goal:  Increased awareness of recovery resources for substance abuse and mental health.  facilitated group with CASEY observation.    Notes:  Patient was actively engaged in group discussion and made positive responses.    Status After Intervention:  Improved    Participation Level: Active Listener and Interactive    Participation Quality: Appropriate, Attentive, and Sharing      Speech:  normal      Thought Process/Content: Logical  Linear      Affective Functioning: Congruent      Mood: Appropriate      Level of consciousness:  Alert and Attentive      Response to Learning: Able to verbalize current knowledge/experience, Able to verbalize/acknowledge new learning, Able to retain information, Capable of insight, Able to change behavior, and Progressing to goal      Endings: None Reported    Modes of Intervention: Education, Support, Socialization, Exploration, Clarifying, and Problem-solving      Discipline Responsible: Psychoeducational Specialist      Signature:  CASEY Crawley

## 2025-07-08 LAB
GLUCOSE BLD-MCNC: 105 MG/DL (ref 74–99)
GLUCOSE BLD-MCNC: 107 MG/DL (ref 74–99)
GLUCOSE BLD-MCNC: 110 MG/DL (ref 74–99)

## 2025-07-08 PROCEDURE — 82962 GLUCOSE BLOOD TEST: CPT

## 2025-07-08 PROCEDURE — 6370000000 HC RX 637 (ALT 250 FOR IP): Performed by: PSYCHIATRY & NEUROLOGY

## 2025-07-08 PROCEDURE — 97161 PT EVAL LOW COMPLEX 20 MIN: CPT

## 2025-07-08 PROCEDURE — 6370000000 HC RX 637 (ALT 250 FOR IP): Performed by: NURSE PRACTITIONER

## 2025-07-08 PROCEDURE — 97165 OT EVAL LOW COMPLEX 30 MIN: CPT

## 2025-07-08 PROCEDURE — 1240000000 HC EMOTIONAL WELLNESS R&B

## 2025-07-08 PROCEDURE — 99232 SBSQ HOSP IP/OBS MODERATE 35: CPT

## 2025-07-08 PROCEDURE — 97530 THERAPEUTIC ACTIVITIES: CPT

## 2025-07-08 RX ADMIN — ATORVASTATIN CALCIUM 40 MG: 40 TABLET, FILM COATED ORAL at 20:30

## 2025-07-08 RX ADMIN — OLANZAPINE 15 MG: 5 TABLET, FILM COATED ORAL at 20:30

## 2025-07-08 RX ADMIN — ACETAMINOPHEN 650 MG: 325 TABLET ORAL at 20:30

## 2025-07-08 RX ADMIN — FAMOTIDINE 20 MG: 20 TABLET, FILM COATED ORAL at 20:30

## 2025-07-08 RX ADMIN — DIVALPROEX SODIUM 250 MG: 250 TABLET, DELAYED RELEASE ORAL at 09:49

## 2025-07-08 RX ADMIN — DIVALPROEX SODIUM 250 MG: 250 TABLET, DELAYED RELEASE ORAL at 20:30

## 2025-07-08 RX ADMIN — ACETAMINOPHEN 650 MG: 325 TABLET ORAL at 15:13

## 2025-07-08 RX ADMIN — SENNOSIDES 8.6 MG: 8.6 TABLET, FILM COATED ORAL at 20:30

## 2025-07-08 RX ADMIN — BENZTROPINE MESYLATE 0.5 MG: 0.5 TABLET ORAL at 20:30

## 2025-07-08 RX ADMIN — BENZTROPINE MESYLATE 0.5 MG: 0.5 TABLET ORAL at 09:49

## 2025-07-08 RX ADMIN — Medication 3 MG: at 20:30

## 2025-07-08 RX ADMIN — MULTIVITAMIN TABLET 1 TABLET: TABLET at 09:49

## 2025-07-08 RX ADMIN — FAMOTIDINE 20 MG: 20 TABLET, FILM COATED ORAL at 09:49

## 2025-07-08 RX ADMIN — ASPIRIN 81 MG CHEWABLE TABLET 81 MG: 81 TABLET CHEWABLE at 09:49

## 2025-07-08 RX ADMIN — APIXABAN 10 MG: 5 TABLET, FILM COATED ORAL at 20:30

## 2025-07-08 RX ADMIN — APIXABAN 10 MG: 5 TABLET, FILM COATED ORAL at 09:49

## 2025-07-08 ASSESSMENT — PAIN SCALES - GENERAL
PAINLEVEL_OUTOF10: 10
PAINLEVEL_OUTOF10: 6

## 2025-07-08 ASSESSMENT — PAIN DESCRIPTION - LOCATION
LOCATION: HAND;ARM
LOCATION: ARM

## 2025-07-08 ASSESSMENT — PAIN - FUNCTIONAL ASSESSMENT: PAIN_FUNCTIONAL_ASSESSMENT: PREVENTS OR INTERFERES SOME ACTIVE ACTIVITIES AND ADLS

## 2025-07-08 ASSESSMENT — PAIN DESCRIPTION - DESCRIPTORS: DESCRIPTORS: STABBING;SHARP;THROBBING

## 2025-07-08 ASSESSMENT — PAIN DESCRIPTION - ORIENTATION: ORIENTATION: LEFT

## 2025-07-08 NOTE — PLAN OF CARE
Problem: Depression  Goal: Will be euthymic at discharge  Description: INTERVENTIONS:  1. Administer medication as ordered  2. Provide emotional support via 1:1 interaction with staff  3. Encourage involvement in milieu/groups/activities  4. Monitor for social isolation  7/8/2025 0907 by Dereje Blair RN  Outcome: Progressing  7/7/2025 2111 by Patricia Puckett RN  Outcome: Progressing     Problem: Psychosis  Goal: Will report no hallucinations or delusions  Description: INTERVENTIONS:  1. Administer medication as  ordered  2. Assist with reality testing to support increasing orientation  3. Assess if patient's hallucinations or delusions are encouraging self harm or harm to others and intervene as appropriate  7/8/2025 0907 by Dereje Blair RN  Outcome: Progressing  7/7/2025 2111 by Patricia Puckett RN  Outcome: Progressing     Problem: Anxiety  Goal: Will report anxiety at manageable levels  Description: INTERVENTIONS:  1. Administer medication as ordered  2. Teach and rehearse alternative coping skills  3. Provide emotional support with 1:1 interaction with staff  7/8/2025 0907 by Dereje Blair RN  Outcome: Progressing  7/7/2025 2111 by Patricia Puckett RN  Outcome: Progressing

## 2025-07-08 NOTE — PLAN OF CARE
Problem: Depression  Goal: Will be euthymic at discharge  Description: INTERVENTIONS:  1. Administer medication as ordered  2. Provide emotional support via 1:1 interaction with staff  3. Encourage involvement in milieu/groups/activities  4. Monitor for social isolation  7/7/2025 2111 by Patricia Puckett RN  Outcome: Progressing     Problem: Psychosis  Goal: Will report no hallucinations or delusions  Description: INTERVENTIONS:  1. Administer medication as  ordered  2. Assist with reality testing to support increasing orientation  3. Assess if patient's hallucinations or delusions are encouraging self harm or harm to others and intervene as appropriate  7/7/2025 2111 by Patricia Puckett RN  Outcome: Progressing     Problem: Anxiety  Goal: Will report anxiety at manageable levels  Description: INTERVENTIONS:  1. Administer medication as ordered  2. Teach and rehearse alternative coping skills  3. Provide emotional support with 1:1 interaction with staff  7/7/2025 2111 by Patricia Puckett RN  Outcome: Progressing     Problem: Sleep Disturbance  Goal: Will exhibit normal sleeping pattern  Description: INTERVENTIONS:  1. Administer medication as ordered  2. Decrease environmental stimuli, including noise, as appropriate  3. Discourage social isolation and naps during the day  7/7/2025 2111 by Patricia Puckett RN  Outcome: Progressing     Problem: Pain  Goal: Verbalizes/displays adequate comfort level or baseline comfort level  Outcome: Progressing     Problem: Safety - Adult  Goal: Free from fall injury  Outcome: Progressing     Patient denies suicidal ideation and homicidal ideations. Endorses AH of voices but does not elaborate. Denies any visual hallucinations. Appears flat, sad, depressed, guarded, and withdrawn. Rates his anxiety a 10/10 and depression a 8/10. Presents calm and cooperative during assessment. Patient is isolative to his room this evening. Medications taken without issue. No

## 2025-07-09 DIAGNOSIS — M79.602 LEFT ARM PAIN: ICD-10-CM

## 2025-07-09 DIAGNOSIS — I73.9 PVD (PERIPHERAL VASCULAR DISEASE): ICD-10-CM

## 2025-07-09 DIAGNOSIS — I99.8 ISCHEMIA OF LEFT UPPER EXTREMITY: Primary | ICD-10-CM

## 2025-07-09 LAB
DATE LAST DOSE: ABNORMAL
GLUCOSE BLD-MCNC: 101 MG/DL (ref 74–99)
GLUCOSE BLD-MCNC: 110 MG/DL (ref 74–99)
TME LAST DOSE: ABNORMAL H
VALPROATE SERPL-MCNC: 19 UG/ML (ref 50–100)
VANCOMYCIN DOSE: ABNORMAL MG

## 2025-07-09 PROCEDURE — 99232 SBSQ HOSP IP/OBS MODERATE 35: CPT

## 2025-07-09 PROCEDURE — 6370000000 HC RX 637 (ALT 250 FOR IP): Performed by: PSYCHIATRY & NEUROLOGY

## 2025-07-09 PROCEDURE — 6370000000 HC RX 637 (ALT 250 FOR IP): Performed by: NURSE PRACTITIONER

## 2025-07-09 PROCEDURE — 1240000000 HC EMOTIONAL WELLNESS R&B

## 2025-07-09 PROCEDURE — 82962 GLUCOSE BLOOD TEST: CPT

## 2025-07-09 PROCEDURE — 36415 COLL VENOUS BLD VENIPUNCTURE: CPT

## 2025-07-09 PROCEDURE — 6370000000 HC RX 637 (ALT 250 FOR IP)

## 2025-07-09 PROCEDURE — 80164 ASSAY DIPROPYLACETIC ACD TOT: CPT

## 2025-07-09 RX ORDER — DIVALPROEX SODIUM 500 MG/1
500 TABLET, DELAYED RELEASE ORAL EVERY 12 HOURS SCHEDULED
Status: DISCONTINUED | OUTPATIENT
Start: 2025-07-09 | End: 2025-07-14 | Stop reason: HOSPADM

## 2025-07-09 RX ADMIN — FAMOTIDINE 20 MG: 20 TABLET, FILM COATED ORAL at 21:27

## 2025-07-09 RX ADMIN — BENZTROPINE MESYLATE 0.5 MG: 0.5 TABLET ORAL at 08:37

## 2025-07-09 RX ADMIN — SENNOSIDES 8.6 MG: 8.6 TABLET, FILM COATED ORAL at 21:26

## 2025-07-09 RX ADMIN — DIVALPROEX SODIUM 250 MG: 250 TABLET, DELAYED RELEASE ORAL at 08:37

## 2025-07-09 RX ADMIN — ACETAMINOPHEN 650 MG: 325 TABLET ORAL at 08:38

## 2025-07-09 RX ADMIN — FAMOTIDINE 20 MG: 20 TABLET, FILM COATED ORAL at 08:37

## 2025-07-09 RX ADMIN — MULTIVITAMIN TABLET 1 TABLET: TABLET at 08:37

## 2025-07-09 RX ADMIN — ACETAMINOPHEN 650 MG: 325 TABLET ORAL at 21:28

## 2025-07-09 RX ADMIN — APIXABAN 10 MG: 5 TABLET, FILM COATED ORAL at 21:26

## 2025-07-09 RX ADMIN — DIVALPROEX SODIUM 500 MG: 500 TABLET, DELAYED RELEASE ORAL at 21:27

## 2025-07-09 RX ADMIN — ASPIRIN 81 MG CHEWABLE TABLET 81 MG: 81 TABLET CHEWABLE at 08:36

## 2025-07-09 RX ADMIN — APIXABAN 10 MG: 5 TABLET, FILM COATED ORAL at 08:37

## 2025-07-09 RX ADMIN — BENZTROPINE MESYLATE 0.5 MG: 0.5 TABLET ORAL at 21:26

## 2025-07-09 RX ADMIN — ATORVASTATIN CALCIUM 40 MG: 40 TABLET, FILM COATED ORAL at 21:27

## 2025-07-09 RX ADMIN — OLANZAPINE 15 MG: 5 TABLET, FILM COATED ORAL at 21:26

## 2025-07-09 ASSESSMENT — PAIN DESCRIPTION - LOCATION
LOCATION: BACK;ARM
LOCATION: HAND;ARM

## 2025-07-09 ASSESSMENT — PAIN SCALES - GENERAL
PAINLEVEL_OUTOF10: 10
PAINLEVEL_OUTOF10: 10
PAINLEVEL_OUTOF10: 6
PAINLEVEL_OUTOF10: 10
PAINLEVEL_OUTOF10: 10

## 2025-07-09 ASSESSMENT — PAIN DESCRIPTION - DESCRIPTORS
DESCRIPTORS: ACHING;THROBBING;POUNDING
DESCRIPTORS: ACHING;THROBBING;SHARP
DESCRIPTORS: ACHING

## 2025-07-09 ASSESSMENT — PAIN DESCRIPTION - ORIENTATION
ORIENTATION: LEFT

## 2025-07-09 NOTE — PLAN OF CARE
Patient denies suicidal ideation, and homicidal ideation. Endorses auditory hallucinations of voices and stated he cannot make them out. Endorses anxiety as \"a little\" and rated it 6/10. Endorses and depression as 8/10. Denies racing thoughts. Appears anxious, cooperative, depressed, and withdrawn during assessment. Reports appetite is \"good\" and sleep is \"better\" with difficulty falling and staying asleep. Patient is taking medications without issues at this time. Patient is observed isolative to themself. Remains in control of behaviors.      Problem: Depression  Goal: Will be euthymic at discharge  Description: INTERVENTIONS:  1. Administer medication as ordered  2. Provide emotional support via 1:1 interaction with staff  3. Encourage involvement in milieu/groups/activities  4. Monitor for social isolation  Outcome: Progressing     Problem: Anxiety  Goal: Will report anxiety at manageable levels  Description: INTERVENTIONS:  1. Administer medication as ordered  2. Teach and rehearse alternative coping skills  3. Provide emotional support with 1:1 interaction with staff  Outcome: Progressing     Problem: Involuntary Admit  Goal: Will cooperate with staff recommendations and doctor's orders and will demonstrate appropriate behavior  Description: INTERVENTIONS:  1. Treat underlying conditions and offer medication as ordered  2. Educate regarding involuntary admission procedures and rules  3. Contain excessive/inappropriate behavior per unit and hospital policies  Outcome: Progressing     Problem: Coping  Goal: Pt/Family able to verbalize concerns and demonstrate effective coping strategies  Description: INTERVENTIONS:  1. Assist patient/family to identify coping skills, available support systems and cultural and spiritual values  2. Provide emotional support, including active listening and acknowledgement of concerns of patient and caregivers  3. Reduce environmental stimuli, as able  4. Instruct patient/family in

## 2025-07-09 NOTE — CARE COORDINATION
EMPERATRIZ spoke with Bear from [facility] [phone] who stated that he will be to the hospital in around 20 minutes to complete the assessment on the pt.

## 2025-07-09 NOTE — CARE COORDINATION
SW met with pt in treatment team with NP and RN. Pt stated that he is doing okay today and team discussed the discharge plan with the pt. Pt stated that he would like to go to a facility in the [facility]. Pt is still having AH and stated that they are the same since he been here and they are talking to him and denied that they are commanding.

## 2025-07-09 NOTE — PLAN OF CARE
Problem: Psychosis  Goal: Will report no hallucinations or delusions  Description: INTERVENTIONS:  1. Administer medication as  ordered  2. Assist with reality testing to support increasing orientation  3. Assess if patient's hallucinations or delusions are encouraging self harm or harm to others and intervene as appropriate  Outcome: Not Progressing     Problem: Anxiety  Goal: Will report anxiety at manageable levels  Description: INTERVENTIONS:  1. Administer medication as ordered  2. Teach and rehearse alternative coping skills  3. Provide emotional support with 1:1 interaction with staff  Outcome: Not Progressing     Problem: Depression  Goal: Will be euthymic at discharge  Description: INTERVENTIONS:  1. Administer medication as ordered  2. Provide emotional support via 1:1 interaction with staff  3. Encourage involvement in milieu/groups/activities  4. Monitor for social isolation  Outcome: Progressing     Problem: Sleep Disturbance  Goal: Will exhibit normal sleeping pattern  Description: INTERVENTIONS:  1. Administer medication as ordered  2. Decrease environmental stimuli, including noise, as appropriate  3. Discourage social isolation and naps during the day  Outcome: Progressing     Problem: Pain  Goal: Verbalizes/displays adequate comfort level or baseline comfort level  Outcome: Progressing     Problem: ABCDS Injury Assessment  Goal: Absence of physical injury  Outcome: Progressing     Patient denies suicidal ideation and homicidal ideations. Continues to endorse auditory hallucinations. Reports that the medication is helping a little bit with the voices. Denies any visual hallucinations. Appears flat, sad, depressed, and withdrawn. Brightens slightly with conversation. Rates his anxiety a 10/10 and depression a 8/10. Calm and cooperative during assessment. Patient seen out briefly in the dayroom for snack but is otherwise isolative to his room. Medications taken without issue. No complaints or

## 2025-07-10 LAB — GLUCOSE BLD-MCNC: 99 MG/DL (ref 74–99)

## 2025-07-10 PROCEDURE — 6370000000 HC RX 637 (ALT 250 FOR IP)

## 2025-07-10 PROCEDURE — 1240000000 HC EMOTIONAL WELLNESS R&B

## 2025-07-10 PROCEDURE — 6370000000 HC RX 637 (ALT 250 FOR IP): Performed by: PSYCHIATRY & NEUROLOGY

## 2025-07-10 PROCEDURE — 6370000000 HC RX 637 (ALT 250 FOR IP): Performed by: NURSE PRACTITIONER

## 2025-07-10 PROCEDURE — 82962 GLUCOSE BLOOD TEST: CPT

## 2025-07-10 RX ADMIN — BENZTROPINE MESYLATE 0.5 MG: 0.5 TABLET ORAL at 21:42

## 2025-07-10 RX ADMIN — ACETAMINOPHEN 650 MG: 325 TABLET ORAL at 07:02

## 2025-07-10 RX ADMIN — APIXABAN 10 MG: 5 TABLET, FILM COATED ORAL at 09:13

## 2025-07-10 RX ADMIN — MULTIVITAMIN TABLET 1 TABLET: TABLET at 09:13

## 2025-07-10 RX ADMIN — Medication 3 MG: at 21:41

## 2025-07-10 RX ADMIN — APIXABAN 10 MG: 5 TABLET, FILM COATED ORAL at 21:42

## 2025-07-10 RX ADMIN — DIVALPROEX SODIUM 500 MG: 500 TABLET, DELAYED RELEASE ORAL at 21:42

## 2025-07-10 RX ADMIN — FAMOTIDINE 20 MG: 20 TABLET, FILM COATED ORAL at 09:13

## 2025-07-10 RX ADMIN — ASPIRIN 81 MG CHEWABLE TABLET 81 MG: 81 TABLET CHEWABLE at 09:13

## 2025-07-10 RX ADMIN — DIVALPROEX SODIUM 500 MG: 500 TABLET, DELAYED RELEASE ORAL at 09:13

## 2025-07-10 RX ADMIN — ATORVASTATIN CALCIUM 40 MG: 40 TABLET, FILM COATED ORAL at 21:42

## 2025-07-10 RX ADMIN — OLANZAPINE 15 MG: 5 TABLET, FILM COATED ORAL at 21:42

## 2025-07-10 RX ADMIN — ACETAMINOPHEN 650 MG: 325 TABLET ORAL at 21:41

## 2025-07-10 RX ADMIN — SENNOSIDES 8.6 MG: 8.6 TABLET, FILM COATED ORAL at 21:42

## 2025-07-10 RX ADMIN — BENZTROPINE MESYLATE 0.5 MG: 0.5 TABLET ORAL at 09:13

## 2025-07-10 RX ADMIN — FAMOTIDINE 20 MG: 20 TABLET, FILM COATED ORAL at 21:42

## 2025-07-10 ASSESSMENT — PAIN DESCRIPTION - DESCRIPTORS
DESCRIPTORS: TENDER;SORE;THROBBING
DESCRIPTORS: DISCOMFORT;SORE;THROBBING
DESCRIPTORS: SORE

## 2025-07-10 ASSESSMENT — PAIN DESCRIPTION - LOCATION
LOCATION: HAND
LOCATION: HAND
LOCATION: BACK;ARM
LOCATION: HAND

## 2025-07-10 ASSESSMENT — PAIN DESCRIPTION - ORIENTATION
ORIENTATION: LEFT

## 2025-07-10 ASSESSMENT — PAIN SCALES - GENERAL
PAINLEVEL_OUTOF10: 10
PAINLEVEL_OUTOF10: 10
PAINLEVEL_OUTOF10: 0
PAINLEVEL_OUTOF10: 10
PAINLEVEL_OUTOF10: 10

## 2025-07-10 NOTE — CARE COORDINATION
Alma Delia received a voicemail from Jaimee at [facility]. She stated that they can accept pt. She stated to let her know and she can start the precert.     Nichole from [facility] visited pt. She declined due to needing more psych. She stated that their facility is not appropriate for pt.     ALMA DELIA called [facility] Liaison [place] [phone] to inform her that they can start the precert for pt. She stated that she will start it now.

## 2025-07-10 NOTE — PLAN OF CARE
Problem: Depression  Goal: Will be euthymic at discharge  Description: INTERVENTIONS:  1. Administer medication as ordered  2. Provide emotional support via 1:1 interaction with staff  3. Encourage involvement in milieu/groups/activities  4. Monitor for social isolation  7/9/2025 2028 by Aileen Martínez RN  Outcome: Progressing     Problem: Psychosis  Goal: Will report no hallucinations or delusions  Description: INTERVENTIONS:  1. Administer medication as  ordered  2. Assist with reality testing to support increasing orientation  3. Assess if patient's hallucinations or delusions are encouraging self harm or harm to others and intervene as appropriate  7/9/2025 2028 by Aileen Martínez RN  Outcome: Progressing     Problem: Anxiety  Goal: Will report anxiety at manageable levels  Description: INTERVENTIONS:  1. Administer medication as ordered  2. Teach and rehearse alternative coping skills  3. Provide emotional support with 1:1 interaction with staff  7/9/2025 2028 by Aileen Martínez RN  Outcome: Progressing     Problem: Sleep Disturbance  Goal: Will exhibit normal sleeping pattern  Description: INTERVENTIONS:  1. Administer medication as ordered  2. Decrease environmental stimuli, including noise, as appropriate  3. Discourage social isolation and naps during the day  Outcome: Progressing     Problem: Pain  Goal: Verbalizes/displays adequate comfort level or baseline comfort level  7/9/2025 2028 by Aileen Martínez RN  Outcome: Progressing     Problem: Risk for Elopement  Goal: Patient will not exit the unit/facility without proper excort  7/9/2025 2028 by Aileen Martínez RN  Outcome: Progressing     Problem: Safety - Adult  Goal: Free from fall injury  7/9/2025 2028 by Aileen Martínez RN  Outcome: Progressing

## 2025-07-10 NOTE — GROUP NOTE
Shared goal for the day as to come to group.                                                                       Group Therapy Note    Date: 7/10/2025    Group Start Time: 0930  Group End Time: 0950  Group Topic: Community Meeting    [facility] ACUTE  1    Shahnaz Marrero, CTRS    Type of Group:Community Meeting    Patient's Goal:  Patient will be able to id staffing assignments, expectations of patients, and general information re: floor rules.   Will be prompted to share goal for the day.     Notes:  Patient appeared to be an active listener, taking in information presented and was prompted to share goal for the day.    Status After Intervention:  improved    Participation Level: active listener    Participation Quality: appropriate    Speech:  normal    Thought Process/Content: logical     Affective Functioning:congruent    Mood: euthymic    Level of consciousness:  alert     Response to Learning: verbalize, retain     Endings:none reported    Modes of Intervention: education, support, clarifying     Discipline Responsible: Psychoeducation       Signature:  CASEY James

## 2025-07-10 NOTE — PLAN OF CARE
Patient denies suicidal ideation, homicidal ideation, and visual hallucinations. Endorses auditory hallucinations of voices when asked to elaborate patient stated \"I just don't know\". Endorses anxiety as 10/10 and depression as 8/10. Denies racing thoughts. Appears flat, withdrawn, cooperative, anxious, sad and more friendly then previous interactions during assessment. Reports appetite is \"alright\" and sleep is poor with difficulty \"falling and staying sleep\". Patient is taking medications without issues at this time. Patient is observed isolative to himself. Remains in control of behaviors.        Problem: Depression  Goal: Will be euthymic at discharge  Description: INTERVENTIONS:  1. Administer medication as ordered  2. Provide emotional support via 1:1 interaction with staff  3. Encourage involvement in milieu/groups/activities  4. Monitor for social isolation  Outcome: Progressing     Problem: Psychosis  Goal: Will report no hallucinations or delusions  Description: INTERVENTIONS:  1. Administer medication as  ordered  2. Assist with reality testing to support increasing orientation  3. Assess if patient's hallucinations or delusions are encouraging self harm or harm to others and intervene as appropriate  Outcome: Progressing     Problem: Involuntary Admit  Goal: Will cooperate with staff recommendations and doctor's orders and will demonstrate appropriate behavior  Description: INTERVENTIONS:  1. Treat underlying conditions and offer medication as ordered  2. Educate regarding involuntary admission procedures and rules  3. Contain excessive/inappropriate behavior per unit and hospital policies  Outcome: Progressing     Problem: Coping  Goal: Pt/Family able to verbalize concerns and demonstrate effective coping strategies  Description: INTERVENTIONS:  1. Assist patient/family to identify coping skills, available support systems and cultural and spiritual values  2. Provide emotional support, including active

## 2025-07-10 NOTE — CARE COORDINATION
[facility] stated that she will have to let this worker know what the facility says. She has not gotten a response back yet.     SIL called the [facility] Liaison Ren[place] [phone] to make a referral for the [facility]. No answer, sil left a voicemail.     SIL called the [facility] Liaison Nichole [phone] to make a referral for the [facility]. Referral made.     SIL called [facility] Liaison [place] [phone] to make a referral to their facility. Referral made.

## 2025-07-10 NOTE — GROUP NOTE
Group Therapy Note    Date: 7/10/2025    Group Start Time: 1030  Group End Time: 1115  Group Topic: Psychoeducation    [facility] ACUTE BH 1    Shahnaz Marrero, ALEJANDRAS    Type of Group: Psychoeducation    Module Name:  managing change    Patient's Goal:  pt will be able to id key steps to managing stressor during times of big change.     Notes:  pleasant and engaged in group, able to share when prompted and accepting of handout.     Status After Intervention:  Improved    Participation Level: Active Listener and Interactive    Participation Quality: Appropriate, Attentive, and Sharing      Speech:  normal      Thought Process/Content: Logical      Affective Functioning: Congruent      Mood: euthymic      Level of consciousness:  Alert, Oriented x4, and Attentive      Response to Learning: Able to verbalize/acknowledge new learning and Able to retain information      Endings: None Reported    Modes of Intervention: Education, Support, and Socialization      Discipline Responsible: Psychoeducational Specialist      Signature:  ALEJANDRA JamesS

## 2025-07-10 NOTE — BH NOTE
[facility]  Week Interdisciplinary Treatment Plan Note     Review Date & Time: 07/10/2025 0900    Patient was in treatment team.    Estimated Length of Stay Update:  3-4 days   Estimated Discharge Date Update: 7/14/25    EDUCATION:   Learner Progress Toward Treatment Goals: Reviewed goals and plan of care    Method: Small group    Outcome: Verbalized understanding, Demonstrated Understanding, and Needs reinforcement    PATIENT GOALS: none verbalized at this time    PLAN/TREATMENT RECOMMENDATIONS UPDATE: Encourage patient to attend and participate in groups and take medications as prescribed.      GOALS UPDATE:  Time frame for Short-Term Goals: Prior to discharge      Miroslava Young RN

## 2025-07-11 LAB — GLUCOSE BLD-MCNC: 97 MG/DL (ref 74–99)

## 2025-07-11 PROCEDURE — 6370000000 HC RX 637 (ALT 250 FOR IP): Performed by: NURSE PRACTITIONER

## 2025-07-11 PROCEDURE — 99232 SBSQ HOSP IP/OBS MODERATE 35: CPT

## 2025-07-11 PROCEDURE — 6370000000 HC RX 637 (ALT 250 FOR IP): Performed by: PSYCHIATRY & NEUROLOGY

## 2025-07-11 PROCEDURE — 6370000000 HC RX 637 (ALT 250 FOR IP)

## 2025-07-11 PROCEDURE — 1240000000 HC EMOTIONAL WELLNESS R&B

## 2025-07-11 PROCEDURE — 82962 GLUCOSE BLOOD TEST: CPT

## 2025-07-11 RX ADMIN — ASPIRIN 81 MG CHEWABLE TABLET 81 MG: 81 TABLET CHEWABLE at 09:36

## 2025-07-11 RX ADMIN — BENZTROPINE MESYLATE 0.5 MG: 0.5 TABLET ORAL at 09:36

## 2025-07-11 RX ADMIN — ACETAMINOPHEN 650 MG: 325 TABLET ORAL at 06:48

## 2025-07-11 RX ADMIN — OLANZAPINE 15 MG: 5 TABLET, FILM COATED ORAL at 22:29

## 2025-07-11 RX ADMIN — ACETAMINOPHEN 650 MG: 325 TABLET ORAL at 22:30

## 2025-07-11 RX ADMIN — DIVALPROEX SODIUM 500 MG: 500 TABLET, DELAYED RELEASE ORAL at 22:29

## 2025-07-11 RX ADMIN — APIXABAN 5 MG: 5 TABLET, FILM COATED ORAL at 22:30

## 2025-07-11 RX ADMIN — MULTIVITAMIN TABLET 1 TABLET: TABLET at 09:36

## 2025-07-11 RX ADMIN — BENZTROPINE MESYLATE 0.5 MG: 0.5 TABLET ORAL at 22:29

## 2025-07-11 RX ADMIN — Medication 3 MG: at 22:30

## 2025-07-11 RX ADMIN — FAMOTIDINE 20 MG: 20 TABLET, FILM COATED ORAL at 22:30

## 2025-07-11 RX ADMIN — ERGOCALCIFEROL 50000 UNITS: 1.25 CAPSULE ORAL at 09:36

## 2025-07-11 RX ADMIN — APIXABAN 5 MG: 5 TABLET, FILM COATED ORAL at 09:36

## 2025-07-11 RX ADMIN — ATORVASTATIN CALCIUM 40 MG: 40 TABLET, FILM COATED ORAL at 22:30

## 2025-07-11 RX ADMIN — DIVALPROEX SODIUM 500 MG: 500 TABLET, DELAYED RELEASE ORAL at 09:36

## 2025-07-11 RX ADMIN — FAMOTIDINE 20 MG: 20 TABLET, FILM COATED ORAL at 09:36

## 2025-07-11 ASSESSMENT — PAIN DESCRIPTION - ORIENTATION
ORIENTATION: LOWER
ORIENTATION: LEFT
ORIENTATION: LEFT

## 2025-07-11 ASSESSMENT — PAIN SCALES - GENERAL
PAINLEVEL_OUTOF10: 10
PAINLEVEL_OUTOF10: 0
PAINLEVEL_OUTOF10: 0
PAINLEVEL_OUTOF10: 10
PAINLEVEL_OUTOF10: 0
PAINLEVEL_OUTOF10: 10
PAINLEVEL_OUTOF10: 0

## 2025-07-11 ASSESSMENT — PAIN DESCRIPTION - DESCRIPTORS
DESCRIPTORS: DISCOMFORT;NUMBNESS
DESCRIPTORS: ACHING
DESCRIPTORS: ACHING

## 2025-07-11 ASSESSMENT — PAIN DESCRIPTION - LOCATION
LOCATION: HAND
LOCATION: ARM
LOCATION: BACK

## 2025-07-11 ASSESSMENT — PAIN SCALES - WONG BAKER: WONGBAKER_NUMERICALRESPONSE: NO HURT

## 2025-07-11 NOTE — PLAN OF CARE
Problem: Depression  Goal: Will be euthymic at discharge  Description: INTERVENTIONS:  1. Administer medication as ordered  2. Provide emotional support via 1:1 interaction with staff  3. Encourage involvement in milieu/groups/activities  4. Monitor for social isolation  7/10/2025 2146 by Venkatesh Vasquez, RN  Outcome: Progressing     Problem: Involuntary Admit  Goal: Will cooperate with staff recommendations and doctor's orders and will demonstrate appropriate behavior  Description: INTERVENTIONS:  1. Treat underlying conditions and offer medication as ordered  2. Educate regarding involuntary admission procedures and rules  3. Contain excessive/inappropriate behavior per unit and hospital policies  7/10/2025 2146 by Venkatesh Vasquez, RN  Outcome: Progressing     Problem: Confusion  Goal: Confusion, delirium, dementia, or psychosis is improved or at baseline  Description: INTERVENTIONS:  1. Assess for possible contributors to thought disturbance, including medications, impaired vision or hearing, underlying metabolic abnormalities, dehydration, psychiatric diagnoses, and notify attending LIP  2. [place] high risk fall precautions, as indicated  3. Provide frequent short contacts to provide reality reorientation, refocusing and direction  4. Decrease environmental stimuli, including noise as appropriate  5. Monitor and intervene to maintain adequate nutrition, hydration, elimination, sleep and activity  6. If unable to ensure safety without constant attention obtain sitter and review sitter guidelines with assigned personnel  7. Initiate Psychosocial CNS and Spiritual Care consult, as indicated  Outcome: Progressing

## 2025-07-11 NOTE — CARE COORDINATION
Treatment team met with pt. He stated that he is doing better. He stated that he is tired. We discussed him being accepted to [facility] and that we are waiting for the okay. He stated that his voices are better.

## 2025-07-11 NOTE — PLAN OF CARE
Problem: Depression  Goal: Will be euthymic at discharge  Description: INTERVENTIONS:  1. Administer medication as ordered  2. Provide emotional support via 1:1 interaction with staff  3. Encourage involvement in milieu/groups/activities  4. Monitor for social isolation  7/11/2025 1120 by Snow Stratton RN  Outcome: Progressing     Problem: Psychosis  Goal: Will report no hallucinations or delusions  Description: INTERVENTIONS:  1. Administer medication as  ordered  2. Assist with reality testing to support increasing orientation  3. Assess if patient's hallucinations or delusions are encouraging self harm or harm to others and intervene as appropriate  Outcome: Progressing     Problem: Anxiety  Goal: Will report anxiety at manageable levels  Description: INTERVENTIONS:  1. Administer medication as ordered  2. Teach and rehearse alternative coping skills  3. Provide emotional support with 1:1 interaction with staff  Outcome: Progressing     Problem: Sleep Disturbance  Goal: Will exhibit normal sleeping pattern  Description: INTERVENTIONS:  1. Administer medication as ordered  2. Decrease environmental stimuli, including noise, as appropriate  3. Discourage social isolation and naps during the day  Outcome: Progressing     Problem: Involuntary Admit  Goal: Will cooperate with staff recommendations and doctor's orders and will demonstrate appropriate behavior  Description: INTERVENTIONS:  1. Treat underlying conditions and offer medication as ordered  2. Educate regarding involuntary admission procedures and rules  3. Contain excessive/inappropriate behavior per unit and hospital policies  7/11/2025 1120 by Snow Stratton RN  Outcome: Progressing       Patient denies SI/HI. Patient continues to endorse auditory hallucinations of voices but states they lessening. Patient rates anxiety 0/10 and depression 8/10. Patient is attending meals but is otherwise isolative to self and room. Patient is taking prescribed

## 2025-07-11 NOTE — GROUP NOTE
Shared goal for the day as to have a good day.                                                                       Group Therapy Note    Date: 7/11/2025    Group Start Time: 0915  Group End Time: 0940  Group Topic: Community Meeting    [facility] ACUTE  1    Shahnaz Marrero, ALEJANDRAS    Type of Group:Community Meeting    Patient's Goal:  Patient will be able to id staffing assignments, expectations of patients, and general information re: floor rules.   Will be prompted to share goal for the day.     Notes:  Patient appeared to be an active listener, taking in information presented and was prompted to share goal for the day.    Status After Intervention:  improved    Participation Level: active listener    Participation Quality: appropriate    Speech:  normal    Thought Process/Content: logical     Affective Functioning:congruent    Mood: euthymic    Level of consciousness:  alert     Response to Learning: verbalize, retain     Endings:none reported    Modes of Intervention: education, support, clarifying     Discipline Responsible: Psychoeducation       Signature:  CASEY James

## 2025-07-11 NOTE — BH NOTE
Denies suicidal/homicidal ideations, as well as visual hallucinations at this time. Endorses and auditory hallucinations of \"voices\", pt does not elaborate further.   Reports anxiety 10/10 and depression 8/10. Pt reports increased anxiety and depression are due to \"I'm discharging tomorrow to a facility\".  Pt presents flat, blunt, appears depressed and anxious. Pt is observed on unit, withdrawn. Pt is calm, cooperative, soft spoken.  Pt is compliant with PM medication, requested PRN Tylenol for L hand pain, Melatonin for sleep aid.  According to previous notes pt attended daytime groups.x2  No acute behavioral concerns voiced/noted at this time.  Purposeful Q15min rounding continues.

## 2025-07-12 LAB
DATE LAST DOSE: ABNORMAL
GLUCOSE BLD-MCNC: 132 MG/DL (ref 74–99)
GLUCOSE BLD-MCNC: 99 MG/DL (ref 74–99)
TME LAST DOSE: ABNORMAL H
VALPROATE SERPL-MCNC: 41 UG/ML (ref 50–100)
VANCOMYCIN DOSE: ABNORMAL MG

## 2025-07-12 PROCEDURE — 36415 COLL VENOUS BLD VENIPUNCTURE: CPT

## 2025-07-12 PROCEDURE — 82962 GLUCOSE BLOOD TEST: CPT

## 2025-07-12 PROCEDURE — 6370000000 HC RX 637 (ALT 250 FOR IP)

## 2025-07-12 PROCEDURE — 1240000000 HC EMOTIONAL WELLNESS R&B

## 2025-07-12 PROCEDURE — 6370000000 HC RX 637 (ALT 250 FOR IP): Performed by: NURSE PRACTITIONER

## 2025-07-12 PROCEDURE — 80164 ASSAY DIPROPYLACETIC ACD TOT: CPT

## 2025-07-12 PROCEDURE — 6370000000 HC RX 637 (ALT 250 FOR IP): Performed by: PSYCHIATRY & NEUROLOGY

## 2025-07-12 PROCEDURE — 99232 SBSQ HOSP IP/OBS MODERATE 35: CPT

## 2025-07-12 RX ADMIN — MULTIVITAMIN TABLET 1 TABLET: TABLET at 09:39

## 2025-07-12 RX ADMIN — APIXABAN 5 MG: 5 TABLET, FILM COATED ORAL at 21:32

## 2025-07-12 RX ADMIN — ACETAMINOPHEN 650 MG: 325 TABLET ORAL at 21:33

## 2025-07-12 RX ADMIN — ACETAMINOPHEN 650 MG: 325 TABLET ORAL at 09:39

## 2025-07-12 RX ADMIN — APIXABAN 5 MG: 5 TABLET, FILM COATED ORAL at 09:39

## 2025-07-12 RX ADMIN — BENZTROPINE MESYLATE 0.5 MG: 0.5 TABLET ORAL at 21:32

## 2025-07-12 RX ADMIN — OLANZAPINE 15 MG: 5 TABLET, FILM COATED ORAL at 21:32

## 2025-07-12 RX ADMIN — ASPIRIN 81 MG CHEWABLE TABLET 81 MG: 81 TABLET CHEWABLE at 09:39

## 2025-07-12 RX ADMIN — ATORVASTATIN CALCIUM 40 MG: 40 TABLET, FILM COATED ORAL at 21:32

## 2025-07-12 RX ADMIN — DIVALPROEX SODIUM 500 MG: 500 TABLET, DELAYED RELEASE ORAL at 21:32

## 2025-07-12 RX ADMIN — FAMOTIDINE 20 MG: 20 TABLET, FILM COATED ORAL at 21:32

## 2025-07-12 RX ADMIN — FAMOTIDINE 20 MG: 20 TABLET, FILM COATED ORAL at 09:39

## 2025-07-12 RX ADMIN — DIVALPROEX SODIUM 500 MG: 500 TABLET, DELAYED RELEASE ORAL at 09:39

## 2025-07-12 RX ADMIN — BENZTROPINE MESYLATE 0.5 MG: 0.5 TABLET ORAL at 09:39

## 2025-07-12 ASSESSMENT — PAIN DESCRIPTION - LOCATION
LOCATION: BACK
LOCATION: ARM;HAND

## 2025-07-12 ASSESSMENT — PAIN SCALES - GENERAL
PAINLEVEL_OUTOF10: 6
PAINLEVEL_OUTOF10: 0
PAINLEVEL_OUTOF10: 10
PAINLEVEL_OUTOF10: 0
PAINLEVEL_OUTOF10: 10

## 2025-07-12 ASSESSMENT — PAIN SCALES - WONG BAKER: WONGBAKER_NUMERICALRESPONSE: NO HURT

## 2025-07-12 ASSESSMENT — PAIN DESCRIPTION - DESCRIPTORS: DESCRIPTORS: ACHING

## 2025-07-12 ASSESSMENT — PAIN DESCRIPTION - ORIENTATION
ORIENTATION: LEFT
ORIENTATION: LOWER

## 2025-07-12 NOTE — CARE COORDINATION
Alma Delia called Jaimee at [facility] to inform her that we are looking at a discharge for Monday. She understood.

## 2025-07-12 NOTE — PLAN OF CARE
Problem: Depression  Goal: Will be euthymic at discharge  Description: INTERVENTIONS:  1. Administer medication as ordered  2. Provide emotional support via 1:1 interaction with staff  3. Encourage involvement in milieu/groups/activities  4. Monitor for social isolation  Outcome: Progressing     Problem: Psychosis  Goal: Will report no hallucinations or delusions  Description: INTERVENTIONS:  1. Administer medication as  ordered  2. Assist with reality testing to support increasing orientation  3. Assess if patient's hallucinations or delusions are encouraging self harm or harm to others and intervene as appropriate  Outcome: Progressing     Problem: Anxiety  Goal: Will report anxiety at manageable levels  Description: INTERVENTIONS:  1. Administer medication as ordered  2. Teach and rehearse alternative coping skills  3. Provide emotional support with 1:1 interaction with staff  Outcome: Progressing     Problem: Sleep Disturbance  Goal: Will exhibit normal sleeping pattern  Description: INTERVENTIONS:  1. Administer medication as ordered  2. Decrease environmental stimuli, including noise, as appropriate  3. Discourage social isolation and naps during the day  Outcome: Progressing     Problem: Coping  Goal: Pt/Family able to verbalize concerns and demonstrate effective coping strategies  Description: INTERVENTIONS:  1. Assist patient/family to identify coping skills, available support systems and cultural and spiritual values  2. Provide emotional support, including active listening and acknowledgement of concerns of patient and caregivers  3. Reduce environmental stimuli, as able  4. Instruct patient/family in relaxation techniques, as appropriate  5. Assess for spiritual pain/suffering and initiate Spiritual Care, Psychosocial Clinical Specialist consults as needed  Outcome: Progressing     Problem: Confusion  Goal: Confusion, delirium, dementia, or psychosis is improved or at baseline  Description:

## 2025-07-12 NOTE — CARE COORDINATION
Sw was informed by Jaimee that pt can go to their facility at any time. Sw stated that we will let her know when we have a discharge.

## 2025-07-12 NOTE — PLAN OF CARE
Patient out on the unit eating breakfast, cooperative with assessment. Rates anxiety and depression 8/10, endorses auditory hallucination of voices that he can not make out what they are saying. Denies SI, HI, paranoia and racing thoughts.   Problem: Depression  Goal: Will be euthymic at discharge  Description: INTERVENTIONS:  1. Administer medication as ordered  2. Provide emotional support via 1:1 interaction with staff  3. Encourage involvement in milieu/groups/activities  4. Monitor for social isolation  7/12/2025 0931 by Aretha Jiménez RN  Outcome: Progressing     Problem: Psychosis  Goal: Will report no hallucinations or delusions  Description: INTERVENTIONS:  1. Administer medication as  ordered  2. Assist with reality testing to support increasing orientation  3. Assess if patient's hallucinations or delusions are encouraging self harm or harm to others and intervene as appropriate  7/12/2025 0931 by Aretha Jiménez RN  Outcome: Progressing     Problem: Anxiety  Goal: Will report anxiety at manageable levels  Description: INTERVENTIONS:  1. Administer medication as ordered  2. Teach and rehearse alternative coping skills  3. Provide emotional support with 1:1 interaction with staff  7/12/2025 0931 by Aretha Jiménez, RN  Outcome: Progressing

## 2025-07-13 LAB — GLUCOSE BLD-MCNC: 118 MG/DL (ref 74–99)

## 2025-07-13 PROCEDURE — 99232 SBSQ HOSP IP/OBS MODERATE 35: CPT

## 2025-07-13 PROCEDURE — 6370000000 HC RX 637 (ALT 250 FOR IP): Performed by: NURSE PRACTITIONER

## 2025-07-13 PROCEDURE — 6370000000 HC RX 637 (ALT 250 FOR IP): Performed by: PSYCHIATRY & NEUROLOGY

## 2025-07-13 PROCEDURE — 1240000000 HC EMOTIONAL WELLNESS R&B

## 2025-07-13 PROCEDURE — 82962 GLUCOSE BLOOD TEST: CPT

## 2025-07-13 PROCEDURE — 6370000000 HC RX 637 (ALT 250 FOR IP)

## 2025-07-13 RX ADMIN — MULTIVITAMIN TABLET 1 TABLET: TABLET at 09:31

## 2025-07-13 RX ADMIN — BENZTROPINE MESYLATE 0.5 MG: 0.5 TABLET ORAL at 09:31

## 2025-07-13 RX ADMIN — APIXABAN 5 MG: 5 TABLET, FILM COATED ORAL at 22:10

## 2025-07-13 RX ADMIN — FAMOTIDINE 20 MG: 20 TABLET, FILM COATED ORAL at 22:10

## 2025-07-13 RX ADMIN — ATORVASTATIN CALCIUM 40 MG: 40 TABLET, FILM COATED ORAL at 22:10

## 2025-07-13 RX ADMIN — ACETAMINOPHEN 650 MG: 325 TABLET ORAL at 22:10

## 2025-07-13 RX ADMIN — ASPIRIN 81 MG CHEWABLE TABLET 81 MG: 81 TABLET CHEWABLE at 09:31

## 2025-07-13 RX ADMIN — FAMOTIDINE 20 MG: 20 TABLET, FILM COATED ORAL at 09:31

## 2025-07-13 RX ADMIN — DIVALPROEX SODIUM 500 MG: 500 TABLET, DELAYED RELEASE ORAL at 22:10

## 2025-07-13 RX ADMIN — BENZTROPINE MESYLATE 0.5 MG: 0.5 TABLET ORAL at 22:10

## 2025-07-13 RX ADMIN — ACETAMINOPHEN 650 MG: 325 TABLET ORAL at 09:32

## 2025-07-13 RX ADMIN — OLANZAPINE 15 MG: 5 TABLET, FILM COATED ORAL at 22:10

## 2025-07-13 RX ADMIN — DIVALPROEX SODIUM 500 MG: 500 TABLET, DELAYED RELEASE ORAL at 09:31

## 2025-07-13 RX ADMIN — APIXABAN 5 MG: 5 TABLET, FILM COATED ORAL at 09:31

## 2025-07-13 ASSESSMENT — PAIN - FUNCTIONAL ASSESSMENT: PAIN_FUNCTIONAL_ASSESSMENT: PREVENTS OR INTERFERES SOME ACTIVE ACTIVITIES AND ADLS

## 2025-07-13 ASSESSMENT — PAIN DESCRIPTION - LOCATION
LOCATION: HAND;ARM
LOCATION: ARM
LOCATION: HAND;ARM

## 2025-07-13 ASSESSMENT — PAIN SCALES - GENERAL
PAINLEVEL_OUTOF10: 10
PAINLEVEL_OUTOF10: 10
PAINLEVEL_OUTOF10: 6
PAINLEVEL_OUTOF10: 10

## 2025-07-13 ASSESSMENT — PAIN DESCRIPTION - DESCRIPTORS: DESCRIPTORS: THROBBING

## 2025-07-13 ASSESSMENT — PAIN DESCRIPTION - ORIENTATION
ORIENTATION: LEFT

## 2025-07-13 NOTE — PLAN OF CARE
Problem: Depression  Goal: Will be euthymic at discharge  Description: INTERVENTIONS:  1. Administer medication as ordered  2. Provide emotional support via 1:1 interaction with staff  3. Encourage involvement in milieu/groups/activities  4. Monitor for social isolation  Outcome: Progressing     Problem: Psychosis  Goal: Will report no hallucinations or delusions  Description: INTERVENTIONS:  1. Administer medication as  ordered  2. Assist with reality testing to support increasing orientation  3. Assess if patient's hallucinations or delusions are encouraging self harm or harm to others and intervene as appropriate  Outcome: Progressing     Problem: Anxiety  Goal: Will report anxiety at manageable levels  Description: INTERVENTIONS:  1. Administer medication as ordered  2. Teach and rehearse alternative coping skills  3. Provide emotional support with 1:1 interaction with staff  Outcome: Progressing     Problem: Involuntary Admit  Goal: Will cooperate with staff recommendations and doctor's orders and will demonstrate appropriate behavior  Description: INTERVENTIONS:  1. Treat underlying conditions and offer medication as ordered  2. Educate regarding involuntary admission procedures and rules  3. Contain excessive/inappropriate behavior per unit and hospital policies  Outcome: Progressing     Problem: Coping  Goal: Pt/Family able to verbalize concerns and demonstrate effective coping strategies  Description: INTERVENTIONS:  1. Assist patient/family to identify coping skills, available support systems and cultural and spiritual values  2. Provide emotional support, including active listening and acknowledgement of concerns of patient and caregivers  3. Reduce environmental stimuli, as able  4. Instruct patient/family in relaxation techniques, as appropriate  5. Assess for spiritual pain/suffering and initiate Spiritual Care, Psychosocial Clinical Specialist consults as needed  Outcome: Progressing       Patient

## 2025-07-13 NOTE — PLAN OF CARE
Problem: Depression  Goal: Will be euthymic at discharge  Description: INTERVENTIONS:  1. Administer medication as ordered  2. Provide emotional support via 1:1 interaction with staff  3. Encourage involvement in milieu/groups/activities  4. Monitor for social isolation  7/12/2025 2215 by Ladan Moore RN  Outcome: Progressing     Problem: Anxiety  Goal: Will report anxiety at manageable levels  Description: INTERVENTIONS:  1. Administer medication as ordered  2. Teach and rehearse alternative coping skills  3. Provide emotional support with 1:1 interaction with staff  7/12/2025 2215 by Ladan Moore RN  Outcome: Progressing     Problem: Sleep Disturbance  Goal: Will exhibit normal sleeping pattern  Description: INTERVENTIONS:  1. Administer medication as ordered  2. Decrease environmental stimuli, including noise, as appropriate  3. Discourage social isolation and naps during the day  Outcome: Progressing     Problem: Coping  Goal: Pt/Family able to verbalize concerns and demonstrate effective coping strategies  Description: INTERVENTIONS:  1. Assist patient/family to identify coping skills, available support systems and cultural and spiritual values  2. Provide emotional support, including active listening and acknowledgement of concerns of patient and caregivers  3. Reduce environmental stimuli, as able  4. Instruct patient/family in relaxation techniques, as appropriate  5. Assess for spiritual pain/suffering and initiate Spiritual Care, Psychosocial Clinical Specialist consults as needed  Outcome: Progressing

## 2025-07-13 NOTE — BH NOTE
Patient awake in day room upon approach.  Patient has good eye contact.    Patient presents .   Appears well groomed  Patient denies suicidal/homicidal ideations and visual hallucinations, reports auditory hallucinations of voices. Patient states he cannot understand the voices but they are not commanding in nature.     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  Patient denies pain  Patient is  taking  ordered medications without issue. Patient is  attending select groups per note review.  Purposeful Q15min rounding continues.

## 2025-07-14 VITALS
SYSTOLIC BLOOD PRESSURE: 114 MMHG | WEIGHT: 149.3 LBS | OXYGEN SATURATION: 97 % | HEIGHT: 71 IN | HEART RATE: 86 BPM | RESPIRATION RATE: 18 BRPM | BODY MASS INDEX: 20.9 KG/M2 | DIASTOLIC BLOOD PRESSURE: 54 MMHG | TEMPERATURE: 97.7 F

## 2025-07-14 LAB
GLUCOSE BLD-MCNC: 91 MG/DL (ref 74–99)
GLUCOSE BLD-MCNC: 98 MG/DL (ref 74–99)

## 2025-07-14 PROCEDURE — 6370000000 HC RX 637 (ALT 250 FOR IP)

## 2025-07-14 PROCEDURE — 82962 GLUCOSE BLOOD TEST: CPT

## 2025-07-14 PROCEDURE — 6370000000 HC RX 637 (ALT 250 FOR IP): Performed by: NURSE PRACTITIONER

## 2025-07-14 PROCEDURE — 99239 HOSP IP/OBS DSCHRG MGMT >30: CPT

## 2025-07-14 PROCEDURE — 6370000000 HC RX 637 (ALT 250 FOR IP): Performed by: PSYCHIATRY & NEUROLOGY

## 2025-07-14 RX ORDER — DIVALPROEX SODIUM 500 MG/1
500 TABLET, DELAYED RELEASE ORAL EVERY 12 HOURS SCHEDULED
Qty: 90 TABLET | Refills: 3
Start: 2025-07-14

## 2025-07-14 RX ORDER — OLANZAPINE 15 MG/1
15 TABLET, FILM COATED ORAL NIGHTLY
Qty: 30 TABLET | Refills: 3
Start: 2025-07-14

## 2025-07-14 RX ADMIN — ASPIRIN 81 MG CHEWABLE TABLET 81 MG: 81 TABLET CHEWABLE at 09:30

## 2025-07-14 RX ADMIN — BENZTROPINE MESYLATE 0.5 MG: 0.5 TABLET ORAL at 09:29

## 2025-07-14 RX ADMIN — APIXABAN 5 MG: 5 TABLET, FILM COATED ORAL at 20:55

## 2025-07-14 RX ADMIN — BENZTROPINE MESYLATE 0.5 MG: 0.5 TABLET ORAL at 20:54

## 2025-07-14 RX ADMIN — APIXABAN 5 MG: 5 TABLET, FILM COATED ORAL at 09:30

## 2025-07-14 RX ADMIN — MULTIVITAMIN TABLET 1 TABLET: TABLET at 09:30

## 2025-07-14 RX ADMIN — OLANZAPINE 15 MG: 5 TABLET, FILM COATED ORAL at 20:54

## 2025-07-14 RX ADMIN — ACETAMINOPHEN 650 MG: 325 TABLET ORAL at 20:56

## 2025-07-14 RX ADMIN — FAMOTIDINE 20 MG: 20 TABLET, FILM COATED ORAL at 09:30

## 2025-07-14 RX ADMIN — DIVALPROEX SODIUM 500 MG: 500 TABLET, DELAYED RELEASE ORAL at 20:55

## 2025-07-14 RX ADMIN — ATORVASTATIN CALCIUM 40 MG: 40 TABLET, FILM COATED ORAL at 20:54

## 2025-07-14 RX ADMIN — DIVALPROEX SODIUM 500 MG: 500 TABLET, DELAYED RELEASE ORAL at 09:30

## 2025-07-14 RX ADMIN — ACETAMINOPHEN 650 MG: 325 TABLET ORAL at 09:30

## 2025-07-14 RX ADMIN — FAMOTIDINE 20 MG: 20 TABLET, FILM COATED ORAL at 20:54

## 2025-07-14 ASSESSMENT — PAIN DESCRIPTION - LOCATION
LOCATION: ARM;BACK
LOCATION: HAND;BACK
LOCATION: ARM;HAND

## 2025-07-14 ASSESSMENT — PAIN SCALES - GENERAL
PAINLEVEL_OUTOF10: 10
PAINLEVEL_OUTOF10: 10
PAINLEVEL_OUTOF10: 7
PAINLEVEL_OUTOF10: 10

## 2025-07-14 ASSESSMENT — PAIN DESCRIPTION - ORIENTATION
ORIENTATION: LEFT
ORIENTATION: LOWER
ORIENTATION: LEFT

## 2025-07-14 ASSESSMENT — PAIN DESCRIPTION - DESCRIPTORS
DESCRIPTORS: ACHING;THROBBING;TINGLING
DESCRIPTORS: ACHING;THROBBING
DESCRIPTORS: THROBBING

## 2025-07-14 ASSESSMENT — PAIN - FUNCTIONAL ASSESSMENT: PAIN_FUNCTIONAL_ASSESSMENT: PREVENTS OR INTERFERES SOME ACTIVE ACTIVITIES AND ADLS

## 2025-07-14 NOTE — BH NOTE
This RN called Jaimee Hinkle ([phone]) to give N2N, no answer at this time unable to leave a VM as VM box if full.

## 2025-07-14 NOTE — GROUP NOTE
Group Therapy Note    Date: 7/14/2025    Group Start Time: 0930  Group End Time: 0950  Group Topic: Community Meeting    [facility]    Shahnaz Marrero, CASEY    Type of Group:Community Meeting    Patient's Goal:  Patient will be able to id staffing assignments, expectations of patients, and general information re: floor rules.   Will be prompted to share goal for the day.     Notes:  Patient appeared to be an active listener, taking in information presented and was prompted to share goal for the day.    Status After Intervention:  improved    Participation Level: active listener    Participation Quality: appropriate    Speech:  normal    Thought Process/Content: logical     Affective Functioning:congruent    Mood: euthymic    Level of consciousness:  alert     Response to Learning: verbalize, retain     Endings:none reported    Modes of Intervention: education, support, clarifying     Discipline Responsible: Psychoeducation       Signature:  CASEY James

## 2025-07-14 NOTE — TRANSITION OF CARE
Behavioral Health Transition Record    Patient Name: Colton Hurley  YOB: 1965   Medical Record Number: [id number]  Date of Admission: 7/3/2025 11:33 AM   Date of Discharge: 07/14/2025    Attending Provider: Derek Nam MD   Discharging Provider: Derek Nam MD  To contact this individual call [phone] and ask the  to page.  If unavailable, ask to be transferred to Behavioral Health Provider on call.  A Behavioral Health Provider will be available on call 24/7 and during holidays.    Primary Care Provider: No primary care provider on file.    Allergies   Allergen Reactions    Latex Rash    Shellfish-Derived Products Hives    Strawberry Extract Hives    Adhesive Tape Rash    Penicillins Rash     Trouble breathing       Reason for Admission:   Patient name: Colton Hurley  Patient's past mental health and addiction history: Schizophrenia  Patient's presentation to the ED and why the patient needs admission: Auditory hallucinations  Legal status:  []  Voluntary  [x]  Involuntary  []  Probate  Triggering/precipitating events: Noncompliance with psychiatric medications  Duration of triggering/precipitating events: Just prior to arrival    Admission Diagnosis: Auditory hallucination [R44.0]  Schizophrenia (HCC) [F20.9]  Schizophrenia, unspecified type (HCC) [F20.9]    * No surgery found *    Results for orders placed or performed during the hospital encounter of 07/03/25   Urinalysis with Microscopic   Result Value Ref Range    Color, UA Yellow Yellow    Turbidity UA Clear Clear    Glucose, Ur NEGATIVE NEGATIVE mg/dL    Bilirubin, Urine NEGATIVE NEGATIVE    Ketones, Urine NEGATIVE NEGATIVE mg/dL    Specific Gravity, UA 1.010 1.005 - 1.030    Urine Hgb NEGATIVE NEGATIVE    pH, Urine 6.0 5.0 - 8.0    Protein, UA NEGATIVE NEGATIVE mg/dL    Urobilinogen, Urine 1.0 0.0 - 1.0 EU/dL    Nitrite, Urine NEGATIVE NEGATIVE    Leukocyte Esterase, Urine NEGATIVE NEGATIVE    WBC, UA 0 TO 5 0 TO 5

## 2025-07-14 NOTE — BH NOTE
Patient awake in day room upon approach.  Patient has good eye contact.    Patient presents hopeless and helpless.   Appears somewhat disheveled  Patient denies suicidal/homicidal ideations and visual hallucinations, reports auditory hallucinations that he cannot describe other than \"voices\" that are not commanding in nature.     Patient reports anxiety 10/10 and depression 8/10  Patient denies pain  Patient is  taking  ordered medications without issue. Patient is not attending groups per note review.  Purposeful Q15min rounding continues.

## 2025-07-14 NOTE — CARE COORDINATION
EMPERATRIZ contacted [facility] [phone] to discuss scheduling transportation from this facility to [facility]. EMPERATRIZ was advised their earliest  time is 7:30 PM. EMPERATRIZ scheduled the transportation due to [facility]var[facility] and [facility] not having wheelchair transportation.     EMPERATRIZ contacted [facility] liaison [place] [phone] and informed her of pt's scheduled  time.     EMPERATRIZ handed the ambulette packet to assigned RN.     In order to ensure appropriate transition and discharge planning is in place, the following documents have been transmitted to [facility], as the new outpatient provider:    The d/c diagnosis was transmitted to the next care provider  The reason for hospitalization was transmitted to the next care provider  The d/c medications (dosage and indication) were transmitted to the next care provider   The continuing care plan was transmitted to the next care provider

## 2025-07-14 NOTE — PLAN OF CARE
Problem: Depression  Goal: Will be euthymic at discharge  Description: INTERVENTIONS:  1. Administer medication as ordered  2. Provide emotional support via 1:1 interaction with staff  3. Encourage involvement in milieu/groups/activities  4. Monitor for social isolation  7/13/2025 2318 by Ladan Moore RN  Outcome: Progressing     Problem: Anxiety  Goal: Will report anxiety at manageable levels  Description: INTERVENTIONS:  1. Administer medication as ordered  2. Teach and rehearse alternative coping skills  3. Provide emotional support with 1:1 interaction with staff  7/13/2025 2318 by Ladan Moore, RN  Outcome: Progressing     Problem: Coping  Goal: Pt/Family able to verbalize concerns and demonstrate effective coping strategies  Description: INTERVENTIONS:  1. Assist patient/family to identify coping skills, available support systems and cultural and spiritual values  2. Provide emotional support, including active listening and acknowledgement of concerns of patient and caregivers  3. Reduce environmental stimuli, as able  4. Instruct patient/family in relaxation techniques, as appropriate  5. Assess for spiritual pain/suffering and initiate Spiritual Care, Psychosocial Clinical Specialist consults as needed  7/13/2025 2318 by Ladan Moore, RN  Outcome: Progressing

## 2025-07-14 NOTE — DISCHARGE INSTRUCTIONS
Follow up for Tobacco Cessation at:    Critical access hospital Tobacco Treatment                                 Date:  Friday *** at 10am              [address]    [place] [address]   (Inside [facility]    take B elevators to 7th floor)   Phone: [phone]   Fax: [phone]

## 2025-07-14 NOTE — BH NOTE
[facility]  Discharge Note    Pt discharged with followings belongings:   Dental Appliances: None  Vision - Corrective Lenses: None  Hearing Aid: None  Jewelry: None  Body Piercings Removed: No  Clothing: Pajamas, Belt, Socks, Jacket/Coat, Footwear, Shirt, Pants, Undergarments (plaid jacket, gry sneakers, maryjane briefs, blk pants, blk tights, grn dre)  Other Valuables: Lighter/Matches, Other (Comment), Personal Toiletries, Money, Wallet, Credit/Debit Card, Cane (flashlights, loose change, 5 bic razors and shaving cream, multi misc toiletries, multi misc cards, ss card, $30 cash)   Valuables sent home with or returned to patient. Patient educated on aftercare instructions: yes  Information faxed to N/A by N/A  at 6:09 PM .Patient verbalize understanding of AVS:  yes.      Status EXAM upon discharge:  Mental Status and Behavioral Exam  Normal: No  Level of Assistance: Independent/Self  Facial Expression: Flat  Affect: Congruent  Level of Consciousness: Alert  Frequency of Checks: 4 times per hour, close  Mood:Normal: No  Mood: Depressed, Anxious  Motor Activity:Normal: No  Motor Activity: Decreased  Eye Contact: Good  Observed Behavior: Cooperative  Sexual Misconduct History: Current - no  Preception: Viola to person, Viola to time, Viola to place, Viola to situation  Attention:Normal: No  Attention: Distractible  Thought Processes: Unremarkable  Thought Content:Normal: No  Thought Content: Preoccupations  Depression Symptoms: Impaired concentration, Isolative  Anxiety Symptoms: Generalized  Cony Symptoms: No problems reported or observed.  Hallucinations: Auditory (comment) (voices, whena sked poatient stated they can not tell what they are saying)  Delusions: No  Memory:Normal: No  Memory: Poor remote, Poor recent  Insight and Judgment: No  Insight and Judgment: Poor judgment, Poor insight, Unmotivated    Tobacco Screening:  Practical Counseling, on admission, richard X, if applicable and completed

## 2025-07-14 NOTE — CARE COORDINATION
Pt was seen during treatment team. Pt reports feeling good today, denied SI/HI/AVH. Pt expressed feeling good on the medications and he feels ready to be discharged to [facility] today. Pt is aware Jaimee Hinkle will continue his mental health treatment at time of discharge. Transportation will be arranged for the pt and SW was advised the pt is appropriate for an ambulette. Pt cooperative, appropriate, with improved insight/judgement.     EMPERATRIZ contacted [facility] liaison Jaimee [phone] and notified her of pt's discharge for today. Jaimee verbalized understanding and informed EMPERATRIZ their transportation staff is not available at this time. Assigned RN will need to call [phone] and follow the prompts for the [place] [facility] to complete N2N.

## 2025-07-14 NOTE — DISCHARGE INSTR - COC
Continuity of Care Form    Patient Name: Colton Hurley   :  1965  MRN:  [id number]    Admit date:  7/3/2025  Discharge date:  2025    Code Status Order: Full Code   Advance Directives:     Admitting Physician:  Derek Nam MD  PCP: No primary care provider on file.    Discharging Nurse: GENEVIEVE  Discharging Hospital Unit/Room#: 7306/7306-A  Discharging Unit Phone Number: [phone]    Emergency Contact:   Extended Emergency Contact Information  Primary Emergency Contact: Nenita Jessica  Mobile Phone: [phone]  Relation: Brother/Sister  Secondary Emergency Contact: Remy Hurley  Mobile Phone: [phone]  Relation: Brother/Sister  Preferred language: English   needed? No    Past Surgical History:  Past Surgical History:   Procedure Laterality Date    BACK SURGERY      IR CAROTID STENT W PROTECTION  2025    IR CAROTID STENT W PROTECTION 2025 Kehinde Coffey MD [facility]    IR MECHANICAL ART THROMBECTOMY INTRACRANIAL  2025    IR MECHANICAL ART THROMBECTOMY INTRACRANIAL 2025 Kehinde Coffey MD [facility]    LEG SURGERY Left     Pins inserted tib/ fib    UPPER GASTROINTESTINAL ENDOSCOPY N/A 2021    EGD BIOPSY performed by Leo Zurita MD at [facility]    VASCULAR SURGERY Left 2025    LEFT BRACHIAL ARTERY  THROMBECTOMY performed by Kirstie Calix MD at [facility]    VASCULAR SURGERY Left 2025    LEFT UPPER EXTREMITY THROMBECTOMY performed by Kirstie Calix MD at [facility]       Immunization History:   Immunization History   Administered Date(s) Administered    COVID-19, MODERNA BLUE border, Primary or Immunocompromised, (age 12y+), IM, 100 mcg/0.5mL 2021    Influenza, AFLURIA (age 3 y+), FLUZONE, (age 6 mo+), Quadv MDV, 0.5mL 2017, 2017    Influenza, FLUCELVAX, (age 6 mo+), MDCK, Quadv PF, 0.5mL 2021    Pneumococcal, PPSV23, PNEUMOVAX 23, (age 2y+), SC/IM, 0.5mL

## 2025-07-14 NOTE — PLAN OF CARE
Patient denies suicidal ideation, homicidal ideation, and visual hallucinations. Endorses auditory hallucinations of voices, when asked if he knew what they were saying patient stated \"no\". Endorses anxiety  as 10/10 and depression as 8/10. Denies racing thoughts. Appears flat, depressed, anxious,  and cooperative during assessment. Reports appetite is good and sleep is poor with difficulty \"falling and staying\" asleep. Patient is taking medications without issues at this time. Patient is observed isolative to themself. Remains in control of behaviors.        Problem: Depression  Goal: Will be euthymic at discharge  Description: INTERVENTIONS:  1. Administer medication as ordered  2. Provide emotional support via 1:1 interaction with staff  3. Encourage involvement in milieu/groups/activities  4. Monitor for social isolation  Outcome: Progressing       Problem: Psychosis  Goal: Will report no hallucinations or delusions  Description: INTERVENTIONS:  1. Administer medication as  ordered  2. Assist with reality testing to support increasing orientation  3. Assess if patient's hallucinations or delusions are encouraging self harm or harm to others and intervene as appropriate  Outcome: Progressing     Problem: Anxiety  Goal: Will report anxiety at manageable levels  Description: INTERVENTIONS:  1. Administer medication as ordered  2. Teach and rehearse alternative coping skills  3. Provide emotional support with 1:1 interaction with staff    Outcome: Progressing       Problem: Sleep Disturbance  Goal: Will exhibit normal sleeping pattern  Description: INTERVENTIONS:  1. Administer medication as ordered  2. Decrease environmental stimuli, including noise, as appropriate  3. Discourage social isolation and naps during the day  Outcome: Progressing     Problem: Involuntary Admit  Goal: Will cooperate with staff recommendations and doctor's orders and will demonstrate appropriate behavior  Description: INTERVENTIONS:  1.

## 2025-07-14 NOTE — BH NOTE
This RN called Jaimee Hinkle ([phone]) to give N2N, no answer at this time unable to leave a VM as VM box if full.

## 2025-07-14 NOTE — DISCHARGE SUMMARY
DISCHARGE SUMMARY      Patient ID:  Colton Hurley  [id number]  60 y.o.  1965    Admit date: 7/3/2025    Discharge date and time: 7/14/2025    Admitting Physician: Derek Nam MD     Discharge Physician: Dr Cyril RALPH    Discharge Diagnoses:   Patient Active Problem List   Diagnosis    Schizophrenia, schizo-affective (HCC)    Dermatitis    Pulmonary emphysema (HCC)    Cystic mass of pancreas    Spondylosis of lumbar region without myelopathy or radiculopathy    Gastroesophageal reflux disease without esophagitis    Cervical adenopathy    Depression    Severe major depression without psychotic features (HCC)    Exposure to communicable diseases    Hyponatremia    Abdominal pain    Tobacco abuse    Benign prostatic hyperplasia with nocturia    Acute CVA (cerebrovascular accident) (HCC)    Acute pulmonary embolism without acute cor pulmonale, unspecified pulmonary embolism type (HCC)    Ischemia of left upper extremity    Hypokalemia    Alcoholic intoxication with complication    Alcohol abuse    Acute exacerbation of chronic paranoid schizophrenia (HCC)       Admission Condition: poor    Discharged Condition: stable    Admission Circumstance: Patient name: Colton Hurley  Patient's past mental health and addiction history: Schizophrenia  Patient's presentation to the ED and why the patient needs admission: Auditory hallucinations  Legal status:  []  Voluntary  [x]  Involuntary  []  Probate  Triggering/precipitating events: Noncompliance with psychiatric medications  Duration of triggering/precipitating events: Just prior to arrival      PAST MEDICAL/PSYCHIATRIC HISTORY:   Past Medical History:   Diagnosis Date    Arthritis     Asthma 02/26/2018    BPH (benign prostatic hyperplasia)     Chronic back pain     Plates inseted from L2-L5    COPD (chronic obstructive pulmonary disease) (HCC)     Depression     Emphysema lung (HCC)     Hepatitis C 2011    Schizo affective schizophrenia (HCC)        FAMILY/SOCIAL

## 2025-07-14 NOTE — BH NOTE
This RN called Jaimee Hinkle ([phone]) to give N2N, no answer at this time unable to leave a VM as VM box if full.

## 2025-07-15 NOTE — PROGRESS NOTES
1755: This RN called Jaimee Hinkle ([phone]) to give N2N, no answer at this time unable to leave a VM as VM box if full.     1842: This RN called Jaimee Hinkle ([phone]) to give N2N, no answer at this time unable to leave a VM at this time due to VM box being full.  
Attended the last half of this afterN's 75 min group on baseball and sportsmanship.    Attentive to a/v materials and peers comments.   Jackson sketch and presented / narrated it before the group using the overH projector.   He appeared to do well on this confidence building exercise.  With assistance, scored 100 on written quiz.   Earned particip prize.   Appeared to fully digest key learning points.     
BEHAVIORAL HEALTH FOLLOW-UP NOTE     7/11/2025     Patient was seen and examined in person, Chart reviewed   Patient's case discussed with staff/team    Chief Complaint: Psychosis    Interim History:   Patient is seen today in his room he is lying in bed he is flat and blunted he states he is feeling tired this morning.  Patient denies suicidal homicidal ideation intent or plan denies visual hallucinations.  He does continue to endorse auditory hallucinations but states that they are getting better he states they are occurring less he does not elaborate further.  He has been taking his medication, he has had no behavioral disturbances, sleep and appetite reported to be fair.  Patient has been accepted at [facility] awaiting pre-CERT.    Appetite: [x] Normal/Unchanged  [] Increased  [] Decreased      Sleep:       [x] Normal/Unchanged  [] Fair       [] Poor              Energy:    [x] Normal/Unchanged  [] Increased  [] Decreased        SI [] Present  [x] Absent    HI  []Present  [x] Absent     Aggression:  [] yes  [x] no    Patient is [x] able  [] unable to CONTRACT FOR SAFETY     PAST MEDICAL/PSYCHIATRIC HISTORY:   Past Medical History:   Diagnosis Date    Arthritis     Asthma 02/26/2018    BPH (benign prostatic hyperplasia)     Chronic back pain     Plates inseted from L2-L5    COPD (chronic obstructive pulmonary disease) (HCC)     Depression     Emphysema lung (HCC)     Hepatitis C 2011    Schizo affective schizophrenia (HCC)        FAMILY/SOCIAL HISTORY:  Family History   Problem Relation Age of Onset    Breast Cancer Mother     Heart Disease Father      Social History     Socioeconomic History    Marital status: Single     Spouse name: Not on file    Number of children: 1    Years of education: Not on file    Highest education level: Not on file   Occupational History     Employer: DISABLED   Tobacco Use    Smoking status: Every Day     Current packs/day: 0.75     Average packs/day: 0.8 packs/day 
BEHAVIORAL HEALTH FOLLOW-UP NOTE     7/12/2025     Patient was seen and examined in person, Chart reviewed   Patient's case discussed with staff/team    Chief Complaint: Psychosis    Interim History:   Patient is seen today in his room he is lying in bed he is flat and blunted but he does brighten slightly and is offering more today in conversation.  He states that he still continues to have auditory hallucinations but reports that the voices are softer and occurring less he states overall he feels medications helping.  He denies visual hallucinations, denies suicidal homicidal ideation intent or plan.  He is requesting some information on [facility] where he has been accepted told him I would let social work know.  He has been taking his medication, he has had no behavioral disturbances, sleep and appetite reported to be fair    Appetite: [x] Normal/Unchanged  [] Increased  [] Decreased      Sleep:       [x] Normal/Unchanged  [] Fair       [] Poor              Energy:    [x] Normal/Unchanged  [] Increased  [] Decreased        SI [] Present  [x] Absent    HI  []Present  [x] Absent     Aggression:  [] yes  [x] no    Patient is [x] able  [] unable to CONTRACT FOR SAFETY     PAST MEDICAL/PSYCHIATRIC HISTORY:   Past Medical History:   Diagnosis Date    Arthritis     Asthma 02/26/2018    BPH (benign prostatic hyperplasia)     Chronic back pain     Plates inseted from L2-L5    COPD (chronic obstructive pulmonary disease) (HCC)     Depression     Emphysema lung (HCC)     Hepatitis C 2011    Schizo affective schizophrenia (HCC)        FAMILY/SOCIAL HISTORY:  Family History   Problem Relation Age of Onset    Breast Cancer Mother     Heart Disease Father      Social History     Socioeconomic History    Marital status: Single     Spouse name: Not on file    Number of children: 1    Years of education: Not on file    Highest education level: Not on file   Occupational History     Employer: DISABLED   Tobacco Use    Smoking 
BEHAVIORAL HEALTH FOLLOW-UP NOTE     7/13/2025     Patient was seen and examined in person, Chart reviewed   Patient's case discussed with staff/team    Chief Complaint: Psychosis    Interim History:   Patient is seen today in his room he is lying in bed he is flat blunted but he does brighten he states he is feeling better.  He states he has intermittent auditory hallucinations but states overall they are improving he does state that they are there intermittently at baseline.  He denies suicidal homicidal ideation intent or plan, denies auditory visual hallucinations. He has been taking his medication, he has had no behavioral disturbances, sleep and appetite reported to be fair    Appetite: [x] Normal/Unchanged  [] Increased  [] Decreased      Sleep:       [x] Normal/Unchanged  [] Fair       [] Poor              Energy:    [x] Normal/Unchanged  [] Increased  [] Decreased        SI [] Present  [x] Absent    HI  []Present  [x] Absent     Aggression:  [] yes  [x] no    Patient is [x] able  [] unable to CONTRACT FOR SAFETY     PAST MEDICAL/PSYCHIATRIC HISTORY:   Past Medical History:   Diagnosis Date    Arthritis     Asthma 02/26/2018    BPH (benign prostatic hyperplasia)     Chronic back pain     Plates inseted from L2-L5    COPD (chronic obstructive pulmonary disease) (HCC)     Depression     Emphysema lung (HCC)     Hepatitis C 2011    Schizo affective schizophrenia (HCC)        FAMILY/SOCIAL HISTORY:  Family History   Problem Relation Age of Onset    Breast Cancer Mother     Heart Disease Father      Social History     Socioeconomic History    Marital status: Single     Spouse name: Not on file    Number of children: 1    Years of education: Not on file    Highest education level: Not on file   Occupational History     Employer: DISABLED   Tobacco Use    Smoking status: Every Day     Current packs/day: 0.75     Average packs/day: 0.8 packs/day for 30.0 years (22.5 ttl pk-yrs)     Types: Cigarettes    Smokeless 
BEHAVIORAL HEALTH FOLLOW-UP NOTE     7/5/2025     Patient was seen and examined in person, Chart reviewed   Patient's case discussed with staff/team    Chief Complaint: Psychosis    Interim History:   Patient seen today in his room with Dr. Werner.  Patient is flat and blunted.  Denies suicidal or homicidal ideations intent or plan endorses auditory hallucinations when asked patient what the voices are saying he states \"everything.\"  He is vague unable to stay with the voices are actually telling him.  He has poor insight and judgment.  He has been seen by vascular to assess his arm.  He has been noncompliant with his anticoagulation and per surgery patient may need an amputation in the future.  He offers little conversation.  He reports anxiety and racing thoughts to staff.  He is isolative and guarded    Appetite: [x] Normal/Unchanged  [] Increased  [] Decreased      Sleep:       [x] Normal/Unchanged  [] Fair       [] Poor              Energy:    [x] Normal/Unchanged  [] Increased  [] Decreased        SI [] Present  [x] Absent    HI  []Present  [x] Absent     Aggression:  [] yes  [x] no    Patient is [x] able  [] unable to CONTRACT FOR SAFETY     PAST MEDICAL/PSYCHIATRIC HISTORY:   Past Medical History:   Diagnosis Date    Arthritis     Asthma 02/26/2018    BPH (benign prostatic hyperplasia)     Chronic back pain     Plates inseted from L2-L5    COPD (chronic obstructive pulmonary disease) (HCC)     Depression     Emphysema lung (HCC)     Hepatitis C 2011    Schizo affective schizophrenia (HCC)        FAMILY/SOCIAL HISTORY:  Family History   Problem Relation Age of Onset    Breast Cancer Mother     Heart Disease Father      Social History     Socioeconomic History    Marital status: Single     Spouse name: Not on file    Number of children: 1    Years of education: Not on file    Highest education level: Not on file   Occupational History     Employer: DISABLED   Tobacco Use    Smoking status: Every Day     Current 
BEHAVIORAL HEALTH FOLLOW-UP NOTE     7/6/2025     Patient was seen and examined in person, Chart reviewed   Patient's case discussed with staff/team    Chief Complaint: Psychosis    Interim History:   Patient is seen in the room this morning.  Patient is assessment is essentially unchanged denies suicidal or homicidal ideations intent or plan continue to endorse auditory hallucinations are saying \"everything.\"  He states he always hears voices and they never go away..  Has been medication compliant.  No behavioral disturbances noted.  He is isolative guarded he has been seen by vascular surgery    Appetite: [x] Normal/Unchanged  [] Increased  [] Decreased      Sleep:       [x] Normal/Unchanged  [] Fair       [] Poor              Energy:    [x] Normal/Unchanged  [] Increased  [] Decreased        SI [] Present  [x] Absent    HI  []Present  [x] Absent     Aggression:  [] yes  [x] no    Patient is [x] able  [] unable to CONTRACT FOR SAFETY     PAST MEDICAL/PSYCHIATRIC HISTORY:   Past Medical History:   Diagnosis Date    Arthritis     Asthma 02/26/2018    BPH (benign prostatic hyperplasia)     Chronic back pain     Plates inseted from L2-L5    COPD (chronic obstructive pulmonary disease) (HCC)     Depression     Emphysema lung (HCC)     Hepatitis C 2011    Schizo affective schizophrenia (HCC)        FAMILY/SOCIAL HISTORY:  Family History   Problem Relation Age of Onset    Breast Cancer Mother     Heart Disease Father      Social History     Socioeconomic History    Marital status: Single     Spouse name: Not on file    Number of children: 1    Years of education: Not on file    Highest education level: Not on file   Occupational History     Employer: DISABLED   Tobacco Use    Smoking status: Every Day     Current packs/day: 0.75     Average packs/day: 0.8 packs/day for 30.0 years (22.5 ttl pk-yrs)     Types: Cigarettes    Smokeless tobacco: Never   Vaping Use    Vaping status: Never Used   Substance and Sexual Activity 
BEHAVIORAL HEALTH FOLLOW-UP NOTE     7/7/2025     Patient was seen and examined in person, Chart reviewed   Patient's case discussed with staff/team    Chief Complaint: Psychosis    Interim History:   Patient is seen today in his room he is lying in bed he is flat, blunted somewhat guarded.  Patient states that he is not sure he can go on discharge he states [facility] but states he is not sure if he will go back there.  He states that he is not sure he will be contacted for collateral but he will think about someone.  He continues to endorse auditory hallucinations but does not elaborate, denies visual hallucinations, denies suicidal homicidal ideation intent or plan. Has been medication compliant.  No behavioral disturbances noted.  He is isolative guarded he has been seen by vascular surgery    Appetite: [x] Normal/Unchanged  [] Increased  [] Decreased      Sleep:       [x] Normal/Unchanged  [] Fair       [] Poor              Energy:    [x] Normal/Unchanged  [] Increased  [] Decreased        SI [] Present  [x] Absent    HI  []Present  [x] Absent     Aggression:  [] yes  [x] no    Patient is [x] able  [] unable to CONTRACT FOR SAFETY     PAST MEDICAL/PSYCHIATRIC HISTORY:   Past Medical History:   Diagnosis Date    Arthritis     Asthma 02/26/2018    BPH (benign prostatic hyperplasia)     Chronic back pain     Plates inseted from L2-L5    COPD (chronic obstructive pulmonary disease) (HCC)     Depression     Emphysema lung (HCC)     Hepatitis C 2011    Schizo affective schizophrenia (HCC)        FAMILY/SOCIAL HISTORY:  Family History   Problem Relation Age of Onset    Breast Cancer Mother     Heart Disease Father      Social History     Socioeconomic History    Marital status: Single     Spouse name: Not on file    Number of children: 1    Years of education: Not on file    Highest education level: Not on file   Occupational History     Employer: DISABLED   Tobacco Use    Smoking 
BEHAVIORAL HEALTH FOLLOW-UP NOTE     7/8/2025     Patient was seen and examined in person, Chart reviewed   Patient's case discussed with staff/team    Chief Complaint: Psychosis    Interim History:   Patient is seen today in his room he is sitting on his bed he is flat blunted.  Patient states that he continues to feel helpless and helpless he states he is not sure he would go on discharge he states he is agreeable for skilled nursing facility on discharge if recommended.  He states that he should have gone when he was discharged in the hospital the first time for medical reasons.  He states that he continues to have auditory hallucinations but does not elaborate, denies suicidal homicidal ideation intent or plan. Has been medication compliant.  No behavioral disturbances noted.  He is isolative guarded he has been seen by vascular surgery    Appetite: [x] Normal/Unchanged  [] Increased  [] Decreased      Sleep:       [x] Normal/Unchanged  [] Fair       [] Poor              Energy:    [x] Normal/Unchanged  [] Increased  [] Decreased        SI [] Present  [x] Absent    HI  []Present  [x] Absent     Aggression:  [] yes  [x] no    Patient is [x] able  [] unable to CONTRACT FOR SAFETY     PAST MEDICAL/PSYCHIATRIC HISTORY:   Past Medical History:   Diagnosis Date    Arthritis     Asthma 02/26/2018    BPH (benign prostatic hyperplasia)     Chronic back pain     Plates inseted from L2-L5    COPD (chronic obstructive pulmonary disease) (HCC)     Depression     Emphysema lung (HCC)     Hepatitis C 2011    Schizo affective schizophrenia (HCC)        FAMILY/SOCIAL HISTORY:  Family History   Problem Relation Age of Onset    Breast Cancer Mother     Heart Disease Father      Social History     Socioeconomic History    Marital status: Single     Spouse name: Not on file    Number of children: 1    Years of education: Not on file    Highest education level: Not on file   Occupational History     Employer: DISABLED   Tobacco Use 
[facility]  Initial Interdisciplinary Treatment Plan Note      Original treatment plan Date & Time: 7/5/2025 0900    Admission Type:  Admission Type: Involuntary    Reason for admission:   Reason for Admission: \"Hearing and seeing things\"    Estimated Length of Stay:  5-7days  Estimated Discharge Date: To be determined by physician.    PATIENT STRENGTHS:  Patient Strengths:   Patient Strengths and Limitations:Limitations: Multiple barriers to leisure interests, Lacks leisure interests  Addictive Behavior: Addictive Behavior  In the Past 3 Months, Have You Felt or Has Someone Told You That You Have a Problem With  : None  Medical Problems:  Past Medical History:   Diagnosis Date    Arthritis     Asthma 02/26/2018    BPH (benign prostatic hyperplasia)     Chronic back pain     Plates inseted from L2-L5    COPD (chronic obstructive pulmonary disease) (HCC)     Depression     Emphysema lung (HCC)     Hepatitis C 2011    Schizo affective schizophrenia ([facility])      Status EXAM:Mental Status and Behavioral Exam  Normal: No  Level of Assistance: Independent/Self  Facial Expression: Flat  Affect: Blunt  Level of Consciousness: Alert  Frequency of Checks: 4 times per hour, close  Mood:Normal: No  Mood: Anxious, Sad  Motor Activity:Normal: No  Motor Activity: Decreased  Eye Contact: Fair  Observed Behavior: Withdrawn, Guarded  Sexual Misconduct History: Current - no  Preception: [place] to person, [place] to time, [place] to place, [place] to situation  Attention:Normal: No  Attention: Distractible  Thought Processes: Unremarkable  Thought Content:Normal: No  Thought Content: Preoccupations  Depression Symptoms: Impaired concentration, Isolative, Loss of interest  Anxiety Symptoms: Generalized  Cony Symptoms: No problems reported or observed.  Hallucinations: Auditory (comment) (voices, patient did not elaborate further)  Delusions: No  Memory:Normal: No  Memory: Poor remote  Insight and Judgment: No  Insight and 
Call placed to [facility] to give nurse to nurse x2. No answer. [facility] here to transport patient.  
Leisure assessment completed.    07/04/25 1211   Activities of Daily Living   Patient Requires assistance with daily self-care activities? No   Leisure Activity 1   3 Favorite Leisure Activities wataching tv being outside   Frequency > 2 hours/day   Last time this month   Barriers to participating  motivation;social (ie. no one to do it with)   Leisure Activity 2   Favorite Leisure Activities  same   Leisure Activity 3   Favorite Leisure Activities  same   Social   Patient reports spending the majority of their free time with one other person   Patient verbalizes a preference for spending free time with one other person   Patient’s perception of support system less healthy  (sister and dtr)   Patient’s perception of barriers to socializing with others include(s) finances;transportation;lack of motivation/interest   Beliefs & Coping   Has difficulty dealing with feelings   Yes   Internalizes feelings/Keeps feelings in No   Externalizes feelings through aggressiveness or poor temper control  No   Feels uncomfortable around others  No   Has difficulty talking to others  No   Depends on others for direction or decisions No   Difficulty dealing with anger of others  No   Difficulty dealing with own anger  No   Difficulty managing stress Yes   Has recently perceived/experienced loss, disappointment, humiliation or failure  Yes   General perception about self likes self   Attitude about abilities occasionally fails   Locus of Control  most of the time   Belief about recovery Recovery is possible   Patient Identified Strengths  not sure   Patient Identified Limitations  no car   Perception of most stressful event prior to hospitalization get staples out and my mental health   Perception of changes needed get meds   Strengths and Limitations   Strengths Demonstrates basic social skills;Independent in basic self-care activities   Limitations Multiple barriers to leisure interests;Lacks leisure interests       
OCCUPATIONAL THERAPY INITIAL EVALUATION    [facility] [address], [place]     Date:2025                                                Patient Name: Colton Hurley  MRN: [id number]  : 1965  Room: [facility]7306    Evaluating OT: Heena Lee OTR/L; [id number]     Referring Provider: Beth Bashir APRN - CNP  Specific Provider Orders/Date: OT Eval and Treat 25 0930     Diagnosis: Acute exacerbation of chronic paranoid schizophrenia.     Surgery: None at time of eval.     Pertinent Medical History:  has a past medical history of Arthritis, Asthma, BPH (benign prostatic hyperplasia), Chronic back pain, COPD (chronic obstructive pulmonary disease) (HCC), Depression, Emphysema lung (HCC), Hepatitis C, and Schizo affective schizophrenia (HCC).   25 LEFT BRACHIAL ARTERY  THROMBECTOMY.  25 LEFT UPPER EXTREMITY THROMBECTOMY.    Recommended Adaptive Equipment: TBD    Precautions:  Fall Risk, +alarms, cognition, suicide precautions, LUE limb alert, LUE elevated, per chart h/o CVA w/ R hemiparesis    Assessment of current deficits   [x] Functional mobility  [x]ADLs  [x] Strength               []Cognition   [x] Functional transfers   [x] IADLs         [x] Safety Awareness   [x]Endurance   [x] Fine Coordination        [x] ROM     [] Vision/perception   [x]Sensation    []Gross Motor Coordination [x] Balance   [] Delirium                  []Motor Control     [] Communication    OT PLAN OF CARE   OT POC based on physician orders, patient diagnosis and results of clinical assessment    Frequency/Duration 1-3 days/wk for 2 weeks PRN   Specific OT Treatment Interventions to include:   * Instruction/training on adapted ADL techniques and AE recommendations to increase functional independence within precautions       * Training on energy conservation strategies, correct breathing pattern and techniques to improve independence/tolerance 
Patient declined invitation to the following groups:    Community Meeting  Education    Patient will continue to be provided with opportunities to enhance leisure skills/interests and/or coping mechanisms.  
Patient declined invitation to the following groups:    Community Meeting ([id number])    Patient will continue to be provided with opportunities to enhance leisure skills/interests and/or coping mechanisms.  
Patient declined to attend the following groups:    Community Meeting  Psychoeducation       Will continue to encourage patient to attend programming.     
Patient denies suicidal ideation and homicidal ideations.  Patient reports auditory hallucinations, stating he is \"hearing people talking.\"  Patient rates anxiety and depression 8 out of 10 due to \"being here.\"  Patient appears flat, sad, anxious, depressed with blunt responses and fair eye contact.  Presents calm and cooperative during assessment.  Patient is out on the unit and is social with select peers.  Medications taken without issue.  No complaints or concerns verbalized at this time.  No unit problems reported.  Will continue to observe and support.          
Patient had two recent thrombectomies to L arm on 6/22 and 6/24. LPN reported to this RN that patient reported increased pain and swelling in L hand and forearm. Patient's hand and forearm appear cool, swollen, and pale compared to earlier assessment. Denies numbness or tingling to L hand. However, limited movement noted to L thumb and forefinger and weakness to  in L hand. Wrist incision is well approximated and scabbed. L AC incision is well approximated with staples intact.     Psych NP notified, new order received for inpatient consult to vascular surgery. Vascular notified, perfectserve sent to Dr. Dominguez who asked for vascular surgical resident to be notified which was completed. Vascular resident came and assessed patient. Ace wrap applied to L arm per resident verbal order. Vascular surgeon to see patient tomorrow.     
Patient in room majority of the day. Patient denies si/hi/avh. Patient medication compliant. Patient has staples removed by physicians removed staples from patient arm. Has visit from pt/ot. Patient pleasant on approach. Comes out for food. Able to voice needs. Does not have behavioral issues this shift.   
Patient is resting quietly in bed with eyes closed at this time.  No signs of distress or discomfort noted.  No PRN medications given thus far.  Safety needs met.  No unit problems reported.  Purposeful rounding continued.  
Patient was observed out in the day room with the ACE bandage removed from huis left hand and loosened along his arm. This RN re-wrapped the ACE bandages on the patient left arm. During this the patient was muttering under his breath \"gen florecita\" and other unintelligible items. This RN encouraged the patient to elevate the arm and keep the ace bandage on as ordered. Patient appeared irritable during this.  
Patient was observed out in the day room with their Ace bandage removed from their hand and loosened on their arm. This RN approached th patient and reapplied the ace bandage to their arm and hand.  
Physical Therapy  Initial Assessment       Name: Colton Hurley  : 1965  MRN: [id number]      Date of Service: 2025    Evaluating PT:  Alberto Sanches PT, DPT  [id number]    Room #:  7306/7306-A  Diagnosis:  Auditory hallucination [R44.0]  Schizophrenia (HCC) [F20.9]  Schizophrenia, unspecified type (HCC) [F20.9]  PMHx/PSHx:   has a past medical history of Arthritis, Asthma, BPH (benign prostatic hyperplasia), Chronic back pain, COPD (chronic obstructive pulmonary disease) (HCC), Depression, Emphysema lung (HCC), Hepatitis C, and Schizo affective schizophrenia (HCC).  Procedure/Surgery:  none this admission   Precautions:  Falls, cognition   Equipment Needs:  TBD     SUBJECTIVE:    Per chart, pt has stayed many places but unsure of discharge plan.  Pt ambulated with [facility] PTA.  Equipment Owned:     OBJECTIVE:   Initial Evaluation  Date: 25 Treatment Short Term/ Long Term   Goals   AM-PAC 6 Clicks      Was pt agreeable to Eval/treatment? Yes      Does pt have pain? No pain currently      Bed Mobility  Rolling: SBA   Supine to sit: SBA   Sit to supine: NT  Scooting: SBA  Rolling: Supervision  Supine to sit: Supervision  Sit to supine: Supervision  Scooting: Supervision   Transfers Sit to stand: SBA  Stand to sit: SBA  Stand pivot: SBA with WW  Sit to stand: Supervision  Stand to sit: Supervision  Stand pivot: Supervision with AAD   Ambulation    30 feet x2 with SBA with WW  100 feet with Supervision with AAD   Stair negotiation: ascended and descended  NT  5 steps with 1 rail Supervision with AAD   ROM BUE:  Defer to OT  BLE:  WFL      Strength BUE:  Defer to OT  BLE:  WFL   Improve 1 MMT   Balance Sitting EOB:  SBA  Dynamic Standing:  SBA with WW  Sitting EOB:  Independent   Dynamic Standing:  Supervision with AAD     Pt is A & O x 4. Flat affect.   Sensation:  Pt denies any numbness or tingling into extremities  Edema:  none      Therapeutic Exercises:  Functional mobility     Patient education  Pt 
Spiritual Health History and Assessment/Progress Note  Y  [facility] [employer]    (P) Initial Encounter,  ,  ,      Name: Colton Hurley MRN: [id number]    Age: 59 y.o.     Sex: male   Language: English   Holiness: [employer]   Schizophrenia (HCC)     Date: 7/7/2025                           Spiritual Assessment began in [facility]  2        Referral/Consult From: (P) Rounding   Encounter Overview/Reason: (P) Initial Encounter  Service Provided For: (P) Patient    Zohreh, Belief, Meaning:   Patient identifies as spiritual  Family/Friends No family/friends present      Importance and Influence:  Patient has spiritual/personal beliefs that influence decisions regarding their health  Family/Friends No family/friends present    Community:  Patient feels well-supported. Support system includes: Extended family  Family/Friends No family/friends present    Assessment and Plan of Care:     Patient Interventions include: Facilitated expression of thoughts and feelings and Explored spiritual coping/struggle/distress  Family/Friends Interventions include: No family/friends present    Patient Plan of Care: Spiritual Care available upon further referral  Family/Friends Plan of Care: No family/friends present    Electronically signed by JASON CAUSEY on 7/7/2025 at 1:57 PM   
This RN approached the patient in their room for VS, the patient's ace bandages were removed. This RN reapplied the bandages and educated patient on importance of keeping them on, patient appeared unreceptive to this.  
This RN called called Jaimee Hermosillo as patient has a knife with the [facility] [employer] to see if the patient was allowed to have it there. Per Jaimee the knife is not allowed there.  
Vascular Surgery Progress Note    Pt is being seen in f/u today regarding  LUE edema, pain sp thrombectomy     Subjective  Complaining from pain and increased swelling of his hand.     Current Medications:      acetaminophen, magnesium hydroxide, aluminum & magnesium hydroxide-simethicone, hydrOXYzine HCl, haloperidol **OR** haloperidol lactate, melatonin, chlordiazePOXIDE    divalproex  250 mg Oral 2 times per day    aspirin  81 mg Oral Daily    apixaban  10 mg Oral BID    Followed by    [START ON 7/11/2025] apixaban  5 mg Oral BID    atorvastatin  40 mg Oral Nightly    benztropine  0.5 mg Oral BID    OLANZapine  15 mg Oral Nightly    senna  1 tablet Oral Nightly    multivitamin  1 tablet Oral Daily    famotidine  20 mg Oral BID    vitamin D  50,000 Units Oral Weekly    nicotine  1 patch TransDERmal Daily        PHYSICAL EXAM:    BP (!) 104/54   Pulse 90   Temp 97.1 °F (36.2 °C) (Temporal)   Resp 16   Ht 1.803 m (5' 11\")   Wt 67.7 kg (149 lb 4.8 oz)   SpO2 99%   BMI 20.82 kg/m²     Intake/Output Summary (Last 24 hours) at 7/6/2025 0724  Last data filed at 7/6/2025 0100  Gross per 24 hour   Intake 238 ml   Output --   Net 238 ml          Gen: awake, alert, no apparent distress  CVS: RRR  Resp: No increased work of breathing  Abd: Soft, non-tender, non-distended  R UE: 5/5 strength, normal sensation, palpable radial  L LE: 2/5  strength, swelling to hand present, warm to touch, brachial pulse biphasic, radial and ulnar signals present, staples to brachial incision intact, wrist incisions are C/D/I.     LABS:    Lab Results   Component Value Date    WBC 8.4 07/03/2025    HGB 10.8 (L) 07/03/2025    HCT 30.6 (L) 07/03/2025     07/03/2025    PROTIME 12.8 (H) 06/22/2025    INR 1.2 06/22/2025    APTT 30.3 06/25/2025    K 4.9 07/03/2025    BUN 5 (L) 07/03/2025    CREATININE 0.5 (L) 07/03/2025       A/P  59 y.o. male  with hx of LUE acute limb ischemia s/p brachial artery, radial and ulnar artery 
Vascular Surgery Progress Note    Pt is being seen in f/u today regarding L UE ischemia s/p thrombectomy 6/22, 6/24    Subjective  Pt s/e.  Overall feeling better.  Hand is sore but not very painful.  Has better strength in the hand.    Current Medications:     acetaminophen, magnesium hydroxide, aluminum & magnesium hydroxide-simethicone, hydrOXYzine HCl, haloperidol **OR** haloperidol lactate, melatonin, chlordiazePOXIDE    divalproex  250 mg Oral 2 times per day    aspirin  81 mg Oral Daily    apixaban  10 mg Oral BID    Followed by    [START ON 7/11/2025] apixaban  5 mg Oral BID    atorvastatin  40 mg Oral Nightly    benztropine  0.5 mg Oral BID    OLANZapine  15 mg Oral Nightly    senna  1 tablet Oral Nightly    multivitamin  1 tablet Oral Daily    famotidine  20 mg Oral BID    vitamin D  50,000 Units Oral Weekly    nicotine  1 patch TransDERmal Daily      PHYSICAL EXAM:    /66   Pulse 77   Temp 98.2 °F (36.8 °C) (Temporal)   Resp 18   Ht 1.803 m (5' 11\")   Wt 67.7 kg (149 lb 4.8 oz)   SpO2 99%   BMI 20.82 kg/m²   No intake or output data in the 24 hours ending 07/08/25 1406       Gen Awake, alert, oriented x3, in no apparent distress   CVS RRR  Resp easy, nonlabored on RA  Abd soft, ndnt  L UE   Wrist incision c/d/i   Radial/ulnar biphasic, palmar weakly biphasic   Hand stable in color   Able to wiggle fingers some   3/5 hand    Forearm incision - c/d/I, staples removed      LABS:    Lab Results   Component Value Date    WBC 8.4 07/03/2025    HGB 10.8 (L) 07/03/2025    HCT 30.6 (L) 07/03/2025     07/03/2025    PROTIME 12.8 (H) 06/22/2025    INR 1.2 06/22/2025    APTT 30.3 06/25/2025    K 4.9 07/03/2025    BUN 5 (L) 07/03/2025    CREATININE 0.5 (L) 07/03/2025     A/P s/p L UE thrombectomy 6/22, 6/25  Remains at risk for limb loss- if shuts down again, will require amputation of the L UE  Continue neurovascular checks   Hand  improving  Continue AC with eliquis   Staples removed 
Vascular Surgery Progress Note    Pt is being seen in f/u today regarding L UE ischemia s/p thrombectomy 6/22, 6/24    Subjective  Pt s/e.  Pt refused rehab, now admitted to psych.  Having some pain in the left arm near the incision line.  Swelling improving.      Current Medications:     acetaminophen, magnesium hydroxide, aluminum & magnesium hydroxide-simethicone, hydrOXYzine HCl, haloperidol **OR** haloperidol lactate, melatonin, chlordiazePOXIDE    divalproex  250 mg Oral 2 times per day    aspirin  81 mg Oral Daily    apixaban  10 mg Oral BID    Followed by    [START ON 7/11/2025] apixaban  5 mg Oral BID    atorvastatin  40 mg Oral Nightly    benztropine  0.5 mg Oral BID    OLANZapine  15 mg Oral Nightly    senna  1 tablet Oral Nightly    multivitamin  1 tablet Oral Daily    famotidine  20 mg Oral BID    vitamin D  50,000 Units Oral Weekly    nicotine  1 patch TransDERmal Daily      PHYSICAL EXAM:    /81   Pulse 84   Temp 97.7 °F (36.5 °C) (Temporal)   Resp 16   Ht 1.803 m (5' 11\")   Wt 67.7 kg (149 lb 4.8 oz)   SpO2 100%   BMI 20.82 kg/m²   No intake or output data in the 24 hours ending 07/07/25 1120       Gen Awake, alert, oriented x3, in no apparent distress   CVS RRR  Resp easy, nonlabored on RA  Abd soft, ndnt  L UE   Wrist incision c/d/i   Radial/ulnar biphasic, palmar weakly biphasic   Hand stable in color   Able to wiggle fingers some   2/5 hand    Forearm incision with staples in place      LABS:    Lab Results   Component Value Date    WBC 8.4 07/03/2025    HGB 10.8 (L) 07/03/2025    HCT 30.6 (L) 07/03/2025     07/03/2025    PROTIME 12.8 (H) 06/22/2025    INR 1.2 06/22/2025    APTT 30.3 06/25/2025    K 4.9 07/03/2025    BUN 5 (L) 07/03/2025    CREATININE 0.5 (L) 07/03/2025     A/P s/p L UE thrombectomy 6/22, 6/25  Remains at risk for limb loss- if shuts down again, will require amputation of the L UE  Continue neurovascular checks  Continue AC with eliquis   No further 
eliquis   - staples to be removed 2 weeks post-op (7/9)  - no further vascular interventions  - remains at high risk of limb loss    Madisyn Gaines MD  Surgery Resident PGY-2  7/7/2025  6:41 AM  
exam  - continue Eliquis  - keep arm elevated and with compression wrap for edema  - okay to continue diet  - no further vascular interventions to offer this patient, if this does not work and neurovascular exam worsens, would require amputation  - continue to monitor closely.      Discussed with Dr. Dominguez.     Kath Jauregui, DO     
 Wt 67.7 kg (149 lb 4.8 oz)   SpO2 98%   BMI 20.82 kg/m²   Gait - steady  Medication side effects(SE):     Mental Status Examination:    Level of consciousness:  within normal limits   Appearance:  fair grooming and fair hygiene  Behavior/Motor:  no abnormalities noted  Attitude toward examiner:  cooperative  Speech:  spontaneous, normal rate and normal volume   Mood: \" I am okay\"  Affect: Flat blunted  Thought processes: Linear without flights of ideas loose associations  Thought content: Endorses auditory hallucinations saying \"everything.\"  Delusions or other perceptual normalities.  Denies SI/HI intent or plan   Language: able to name objects and repeate phrases  Remote Memory: intact  Recent Memory: intact  Cognition:  oriented to person, place, and time   Fund of Knowledge: Vocabulary intact, pt is aware of current events and past history  Attetion and Concentration intact  Insight fair   Judgement fair         ASSESSMENT: Patient symptoms are:  [] Well controlled  [x] Improving  [] Worsening  [] No change      Diagnosis:  Principal Problem:    Acute exacerbation of chronic paranoid schizophrenia (HCC)  Resolved Problems:    Schizophrenia (HCC)      LABS:    No results for input(s): \"WBC\", \"HGB\", \"PLT\" in the last 72 hours.    No results for input(s): \"NA\", \"K\", \"CL\", \"CO2\", \"BUN\", \"CREATININE\", \"GLUCOSE\" in the last 72 hours.    No results for input(s): \"BILITOT\", \"ALKPHOS\", \"AST\", \"ALT\" in the last 72 hours.    Lab Results   Component Value Date/Time    LABAMPH NOT DETECTED 03/26/2011 12:00 AM    BARBSCNU POSITIVE 07/03/2025 12:25 PM    LABBENZ NEGATIVE 07/03/2025 12:25 PM    LABBENZ NOT DETECTED 03/26/2011 12:00 AM    CANNAB NOT DETECTED 03/26/2011 12:00 AM    LABMETH NEGATIVE 07/03/2025 12:25 PM    ETOH <10 07/03/2025 03:21 PM     Lab Results   Component Value Date/Time    TSH 1.56 05/12/2025 12:13 PM     No results found for: \"LITHIUM\"  Lab Results   Component Value Date    VALPROATE 19 (L) 07/09/2025 
Medications with the patient.   Risks, benefits, side effects, drug-to-drug interactions and alternatives to treatment were discussed.  Collateral information:   CD evaluation  Encourage patient to attend group and other milieu activities.  Discharge planning discussed with the patient and treatment team.    Continue Zyprexa 15 mg at bedtime  Depakote and 500 mg twice daily for racing thoughts and mood with repeat lab    PSYCHOTHERAPY/COUNSELING:  [x] Therapeutic interview  [x] Supportive  [] CBT  [] Ongoing  [] Other    [x] Patient continues to need, on a daily basis, active treatment furnished directly by or requiring the supervision of inpatient psychiatric personnel      Anticipated Length of stay: 3 to 7 days based on stability        NOTE: This report was transcribed using voice recognition software. Every effort was made to ensure accuracy; however, inadvertent computerized transcription errors may be present.     Electronically signed by ASHLIE Juarez CNP on 7/9/2025 at 12:24 PM

## (undated) DEVICE — SYRINGE MED 1ML POLYCARB LUERLOCK HUB

## (undated) DEVICE — 3M™ COBAN™ NL STERILE NON-LATEX SELF-ADHERENT WRAP, 2084S, 4 IN X 5 YD (10 CM X 4,5 M), 18 ROLLS/CASE: Brand: 3M™ COBAN™

## (undated) DEVICE — SYRINGE MED 20ML STD CLR PLAS LUERLOCK TIP N CTRL DISP

## (undated) DEVICE — ELECTRODE PT RET AD L9FT HI MOIST COND ADH HYDRGEL CORDED

## (undated) DEVICE — AGENT HEMSTAT W4XL4IN OXIDIZED REGENERATED CELOS STRUCTURED

## (undated) DEVICE — DRESSING HYDROFIBER AQUACEL AG ADVANTAGE 3.5X10 IN

## (undated) DEVICE — DEFENDO AIR WATER SUCTION AND BIOPSY VALVE KIT FOR  OLYMPUS: Brand: DEFENDO AIR/WATER/SUCTION AND BIOPSY VALVE

## (undated) DEVICE — GAUZE,SPONGE,POST-OP,4X3,STRL,LF: Brand: MEDLINE

## (undated) DEVICE — SYRINGE MED 5ML STD CLR PLAS LUERLOCK TIP N CTRL DISP

## (undated) DEVICE — SYRINGE MED 10ML LUERLOCK TIP W/O SFTY DISP

## (undated) DEVICE — DRESSING HYDROFIBER AQUACEL AG ADVANTAGE 3.5X6 IN

## (undated) DEVICE — OPTIVIEW, SACRUM, 9"X9": Brand: MEDLINE

## (undated) DEVICE — WIPES SKIN CLOTH READYPREP 9 X 10.5 IN 2% CHLORHEX GLUCONATE CHG PREOP

## (undated) DEVICE — FOGARTY ARTERIAL EMBOLECTOMY CATHETER 3F 40CM: Brand: FOGARTY

## (undated) DEVICE — GOWN,SIRUS,FABRNF,L,20/CS: Brand: MEDLINE

## (undated) DEVICE — GLOVE ORANGE PI 7 1/2   MSG9075

## (undated) DEVICE — CANNULA INJ L2.5IN BLNT TIP 3MM CLR BODY W/ 1 W VLV DLP

## (undated) DEVICE — FORCEPS BX L160CM JAW DIA2.4MM YEL L CAP W/ NDL DISP RAD

## (undated) DEVICE — TOWEL,OR,DSP,ST,BLUE,STD,6/PK,12PK/CS: Brand: MEDLINE

## (undated) DEVICE — SYSTEM CATH 20GA L1IN OD1.0668-1.143MM ID0.7874-0.8636MM 383516

## (undated) DEVICE — MAJOR VASCULAR: Brand: MEDLINE INDUSTRIES, INC.

## (undated) DEVICE — LOOP VES W25MM THK1MM MAXI RED SIL FLD REPELLENT 100 PER

## (undated) DEVICE — FOGARTY ARTERIAL EMBOLECTOMY CATHETER 2F 60CM: Brand: FOGARTY

## (undated) DEVICE — CONTAINER SPEC 60ML PH 7NEUTRAL BUFF FRMLN RDY TO USE

## (undated) DEVICE — DRAPE,REIN 53X77,STERILE: Brand: MEDLINE

## (undated) DEVICE — GOWN,AURORA,BRTHSLV,2XL,18/CS: Brand: MEDLINE

## (undated) DEVICE — Device

## (undated) DEVICE — SOLUTION IV 500ML 0.9% SOD CHL PH 5 INJ USP VIAFLX PLAS

## (undated) DEVICE — BITEBLOCK 54FR W/ DENT RIM BLOX

## (undated) DEVICE — 3M™ IOBAN™ 2 ANTIMICROBIAL INCISE DRAPE 6640EZ: Brand: IOBAN™ 2

## (undated) DEVICE — Z DISCONTINUED NO SUB IDED TUBING ETCO2 AD L6.5FT NSL ORAL CVD PRNG NONFLARED TIP OVR

## (undated) DEVICE — GOWN,SIRUS,FABRNF,2XL,18/CS: Brand: MEDLINE